# Patient Record
Sex: FEMALE | Race: WHITE | NOT HISPANIC OR LATINO | Employment: UNEMPLOYED
[De-identification: names, ages, dates, MRNs, and addresses within clinical notes are randomized per-mention and may not be internally consistent; named-entity substitution may affect disease eponyms.]

---

## 2018-01-01 ENCOUNTER — SURGERY (OUTPATIENT)
Age: 58
End: 2018-01-01

## 2018-01-01 ENCOUNTER — HOSPITAL ENCOUNTER (OUTPATIENT)
Dept: RADIOLOGY | Facility: HOSPITAL | Age: 58
Discharge: HOME | DRG: 405 | End: 2018-05-01
Attending: INTERNAL MEDICINE
Payer: MEDICARE

## 2018-01-01 ENCOUNTER — OFFICE VISIT (OUTPATIENT)
Dept: HEPATOLOGY | Facility: CLINIC | Age: 58
End: 2018-01-01
Attending: INTERNAL MEDICINE
Payer: MEDICARE

## 2018-01-01 ENCOUNTER — LAB VISIT (OUTPATIENT)
Dept: LAB | Facility: OTHER | Age: 58
End: 2018-01-01
Attending: INTERNAL MEDICINE
Payer: MEDICARE

## 2018-01-01 ENCOUNTER — TELEPHONE (OUTPATIENT)
Dept: HEMATOLOGY/ONCOLOGY | Facility: CLINIC | Age: 58
End: 2018-01-01

## 2018-01-01 ENCOUNTER — TELEPHONE (OUTPATIENT)
Dept: INTERVENTIONAL RADIOLOGY/VASCULAR | Facility: CLINIC | Age: 58
End: 2018-01-01

## 2018-01-01 ENCOUNTER — INITIAL CONSULT (OUTPATIENT)
Dept: HEMATOLOGY/ONCOLOGY | Facility: CLINIC | Age: 58
End: 2018-01-01
Payer: MEDICARE

## 2018-01-01 ENCOUNTER — CONFERENCE (OUTPATIENT)
Dept: TRANSPLANT | Facility: CLINIC | Age: 58
End: 2018-01-01

## 2018-01-01 ENCOUNTER — TELEPHONE (OUTPATIENT)
Dept: HEPATOLOGY | Facility: CLINIC | Age: 58
End: 2018-01-01

## 2018-01-01 ENCOUNTER — HOSPITAL ENCOUNTER (OUTPATIENT)
Dept: RADIOLOGY | Facility: HOSPITAL | Age: 58
Discharge: HOME | End: 2018-05-15
Attending: INTERNAL MEDICINE

## 2018-01-01 ENCOUNTER — TELEPHONE (OUTPATIENT)
Dept: TRANSPLANT | Facility: CLINIC | Age: 58
End: 2018-01-01

## 2018-01-01 ENCOUNTER — OFFICE VISIT (OUTPATIENT)
Dept: HEPATOLOGY | Facility: CLINIC | Age: 58
DRG: 405 | End: 2018-01-01
Payer: MEDICARE

## 2018-01-01 ENCOUNTER — PATIENT MESSAGE (OUTPATIENT)
Dept: HEPATOLOGY | Facility: CLINIC | Age: 58
End: 2018-01-01

## 2018-01-01 ENCOUNTER — HOSPITAL ENCOUNTER (INPATIENT)
Facility: HOSPITAL | Age: 58
LOS: 3 days | Discharge: HOME | DRG: 186 | End: 2018-05-25
Attending: EMERGENCY MEDICINE | Admitting: EMERGENCY MEDICINE
Payer: MEDICARE

## 2018-01-01 ENCOUNTER — ANESTHESIA EVENT (OUTPATIENT)
Dept: ENDOSCOPY | Facility: HOSPITAL | Age: 58
DRG: 405 | End: 2018-01-01
Payer: MEDICARE

## 2018-01-01 ENCOUNTER — ANESTHESIA (OUTPATIENT)
Dept: ENDOSCOPY | Facility: HOSPITAL | Age: 58
DRG: 405 | End: 2018-01-01
Payer: MEDICARE

## 2018-01-01 ENCOUNTER — PATIENT OUTREACH (OUTPATIENT)
Dept: ADMINISTRATIVE | Facility: CLINIC | Age: 58
End: 2018-01-01

## 2018-01-01 ENCOUNTER — HOSPITAL ENCOUNTER (INPATIENT)
Facility: HOSPITAL | Age: 58
LOS: 3 days | Discharge: HOME | DRG: 919 | End: 2018-07-30
Attending: EMERGENCY MEDICINE | Admitting: EMERGENCY MEDICINE
Payer: MEDICARE

## 2018-01-01 ENCOUNTER — OFFICE VISIT (OUTPATIENT)
Dept: HEPATOLOGY | Facility: CLINIC | Age: 58
End: 2018-01-01
Payer: MEDICARE

## 2018-01-01 ENCOUNTER — HOSPITAL ENCOUNTER (INPATIENT)
Facility: HOSPITAL | Age: 58
LOS: 13 days | Discharge: HOME | DRG: 405 | End: 2018-05-17
Attending: INTERNAL MEDICINE | Admitting: INTERNAL MEDICINE
Payer: MEDICARE

## 2018-01-01 ENCOUNTER — HOSPITAL ENCOUNTER (OUTPATIENT)
Facility: HOSPITAL | Age: 58
Discharge: HOME | End: 2018-08-13
Attending: RADIOLOGY | Admitting: RADIOLOGY
Payer: MEDICARE

## 2018-01-01 VITALS
WEIGHT: 155 LBS | BODY MASS INDEX: 26.4 KG/M2 | OXYGEN SATURATION: 98 % | HEART RATE: 76 BPM | HEIGHT: 63 IN | HEART RATE: 76 BPM | SYSTOLIC BLOOD PRESSURE: 107 MMHG | WEIGHT: 149 LBS | DIASTOLIC BLOOD PRESSURE: 53 MMHG | DIASTOLIC BLOOD PRESSURE: 60 MMHG | BODY MASS INDEX: 27.46 KG/M2 | TEMPERATURE: 98 F | RESPIRATION RATE: 18 BRPM | OXYGEN SATURATION: 97 % | HEIGHT: 63 IN | SYSTOLIC BLOOD PRESSURE: 104 MMHG | RESPIRATION RATE: 17 BRPM | TEMPERATURE: 98 F

## 2018-01-01 VITALS
WEIGHT: 166.69 LBS | SYSTOLIC BLOOD PRESSURE: 95 MMHG | TEMPERATURE: 98 F | HEIGHT: 63 IN | DIASTOLIC BLOOD PRESSURE: 50 MMHG | OXYGEN SATURATION: 96 % | HEART RATE: 80 BPM | BODY MASS INDEX: 29.54 KG/M2 | RESPIRATION RATE: 18 BRPM

## 2018-01-01 VITALS
DIASTOLIC BLOOD PRESSURE: 59 MMHG | DIASTOLIC BLOOD PRESSURE: 48 MMHG | TEMPERATURE: 98 F | BODY MASS INDEX: 26.31 KG/M2 | BODY MASS INDEX: 28.9 KG/M2 | SYSTOLIC BLOOD PRESSURE: 88 MMHG | OXYGEN SATURATION: 98 % | HEIGHT: 63 IN | WEIGHT: 148.5 LBS | DIASTOLIC BLOOD PRESSURE: 53 MMHG | RESPIRATION RATE: 19 BRPM | RESPIRATION RATE: 17 BRPM | SYSTOLIC BLOOD PRESSURE: 119 MMHG | HEART RATE: 104 BPM | RESPIRATION RATE: 16 BRPM | HEIGHT: 63 IN | TEMPERATURE: 98 F | OXYGEN SATURATION: 96 % | HEART RATE: 75 BPM | WEIGHT: 158.94 LBS | WEIGHT: 163.13 LBS | BODY MASS INDEX: 28.16 KG/M2 | OXYGEN SATURATION: 97 % | TEMPERATURE: 98 F | SYSTOLIC BLOOD PRESSURE: 87 MMHG | HEIGHT: 63 IN | HEART RATE: 78 BPM

## 2018-01-01 VITALS
DIASTOLIC BLOOD PRESSURE: 52 MMHG | OXYGEN SATURATION: 100 % | HEIGHT: 63 IN | BODY MASS INDEX: 27.46 KG/M2 | RESPIRATION RATE: 20 BRPM | WEIGHT: 155 LBS | HEART RATE: 67 BPM | TEMPERATURE: 98 F | SYSTOLIC BLOOD PRESSURE: 102 MMHG

## 2018-01-01 VITALS
BODY MASS INDEX: 26.17 KG/M2 | OXYGEN SATURATION: 100 % | RESPIRATION RATE: 18 BRPM | DIASTOLIC BLOOD PRESSURE: 61 MMHG | WEIGHT: 147.69 LBS | SYSTOLIC BLOOD PRESSURE: 128 MMHG | TEMPERATURE: 98 F | HEIGHT: 63 IN | HEART RATE: 93 BPM

## 2018-01-01 DIAGNOSIS — B18.2 CHRONIC HEPATITIS C WITHOUT HEPATIC COMA: ICD-10-CM

## 2018-01-01 DIAGNOSIS — K74.69 DECOMPENSATED HCV CIRRHOSIS: Primary | ICD-10-CM

## 2018-01-01 DIAGNOSIS — D61.818 PANCYTOPENIA: ICD-10-CM

## 2018-01-01 DIAGNOSIS — F32.A DEPRESSION, UNSPECIFIED DEPRESSION TYPE: ICD-10-CM

## 2018-01-01 DIAGNOSIS — D69.6 THROMBOCYTOPENIA: ICD-10-CM

## 2018-01-01 DIAGNOSIS — Z95.828 S/P TIPS (TRANSJUGULAR INTRAHEPATIC PORTOSYSTEMIC SHUNT): ICD-10-CM

## 2018-01-01 DIAGNOSIS — I81 PORTAL VEIN THROMBOSIS: Primary | ICD-10-CM

## 2018-01-01 DIAGNOSIS — B19.20 DECOMPENSATED HCV CIRRHOSIS: Primary | ICD-10-CM

## 2018-01-01 DIAGNOSIS — K73.2 CHRONIC ACTIVE HEPATITIS, NOT ELSEWHERE CLASSIFIED: ICD-10-CM

## 2018-01-01 DIAGNOSIS — B19.20 DECOMPENSATED HCV CIRRHOSIS: Chronic | ICD-10-CM

## 2018-01-01 DIAGNOSIS — J90 RECURRENT RIGHT PLEURAL EFFUSION: ICD-10-CM

## 2018-01-01 DIAGNOSIS — B19.20 DECOMPENSATED HCV CIRRHOSIS: ICD-10-CM

## 2018-01-01 DIAGNOSIS — K74.60 CIRRHOSIS: Primary | ICD-10-CM

## 2018-01-01 DIAGNOSIS — J94.8 HYDROTHORAX: Primary | ICD-10-CM

## 2018-01-01 DIAGNOSIS — J90 PLEURAL EFFUSION: ICD-10-CM

## 2018-01-01 DIAGNOSIS — J94.8 HYDROTHORAX: ICD-10-CM

## 2018-01-01 DIAGNOSIS — K74.69 DECOMPENSATED HCV CIRRHOSIS: Chronic | ICD-10-CM

## 2018-01-01 DIAGNOSIS — I81 PORTAL VEIN THROMBOSIS: ICD-10-CM

## 2018-01-01 DIAGNOSIS — D72.819 LEUKOPENIA, UNSPECIFIED TYPE: ICD-10-CM

## 2018-01-01 DIAGNOSIS — K74.69 DECOMPENSATED HCV CIRRHOSIS: ICD-10-CM

## 2018-01-01 DIAGNOSIS — B18.2 CHRONIC HEPATITIS C WITHOUT HEPATIC COMA: Primary | ICD-10-CM

## 2018-01-01 DIAGNOSIS — K74.69 OTHER CIRRHOSIS OF LIVER: ICD-10-CM

## 2018-01-01 DIAGNOSIS — K74.60 CIRRHOSIS: ICD-10-CM

## 2018-01-01 DIAGNOSIS — K74.69 OTHER CIRRHOSIS OF LIVER: Primary | ICD-10-CM

## 2018-01-01 DIAGNOSIS — I36.9 NONRHEUMATIC TRICUSPID VALVE DISORDER: ICD-10-CM

## 2018-01-01 DIAGNOSIS — R06.02 SOB (SHORTNESS OF BREATH): ICD-10-CM

## 2018-01-01 DIAGNOSIS — J90 RECURRENT RIGHT PLEURAL EFFUSION: Primary | ICD-10-CM

## 2018-01-01 DIAGNOSIS — N18.30 CKD STAGE 3 DUE TO TYPE 2 DIABETES MELLITUS: ICD-10-CM

## 2018-01-01 DIAGNOSIS — R06.02 SHORTNESS OF BREATH: ICD-10-CM

## 2018-01-01 DIAGNOSIS — I10 ESSENTIAL HYPERTENSION: ICD-10-CM

## 2018-01-01 DIAGNOSIS — R18.8 OTHER ASCITES: Primary | ICD-10-CM

## 2018-01-01 DIAGNOSIS — F41.9 ANXIETY: ICD-10-CM

## 2018-01-01 DIAGNOSIS — G93.40 ENCEPHALOPATHY: Primary | ICD-10-CM

## 2018-01-01 DIAGNOSIS — E88.09 HYPOALBUMINEMIA: ICD-10-CM

## 2018-01-01 DIAGNOSIS — E11.22 CKD STAGE 3 DUE TO TYPE 2 DIABETES MELLITUS: ICD-10-CM

## 2018-01-01 DIAGNOSIS — K74.60 HEPATIC CIRRHOSIS, UNSPECIFIED HEPATIC CIRRHOSIS TYPE, UNSPECIFIED WHETHER ASCITES PRESENT: Primary | ICD-10-CM

## 2018-01-01 LAB
ABO + RH BLD: NORMAL
ACID FAST MOD KINY STN SPEC: NORMAL
ALBUMIN FLD-MCNC: 0.4 G/DL
ALBUMIN SERPL BCP-MCNC: 1.8 G/DL
ALBUMIN SERPL BCP-MCNC: 1.9 G/DL
ALBUMIN SERPL BCP-MCNC: 2 G/DL
ALBUMIN SERPL BCP-MCNC: 2.1 G/DL
ALBUMIN SERPL BCP-MCNC: 2.1 G/DL
ALBUMIN SERPL BCP-MCNC: 2.2 G/DL
ALBUMIN SERPL BCP-MCNC: 2.3 G/DL
ALBUMIN SERPL BCP-MCNC: 2.3 G/DL
ALBUMIN SERPL BCP-MCNC: 2.4 G/DL
ALBUMIN SERPL BCP-MCNC: 2.6 G/DL
ALBUMIN SERPL BCP-MCNC: 2.6 G/DL
ALBUMIN SERPL BCP-MCNC: 3 G/DL
ALBUMIN SERPL BCP-MCNC: 3 G/DL
ALBUMIN SERPL BCP-MCNC: 3.1 G/DL
ALBUMIN SERPL BCP-MCNC: 3.2 G/DL
ALBUMIN SERPL BCP-MCNC: 3.3 G/DL
ALBUMIN SERPL BCP-MCNC: 3.4 G/DL
ALBUMIN SERPL BCP-MCNC: 3.4 G/DL
ALBUMIN SERPL BCP-MCNC: 3.5 G/DL
ALBUMIN SERPL BCP-MCNC: 3.7 G/DL
ALP SERPL-CCNC: 38 U/L
ALP SERPL-CCNC: 38 U/L
ALP SERPL-CCNC: 42 U/L
ALP SERPL-CCNC: 43 U/L
ALP SERPL-CCNC: 43 U/L
ALP SERPL-CCNC: 47 U/L
ALP SERPL-CCNC: 50 U/L
ALP SERPL-CCNC: 53 U/L
ALP SERPL-CCNC: 53 U/L
ALP SERPL-CCNC: 54 U/L
ALP SERPL-CCNC: 55 U/L
ALP SERPL-CCNC: 56 U/L
ALP SERPL-CCNC: 56 U/L
ALP SERPL-CCNC: 57 U/L
ALP SERPL-CCNC: 58 U/L
ALP SERPL-CCNC: 58 U/L
ALP SERPL-CCNC: 61 U/L
ALP SERPL-CCNC: 61 U/L
ALP SERPL-CCNC: 62 U/L
ALP SERPL-CCNC: 63 U/L
ALP SERPL-CCNC: 65 U/L
ALP SERPL-CCNC: 71 U/L
ALP SERPL-CCNC: 72 U/L
ALT SERPL W/O P-5'-P-CCNC: 10 U/L
ALT SERPL W/O P-5'-P-CCNC: 11 U/L
ALT SERPL W/O P-5'-P-CCNC: 12 U/L
ALT SERPL W/O P-5'-P-CCNC: 13 U/L
ALT SERPL W/O P-5'-P-CCNC: 16 U/L
ALT SERPL W/O P-5'-P-CCNC: 18 U/L
ALT SERPL W/O P-5'-P-CCNC: 20 U/L
ALT SERPL W/O P-5'-P-CCNC: 25 U/L
ALT SERPL W/O P-5'-P-CCNC: 26 U/L
ALT SERPL W/O P-5'-P-CCNC: 31 U/L
ALT SERPL W/O P-5'-P-CCNC: 37 U/L
ALT SERPL W/O P-5'-P-CCNC: 37 U/L
ALT SERPL W/O P-5'-P-CCNC: 44 U/L
ALT SERPL W/O P-5'-P-CCNC: 60 U/L
ALT SERPL W/O P-5'-P-CCNC: 82 U/L
ALT SERPL W/O P-5'-P-CCNC: 9 U/L
AMMONIA PLAS-SCNC: 37 UMOL/L
AMMONIA PLAS-SCNC: 58 UMOL/L
AMYLASE, BODY FLUID: 13 U/L
ANION GAP SERPL CALC-SCNC: 10 MMOL/L
ANION GAP SERPL CALC-SCNC: 3 MMOL/L
ANION GAP SERPL CALC-SCNC: 4 MMOL/L
ANION GAP SERPL CALC-SCNC: 4 MMOL/L
ANION GAP SERPL CALC-SCNC: 5 MMOL/L
ANION GAP SERPL CALC-SCNC: 5 MMOL/L
ANION GAP SERPL CALC-SCNC: 6 MMOL/L
ANION GAP SERPL CALC-SCNC: 7 MMOL/L
ANION GAP SERPL CALC-SCNC: 7 MMOL/L
ANION GAP SERPL CALC-SCNC: 8 MMOL/L
ANION GAP SERPL CALC-SCNC: 9 MMOL/L
ANISOCYTOSIS BLD QL SMEAR: SLIGHT
APPEARANCE FLD: NORMAL
APTT BLDCRRT: 26.5 SEC
AST SERPL-CCNC: 12 U/L
AST SERPL-CCNC: 13 U/L
AST SERPL-CCNC: 14 U/L
AST SERPL-CCNC: 141 U/L
AST SERPL-CCNC: 16 U/L
AST SERPL-CCNC: 18 U/L
AST SERPL-CCNC: 20 U/L
AST SERPL-CCNC: 20 U/L
AST SERPL-CCNC: 21 U/L
AST SERPL-CCNC: 21 U/L
AST SERPL-CCNC: 24 U/L
AST SERPL-CCNC: 25 U/L
AST SERPL-CCNC: 25 U/L
AST SERPL-CCNC: 30 U/L
AST SERPL-CCNC: 31 U/L
AST SERPL-CCNC: 35 U/L
AST SERPL-CCNC: 50 U/L
AST SERPL-CCNC: 51 U/L
AST SERPL-CCNC: 87 U/L
BACTERIA #/AREA URNS AUTO: ABNORMAL /HPF
BACTERIA BLD CULT: NORMAL
BACTERIA BLD CULT: NORMAL
BACTERIA FLD AEROBE CULT: NO GROWTH
BACTERIA SPEC ANAEROBE CULT: NORMAL
BACTERIA UR CULT: NO GROWTH
BASOPHILS # BLD AUTO: 0 K/UL
BASOPHILS # BLD AUTO: 0.01 K/UL
BASOPHILS # BLD AUTO: ABNORMAL K/UL
BASOPHILS NFR BLD: 0 %
BASOPHILS NFR BLD: 0.3 %
BASOPHILS NFR BLD: 0.4 %
BASOPHILS NFR BLD: 0.5 %
BILIRUB SERPL-MCNC: 0.3 MG/DL
BILIRUB SERPL-MCNC: 0.3 MG/DL
BILIRUB SERPL-MCNC: 0.4 MG/DL
BILIRUB SERPL-MCNC: 0.4 MG/DL
BILIRUB SERPL-MCNC: 0.6 MG/DL
BILIRUB SERPL-MCNC: 0.7 MG/DL
BILIRUB SERPL-MCNC: 0.7 MG/DL
BILIRUB SERPL-MCNC: 0.8 MG/DL
BILIRUB SERPL-MCNC: 0.9 MG/DL
BILIRUB SERPL-MCNC: 1 MG/DL
BILIRUB SERPL-MCNC: 1.2 MG/DL
BILIRUB SERPL-MCNC: 1.3 MG/DL
BILIRUB SERPL-MCNC: 1.5 MG/DL
BILIRUB SERPL-MCNC: 1.6 MG/DL
BILIRUB SERPL-MCNC: 1.7 MG/DL
BILIRUB SERPL-MCNC: 1.7 MG/DL
BILIRUB SERPL-MCNC: 1.9 MG/DL
BILIRUB UR QL STRIP: NEGATIVE
BLD GP AB SCN CELLS X3 SERPL QL: NORMAL
BLD PROD TYP BPU: NORMAL
BLOOD UNIT EXPIRATION DATE: NORMAL
BLOOD UNIT TYPE CODE: 6200
BLOOD UNIT TYPE: NORMAL
BNP SERPL-MCNC: 178 PG/ML
BNP SERPL-MCNC: 52 PG/ML
BODY FLD TYPE: NORMAL
BODY FLUID SOURCE AMYLASE: NORMAL
BODY FLUID SOURCE, LDH: NORMAL
BUN SERPL-MCNC: 10 MG/DL
BUN SERPL-MCNC: 11 MG/DL
BUN SERPL-MCNC: 12 MG/DL
BUN SERPL-MCNC: 14 MG/DL
BUN SERPL-MCNC: 15 MG/DL
BUN SERPL-MCNC: 16 MG/DL
BUN SERPL-MCNC: 17 MG/DL
BUN SERPL-MCNC: 20 MG/DL
BUN SERPL-MCNC: 22 MG/DL
BUN SERPL-MCNC: 22 MG/DL
BUN SERPL-MCNC: 24 MG/DL
BUN SERPL-MCNC: 26 MG/DL
BUN SERPL-MCNC: 26 MG/DL
BUN SERPL-MCNC: 27 MG/DL
BUN SERPL-MCNC: 27 MG/DL
CALCIUM SERPL-MCNC: 8 MG/DL
CALCIUM SERPL-MCNC: 8 MG/DL
CALCIUM SERPL-MCNC: 8.1 MG/DL
CALCIUM SERPL-MCNC: 8.3 MG/DL
CALCIUM SERPL-MCNC: 8.3 MG/DL
CALCIUM SERPL-MCNC: 8.4 MG/DL
CALCIUM SERPL-MCNC: 8.4 MG/DL
CALCIUM SERPL-MCNC: 8.6 MG/DL
CALCIUM SERPL-MCNC: 8.6 MG/DL
CALCIUM SERPL-MCNC: 8.8 MG/DL
CALCIUM SERPL-MCNC: 8.9 MG/DL
CALCIUM SERPL-MCNC: 8.9 MG/DL
CALCIUM SERPL-MCNC: 9 MG/DL
CALCIUM SERPL-MCNC: 9.1 MG/DL
CALCIUM SERPL-MCNC: 9.2 MG/DL
CALCIUM SERPL-MCNC: 9.3 MG/DL
CALCIUM SERPL-MCNC: 9.4 MG/DL
CALCIUM SERPL-MCNC: 9.4 MG/DL
CALCIUM SERPL-MCNC: 9.5 MG/DL
CALCIUM SERPL-MCNC: 9.5 MG/DL
CHLORIDE SERPL-SCNC: 100 MMOL/L
CHLORIDE SERPL-SCNC: 100 MMOL/L
CHLORIDE SERPL-SCNC: 101 MMOL/L
CHLORIDE SERPL-SCNC: 102 MMOL/L
CHLORIDE SERPL-SCNC: 102 MMOL/L
CHLORIDE SERPL-SCNC: 103 MMOL/L
CHLORIDE SERPL-SCNC: 104 MMOL/L
CHLORIDE SERPL-SCNC: 105 MMOL/L
CHLORIDE SERPL-SCNC: 106 MMOL/L
CHLORIDE SERPL-SCNC: 106 MMOL/L
CHLORIDE SERPL-SCNC: 107 MMOL/L
CHLORIDE SERPL-SCNC: 110 MMOL/L
CHLORIDE SERPL-SCNC: 96 MMOL/L
CHLORIDE SERPL-SCNC: 97 MMOL/L
CHLORIDE SERPL-SCNC: 98 MMOL/L
CHLORIDE SERPL-SCNC: 98 MMOL/L
CHLORIDE SERPL-SCNC: 99 MMOL/L
CHLORIDE SERPL-SCNC: 99 MMOL/L
CHOLEST FLD-MCNC: 9 MG/DL
CLARITY UR REFRACT.AUTO: CLEAR
CO2 SERPL-SCNC: 23 MMOL/L
CO2 SERPL-SCNC: 25 MMOL/L
CO2 SERPL-SCNC: 26 MMOL/L
CO2 SERPL-SCNC: 26 MMOL/L
CO2 SERPL-SCNC: 27 MMOL/L
CO2 SERPL-SCNC: 28 MMOL/L
CO2 SERPL-SCNC: 29 MMOL/L
CO2 SERPL-SCNC: 30 MMOL/L
CO2 SERPL-SCNC: 33 MMOL/L
CODING SYSTEM: NORMAL
COLOR FLD: NORMAL
COLOR UR AUTO: ABNORMAL
COLOR UR AUTO: YELLOW
COLOR UR AUTO: YELLOW
CREAT SERPL-MCNC: 0.8 MG/DL
CREAT SERPL-MCNC: 0.8 MG/DL
CREAT SERPL-MCNC: 0.9 MG/DL
CREAT SERPL-MCNC: 1 MG/DL
CREAT SERPL-MCNC: 1 MG/DL
CREAT SERPL-MCNC: 1.1 MG/DL
CREAT SERPL-MCNC: 1.2 MG/DL
CREAT SERPL-MCNC: 1.2 MG/DL
CREAT SERPL-MCNC: 1.3 MG/DL
CREAT SERPL-MCNC: 1.4 MG/DL
CREAT SERPL-MCNC: 1.4 MG/DL
CREAT SERPL-MCNC: 1.5 MG/DL
CREAT SERPL-MCNC: 1.7 MG/DL
CREAT UR-MCNC: 142 MG/DL
DACRYOCYTES BLD QL SMEAR: ABNORMAL
DIASTOLIC DYSFUNCTION: NO
DIFFERENTIAL METHOD: ABNORMAL
DISPENSE STATUS: NORMAL
EOSINOPHIL # BLD AUTO: 0 K/UL
EOSINOPHIL # BLD AUTO: 0.1 K/UL
EOSINOPHIL # BLD AUTO: ABNORMAL K/UL
EOSINOPHIL NFR BLD: 0 %
EOSINOPHIL NFR BLD: 0 %
EOSINOPHIL NFR BLD: 0.4 %
EOSINOPHIL NFR BLD: 0.9 %
EOSINOPHIL NFR BLD: 0.9 %
EOSINOPHIL NFR BLD: 1.2 %
EOSINOPHIL NFR BLD: 1.5 %
EOSINOPHIL NFR BLD: 2.2 %
EOSINOPHIL NFR BLD: 2.5 %
EOSINOPHIL NFR BLD: 2.6 %
EOSINOPHIL NFR BLD: 3 %
EOSINOPHIL NFR BLD: 3 %
EOSINOPHIL NFR BLD: 3.1 %
EOSINOPHIL NFR BLD: 3.8 %
EOSINOPHIL NFR BLD: 4.1 %
EOSINOPHIL NFR BLD: 4.2 %
EOSINOPHIL NFR BLD: 4.4 %
EOSINOPHIL NFR BLD: 4.5 %
EOSINOPHIL NFR BLD: 5 %
EOSINOPHIL NFR BLD: 5 %
EOSINOPHIL NFR BLD: 5.4 %
EOSINOPHIL NFR BLD: 6 %
ERYTHROCYTE [DISTWIDTH] IN BLOOD BY AUTOMATED COUNT: 15.1 %
ERYTHROCYTE [DISTWIDTH] IN BLOOD BY AUTOMATED COUNT: 15.2 %
ERYTHROCYTE [DISTWIDTH] IN BLOOD BY AUTOMATED COUNT: 15.5 %
ERYTHROCYTE [DISTWIDTH] IN BLOOD BY AUTOMATED COUNT: 15.5 %
ERYTHROCYTE [DISTWIDTH] IN BLOOD BY AUTOMATED COUNT: 15.6 %
ERYTHROCYTE [DISTWIDTH] IN BLOOD BY AUTOMATED COUNT: 15.7 %
ERYTHROCYTE [DISTWIDTH] IN BLOOD BY AUTOMATED COUNT: 15.8 %
ERYTHROCYTE [DISTWIDTH] IN BLOOD BY AUTOMATED COUNT: 15.8 %
ERYTHROCYTE [DISTWIDTH] IN BLOOD BY AUTOMATED COUNT: 15.9 %
ERYTHROCYTE [DISTWIDTH] IN BLOOD BY AUTOMATED COUNT: 15.9 %
ERYTHROCYTE [DISTWIDTH] IN BLOOD BY AUTOMATED COUNT: 16 %
ERYTHROCYTE [DISTWIDTH] IN BLOOD BY AUTOMATED COUNT: 16 %
ERYTHROCYTE [DISTWIDTH] IN BLOOD BY AUTOMATED COUNT: 16.1 %
ERYTHROCYTE [DISTWIDTH] IN BLOOD BY AUTOMATED COUNT: 16.2 %
ERYTHROCYTE [DISTWIDTH] IN BLOOD BY AUTOMATED COUNT: 16.3 %
ERYTHROCYTE [DISTWIDTH] IN BLOOD BY AUTOMATED COUNT: 16.3 %
ERYTHROCYTE [DISTWIDTH] IN BLOOD BY AUTOMATED COUNT: 16.4 %
ERYTHROCYTE [DISTWIDTH] IN BLOOD BY AUTOMATED COUNT: 16.5 %
ERYTHROCYTE [DISTWIDTH] IN BLOOD BY AUTOMATED COUNT: 17.2 %
ERYTHROCYTE [DISTWIDTH] IN BLOOD BY AUTOMATED COUNT: 18.1 %
ERYTHROCYTE [DISTWIDTH] IN BLOOD BY AUTOMATED COUNT: 18.2 %
ERYTHROCYTE [DISTWIDTH] IN BLOOD BY AUTOMATED COUNT: 18.2 %
ERYTHROCYTE [DISTWIDTH] IN BLOOD BY AUTOMATED COUNT: 18.7 %
EST. GFR  (AFRICAN AMERICAN): 38 ML/MIN/1.73 M^2
EST. GFR  (AFRICAN AMERICAN): 44.3 ML/MIN/1.73 M^2
EST. GFR  (AFRICAN AMERICAN): 48 ML/MIN/1.73 M^2
EST. GFR  (AFRICAN AMERICAN): 48.1 ML/MIN/1.73 M^2
EST. GFR  (AFRICAN AMERICAN): 52.6 ML/MIN/1.73 M^2
EST. GFR  (AFRICAN AMERICAN): 58 ML/MIN/1.73 M^2
EST. GFR  (AFRICAN AMERICAN): 58 ML/MIN/1.73 M^2
EST. GFR  (AFRICAN AMERICAN): >60 ML/MIN/1.73 M^2
EST. GFR  (NON AFRICAN AMERICAN): 33 ML/MIN/1.73 M^2
EST. GFR  (NON AFRICAN AMERICAN): 38.4 ML/MIN/1.73 M^2
EST. GFR  (NON AFRICAN AMERICAN): 41.7 ML/MIN/1.73 M^2
EST. GFR  (NON AFRICAN AMERICAN): 42 ML/MIN/1.73 M^2
EST. GFR  (NON AFRICAN AMERICAN): 45.6 ML/MIN/1.73 M^2
EST. GFR  (NON AFRICAN AMERICAN): 50.3 ML/MIN/1.73 M^2
EST. GFR  (NON AFRICAN AMERICAN): 50.3 ML/MIN/1.73 M^2
EST. GFR  (NON AFRICAN AMERICAN): 55.9 ML/MIN/1.73 M^2
EST. GFR  (NON AFRICAN AMERICAN): >60 ML/MIN/1.73 M^2
ESTIMATED AVG GLUCOSE: 117 MG/DL
ESTIMATED AVG GLUCOSE: 134 MG/DL
ESTIMATED PA SYSTOLIC PRESSURE: 22.36
FACT X PPP CHRO-ACNC: 0.24 IU/ML
FACT X PPP CHRO-ACNC: 0.42 IU/ML
FACT X PPP CHRO-ACNC: 0.5 IU/ML
FACT X PPP CHRO-ACNC: <0.1 IU/ML
FIBRINOGEN PPP-MCNC: 225 MG/DL
FOLATE SERPL-MCNC: 3.6 NG/ML
FUNGUS SPEC CULT: NORMAL
GIANT PLATELETS BLD QL SMEAR: PRESENT
GLUCOSE FLD-MCNC: 194 MG/DL
GLUCOSE SERPL-MCNC: 126 MG/DL
GLUCOSE SERPL-MCNC: 129 MG/DL
GLUCOSE SERPL-MCNC: 136 MG/DL
GLUCOSE SERPL-MCNC: 139 MG/DL
GLUCOSE SERPL-MCNC: 144 MG/DL
GLUCOSE SERPL-MCNC: 153 MG/DL
GLUCOSE SERPL-MCNC: 169 MG/DL
GLUCOSE SERPL-MCNC: 188 MG/DL
GLUCOSE SERPL-MCNC: 193 MG/DL
GLUCOSE SERPL-MCNC: 197 MG/DL
GLUCOSE SERPL-MCNC: 200 MG/DL
GLUCOSE SERPL-MCNC: 202 MG/DL
GLUCOSE SERPL-MCNC: 203 MG/DL
GLUCOSE SERPL-MCNC: 205 MG/DL
GLUCOSE SERPL-MCNC: 206 MG/DL
GLUCOSE SERPL-MCNC: 206 MG/DL
GLUCOSE SERPL-MCNC: 215 MG/DL
GLUCOSE SERPL-MCNC: 219 MG/DL
GLUCOSE SERPL-MCNC: 237 MG/DL
GLUCOSE SERPL-MCNC: 243 MG/DL
GLUCOSE SERPL-MCNC: 270 MG/DL
GLUCOSE SERPL-MCNC: 280 MG/DL
GLUCOSE SERPL-MCNC: 345 MG/DL
GLUCOSE SERPL-MCNC: 98 MG/DL
GLUCOSE UR QL STRIP: ABNORMAL
GLUCOSE UR QL STRIP: NEGATIVE
GLUCOSE UR QL STRIP: NEGATIVE
GRAM STN SPEC: NORMAL
GRAM STN SPEC: NORMAL
HAPTOGLOB SERPL-MCNC: 46 MG/DL
HBA1C MFR BLD HPLC: 5.7 %
HBA1C MFR BLD HPLC: 6.3 %
HCT VFR BLD AUTO: 21.4 %
HCT VFR BLD AUTO: 22.4 %
HCT VFR BLD AUTO: 22.8 %
HCT VFR BLD AUTO: 22.9 %
HCT VFR BLD AUTO: 23.8 %
HCT VFR BLD AUTO: 24 %
HCT VFR BLD AUTO: 24 %
HCT VFR BLD AUTO: 24.1 %
HCT VFR BLD AUTO: 24.6 %
HCT VFR BLD AUTO: 24.7 %
HCT VFR BLD AUTO: 24.7 %
HCT VFR BLD AUTO: 24.8 %
HCT VFR BLD AUTO: 25 %
HCT VFR BLD AUTO: 25.1 %
HCT VFR BLD AUTO: 25.2 %
HCT VFR BLD AUTO: 25.3 %
HCT VFR BLD AUTO: 25.5 %
HCT VFR BLD AUTO: 25.8 %
HCT VFR BLD AUTO: 27.1 %
HCT VFR BLD AUTO: 27.2 %
HCT VFR BLD AUTO: 27.6 %
HCT VFR BLD AUTO: 29 %
HCT VFR BLD AUTO: 29.5 %
HCT VFR BLD AUTO: 30.4 %
HCT VFR BLD AUTO: 30.9 %
HGB BLD-MCNC: 6.5 G/DL
HGB BLD-MCNC: 6.9 G/DL
HGB BLD-MCNC: 6.9 G/DL
HGB BLD-MCNC: 7 G/DL
HGB BLD-MCNC: 7.3 G/DL
HGB BLD-MCNC: 7.3 G/DL
HGB BLD-MCNC: 7.4 G/DL
HGB BLD-MCNC: 7.5 G/DL
HGB BLD-MCNC: 7.5 G/DL
HGB BLD-MCNC: 7.6 G/DL
HGB BLD-MCNC: 7.7 G/DL
HGB BLD-MCNC: 7.8 G/DL
HGB BLD-MCNC: 7.9 G/DL
HGB BLD-MCNC: 8 G/DL
HGB BLD-MCNC: 8.1 G/DL
HGB BLD-MCNC: 8.4 G/DL
HGB BLD-MCNC: 8.5 G/DL
HGB BLD-MCNC: 8.6 G/DL
HGB BLD-MCNC: 8.8 G/DL
HGB BLD-MCNC: 9.2 G/DL
HGB BLD-MCNC: 9.2 G/DL
HGB BLD-MCNC: 9.7 G/DL
HGB UR QL STRIP: NEGATIVE
HYALINE CASTS UR QL AUTO: 4 /LPF
HYPOCHROMIA BLD QL SMEAR: ABNORMAL
IMM GRANULOCYTES # BLD AUTO: 0 K/UL
IMM GRANULOCYTES # BLD AUTO: 0.01 K/UL
IMM GRANULOCYTES # BLD AUTO: 0.02 K/UL
IMM GRANULOCYTES # BLD AUTO: 0.03 K/UL
IMM GRANULOCYTES # BLD AUTO: 0.05 K/UL
IMM GRANULOCYTES # BLD AUTO: 0.06 K/UL
IMM GRANULOCYTES # BLD AUTO: ABNORMAL K/UL
IMM GRANULOCYTES NFR BLD AUTO: 0 %
IMM GRANULOCYTES NFR BLD AUTO: 0.4 %
IMM GRANULOCYTES NFR BLD AUTO: 0.5 %
IMM GRANULOCYTES NFR BLD AUTO: 0.6 %
IMM GRANULOCYTES NFR BLD AUTO: 0.7 %
IMM GRANULOCYTES NFR BLD AUTO: 0.7 %
IMM GRANULOCYTES NFR BLD AUTO: 0.8 %
IMM GRANULOCYTES NFR BLD AUTO: 0.8 %
IMM GRANULOCYTES NFR BLD AUTO: 0.9 %
IMM GRANULOCYTES NFR BLD AUTO: 1.1 %
IMM GRANULOCYTES NFR BLD AUTO: 1.1 %
IMM GRANULOCYTES NFR BLD AUTO: 1.3 %
IMM GRANULOCYTES NFR BLD AUTO: 1.3 %
IMM GRANULOCYTES NFR BLD AUTO: ABNORMAL %
INR PPP: 1.1
INR PPP: 1.2
INR PPP: 1.3
INR PPP: 1.4
INR PPP: 1.5
INR PPP: 1.6
KETONES UR QL STRIP: NEGATIVE
KOH PREP SPEC: NORMAL
LACTATE SERPL-SCNC: 1.2 MMOL/L
LACTATE SERPL-SCNC: 1.5 MMOL/L
LDH FLD L TO P-CCNC: 25 U/L
LDH SERPL L TO P-CCNC: 148 U/L
LDH SERPL L TO P-CCNC: 179 U/L
LEUKOCYTE ESTERASE UR QL STRIP: NEGATIVE
LIPASE SERPL-CCNC: 16 U/L
LYMPHOCYTES # BLD AUTO: 0.1 K/UL
LYMPHOCYTES # BLD AUTO: 0.2 K/UL
LYMPHOCYTES # BLD AUTO: 0.3 K/UL
LYMPHOCYTES # BLD AUTO: 0.4 K/UL
LYMPHOCYTES # BLD AUTO: 0.4 K/UL
LYMPHOCYTES # BLD AUTO: ABNORMAL K/UL
LYMPHOCYTES NFR BLD: 10.9 %
LYMPHOCYTES NFR BLD: 11.1 %
LYMPHOCYTES NFR BLD: 12 %
LYMPHOCYTES NFR BLD: 12.3 %
LYMPHOCYTES NFR BLD: 12.9 %
LYMPHOCYTES NFR BLD: 14 %
LYMPHOCYTES NFR BLD: 14.1 %
LYMPHOCYTES NFR BLD: 14.4 %
LYMPHOCYTES NFR BLD: 15.6 %
LYMPHOCYTES NFR BLD: 15.9 %
LYMPHOCYTES NFR BLD: 17.1 %
LYMPHOCYTES NFR BLD: 17.4 %
LYMPHOCYTES NFR BLD: 2.5 %
LYMPHOCYTES NFR BLD: 23 %
LYMPHOCYTES NFR BLD: 4.8 %
LYMPHOCYTES NFR BLD: 5 %
LYMPHOCYTES NFR BLD: 5.1 %
LYMPHOCYTES NFR BLD: 5.9 %
LYMPHOCYTES NFR BLD: 6 %
LYMPHOCYTES NFR BLD: 7.1 %
LYMPHOCYTES NFR BLD: 7.4 %
LYMPHOCYTES NFR BLD: 9.7 %
LYMPHOCYTES NFR BLD: 9.8 %
LYMPHOCYTES NFR BLD: 9.8 %
LYMPHOCYTES NFR FLD MANUAL: 53 %
MAGNESIUM SERPL-MCNC: 1.3 MG/DL
MAGNESIUM SERPL-MCNC: 1.4 MG/DL
MAGNESIUM SERPL-MCNC: 1.5 MG/DL
MAGNESIUM SERPL-MCNC: 1.5 MG/DL
MAGNESIUM SERPL-MCNC: 1.6 MG/DL
MAGNESIUM SERPL-MCNC: 1.7 MG/DL
MAGNESIUM SERPL-MCNC: 1.7 MG/DL
MAGNESIUM SERPL-MCNC: 1.8 MG/DL
MAGNESIUM SERPL-MCNC: 2.1 MG/DL
MAGNESIUM SERPL-MCNC: 2.1 MG/DL
MAGNESIUM SERPL-MCNC: 2.3 MG/DL
MCH RBC QN AUTO: 24 PG
MCH RBC QN AUTO: 24.1 PG
MCH RBC QN AUTO: 24.4 PG
MCH RBC QN AUTO: 24.6 PG
MCH RBC QN AUTO: 24.6 PG
MCH RBC QN AUTO: 25 PG
MCH RBC QN AUTO: 25.1 PG
MCH RBC QN AUTO: 25.2 PG
MCH RBC QN AUTO: 25.6 PG
MCH RBC QN AUTO: 25.6 PG
MCH RBC QN AUTO: 25.7 PG
MCH RBC QN AUTO: 25.8 PG
MCH RBC QN AUTO: 25.9 PG
MCH RBC QN AUTO: 26 PG
MCH RBC QN AUTO: 26.1 PG
MCH RBC QN AUTO: 26.2 PG
MCH RBC QN AUTO: 26.3 PG
MCH RBC QN AUTO: 26.4 PG
MCH RBC QN AUTO: 26.6 PG
MCH RBC QN AUTO: 26.7 PG
MCHC RBC AUTO-ENTMCNC: 30 G/DL
MCHC RBC AUTO-ENTMCNC: 30.1 G/DL
MCHC RBC AUTO-ENTMCNC: 30.2 G/DL
MCHC RBC AUTO-ENTMCNC: 30.3 G/DL
MCHC RBC AUTO-ENTMCNC: 30.4 G/DL
MCHC RBC AUTO-ENTMCNC: 30.6 G/DL
MCHC RBC AUTO-ENTMCNC: 30.7 G/DL
MCHC RBC AUTO-ENTMCNC: 30.8 G/DL
MCHC RBC AUTO-ENTMCNC: 30.9 G/DL
MCHC RBC AUTO-ENTMCNC: 30.9 G/DL
MCHC RBC AUTO-ENTMCNC: 31 G/DL
MCHC RBC AUTO-ENTMCNC: 31.2 G/DL
MCHC RBC AUTO-ENTMCNC: 31.3 G/DL
MCHC RBC AUTO-ENTMCNC: 31.4 G/DL
MCHC RBC AUTO-ENTMCNC: 31.5 G/DL
MCHC RBC AUTO-ENTMCNC: 31.5 G/DL
MCHC RBC AUTO-ENTMCNC: 31.6 G/DL
MCHC RBC AUTO-ENTMCNC: 31.7 G/DL
MCHC RBC AUTO-ENTMCNC: 31.8 G/DL
MCV RBC AUTO: 79 FL
MCV RBC AUTO: 80 FL
MCV RBC AUTO: 81 FL
MCV RBC AUTO: 81 FL
MCV RBC AUTO: 82 FL
MCV RBC AUTO: 82 FL
MCV RBC AUTO: 83 FL
MCV RBC AUTO: 84 FL
MCV RBC AUTO: 85 FL
MCV RBC AUTO: 86 FL
MCV RBC AUTO: 87 FL
MCV RBC AUTO: 87 FL
MICROSCOPIC COMMENT: ABNORMAL
MONOCYTES # BLD AUTO: 0.1 K/UL
MONOCYTES # BLD AUTO: 0.2 K/UL
MONOCYTES # BLD AUTO: 0.3 K/UL
MONOCYTES # BLD AUTO: 0.4 K/UL
MONOCYTES # BLD AUTO: 0.4 K/UL
MONOCYTES # BLD AUTO: 0.5 K/UL
MONOCYTES # BLD AUTO: ABNORMAL K/UL
MONOCYTES NFR BLD: 10.6 %
MONOCYTES NFR BLD: 10.7 %
MONOCYTES NFR BLD: 11.1 %
MONOCYTES NFR BLD: 12.1 %
MONOCYTES NFR BLD: 12.2 %
MONOCYTES NFR BLD: 12.4 %
MONOCYTES NFR BLD: 12.9 %
MONOCYTES NFR BLD: 16.7 %
MONOCYTES NFR BLD: 19.7 %
MONOCYTES NFR BLD: 2 %
MONOCYTES NFR BLD: 20.9 %
MONOCYTES NFR BLD: 3 %
MONOCYTES NFR BLD: 4 %
MONOCYTES NFR BLD: 5 %
MONOCYTES NFR BLD: 6.4 %
MONOCYTES NFR BLD: 6.5 %
MONOCYTES NFR BLD: 7 %
MONOCYTES NFR BLD: 7.4 %
MONOCYTES NFR BLD: 7.5 %
MONOCYTES NFR BLD: 7.5 %
MONOCYTES NFR BLD: 8.2 %
MONOCYTES NFR BLD: 8.8 %
MONOCYTES NFR BLD: 9 %
MONOCYTES NFR BLD: 9.1 %
MONOCYTES NFR BLD: 9.4 %
MONOCYTES NFR BLD: 9.5 %
MONOS+MACROS NFR FLD MANUAL: 36 %
MYCOBACTERIUM SPEC QL CULT: NORMAL
NEUTROPHILS # BLD AUTO: 0.9 K/UL
NEUTROPHILS # BLD AUTO: 1 K/UL
NEUTROPHILS # BLD AUTO: 1 K/UL
NEUTROPHILS # BLD AUTO: 1.3 K/UL
NEUTROPHILS # BLD AUTO: 1.4 K/UL
NEUTROPHILS # BLD AUTO: 1.5 K/UL
NEUTROPHILS # BLD AUTO: 1.7 K/UL
NEUTROPHILS # BLD AUTO: 1.7 K/UL
NEUTROPHILS # BLD AUTO: 1.8 K/UL
NEUTROPHILS # BLD AUTO: 1.9 K/UL
NEUTROPHILS # BLD AUTO: 2 K/UL
NEUTROPHILS # BLD AUTO: 2.1 K/UL
NEUTROPHILS # BLD AUTO: 2.1 K/UL
NEUTROPHILS # BLD AUTO: 2.2 K/UL
NEUTROPHILS # BLD AUTO: 2.9 K/UL
NEUTROPHILS # BLD AUTO: 3.9 K/UL
NEUTROPHILS # BLD AUTO: 4.1 K/UL
NEUTROPHILS # BLD AUTO: 4.3 K/UL
NEUTROPHILS NFR BLD: 61.1 %
NEUTROPHILS NFR BLD: 65.1 %
NEUTROPHILS NFR BLD: 65.4 %
NEUTROPHILS NFR BLD: 67.2 %
NEUTROPHILS NFR BLD: 67.2 %
NEUTROPHILS NFR BLD: 67.7 %
NEUTROPHILS NFR BLD: 68 %
NEUTROPHILS NFR BLD: 69.6 %
NEUTROPHILS NFR BLD: 69.7 %
NEUTROPHILS NFR BLD: 70.4 %
NEUTROPHILS NFR BLD: 73.6 %
NEUTROPHILS NFR BLD: 74.4 %
NEUTROPHILS NFR BLD: 76.1 %
NEUTROPHILS NFR BLD: 77.3 %
NEUTROPHILS NFR BLD: 77.4 %
NEUTROPHILS NFR BLD: 77.8 %
NEUTROPHILS NFR BLD: 79.8 %
NEUTROPHILS NFR BLD: 80 %
NEUTROPHILS NFR BLD: 81 %
NEUTROPHILS NFR BLD: 85 %
NEUTROPHILS NFR BLD: 85.5 %
NEUTROPHILS NFR BLD: 85.6 %
NEUTROPHILS NFR BLD: 93 %
NEUTROPHILS NFR BLD: 93.4 %
NEUTROPHILS NFR FLD MANUAL: 11 %
NITRITE UR QL STRIP: NEGATIVE
NRBC BLD-RTO: 0 /100 WBC
NRBC BLD-RTO: 1 /100 WBC
NRBC BLD-RTO: 1 /100 WBC
OB PNL STL: NEGATIVE
OVALOCYTES BLD QL SMEAR: ABNORMAL
PH UR STRIP: 5 [PH] (ref 5–8)
PHOSPHATE SERPL-MCNC: 2.1 MG/DL
PHOSPHATE SERPL-MCNC: 2.5 MG/DL
PHOSPHATE SERPL-MCNC: 2.5 MG/DL
PHOSPHATE SERPL-MCNC: 2.9 MG/DL
PHOSPHATE SERPL-MCNC: 2.9 MG/DL
PHOSPHATE SERPL-MCNC: 3 MG/DL
PHOSPHATE SERPL-MCNC: 3.1 MG/DL
PHOSPHATE SERPL-MCNC: 3.2 MG/DL
PHOSPHATE SERPL-MCNC: 3.3 MG/DL
PHOSPHATE SERPL-MCNC: 3.4 MG/DL
PHOSPHATE SERPL-MCNC: 3.4 MG/DL
PHOSPHATE SERPL-MCNC: 3.5 MG/DL
PHOSPHATE SERPL-MCNC: 3.5 MG/DL
PHOSPHATE SERPL-MCNC: 3.8 MG/DL
PHOSPHATE SERPL-MCNC: 3.9 MG/DL
PHOSPHATE SERPL-MCNC: 4.3 MG/DL
PHOSPHATE SERPL-MCNC: 4.7 MG/DL
PLATELET # BLD AUTO: 41 K/UL
PLATELET # BLD AUTO: 42 K/UL
PLATELET # BLD AUTO: 43 K/UL
PLATELET # BLD AUTO: 44 K/UL
PLATELET # BLD AUTO: 45 K/UL
PLATELET # BLD AUTO: 46 K/UL
PLATELET # BLD AUTO: 47 K/UL
PLATELET # BLD AUTO: 49 K/UL
PLATELET # BLD AUTO: 50 K/UL
PLATELET # BLD AUTO: 50 K/UL
PLATELET # BLD AUTO: 51 K/UL
PLATELET # BLD AUTO: 53 K/UL
PLATELET # BLD AUTO: 53 K/UL
PLATELET # BLD AUTO: 54 K/UL
PLATELET # BLD AUTO: 55 K/UL
PLATELET # BLD AUTO: 57 K/UL
PLATELET # BLD AUTO: 60 K/UL
PLATELET # BLD AUTO: 63 K/UL
PLATELET # BLD AUTO: 67 K/UL
PLATELET BLD QL SMEAR: ABNORMAL
PMV BLD AUTO: 10.3 FL
PMV BLD AUTO: 10.5 FL
PMV BLD AUTO: 10.7 FL
PMV BLD AUTO: 10.7 FL
PMV BLD AUTO: 10.8 FL
PMV BLD AUTO: 10.9 FL
PMV BLD AUTO: 11 FL
PMV BLD AUTO: 11.2 FL
PMV BLD AUTO: 11.2 FL
PMV BLD AUTO: 11.3 FL
PMV BLD AUTO: 11.3 FL
PMV BLD AUTO: 11.6 FL
PMV BLD AUTO: 11.7 FL
PMV BLD AUTO: 11.8 FL
PMV BLD AUTO: 12.1 FL
PMV BLD AUTO: 12.1 FL
PMV BLD AUTO: 12.2 FL
PMV BLD AUTO: 12.3 FL
PMV BLD AUTO: 12.3 FL
PMV BLD AUTO: ABNORMAL FL
POCT GLUCOSE: 105 MG/DL (ref 70–110)
POCT GLUCOSE: 107 MG/DL (ref 70–110)
POCT GLUCOSE: 109 MG/DL (ref 70–110)
POCT GLUCOSE: 127 MG/DL (ref 70–110)
POCT GLUCOSE: 131 MG/DL (ref 70–110)
POCT GLUCOSE: 133 MG/DL (ref 70–110)
POCT GLUCOSE: 135 MG/DL (ref 70–110)
POCT GLUCOSE: 141 MG/DL (ref 70–110)
POCT GLUCOSE: 141 MG/DL (ref 70–110)
POCT GLUCOSE: 145 MG/DL (ref 70–110)
POCT GLUCOSE: 148 MG/DL (ref 70–110)
POCT GLUCOSE: 151 MG/DL (ref 70–110)
POCT GLUCOSE: 155 MG/DL (ref 70–110)
POCT GLUCOSE: 157 MG/DL (ref 70–110)
POCT GLUCOSE: 158 MG/DL (ref 70–110)
POCT GLUCOSE: 158 MG/DL (ref 70–110)
POCT GLUCOSE: 163 MG/DL (ref 70–110)
POCT GLUCOSE: 165 MG/DL (ref 70–110)
POCT GLUCOSE: 166 MG/DL (ref 70–110)
POCT GLUCOSE: 168 MG/DL (ref 70–110)
POCT GLUCOSE: 170 MG/DL (ref 70–110)
POCT GLUCOSE: 175 MG/DL (ref 70–110)
POCT GLUCOSE: 185 MG/DL (ref 70–110)
POCT GLUCOSE: 185 MG/DL (ref 70–110)
POCT GLUCOSE: 189 MG/DL (ref 70–110)
POCT GLUCOSE: 190 MG/DL (ref 70–110)
POCT GLUCOSE: 197 MG/DL (ref 70–110)
POCT GLUCOSE: 202 MG/DL (ref 70–110)
POCT GLUCOSE: 207 MG/DL (ref 70–110)
POCT GLUCOSE: 213 MG/DL (ref 70–110)
POCT GLUCOSE: 213 MG/DL (ref 70–110)
POCT GLUCOSE: 215 MG/DL (ref 70–110)
POCT GLUCOSE: 218 MG/DL (ref 70–110)
POCT GLUCOSE: 218 MG/DL (ref 70–110)
POCT GLUCOSE: 225 MG/DL (ref 70–110)
POCT GLUCOSE: 225 MG/DL (ref 70–110)
POCT GLUCOSE: 226 MG/DL (ref 70–110)
POCT GLUCOSE: 226 MG/DL (ref 70–110)
POCT GLUCOSE: 232 MG/DL (ref 70–110)
POCT GLUCOSE: 232 MG/DL (ref 70–110)
POCT GLUCOSE: 235 MG/DL (ref 70–110)
POCT GLUCOSE: 243 MG/DL (ref 70–110)
POCT GLUCOSE: 349 MG/DL (ref 70–110)
POIKILOCYTOSIS BLD QL SMEAR: SLIGHT
POLYCHROMASIA BLD QL SMEAR: ABNORMAL
POTASSIUM SERPL-SCNC: 3.3 MMOL/L
POTASSIUM SERPL-SCNC: 3.4 MMOL/L
POTASSIUM SERPL-SCNC: 3.5 MMOL/L
POTASSIUM SERPL-SCNC: 3.5 MMOL/L
POTASSIUM SERPL-SCNC: 3.6 MMOL/L
POTASSIUM SERPL-SCNC: 3.7 MMOL/L
POTASSIUM SERPL-SCNC: 3.8 MMOL/L
POTASSIUM SERPL-SCNC: 3.8 MMOL/L
POTASSIUM SERPL-SCNC: 3.9 MMOL/L
POTASSIUM SERPL-SCNC: 4.2 MMOL/L
POTASSIUM SERPL-SCNC: 4.3 MMOL/L
POTASSIUM SERPL-SCNC: 4.7 MMOL/L
PREALB SERPL-MCNC: 11 MG/DL
PREALB SERPL-MCNC: 6 MG/DL
PROCALCITONIN SERPL IA-MCNC: 0.27 NG/ML
PROT FLD-MCNC: <1 G/DL
PROT SERPL-MCNC: 4.5 G/DL
PROT SERPL-MCNC: 4.5 G/DL
PROT SERPL-MCNC: 4.6 G/DL
PROT SERPL-MCNC: 4.6 G/DL
PROT SERPL-MCNC: 4.7 G/DL
PROT SERPL-MCNC: 4.9 G/DL
PROT SERPL-MCNC: 5 G/DL
PROT SERPL-MCNC: 5 G/DL
PROT SERPL-MCNC: 5.1 G/DL
PROT SERPL-MCNC: 5.1 G/DL
PROT SERPL-MCNC: 5.2 G/DL
PROT SERPL-MCNC: 5.3 G/DL
PROT SERPL-MCNC: 5.4 G/DL
PROT SERPL-MCNC: 5.5 G/DL
PROT SERPL-MCNC: 5.6 G/DL
PROT SERPL-MCNC: 5.6 G/DL
PROT SERPL-MCNC: 5.7 G/DL
PROT SERPL-MCNC: 5.8 G/DL
PROT SERPL-MCNC: 5.8 G/DL
PROT SERPL-MCNC: 5.9 G/DL
PROT SERPL-MCNC: 6.6 G/DL
PROT UR QL STRIP: NEGATIVE
PROTHROMBIN TIME: 11.2 SEC
PROTHROMBIN TIME: 11.4 SEC
PROTHROMBIN TIME: 11.5 SEC
PROTHROMBIN TIME: 11.6 SEC
PROTHROMBIN TIME: 11.6 SEC
PROTHROMBIN TIME: 11.8 SEC
PROTHROMBIN TIME: 12 SEC
PROTHROMBIN TIME: 12 SEC
PROTHROMBIN TIME: 12.1 SEC
PROTHROMBIN TIME: 12.3 SEC
PROTHROMBIN TIME: 12.4 SEC
PROTHROMBIN TIME: 12.7 SEC
PROTHROMBIN TIME: 13.3 SEC
PROTHROMBIN TIME: 13.3 SEC
PROTHROMBIN TIME: 13.4 SEC
PROTHROMBIN TIME: 13.4 SEC
PROTHROMBIN TIME: 14 SEC
PROTHROMBIN TIME: 14.1 SEC
PROTHROMBIN TIME: 14.2 SEC
PROTHROMBIN TIME: 14.6 SEC
PROTHROMBIN TIME: 15.7 SEC
RBC # BLD AUTO: 2.52 M/UL
RBC # BLD AUTO: 2.73 M/UL
RBC # BLD AUTO: 2.76 M/UL
RBC # BLD AUTO: 2.76 M/UL
RBC # BLD AUTO: 2.85 M/UL
RBC # BLD AUTO: 2.85 M/UL
RBC # BLD AUTO: 2.86 M/UL
RBC # BLD AUTO: 2.89 M/UL
RBC # BLD AUTO: 2.92 M/UL
RBC # BLD AUTO: 2.94 M/UL
RBC # BLD AUTO: 2.95 M/UL
RBC # BLD AUTO: 2.97 M/UL
RBC # BLD AUTO: 2.99 M/UL
RBC # BLD AUTO: 3 M/UL
RBC # BLD AUTO: 3.02 M/UL
RBC # BLD AUTO: 3.09 M/UL
RBC # BLD AUTO: 3.15 M/UL
RBC # BLD AUTO: 3.17 M/UL
RBC # BLD AUTO: 3.32 M/UL
RBC # BLD AUTO: 3.32 M/UL
RBC # BLD AUTO: 3.34 M/UL
RBC # BLD AUTO: 3.37 M/UL
RBC # BLD AUTO: 3.53 M/UL
RBC # BLD AUTO: 3.64 M/UL
RBC # BLD AUTO: 3.83 M/UL
RBC #/AREA URNS AUTO: 1 /HPF (ref 0–4)
RETIRED EF AND QEF - SEE NOTES: 60 (ref 55–65)
SODIUM SERPL-SCNC: 133 MMOL/L
SODIUM SERPL-SCNC: 134 MMOL/L
SODIUM SERPL-SCNC: 135 MMOL/L
SODIUM SERPL-SCNC: 136 MMOL/L
SODIUM SERPL-SCNC: 137 MMOL/L
SODIUM SERPL-SCNC: 138 MMOL/L
SODIUM SERPL-SCNC: 139 MMOL/L
SODIUM SERPL-SCNC: 140 MMOL/L
SODIUM SERPL-SCNC: 140 MMOL/L
SODIUM UR-SCNC: <20 MMOL/L
SP GR UR STRIP: 1.02 (ref 1–1.03)
SP GR UR STRIP: >=1.03 (ref 1–1.03)
SP GR UR STRIP: >=1.03 (ref 1–1.03)
SPECIMEN SOURCE: NORMAL
SQUAMOUS #/AREA URNS AUTO: 1 /HPF
TARGETS BLD QL SMEAR: ABNORMAL
TRANS ERYTHROCYTES VOL PATIENT: NORMAL ML
TRICUSPID VALVE REGURGITATION: NORMAL
TRIGL FLD-MCNC: 62 MG/DL
TROPONIN I SERPL DL<=0.01 NG/ML-MCNC: <0.006 NG/ML
TROPONIN I SERPL DL<=0.01 NG/ML-MCNC: <0.006 NG/ML
URN SPEC COLLECT METH UR: ABNORMAL
UROBILINOGEN UR STRIP-ACNC: ABNORMAL EU/DL
UROBILINOGEN UR STRIP-ACNC: NEGATIVE EU/DL
UROBILINOGEN UR STRIP-ACNC: NEGATIVE EU/DL
VIT B12 SERPL-MCNC: 247 PG/ML
WBC # BLD AUTO: 1.35 K/UL
WBC # BLD AUTO: 1.37 K/UL
WBC # BLD AUTO: 1.45 K/UL
WBC # BLD AUTO: 1.49 K/UL
WBC # BLD AUTO: 1.66 K/UL
WBC # BLD AUTO: 1.89 K/UL
WBC # BLD AUTO: 1.94 K/UL
WBC # BLD AUTO: 2.01 K/UL
WBC # BLD AUTO: 2.02 K/UL
WBC # BLD AUTO: 2.27 K/UL
WBC # BLD AUTO: 2.34 K/UL
WBC # BLD AUTO: 2.34 K/UL
WBC # BLD AUTO: 2.35 K/UL
WBC # BLD AUTO: 2.42 K/UL
WBC # BLD AUTO: 2.43 K/UL
WBC # BLD AUTO: 2.66 K/UL
WBC # BLD AUTO: 2.68 K/UL
WBC # BLD AUTO: 2.7 K/UL
WBC # BLD AUTO: 2.76 K/UL
WBC # BLD AUTO: 2.81 K/UL
WBC # BLD AUTO: 2.85 K/UL
WBC # BLD AUTO: 3.34 K/UL
WBC # BLD AUTO: 3.34 K/UL
WBC # BLD AUTO: 3.37 K/UL
WBC # BLD AUTO: 4.36 K/UL
WBC # BLD AUTO: 4.55 K/UL
WBC # BLD AUTO: 4.99 K/UL
WBC # FLD: 55 /CU MM
WBC #/AREA URNS AUTO: 2 /HPF (ref 0–5)
YEAST UR QL AUTO: ABNORMAL

## 2018-01-01 PROCEDURE — 85610 PROTHROMBIN TIME: CPT

## 2018-01-01 PROCEDURE — 83880 ASSAY OF NATRIURETIC PEPTIDE: CPT

## 2018-01-01 PROCEDURE — 63600175 PHARM REV CODE 636 W HCPCS: Performed by: HOSPITALIST

## 2018-01-01 PROCEDURE — 84100 ASSAY OF PHOSPHORUS: CPT

## 2018-01-01 PROCEDURE — 63600175 PHARM REV CODE 636 W HCPCS: Performed by: STUDENT IN AN ORGANIZED HEALTH CARE EDUCATION/TRAINING PROGRAM

## 2018-01-01 PROCEDURE — 25000003 PHARM REV CODE 250: Performed by: INTERNAL MEDICINE

## 2018-01-01 PROCEDURE — 25000003 PHARM REV CODE 250: Performed by: PHYSICIAN ASSISTANT

## 2018-01-01 PROCEDURE — 99223 1ST HOSP IP/OBS HIGH 75: CPT | Mod: ,,, | Performed by: HOSPITALIST

## 2018-01-01 PROCEDURE — 88312 SPECIAL STAINS GROUP 1: CPT | Performed by: PATHOLOGY

## 2018-01-01 PROCEDURE — 94761 N-INVAS EAR/PLS OXIMETRY MLT: CPT

## 2018-01-01 PROCEDURE — 86850 RBC ANTIBODY SCREEN: CPT

## 2018-01-01 PROCEDURE — 37000008 HC ANESTHESIA 1ST 15 MINUTES

## 2018-01-01 PROCEDURE — 83036 HEMOGLOBIN GLYCOSYLATED A1C: CPT

## 2018-01-01 PROCEDURE — 83605 ASSAY OF LACTIC ACID: CPT

## 2018-01-01 PROCEDURE — 80053 COMPREHEN METABOLIC PANEL: CPT

## 2018-01-01 PROCEDURE — 85027 COMPLETE CBC AUTOMATED: CPT

## 2018-01-01 PROCEDURE — P9047 ALBUMIN (HUMAN), 25%, 50ML: HCPCS | Mod: JG | Performed by: HOSPITALIST

## 2018-01-01 PROCEDURE — P9021 RED BLOOD CELLS UNIT: HCPCS

## 2018-01-01 PROCEDURE — 84478 ASSAY OF TRIGLYCERIDES: CPT

## 2018-01-01 PROCEDURE — 85025 COMPLETE CBC W/AUTO DIFF WBC: CPT

## 2018-01-01 PROCEDURE — 25000003 PHARM REV CODE 250: Performed by: STUDENT IN AN ORGANIZED HEALTH CARE EDUCATION/TRAINING PROGRAM

## 2018-01-01 PROCEDURE — 99215 OFFICE O/P EST HI 40 MIN: CPT | Mod: S$PBB,,, | Performed by: INTERNAL MEDICINE

## 2018-01-01 PROCEDURE — 71000039 HC RECOVERY, EACH ADD'L HOUR

## 2018-01-01 PROCEDURE — 84484 ASSAY OF TROPONIN QUANT: CPT

## 2018-01-01 PROCEDURE — 25000003 PHARM REV CODE 250: Performed by: HOSPITALIST

## 2018-01-01 PROCEDURE — 36415 COLL VENOUS BLD VENIPUNCTURE: CPT

## 2018-01-01 PROCEDURE — 99205 OFFICE O/P NEW HI 60 MIN: CPT | Mod: S$PBB,,, | Performed by: INTERNAL MEDICINE

## 2018-01-01 PROCEDURE — 85520 HEPARIN ASSAY: CPT

## 2018-01-01 PROCEDURE — 63600175 PHARM REV CODE 636 W HCPCS: Performed by: RADIOLOGY

## 2018-01-01 PROCEDURE — 25000003 PHARM REV CODE 250: Performed by: RADIOLOGY

## 2018-01-01 PROCEDURE — 20600001 HC STEP DOWN PRIVATE ROOM

## 2018-01-01 PROCEDURE — 63600175 PHARM REV CODE 636 W HCPCS: Performed by: NURSE ANESTHETIST, CERTIFIED REGISTERED

## 2018-01-01 PROCEDURE — 36430 TRANSFUSION BLD/BLD COMPNT: CPT

## 2018-01-01 PROCEDURE — 83735 ASSAY OF MAGNESIUM: CPT

## 2018-01-01 PROCEDURE — 63600175 PHARM REV CODE 636 W HCPCS: Performed by: INTERNAL MEDICINE

## 2018-01-01 PROCEDURE — 99232 SBSQ HOSP IP/OBS MODERATE 35: CPT | Mod: ,,, | Performed by: HOSPITALIST

## 2018-01-01 PROCEDURE — 0W993ZZ DRAINAGE OF RIGHT PLEURAL CAVITY, PERCUTANEOUS APPROACH: ICD-10-PCS | Performed by: INTERNAL MEDICINE

## 2018-01-01 PROCEDURE — 85730 THROMBOPLASTIN TIME PARTIAL: CPT

## 2018-01-01 PROCEDURE — 82140 ASSAY OF AMMONIA: CPT

## 2018-01-01 PROCEDURE — 85384 FIBRINOGEN ACTIVITY: CPT

## 2018-01-01 PROCEDURE — 97116 GAIT TRAINING THERAPY: CPT

## 2018-01-01 PROCEDURE — 25000003 PHARM REV CODE 250: Performed by: FAMILY MEDICINE

## 2018-01-01 PROCEDURE — 93306 TTE W/DOPPLER COMPLETE: CPT

## 2018-01-01 PROCEDURE — 87015 SPECIMEN INFECT AGNT CONCNTJ: CPT

## 2018-01-01 PROCEDURE — 99285 EMERGENCY DEPT VISIT HI MDM: CPT | Mod: 25

## 2018-01-01 PROCEDURE — 11000001 HC ACUTE MED/SURG PRIVATE ROOM

## 2018-01-01 PROCEDURE — 99232 SBSQ HOSP IP/OBS MODERATE 35: CPT | Mod: GC,,, | Performed by: INTERNAL MEDICINE

## 2018-01-01 PROCEDURE — G8980 MOBILITY D/C STATUS: HCPCS | Mod: CH

## 2018-01-01 PROCEDURE — 97161 PT EVAL LOW COMPLEX 20 MIN: CPT

## 2018-01-01 PROCEDURE — 63600175 PHARM REV CODE 636 W HCPCS: Performed by: FAMILY MEDICINE

## 2018-01-01 PROCEDURE — 86901 BLOOD TYPING SEROLOGIC RH(D): CPT

## 2018-01-01 PROCEDURE — D9220A PRA ANESTHESIA: Mod: ANES,,, | Performed by: ANESTHESIOLOGY

## 2018-01-01 PROCEDURE — 99222 1ST HOSP IP/OBS MODERATE 55: CPT | Mod: ,,, | Performed by: INTERNAL MEDICINE

## 2018-01-01 PROCEDURE — 37000009 HC ANESTHESIA EA ADD 15 MINS

## 2018-01-01 PROCEDURE — 99233 SBSQ HOSP IP/OBS HIGH 50: CPT | Mod: ,,, | Performed by: HOSPITALIST

## 2018-01-01 PROCEDURE — 85007 BL SMEAR W/DIFF WBC COUNT: CPT

## 2018-01-01 PROCEDURE — 82607 VITAMIN B-12: CPT

## 2018-01-01 PROCEDURE — 89051 BODY FLUID CELL COUNT: CPT

## 2018-01-01 PROCEDURE — 06JY3ZZ INSPECTION OF LOWER VEIN, PERCUTANEOUS APPROACH: ICD-10-PCS | Performed by: RADIOLOGY

## 2018-01-01 PROCEDURE — 97802 MEDICAL NUTRITION INDIV IN: CPT

## 2018-01-01 PROCEDURE — 84134 ASSAY OF PREALBUMIN: CPT

## 2018-01-01 PROCEDURE — 99215 OFFICE O/P EST HI 40 MIN: CPT | Mod: PBBFAC,25 | Performed by: INTERNAL MEDICINE

## 2018-01-01 PROCEDURE — 99233 SBSQ HOSP IP/OBS HIGH 50: CPT | Mod: GC,,, | Performed by: INTERNAL MEDICINE

## 2018-01-01 PROCEDURE — 87075 CULTR BACTERIA EXCEPT BLOOD: CPT

## 2018-01-01 PROCEDURE — 93306 TTE W/DOPPLER COMPLETE: CPT | Mod: 26,,, | Performed by: INTERNAL MEDICINE

## 2018-01-01 PROCEDURE — 84157 ASSAY OF PROTEIN OTHER: CPT

## 2018-01-01 PROCEDURE — 99999 PR PBB SHADOW E&M-EST. PATIENT-LVL IV: CPT | Mod: PBBFAC,,, | Performed by: INTERNAL MEDICINE

## 2018-01-01 PROCEDURE — 82962 GLUCOSE BLOOD TEST: CPT

## 2018-01-01 PROCEDURE — 87206 SMEAR FLUORESCENT/ACID STAI: CPT

## 2018-01-01 PROCEDURE — 84155 ASSAY OF PROTEIN SERUM: CPT

## 2018-01-01 PROCEDURE — 93010 ELECTROCARDIOGRAM REPORT: CPT | Mod: ,,, | Performed by: INTERNAL MEDICINE

## 2018-01-01 PROCEDURE — 25500020 PHARM REV CODE 255: Performed by: HOSPITALIST

## 2018-01-01 PROCEDURE — 0W993ZZ DRAINAGE OF RIGHT PLEURAL CAVITY, PERCUTANEOUS APPROACH: ICD-10-PCS | Performed by: RADIOLOGY

## 2018-01-01 PROCEDURE — 82150 ASSAY OF AMYLASE: CPT

## 2018-01-01 PROCEDURE — D9220A PRA ANESTHESIA: Mod: CRNA,,, | Performed by: NURSE ANESTHETIST, CERTIFIED REGISTERED

## 2018-01-01 PROCEDURE — 83010 ASSAY OF HAPTOGLOBIN QUANT: CPT

## 2018-01-01 PROCEDURE — 99223 1ST HOSP IP/OBS HIGH 75: CPT | Mod: AI,GC,, | Performed by: HOSPITALIST

## 2018-01-01 PROCEDURE — 80048 BASIC METABOLIC PNL TOTAL CA: CPT

## 2018-01-01 PROCEDURE — 25000003 PHARM REV CODE 250: Performed by: NURSE ANESTHETIST, CERTIFIED REGISTERED

## 2018-01-01 PROCEDURE — 99238 HOSP IP/OBS DSCHRG MGMT 30/<: CPT | Mod: ,,, | Performed by: HOSPITALIST

## 2018-01-01 PROCEDURE — 0W993ZZ DRAINAGE OF RIGHT PLEURAL CAVITY, PERCUTANEOUS APPROACH: ICD-10-PCS | Performed by: HOSPITALIST

## 2018-01-01 PROCEDURE — G8979 MOBILITY GOAL STATUS: HCPCS | Mod: CH

## 2018-01-01 PROCEDURE — 99223 1ST HOSP IP/OBS HIGH 75: CPT | Mod: GC,,, | Performed by: INTERNAL MEDICINE

## 2018-01-01 PROCEDURE — 99222 1ST HOSP IP/OBS MODERATE 55: CPT | Mod: GC,,, | Performed by: INTERNAL MEDICINE

## 2018-01-01 PROCEDURE — 71046 X-RAY EXAM CHEST 2 VIEWS: CPT | Mod: 26,,, | Performed by: RADIOLOGY

## 2018-01-01 PROCEDURE — 83615 LACTATE (LD) (LDH) ENZYME: CPT | Mod: 91

## 2018-01-01 PROCEDURE — 86920 COMPATIBILITY TEST SPIN: CPT

## 2018-01-01 PROCEDURE — 63600175 PHARM REV CODE 636 W HCPCS: Mod: JG | Performed by: HOSPITALIST

## 2018-01-01 PROCEDURE — 87205 SMEAR GRAM STAIN: CPT

## 2018-01-01 PROCEDURE — 82042 OTHER SOURCE ALBUMIN QUAN EA: CPT

## 2018-01-01 PROCEDURE — 87070 CULTURE OTHR SPECIMN AEROBIC: CPT

## 2018-01-01 PROCEDURE — 84145 PROCALCITONIN (PCT): CPT

## 2018-01-01 PROCEDURE — 88312 SPECIAL STAINS GROUP 1: CPT | Mod: 26,,, | Performed by: PATHOLOGY

## 2018-01-01 PROCEDURE — 83615 LACTATE (LD) (LDH) ENZYME: CPT

## 2018-01-01 PROCEDURE — 99284 EMERGENCY DEPT VISIT MOD MDM: CPT | Mod: 25

## 2018-01-01 PROCEDURE — 82746 ASSAY OF FOLIC ACID SERUM: CPT

## 2018-01-01 PROCEDURE — 82570 ASSAY OF URINE CREATININE: CPT

## 2018-01-01 PROCEDURE — 87086 URINE CULTURE/COLONY COUNT: CPT

## 2018-01-01 PROCEDURE — 87210 SMEAR WET MOUNT SALINE/INK: CPT

## 2018-01-01 PROCEDURE — 81001 URINALYSIS AUTO W/SCOPE: CPT

## 2018-01-01 PROCEDURE — 99285 EMERGENCY DEPT VISIT HI MDM: CPT | Mod: ,,, | Performed by: PHYSICIAN ASSISTANT

## 2018-01-01 PROCEDURE — 06183J4 BYPASS PORTAL VEIN TO HEPATIC VEIN WITH SYNTHETIC SUBSTITUTE, PERCUTANEOUS APPROACH: ICD-10-PCS | Performed by: RADIOLOGY

## 2018-01-01 PROCEDURE — 81003 URINALYSIS AUTO W/O SCOPE: CPT

## 2018-01-01 PROCEDURE — 99215 OFFICE O/P EST HI 40 MIN: CPT | Mod: S$GLB,,, | Performed by: INTERNAL MEDICINE

## 2018-01-01 PROCEDURE — 87040 BLOOD CULTURE FOR BACTERIA: CPT | Mod: 59

## 2018-01-01 PROCEDURE — S0030 INJECTION, METRONIDAZOLE: HCPCS | Performed by: RADIOLOGY

## 2018-01-01 PROCEDURE — 93005 ELECTROCARDIOGRAM TRACING: CPT

## 2018-01-01 PROCEDURE — 82272 OCCULT BLD FECES 1-3 TESTS: CPT

## 2018-01-01 PROCEDURE — 71000033 HC RECOVERY, INTIAL HOUR

## 2018-01-01 PROCEDURE — 25000242 PHARM REV CODE 250 ALT 637 W/ HCPCS: Performed by: INTERNAL MEDICINE

## 2018-01-01 PROCEDURE — 32555 ASPIRATE PLEURA W/ IMAGING: CPT

## 2018-01-01 PROCEDURE — 85025 COMPLETE CBC W/AUTO DIFF WBC: CPT | Mod: 91

## 2018-01-01 PROCEDURE — 99999 PR PBB SHADOW E&M-EST. PATIENT-LVL V: CPT | Mod: PBBFAC,,, | Performed by: INTERNAL MEDICINE

## 2018-01-01 PROCEDURE — 99214 OFFICE O/P EST MOD 30 MIN: CPT | Mod: PBBFAC | Performed by: INTERNAL MEDICINE

## 2018-01-01 PROCEDURE — 86850 RBC ANTIBODY SCREEN: CPT | Mod: 91

## 2018-01-01 PROCEDURE — G8978 MOBILITY CURRENT STATUS: HCPCS | Mod: CH

## 2018-01-01 PROCEDURE — 63600175 PHARM REV CODE 636 W HCPCS: Performed by: ANESTHESIOLOGY

## 2018-01-01 PROCEDURE — 87116 MYCOBACTERIA CULTURE: CPT

## 2018-01-01 PROCEDURE — 88112 CYTOPATH CELL ENHANCE TECH: CPT | Mod: 26,,, | Performed by: PATHOLOGY

## 2018-01-01 PROCEDURE — 27000221 HC OXYGEN, UP TO 24 HOURS

## 2018-01-01 PROCEDURE — 84300 ASSAY OF URINE SODIUM: CPT

## 2018-01-01 PROCEDURE — 71046 X-RAY EXAM CHEST 2 VIEWS: CPT | Mod: TC

## 2018-01-01 PROCEDURE — 99233 SBSQ HOSP IP/OBS HIGH 50: CPT | Mod: GC,,, | Performed by: HOSPITALIST

## 2018-01-01 PROCEDURE — 82945 GLUCOSE OTHER FLUID: CPT

## 2018-01-01 PROCEDURE — 99223 1ST HOSP IP/OBS HIGH 75: CPT | Mod: AI,,, | Performed by: HOSPITALIST

## 2018-01-01 PROCEDURE — 87102 FUNGUS ISOLATION CULTURE: CPT

## 2018-01-01 PROCEDURE — 83690 ASSAY OF LIPASE: CPT

## 2018-01-01 PROCEDURE — 84311 SPECTROPHOTOMETRY: CPT

## 2018-01-01 RX ORDER — POTASSIUM CHLORIDE 20 MEQ/1
40 TABLET, EXTENDED RELEASE ORAL ONCE
Status: COMPLETED | OUTPATIENT
Start: 2018-01-01 | End: 2018-01-01

## 2018-01-01 RX ORDER — SODIUM,POTASSIUM PHOSPHATES 280-250MG
1 POWDER IN PACKET (EA) ORAL
Status: DISCONTINUED | OUTPATIENT
Start: 2018-01-01 | End: 2018-01-01

## 2018-01-01 RX ORDER — ONDANSETRON 8 MG/1
8 TABLET, ORALLY DISINTEGRATING ORAL EVERY 8 HOURS PRN
Status: DISCONTINUED | OUTPATIENT
Start: 2018-01-01 | End: 2018-01-01 | Stop reason: HOSPADM

## 2018-01-01 RX ORDER — ALBUMIN HUMAN 250 G/1000ML
25 SOLUTION INTRAVENOUS 2 TIMES DAILY
Status: COMPLETED | OUTPATIENT
Start: 2018-01-01 | End: 2018-01-01

## 2018-01-01 RX ORDER — NEOSTIGMINE METHYLSULFATE 1 MG/ML
INJECTION, SOLUTION INTRAVENOUS
Status: DISCONTINUED | OUTPATIENT
Start: 2018-01-01 | End: 2018-01-01

## 2018-01-01 RX ORDER — GLUCAGON 1 MG
1 KIT INJECTION
Status: DISCONTINUED | OUTPATIENT
Start: 2018-01-01 | End: 2018-01-01 | Stop reason: HOSPADM

## 2018-01-01 RX ORDER — TRAZODONE HYDROCHLORIDE 50 MG/1
100 TABLET ORAL NIGHTLY
Status: DISCONTINUED | OUTPATIENT
Start: 2018-01-01 | End: 2018-01-01 | Stop reason: HOSPADM

## 2018-01-01 RX ORDER — MIDAZOLAM HYDROCHLORIDE 1 MG/ML
INJECTION, SOLUTION INTRAMUSCULAR; INTRAVENOUS
Status: DISCONTINUED | OUTPATIENT
Start: 2018-01-01 | End: 2018-01-01

## 2018-01-01 RX ORDER — CLONAZEPAM 0.5 MG/1
1 TABLET ORAL NIGHTLY
Status: DISCONTINUED | OUTPATIENT
Start: 2018-01-01 | End: 2018-01-01 | Stop reason: HOSPADM

## 2018-01-01 RX ORDER — IBUPROFEN 200 MG
16 TABLET ORAL
Status: DISCONTINUED | OUTPATIENT
Start: 2018-01-01 | End: 2018-01-01 | Stop reason: HOSPADM

## 2018-01-01 RX ORDER — HYDROXYZINE HYDROCHLORIDE 25 MG/1
25 TABLET, FILM COATED ORAL DAILY PRN
Status: DISCONTINUED | OUTPATIENT
Start: 2018-01-01 | End: 2018-01-01 | Stop reason: HOSPADM

## 2018-01-01 RX ORDER — GLYCOPYRROLATE 0.2 MG/ML
INJECTION INTRAMUSCULAR; INTRAVENOUS
Status: DISCONTINUED | OUTPATIENT
Start: 2018-01-01 | End: 2018-01-01

## 2018-01-01 RX ORDER — LIDOCAINE HCL/PF 100 MG/5ML
SYRINGE (ML) INTRAVENOUS
Status: DISCONTINUED | OUTPATIENT
Start: 2018-01-01 | End: 2018-01-01

## 2018-01-01 RX ORDER — INSULIN ASPART 100 [IU]/ML
0-5 INJECTION, SOLUTION INTRAVENOUS; SUBCUTANEOUS
Status: DISCONTINUED | OUTPATIENT
Start: 2018-01-01 | End: 2018-01-01 | Stop reason: HOSPADM

## 2018-01-01 RX ORDER — HYOSCYAMINE SULFATE 0.12 MG/1
TABLET SUBLINGUAL
Status: ON HOLD | COMMUNITY
Start: 2015-08-31 | End: 2019-01-01 | Stop reason: HOSPADM

## 2018-01-01 RX ORDER — FUROSEMIDE 40 MG/1
40 TABLET ORAL 2 TIMES DAILY
Status: DISCONTINUED | OUTPATIENT
Start: 2018-01-01 | End: 2018-01-01

## 2018-01-01 RX ORDER — HYDROCODONE BITARTRATE AND ACETAMINOPHEN 500; 5 MG/1; MG/1
TABLET ORAL
Status: DISCONTINUED | OUTPATIENT
Start: 2018-01-01 | End: 2018-01-01 | Stop reason: HOSPADM

## 2018-01-01 RX ORDER — SODIUM CHLORIDE 0.9 % (FLUSH) 0.9 %
5 SYRINGE (ML) INJECTION
Status: DISCONTINUED | OUTPATIENT
Start: 2018-01-01 | End: 2018-01-01

## 2018-01-01 RX ORDER — SODIUM CHLORIDE 0.9 % (FLUSH) 0.9 %
5 SYRINGE (ML) INJECTION
Status: DISCONTINUED | OUTPATIENT
Start: 2018-01-01 | End: 2018-01-01 | Stop reason: HOSPADM

## 2018-01-01 RX ORDER — SPIRONOLACTONE 100 MG/1
100 TABLET, FILM COATED ORAL DAILY
Qty: 30 TABLET | Refills: 11 | Status: ON HOLD | OUTPATIENT
Start: 2018-01-01 | End: 2018-01-01 | Stop reason: HOSPADM

## 2018-01-01 RX ORDER — ONDANSETRON 4 MG/1
8 TABLET, ORALLY DISINTEGRATING ORAL EVERY 8 HOURS PRN
Status: DISCONTINUED | OUTPATIENT
Start: 2018-01-01 | End: 2018-01-01 | Stop reason: HOSPADM

## 2018-01-01 RX ORDER — POTASSIUM CHLORIDE 20 MEQ/1
40 TABLET, EXTENDED RELEASE ORAL ONCE
Status: DISCONTINUED | OUTPATIENT
Start: 2018-01-01 | End: 2018-01-01

## 2018-01-01 RX ORDER — IBUPROFEN 200 MG
16 TABLET ORAL
Status: DISCONTINUED | OUTPATIENT
Start: 2018-01-01 | End: 2018-01-01

## 2018-01-01 RX ORDER — PROMETHAZINE HYDROCHLORIDE 25 MG/1
25 TABLET ORAL
COMMUNITY
End: 2018-01-01

## 2018-01-01 RX ORDER — FUROSEMIDE 40 MG/1
40 TABLET ORAL 2 TIMES DAILY
Status: ON HOLD | COMMUNITY
End: 2018-01-01 | Stop reason: HOSPADM

## 2018-01-01 RX ORDER — FENTANYL CITRATE 50 UG/ML
25 INJECTION, SOLUTION INTRAMUSCULAR; INTRAVENOUS EVERY 5 MIN PRN
Status: DISCONTINUED | OUTPATIENT
Start: 2018-01-01 | End: 2018-01-01 | Stop reason: HOSPADM

## 2018-01-01 RX ORDER — METOCLOPRAMIDE HYDROCHLORIDE 5 MG/ML
10 INJECTION INTRAMUSCULAR; INTRAVENOUS EVERY 10 MIN PRN
Status: DISCONTINUED | OUTPATIENT
Start: 2018-01-01 | End: 2018-01-01 | Stop reason: HOSPADM

## 2018-01-01 RX ORDER — SODIUM CHLORIDE 9 MG/ML
INJECTION, SOLUTION INTRAVENOUS CONTINUOUS PRN
Status: DISCONTINUED | OUTPATIENT
Start: 2018-01-01 | End: 2018-01-01

## 2018-01-01 RX ORDER — SPIRONOLACTONE 100 MG/1
200 TABLET, FILM COATED ORAL DAILY
Qty: 60 TABLET | Refills: 2 | Status: ON HOLD | OUTPATIENT
Start: 2018-01-01 | End: 2019-01-01 | Stop reason: HOSPADM

## 2018-01-01 RX ORDER — LANOLIN ALCOHOL/MO/W.PET/CERES
800 CREAM (GRAM) TOPICAL DAILY
Status: DISCONTINUED | OUTPATIENT
Start: 2018-01-01 | End: 2018-01-01 | Stop reason: HOSPADM

## 2018-01-01 RX ORDER — PROPOFOL 10 MG/ML
VIAL (ML) INTRAVENOUS
Status: DISCONTINUED | OUTPATIENT
Start: 2018-01-01 | End: 2018-01-01

## 2018-01-01 RX ORDER — FENTANYL CITRATE 50 UG/ML
INJECTION, SOLUTION INTRAMUSCULAR; INTRAVENOUS
Status: DISCONTINUED | OUTPATIENT
Start: 2018-01-01 | End: 2018-01-01

## 2018-01-01 RX ORDER — POTASSIUM CHLORIDE 20 MEQ/15ML
40 SOLUTION ORAL ONCE
Status: COMPLETED | OUTPATIENT
Start: 2018-01-01 | End: 2018-01-01

## 2018-01-01 RX ORDER — LORAZEPAM/0.9% SODIUM CHLORIDE 100MG/0.1L
2 PLASTIC BAG, INJECTION (ML) INTRAVENOUS ONCE
Status: COMPLETED | OUTPATIENT
Start: 2018-01-01 | End: 2018-01-01

## 2018-01-01 RX ORDER — PHENYLEPHRINE HYDROCHLORIDE 10 MG/ML
INJECTION INTRAVENOUS
Status: DISCONTINUED | OUTPATIENT
Start: 2018-01-01 | End: 2018-01-01

## 2018-01-01 RX ORDER — PROMETHAZINE HYDROCHLORIDE 25 MG/1
25 TABLET ORAL EVERY 6 HOURS PRN
Status: DISCONTINUED | OUTPATIENT
Start: 2018-01-01 | End: 2018-01-01 | Stop reason: HOSPADM

## 2018-01-01 RX ORDER — LISINOPRIL 5 MG/1
5 TABLET ORAL DAILY
COMMUNITY
End: 2018-01-01

## 2018-01-01 RX ORDER — FENTANYL CITRATE 50 UG/ML
50 INJECTION, SOLUTION INTRAMUSCULAR; INTRAVENOUS
Status: DISCONTINUED | OUTPATIENT
Start: 2018-01-01 | End: 2019-01-01

## 2018-01-01 RX ORDER — FUROSEMIDE 10 MG/ML
40 INJECTION INTRAMUSCULAR; INTRAVENOUS ONCE
Status: COMPLETED | OUTPATIENT
Start: 2018-01-01 | End: 2018-01-01

## 2018-01-01 RX ORDER — CLONAZEPAM 0.5 MG/1
1 TABLET ORAL 2 TIMES DAILY PRN
Status: DISCONTINUED | OUTPATIENT
Start: 2018-01-01 | End: 2018-01-01

## 2018-01-01 RX ORDER — MORPHINE SULFATE 2 MG/ML
2 INJECTION, SOLUTION INTRAMUSCULAR; INTRAVENOUS EVERY 5 MIN PRN
Status: DISCONTINUED | OUTPATIENT
Start: 2018-01-01 | End: 2018-01-01 | Stop reason: HOSPADM

## 2018-01-01 RX ORDER — HYDROXYZINE HYDROCHLORIDE 25 MG/1
25 TABLET, FILM COATED ORAL DAILY PRN
Qty: 30 TABLET | Refills: 1 | Status: SHIPPED | OUTPATIENT
Start: 2018-01-01 | End: 2018-01-01

## 2018-01-01 RX ORDER — CITALOPRAM 20 MG/1
TABLET, FILM COATED ORAL
COMMUNITY
Start: 2015-08-05 | End: 2018-01-01

## 2018-01-01 RX ORDER — SPIRONOLACTONE 25 MG/1
100 TABLET ORAL DAILY
Status: DISCONTINUED | OUTPATIENT
Start: 2018-01-01 | End: 2018-01-01

## 2018-01-01 RX ORDER — OXYCODONE HYDROCHLORIDE 5 MG/1
5 TABLET ORAL EVERY 6 HOURS PRN
Status: DISCONTINUED | OUTPATIENT
Start: 2018-01-01 | End: 2018-01-01 | Stop reason: HOSPADM

## 2018-01-01 RX ORDER — CIPROFLOXACIN 250 MG/1
500 TABLET, FILM COATED ORAL DAILY
Status: DISCONTINUED | OUTPATIENT
Start: 2018-01-01 | End: 2018-01-01 | Stop reason: HOSPADM

## 2018-01-01 RX ORDER — FUROSEMIDE 40 MG/1
40 TABLET ORAL DAILY
Status: DISCONTINUED | OUTPATIENT
Start: 2018-01-01 | End: 2018-01-01 | Stop reason: HOSPADM

## 2018-01-01 RX ORDER — ROCURONIUM BROMIDE 10 MG/ML
INJECTION, SOLUTION INTRAVENOUS
Status: DISCONTINUED | OUTPATIENT
Start: 2018-01-01 | End: 2018-01-01

## 2018-01-01 RX ORDER — HYDROMORPHONE HYDROCHLORIDE 1 MG/ML
0.2 INJECTION, SOLUTION INTRAMUSCULAR; INTRAVENOUS; SUBCUTANEOUS EVERY 5 MIN PRN
Status: DISCONTINUED | OUTPATIENT
Start: 2018-01-01 | End: 2018-01-01 | Stop reason: HOSPADM

## 2018-01-01 RX ORDER — CEFTRIAXONE 1 G/1
1 INJECTION, POWDER, FOR SOLUTION INTRAMUSCULAR; INTRAVENOUS ONCE
Status: COMPLETED | OUTPATIENT
Start: 2018-01-01 | End: 2018-01-01

## 2018-01-01 RX ORDER — HEPARIN SODIUM,PORCINE/D5W 25000/250
17 INTRAVENOUS SOLUTION INTRAVENOUS CONTINUOUS
Status: DISCONTINUED | OUTPATIENT
Start: 2018-01-01 | End: 2018-01-01

## 2018-01-01 RX ORDER — FUROSEMIDE 40 MG/1
80 TABLET ORAL 3 TIMES DAILY
Status: DISCONTINUED | OUTPATIENT
Start: 2018-01-01 | End: 2018-01-01 | Stop reason: HOSPADM

## 2018-01-01 RX ORDER — SPIRONOLACTONE 100 MG/1
100 TABLET, FILM COATED ORAL 2 TIMES DAILY
Status: DISCONTINUED | OUTPATIENT
Start: 2018-01-01 | End: 2018-01-01

## 2018-01-01 RX ORDER — CIPROFLOXACIN 500 MG/1
500 TABLET ORAL DAILY
Status: DISCONTINUED | OUTPATIENT
Start: 2018-01-01 | End: 2018-01-01 | Stop reason: HOSPADM

## 2018-01-01 RX ORDER — LACTULOSE 10 G/15ML
30 SOLUTION ORAL; RECTAL 3 TIMES DAILY
Status: ON HOLD
Start: 2018-01-01 | End: 2019-01-01 | Stop reason: HOSPADM

## 2018-01-01 RX ORDER — GLIMEPIRIDE 2 MG/1
2 TABLET ORAL
Status: ON HOLD | COMMUNITY
End: 2019-01-01 | Stop reason: HOSPADM

## 2018-01-01 RX ORDER — METOLAZONE 5 MG/1
5 TABLET ORAL DAILY
Status: ON HOLD | COMMUNITY
End: 2018-01-01 | Stop reason: HOSPADM

## 2018-01-01 RX ORDER — IBUPROFEN 200 MG
24 TABLET ORAL
Status: DISCONTINUED | OUTPATIENT
Start: 2018-01-01 | End: 2018-01-01 | Stop reason: HOSPADM

## 2018-01-01 RX ORDER — HYOSCYAMINE SULFATE 0.12 MG/1
0.12 TABLET SUBLINGUAL EVERY 4 HOURS PRN
Status: DISCONTINUED | OUTPATIENT
Start: 2018-01-01 | End: 2018-01-01 | Stop reason: HOSPADM

## 2018-01-01 RX ORDER — POTASSIUM CHLORIDE 750 MG/1
30 CAPSULE, EXTENDED RELEASE ORAL ONCE
Status: COMPLETED | OUTPATIENT
Start: 2018-01-01 | End: 2018-01-01

## 2018-01-01 RX ORDER — IBUPROFEN 200 MG
24 TABLET ORAL
Status: DISCONTINUED | OUTPATIENT
Start: 2018-01-01 | End: 2018-01-01

## 2018-01-01 RX ORDER — MIDAZOLAM HYDROCHLORIDE 1 MG/ML
INJECTION INTRAMUSCULAR; INTRAVENOUS CODE/TRAUMA/SEDATION MEDICATION
Status: COMPLETED | OUTPATIENT
Start: 2018-01-01 | End: 2018-01-01

## 2018-01-01 RX ORDER — SPIRONOLACTONE 100 MG/1
100 TABLET, FILM COATED ORAL ONCE
Status: COMPLETED | OUTPATIENT
Start: 2018-01-01 | End: 2018-01-01

## 2018-01-01 RX ORDER — CLONAZEPAM 1 MG/1
1 TABLET ORAL 2 TIMES DAILY PRN
Status: DISCONTINUED | OUTPATIENT
Start: 2018-01-01 | End: 2018-01-01 | Stop reason: HOSPADM

## 2018-01-01 RX ORDER — CIPROFLOXACIN 250 MG/1
500 TABLET, FILM COATED ORAL EVERY 24 HOURS
Status: DISCONTINUED | OUTPATIENT
Start: 2018-01-01 | End: 2018-01-01 | Stop reason: HOSPADM

## 2018-01-01 RX ORDER — FLUTICASONE PROPIONATE 50 MCG
2 SPRAY, SUSPENSION (ML) NASAL DAILY
Status: DISCONTINUED | OUTPATIENT
Start: 2018-01-01 | End: 2018-01-01 | Stop reason: HOSPADM

## 2018-01-01 RX ORDER — SODIUM CHLORIDE 0.9 % (FLUSH) 0.9 %
3 SYRINGE (ML) INJECTION
Status: DISCONTINUED | OUTPATIENT
Start: 2018-01-01 | End: 2018-01-01 | Stop reason: HOSPADM

## 2018-01-01 RX ORDER — LIDOCAINE HYDROCHLORIDE 10 MG/ML
10 INJECTION INFILTRATION; PERINEURAL ONCE
Status: DISCONTINUED | OUTPATIENT
Start: 2018-01-01 | End: 2018-01-01

## 2018-01-01 RX ORDER — CIPROFLOXACIN 500 MG/1
500 TABLET ORAL DAILY
Qty: 30 TABLET | Refills: 11 | Status: ON HOLD | OUTPATIENT
Start: 2018-01-01 | End: 2019-01-01 | Stop reason: HOSPADM

## 2018-01-01 RX ORDER — TRAMADOL HYDROCHLORIDE 50 MG/1
50 TABLET ORAL ONCE AS NEEDED
Status: COMPLETED | OUTPATIENT
Start: 2018-01-01 | End: 2018-01-01

## 2018-01-01 RX ORDER — ENOXAPARIN SODIUM 100 MG/ML
40 INJECTION SUBCUTANEOUS EVERY 24 HOURS
Status: DISCONTINUED | OUTPATIENT
Start: 2018-01-01 | End: 2018-01-01

## 2018-01-01 RX ORDER — MIDODRINE HYDROCHLORIDE 5 MG/1
15 TABLET ORAL 3 TIMES DAILY
Qty: 270 TABLET | Refills: 0 | Status: SHIPPED | OUTPATIENT
Start: 2018-01-01 | End: 2018-01-01

## 2018-01-01 RX ORDER — MIDODRINE HYDROCHLORIDE 5 MG/1
10 TABLET ORAL 3 TIMES DAILY
Status: DISCONTINUED | OUTPATIENT
Start: 2018-01-01 | End: 2018-01-01 | Stop reason: HOSPADM

## 2018-01-01 RX ORDER — SPIRONOLACTONE 25 MG/1
100 TABLET ORAL DAILY
Status: DISCONTINUED | OUTPATIENT
Start: 2018-01-01 | End: 2018-01-01 | Stop reason: HOSPADM

## 2018-01-01 RX ORDER — SODIUM CHLORIDE 9 MG/ML
INJECTION, SOLUTION INTRAVENOUS CONTINUOUS
Status: DISCONTINUED | OUTPATIENT
Start: 2018-01-01 | End: 2018-01-01

## 2018-01-01 RX ORDER — SODIUM,POTASSIUM PHOSPHATES 280-250MG
2 POWDER IN PACKET (EA) ORAL
Status: DISCONTINUED | OUTPATIENT
Start: 2018-01-01 | End: 2018-01-01 | Stop reason: HOSPADM

## 2018-01-01 RX ORDER — LACTULOSE 10 G/15ML
30 SOLUTION ORAL 2 TIMES DAILY
Status: DISCONTINUED | OUTPATIENT
Start: 2018-01-01 | End: 2018-01-01 | Stop reason: HOSPADM

## 2018-01-01 RX ORDER — MAGNESIUM SULFATE HEPTAHYDRATE 40 MG/ML
2 INJECTION, SOLUTION INTRAVENOUS ONCE
Status: DISCONTINUED | OUTPATIENT
Start: 2018-01-01 | End: 2018-01-01

## 2018-01-01 RX ORDER — SODIUM CHLORIDE 0.9 % (FLUSH) 0.9 %
3 SYRINGE (ML) INJECTION
Status: CANCELLED | OUTPATIENT
Start: 2018-01-01

## 2018-01-01 RX ORDER — MIDODRINE HYDROCHLORIDE 5 MG/1
5 TABLET ORAL ONCE
Status: COMPLETED | OUTPATIENT
Start: 2018-01-01 | End: 2018-01-01

## 2018-01-01 RX ORDER — MAGNESIUM SULFATE HEPTAHYDRATE 40 MG/ML
2 INJECTION, SOLUTION INTRAVENOUS ONCE
Status: COMPLETED | OUTPATIENT
Start: 2018-01-01 | End: 2018-01-01

## 2018-01-01 RX ORDER — METOLAZONE 5 MG/1
5 TABLET ORAL DAILY
Status: DISCONTINUED | OUTPATIENT
Start: 2018-01-01 | End: 2018-01-01

## 2018-01-01 RX ORDER — LIDOCAINE HYDROCHLORIDE 10 MG/ML
10 INJECTION INFILTRATION; PERINEURAL
Status: COMPLETED | OUTPATIENT
Start: 2018-01-01 | End: 2018-01-01

## 2018-01-01 RX ORDER — FUROSEMIDE 40 MG/1
40 TABLET ORAL DAILY
Qty: 30 TABLET | Refills: 11 | Status: ON HOLD | OUTPATIENT
Start: 2018-01-01 | End: 2018-01-01 | Stop reason: HOSPADM

## 2018-01-01 RX ORDER — LACTULOSE 10 G/15ML
30 SOLUTION ORAL; RECTAL 2 TIMES DAILY
Refills: 0 | Status: ON HOLD
Start: 2018-01-01 | End: 2018-01-01

## 2018-01-01 RX ORDER — SPIRONOLACTONE 100 MG/1
200 TABLET, FILM COATED ORAL DAILY
Status: DISCONTINUED | OUTPATIENT
Start: 2018-01-01 | End: 2018-01-01 | Stop reason: HOSPADM

## 2018-01-01 RX ORDER — SODIUM CHLORIDE 0.9 % (FLUSH) 0.9 %
3 SYRINGE (ML) INJECTION
Status: DISCONTINUED | OUTPATIENT
Start: 2018-01-01 | End: 2018-01-01 | Stop reason: SDUPTHER

## 2018-01-01 RX ORDER — LACTULOSE 10 G/15ML
30 SOLUTION ORAL 3 TIMES DAILY
Status: DISCONTINUED | OUTPATIENT
Start: 2018-01-01 | End: 2018-01-01 | Stop reason: HOSPADM

## 2018-01-01 RX ORDER — MIDODRINE HYDROCHLORIDE 10 MG/1
10 TABLET ORAL 3 TIMES DAILY
Qty: 90 TABLET | Refills: 5 | Status: ON HOLD | OUTPATIENT
Start: 2018-01-01 | End: 2019-01-01 | Stop reason: HOSPADM

## 2018-01-01 RX ORDER — GLUCAGON 1 MG
1 KIT INJECTION
Status: DISCONTINUED | OUTPATIENT
Start: 2018-01-01 | End: 2018-01-01

## 2018-01-01 RX ORDER — ALBUMIN HUMAN 250 G/1000ML
25 SOLUTION INTRAVENOUS ONCE
Status: COMPLETED | OUTPATIENT
Start: 2018-01-01 | End: 2018-01-01

## 2018-01-01 RX ORDER — IPRATROPIUM BROMIDE AND ALBUTEROL SULFATE 2.5; .5 MG/3ML; MG/3ML
3 SOLUTION RESPIRATORY (INHALATION) EVERY 4 HOURS PRN
Status: DISCONTINUED | OUTPATIENT
Start: 2018-01-01 | End: 2018-01-01 | Stop reason: HOSPADM

## 2018-01-01 RX ORDER — FENTANYL CITRATE 50 UG/ML
INJECTION, SOLUTION INTRAMUSCULAR; INTRAVENOUS CODE/TRAUMA/SEDATION MEDICATION
Status: COMPLETED | OUTPATIENT
Start: 2018-01-01 | End: 2018-01-01

## 2018-01-01 RX ORDER — TRAZODONE HYDROCHLORIDE 100 MG/1
100 TABLET ORAL NIGHTLY
Status: ON HOLD | COMMUNITY
End: 2019-01-01 | Stop reason: HOSPADM

## 2018-01-01 RX ORDER — ONDANSETRON 2 MG/ML
4 INJECTION INTRAMUSCULAR; INTRAVENOUS ONCE AS NEEDED
Status: DISCONTINUED | OUTPATIENT
Start: 2018-01-01 | End: 2018-01-01 | Stop reason: HOSPADM

## 2018-01-01 RX ORDER — SUCCINYLCHOLINE CHLORIDE 20 MG/ML
INJECTION INTRAMUSCULAR; INTRAVENOUS
Status: DISCONTINUED | OUTPATIENT
Start: 2018-01-01 | End: 2018-01-01

## 2018-01-01 RX ORDER — LACTULOSE 10 G/15ML
30 SOLUTION ORAL; RECTAL 2 TIMES DAILY
Status: ON HOLD | COMMUNITY
End: 2018-01-01

## 2018-01-01 RX ORDER — ACETAMINOPHEN 325 MG/1
650 TABLET ORAL EVERY 4 HOURS PRN
Status: DISCONTINUED | OUTPATIENT
Start: 2018-01-01 | End: 2018-01-01 | Stop reason: HOSPADM

## 2018-01-01 RX ORDER — MIDAZOLAM HYDROCHLORIDE 1 MG/ML
1 INJECTION INTRAMUSCULAR; INTRAVENOUS
Status: DISCONTINUED | OUTPATIENT
Start: 2018-01-01 | End: 2019-01-01

## 2018-01-01 RX ORDER — LIDOCAINE 50 MG/G
1 PATCH TOPICAL
Status: DISCONTINUED | OUTPATIENT
Start: 2018-01-01 | End: 2018-01-01 | Stop reason: HOSPADM

## 2018-01-01 RX ORDER — HYDROMORPHONE HYDROCHLORIDE 1 MG/ML
1 INJECTION, SOLUTION INTRAMUSCULAR; INTRAVENOUS; SUBCUTANEOUS ONCE
Status: COMPLETED | OUTPATIENT
Start: 2018-01-01 | End: 2018-01-01

## 2018-01-01 RX ORDER — SODIUM,POTASSIUM PHOSPHATES 280-250MG
2 POWDER IN PACKET (EA) ORAL ONCE
Status: COMPLETED | OUTPATIENT
Start: 2018-01-01 | End: 2018-01-01

## 2018-01-01 RX ORDER — CIPROFLOXACIN 2 MG/ML
400 INJECTION, SOLUTION INTRAVENOUS ONCE
Status: COMPLETED | OUTPATIENT
Start: 2018-01-01 | End: 2018-01-01

## 2018-01-01 RX ORDER — FUROSEMIDE 10 MG/ML
40 INJECTION INTRAMUSCULAR; INTRAVENOUS 2 TIMES DAILY
Status: DISCONTINUED | OUTPATIENT
Start: 2018-01-01 | End: 2018-01-01

## 2018-01-01 RX ORDER — MIDODRINE HYDROCHLORIDE 5 MG/1
10 TABLET ORAL 3 TIMES DAILY
Status: DISCONTINUED | OUTPATIENT
Start: 2018-01-01 | End: 2018-01-01

## 2018-01-01 RX ORDER — METRONIDAZOLE 500 MG/100ML
500 INJECTION, SOLUTION INTRAVENOUS
Status: DISCONTINUED | OUTPATIENT
Start: 2018-01-01 | End: 2018-01-01

## 2018-01-01 RX ORDER — ALBUMIN HUMAN 250 G/1000ML
25 SOLUTION INTRAVENOUS EVERY 6 HOURS
Status: COMPLETED | OUTPATIENT
Start: 2018-01-01 | End: 2018-01-01

## 2018-01-01 RX ORDER — FUROSEMIDE 10 MG/ML
40 INJECTION INTRAMUSCULAR; INTRAVENOUS 3 TIMES DAILY
Status: DISCONTINUED | OUTPATIENT
Start: 2018-01-01 | End: 2018-01-01

## 2018-01-01 RX ORDER — HEPARIN SODIUM 10000 [USP'U]/100ML
17 INJECTION, SOLUTION INTRAVENOUS CONTINUOUS
Status: DISCONTINUED | OUTPATIENT
Start: 2018-01-01 | End: 2018-01-01

## 2018-01-01 RX ORDER — DEXAMETHASONE SODIUM PHOSPHATE 4 MG/ML
INJECTION, SOLUTION INTRA-ARTICULAR; INTRALESIONAL; INTRAMUSCULAR; INTRAVENOUS; SOFT TISSUE
Status: DISCONTINUED | OUTPATIENT
Start: 2018-01-01 | End: 2018-01-01

## 2018-01-01 RX ORDER — SPIRONOLACTONE 100 MG/1
100 TABLET, FILM COATED ORAL 2 TIMES DAILY
Status: ON HOLD | COMMUNITY
End: 2018-01-01 | Stop reason: HOSPADM

## 2018-01-01 RX ORDER — FUROSEMIDE 40 MG/1
40 TABLET ORAL DAILY
Status: DISCONTINUED | OUTPATIENT
Start: 2018-01-01 | End: 2018-01-01

## 2018-01-01 RX ORDER — FUROSEMIDE 80 MG/1
80 TABLET ORAL 2 TIMES DAILY
Qty: 60 TABLET | Refills: 2 | Status: ON HOLD | OUTPATIENT
Start: 2018-01-01 | End: 2019-01-01 | Stop reason: HOSPADM

## 2018-01-01 RX ORDER — LIDOCAINE HYDROCHLORIDE 10 MG/ML
10 INJECTION, SOLUTION EPIDURAL; INFILTRATION; INTRACAUDAL; PERINEURAL ONCE
Status: COMPLETED | OUTPATIENT
Start: 2018-01-01 | End: 2018-01-01

## 2018-01-01 RX ORDER — FOLIC ACID 1 MG/1
1 TABLET ORAL DAILY
Status: DISCONTINUED | OUTPATIENT
Start: 2018-01-01 | End: 2018-01-01 | Stop reason: HOSPADM

## 2018-01-01 RX ORDER — CLONAZEPAM 1 MG/1
TABLET ORAL
Status: ON HOLD | COMMUNITY
Start: 2015-07-13 | End: 2019-01-01 | Stop reason: HOSPADM

## 2018-01-01 RX ORDER — FUROSEMIDE 10 MG/ML
20 INJECTION INTRAMUSCULAR; INTRAVENOUS ONCE
Status: COMPLETED | OUTPATIENT
Start: 2018-01-01 | End: 2018-01-01

## 2018-01-01 RX ORDER — SODIUM CHLORIDE 9 MG/ML
500 INJECTION, SOLUTION INTRAVENOUS ONCE
Status: COMPLETED | OUTPATIENT
Start: 2018-01-01 | End: 2018-01-01

## 2018-01-01 RX ORDER — MIDODRINE HYDROCHLORIDE 5 MG/1
15 TABLET ORAL 3 TIMES DAILY
Status: DISCONTINUED | OUTPATIENT
Start: 2018-01-01 | End: 2018-01-01 | Stop reason: HOSPADM

## 2018-01-01 RX ADMIN — FOLIC ACID 1 MG: 1 TABLET ORAL at 09:05

## 2018-01-01 RX ADMIN — OXYCODONE HYDROCHLORIDE 5 MG: 5 TABLET ORAL at 03:05

## 2018-01-01 RX ADMIN — ROCURONIUM BROMIDE 10 MG: 10 INJECTION, SOLUTION INTRAVENOUS at 03:05

## 2018-01-01 RX ADMIN — FUROSEMIDE 40 MG: 10 INJECTION, SOLUTION INTRAMUSCULAR; INTRAVENOUS at 06:05

## 2018-01-01 RX ADMIN — MIDODRINE HYDROCHLORIDE 5 MG: 5 TABLET ORAL at 09:05

## 2018-01-01 RX ADMIN — CEFTRIAXONE SODIUM 1 G: 1 INJECTION, POWDER, FOR SOLUTION INTRAMUSCULAR; INTRAVENOUS at 05:05

## 2018-01-01 RX ADMIN — FOLIC ACID 1 MG: 1 TABLET ORAL at 08:05

## 2018-01-01 RX ADMIN — CLONAZEPAM 1 MG: 0.5 TABLET ORAL at 09:05

## 2018-01-01 RX ADMIN — TRAZODONE HYDROCHLORIDE 100 MG: 50 TABLET ORAL at 09:05

## 2018-01-01 RX ADMIN — ENOXAPARIN SODIUM 40 MG: 100 INJECTION SUBCUTANEOUS at 07:05

## 2018-01-01 RX ADMIN — LACTULOSE 30 G: 20 SOLUTION ORAL at 09:05

## 2018-01-01 RX ADMIN — CIPROFLOXACIN 400 MG: 2 INJECTION, SOLUTION INTRAVENOUS at 01:05

## 2018-01-01 RX ADMIN — MIDODRINE HYDROCHLORIDE 15 MG: 5 TABLET ORAL at 10:05

## 2018-01-01 RX ADMIN — METRONIDAZOLE 500 MG: 500 INJECTION, SOLUTION INTRAVENOUS at 01:05

## 2018-01-01 RX ADMIN — OXYCODONE HYDROCHLORIDE 5 MG: 5 TABLET ORAL at 07:05

## 2018-01-01 RX ADMIN — MIDODRINE HYDROCHLORIDE 10 MG: 5 TABLET ORAL at 09:05

## 2018-01-01 RX ADMIN — OXYCODONE HYDROCHLORIDE 5 MG: 5 TABLET ORAL at 12:05

## 2018-01-01 RX ADMIN — POTASSIUM CHLORIDE 40 MEQ: 1500 TABLET, EXTENDED RELEASE ORAL at 10:05

## 2018-01-01 RX ADMIN — IOHEXOL 75 ML: 350 INJECTION, SOLUTION INTRAVENOUS at 11:05

## 2018-01-01 RX ADMIN — ONDANSETRON 8 MG: 8 TABLET, ORALLY DISINTEGRATING ORAL at 07:05

## 2018-01-01 RX ADMIN — SPIRONOLACTONE 100 MG: 100 TABLET, FILM COATED ORAL at 09:07

## 2018-01-01 RX ADMIN — POTASSIUM CHLORIDE 40 MEQ: 1500 TABLET, EXTENDED RELEASE ORAL at 02:05

## 2018-01-01 RX ADMIN — DEXAMETHASONE SODIUM PHOSPHATE 4 MG: 4 INJECTION, SOLUTION INTRAMUSCULAR; INTRAVENOUS at 02:05

## 2018-01-01 RX ADMIN — FENTANYL CITRATE 50 MCG: 50 INJECTION, SOLUTION INTRAMUSCULAR; INTRAVENOUS at 03:05

## 2018-01-01 RX ADMIN — TRAZODONE HYDROCHLORIDE 100 MG: 50 TABLET ORAL at 08:05

## 2018-01-01 RX ADMIN — LACTULOSE 30 G: 20 SOLUTION ORAL at 03:07

## 2018-01-01 RX ADMIN — INSULIN ASPART 2 UNITS: 100 INJECTION, SOLUTION INTRAVENOUS; SUBCUTANEOUS at 04:05

## 2018-01-01 RX ADMIN — ACETAMINOPHEN 650 MG: 325 TABLET ORAL at 07:05

## 2018-01-01 RX ADMIN — APIXABAN 5 MG: 5 TABLET, FILM COATED ORAL at 10:07

## 2018-01-01 RX ADMIN — FLUTICASONE PROPIONATE 100 MCG: 50 SPRAY, METERED NASAL at 09:05

## 2018-01-01 RX ADMIN — FUROSEMIDE 40 MG: 40 TABLET ORAL at 01:05

## 2018-01-01 RX ADMIN — METOLAZONE 5 MG: 5 TABLET ORAL at 09:05

## 2018-01-01 RX ADMIN — MIDODRINE HYDROCHLORIDE 15 MG: 5 TABLET ORAL at 06:05

## 2018-01-01 RX ADMIN — MIDAZOLAM HYDROCHLORIDE 1 MG: 1 INJECTION, SOLUTION INTRAMUSCULAR; INTRAVENOUS at 04:08

## 2018-01-01 RX ADMIN — MIDODRINE HYDROCHLORIDE 15 MG: 5 TABLET ORAL at 03:05

## 2018-01-01 RX ADMIN — HYDROXYZINE HYDROCHLORIDE 25 MG: 25 TABLET, FILM COATED ORAL at 07:05

## 2018-01-01 RX ADMIN — PHENYLEPHRINE HYDROCHLORIDE 100 MCG: 10 INJECTION INTRAVENOUS at 04:05

## 2018-01-01 RX ADMIN — INSULIN ASPART 2 UNITS: 100 INJECTION, SOLUTION INTRAVENOUS; SUBCUTANEOUS at 08:05

## 2018-01-01 RX ADMIN — CIPROFLOXACIN HYDROCHLORIDE 500 MG: 250 TABLET, FILM COATED ORAL at 08:05

## 2018-01-01 RX ADMIN — LACTULOSE 30 G: 20 SOLUTION ORAL at 08:07

## 2018-01-01 RX ADMIN — FUROSEMIDE 40 MG: 40 TABLET ORAL at 05:05

## 2018-01-01 RX ADMIN — INSULIN ASPART 1 UNITS: 100 INJECTION, SOLUTION INTRAVENOUS; SUBCUTANEOUS at 09:05

## 2018-01-01 RX ADMIN — MIDODRINE HYDROCHLORIDE 15 MG: 5 TABLET ORAL at 08:05

## 2018-01-01 RX ADMIN — LIDOCAINE HYDROCHLORIDE 80 MG: 20 INJECTION, SOLUTION INTRAVENOUS at 03:05

## 2018-01-01 RX ADMIN — CLONAZEPAM 1 MG: 0.5 TABLET ORAL at 08:05

## 2018-01-01 RX ADMIN — HYOSCYAMINE SULFATE 0.12 MG: 0.12 TABLET ORAL; SUBLINGUAL at 08:05

## 2018-01-01 RX ADMIN — MIDODRINE HYDROCHLORIDE 10 MG: 5 TABLET ORAL at 08:07

## 2018-01-01 RX ADMIN — OXYCODONE HYDROCHLORIDE 5 MG: 5 TABLET ORAL at 09:05

## 2018-01-01 RX ADMIN — FLUTICASONE PROPIONATE 100 MCG: 50 SPRAY, METERED NASAL at 08:05

## 2018-01-01 RX ADMIN — HYDROXYZINE HYDROCHLORIDE 25 MG: 25 TABLET, FILM COATED ORAL at 09:05

## 2018-01-01 RX ADMIN — APIXABAN 5 MG: 5 TABLET, FILM COATED ORAL at 08:07

## 2018-01-01 RX ADMIN — IOHEXOL 65 ML: 300 INJECTION, SOLUTION INTRAVENOUS at 06:05

## 2018-01-01 RX ADMIN — LACTULOSE 30 G: 20 SOLUTION ORAL at 10:07

## 2018-01-01 RX ADMIN — LIDOCAINE 1 PATCH: 50 PATCH TOPICAL at 04:05

## 2018-01-01 RX ADMIN — ROCURONIUM BROMIDE 30 MG: 10 INJECTION, SOLUTION INTRAVENOUS at 04:05

## 2018-01-01 RX ADMIN — MIDODRINE HYDROCHLORIDE 10 MG: 5 TABLET ORAL at 10:07

## 2018-01-01 RX ADMIN — ALBUMIN HUMAN 25 G: 0.25 SOLUTION INTRAVENOUS at 09:05

## 2018-01-01 RX ADMIN — CLONAZEPAM 1 MG: 1 TABLET ORAL at 08:07

## 2018-01-01 RX ADMIN — METOLAZONE 5 MG: 5 TABLET ORAL at 10:05

## 2018-01-01 RX ADMIN — FUROSEMIDE 40 MG: 10 INJECTION, SOLUTION INTRAMUSCULAR; INTRAVENOUS at 03:07

## 2018-01-01 RX ADMIN — CIPROFLOXACIN HYDROCHLORIDE 500 MG: 250 TABLET, FILM COATED ORAL at 09:05

## 2018-01-01 RX ADMIN — ROCURONIUM BROMIDE 5 MG: 10 INJECTION, SOLUTION INTRAVENOUS at 11:05

## 2018-01-01 RX ADMIN — SPIRONOLACTONE 200 MG: 100 TABLET, FILM COATED ORAL at 08:07

## 2018-01-01 RX ADMIN — POTASSIUM BICARBONATE 50 MEQ: 25 TABLET, EFFERVESCENT ORAL at 08:07

## 2018-01-01 RX ADMIN — TBO-FILGRASTIM 300 MCG: 300 INJECTION, SOLUTION SUBCUTANEOUS at 11:05

## 2018-01-01 RX ADMIN — SPIRONOLACTONE 100 MG: 100 TABLET, FILM COATED ORAL at 08:05

## 2018-01-01 RX ADMIN — OXYCODONE HYDROCHLORIDE 5 MG: 5 TABLET ORAL at 01:05

## 2018-01-01 RX ADMIN — MIDODRINE HYDROCHLORIDE 15 MG: 5 TABLET ORAL at 09:05

## 2018-01-01 RX ADMIN — APIXABAN 5 MG: 5 TABLET, FILM COATED ORAL at 09:07

## 2018-01-01 RX ADMIN — RIFAXIMIN 550 MG: 550 TABLET ORAL at 08:07

## 2018-01-01 RX ADMIN — SPIRONOLACTONE 100 MG: 100 TABLET, FILM COATED ORAL at 11:07

## 2018-01-01 RX ADMIN — MIDODRINE HYDROCHLORIDE 10 MG: 5 TABLET ORAL at 11:05

## 2018-01-01 RX ADMIN — FUROSEMIDE 40 MG: 40 TABLET ORAL at 08:05

## 2018-01-01 RX ADMIN — MAGNESIUM SULFATE IN WATER 2 G: 40 INJECTION, SOLUTION INTRAVENOUS at 09:05

## 2018-01-01 RX ADMIN — LIDOCAINE 1 PATCH: 50 PATCH TOPICAL at 06:05

## 2018-01-01 RX ADMIN — LACTULOSE 30 G: 20 SOLUTION ORAL at 09:07

## 2018-01-01 RX ADMIN — APIXABAN 5 MG: 5 TABLET, FILM COATED ORAL at 09:05

## 2018-01-01 RX ADMIN — HEPARIN SODIUM 17 UNITS/KG/HR: 10000 INJECTION, SOLUTION INTRAVENOUS at 11:05

## 2018-01-01 RX ADMIN — SPIRONOLACTONE 100 MG: 25 TABLET, FILM COATED ORAL at 12:05

## 2018-01-01 RX ADMIN — MAGNESIUM SULFATE IN WATER 2 G: 40 INJECTION, SOLUTION INTRAVENOUS at 10:07

## 2018-01-01 RX ADMIN — ALBUMIN HUMAN 25 G: 0.25 SOLUTION INTRAVENOUS at 10:05

## 2018-01-01 RX ADMIN — OXYCODONE HYDROCHLORIDE 5 MG: 5 TABLET ORAL at 04:05

## 2018-01-01 RX ADMIN — MIDODRINE HYDROCHLORIDE 10 MG: 5 TABLET ORAL at 03:07

## 2018-01-01 RX ADMIN — POTASSIUM & SODIUM PHOSPHATES POWDER PACK 280-160-250 MG 1 PACKET: 280-160-250 PACK at 05:07

## 2018-01-01 RX ADMIN — FUROSEMIDE 40 MG: 10 INJECTION, SOLUTION INTRAMUSCULAR; INTRAVENOUS at 08:07

## 2018-01-01 RX ADMIN — IOHEXOL 75 ML: 350 INJECTION, SOLUTION INTRAVENOUS at 02:05

## 2018-01-01 RX ADMIN — CIPROFLOXACIN HYDROCHLORIDE 500 MG: 500 TABLET, FILM COATED ORAL at 08:05

## 2018-01-01 RX ADMIN — MAGNESIUM SULFATE IN WATER 2 G: 40 INJECTION, SOLUTION INTRAVENOUS at 01:05

## 2018-01-01 RX ADMIN — INSULIN ASPART 2 UNITS: 100 INJECTION, SOLUTION INTRAVENOUS; SUBCUTANEOUS at 12:05

## 2018-01-01 RX ADMIN — SPIRONOLACTONE 100 MG: 100 TABLET, FILM COATED ORAL at 08:07

## 2018-01-01 RX ADMIN — SPIRONOLACTONE 100 MG: 100 TABLET, FILM COATED ORAL at 10:05

## 2018-01-01 RX ADMIN — IOHEXOL 130 ML: 300 INJECTION, SOLUTION INTRAVENOUS at 02:05

## 2018-01-01 RX ADMIN — SUCCINYLCHOLINE CHLORIDE 120 MG: 20 INJECTION, SOLUTION INTRAMUSCULAR; INTRAVENOUS at 11:05

## 2018-01-01 RX ADMIN — FUROSEMIDE 40 MG: 10 INJECTION, SOLUTION INTRAMUSCULAR; INTRAVENOUS at 10:07

## 2018-01-01 RX ADMIN — FUROSEMIDE 40 MG: 10 INJECTION, SOLUTION INTRAMUSCULAR; INTRAVENOUS at 09:05

## 2018-01-01 RX ADMIN — POTASSIUM & SODIUM PHOSPHATES POWDER PACK 280-160-250 MG 1 PACKET: 280-160-250 PACK at 08:07

## 2018-01-01 RX ADMIN — LACTULOSE 30 G: 20 SOLUTION ORAL at 08:05

## 2018-01-01 RX ADMIN — GLYCOPYRROLATE 0.4 MG: 0.2 INJECTION, SOLUTION INTRAMUSCULAR; INTRAVENOUS at 02:05

## 2018-01-01 RX ADMIN — PHENYLEPHRINE HYDROCHLORIDE 100 MCG: 10 INJECTION INTRAVENOUS at 11:05

## 2018-01-01 RX ADMIN — SODIUM CHLORIDE: 0.9 INJECTION, SOLUTION INTRAVENOUS at 03:05

## 2018-01-01 RX ADMIN — FLUTICASONE PROPIONATE 100 MCG: 50 SPRAY, METERED NASAL at 10:05

## 2018-01-01 RX ADMIN — TRAZODONE HYDROCHLORIDE 100 MG: 50 TABLET ORAL at 08:07

## 2018-01-01 RX ADMIN — FENTANYL CITRATE 50 MCG: 50 INJECTION, SOLUTION INTRAMUSCULAR; INTRAVENOUS at 04:08

## 2018-01-01 RX ADMIN — POTASSIUM & SODIUM PHOSPHATES POWDER PACK 280-160-250 MG 1 PACKET: 280-160-250 PACK at 01:07

## 2018-01-01 RX ADMIN — LIDOCAINE 1 PATCH: 50 PATCH TOPICAL at 07:05

## 2018-01-01 RX ADMIN — SODIUM CHLORIDE: 0.9 INJECTION, SOLUTION INTRAVENOUS at 12:05

## 2018-01-01 RX ADMIN — CIPROFLOXACIN HYDROCHLORIDE 500 MG: 250 TABLET, FILM COATED ORAL at 08:07

## 2018-01-01 RX ADMIN — INSULIN ASPART 1 UNITS: 100 INJECTION, SOLUTION INTRAVENOUS; SUBCUTANEOUS at 08:05

## 2018-01-01 RX ADMIN — SPIRONOLACTONE 100 MG: 100 TABLET, FILM COATED ORAL at 09:05

## 2018-01-01 RX ADMIN — INSULIN DETEMIR 5 UNITS: 100 INJECTION, SOLUTION SUBCUTANEOUS at 09:05

## 2018-01-01 RX ADMIN — CIPROFLOXACIN HYDROCHLORIDE 500 MG: 250 TABLET, FILM COATED ORAL at 10:07

## 2018-01-01 RX ADMIN — SPIRONOLACTONE 100 MG: 25 TABLET, FILM COATED ORAL at 08:05

## 2018-01-01 RX ADMIN — PHENYLEPHRINE HYDROCHLORIDE 100 MCG: 10 INJECTION INTRAVENOUS at 05:05

## 2018-01-01 RX ADMIN — ALBUMIN HUMAN 25 G: 0.25 SOLUTION INTRAVENOUS at 08:05

## 2018-01-01 RX ADMIN — FUROSEMIDE 40 MG: 40 TABLET ORAL at 09:05

## 2018-01-01 RX ADMIN — ONDANSETRON 8 MG: 8 TABLET, ORALLY DISINTEGRATING ORAL at 01:05

## 2018-01-01 RX ADMIN — ALBUMIN HUMAN 25 G: 0.25 SOLUTION INTRAVENOUS at 05:05

## 2018-01-01 RX ADMIN — ROCURONIUM BROMIDE 25 MG: 10 INJECTION, SOLUTION INTRAVENOUS at 12:05

## 2018-01-01 RX ADMIN — LACTULOSE 30 G: 20 SOLUTION ORAL at 01:05

## 2018-01-01 RX ADMIN — MIDODRINE HYDROCHLORIDE 10 MG: 5 TABLET ORAL at 08:05

## 2018-01-01 RX ADMIN — LACTULOSE 20 G: 20 SOLUTION ORAL at 08:05

## 2018-01-01 RX ADMIN — PROPOFOL 200 MG: 10 INJECTION, EMULSION INTRAVENOUS at 03:05

## 2018-01-01 RX ADMIN — POTASSIUM & SODIUM PHOSPHATES POWDER PACK 280-160-250 MG 1 PACKET: 280-160-250 PACK at 06:07

## 2018-01-01 RX ADMIN — LIDOCAINE HYDROCHLORIDE 50 MG: 20 INJECTION, SOLUTION INTRAVENOUS at 11:05

## 2018-01-01 RX ADMIN — CLONAZEPAM 1 MG: 1 TABLET ORAL at 09:07

## 2018-01-01 RX ADMIN — CIPROFLOXACIN HYDROCHLORIDE 500 MG: 250 TABLET, FILM COATED ORAL at 10:05

## 2018-01-01 RX ADMIN — PHENYLEPHRINE HYDROCHLORIDE 200 MCG: 10 INJECTION INTRAVENOUS at 04:05

## 2018-01-01 RX ADMIN — FUROSEMIDE 20 MG: 10 INJECTION, SOLUTION INTRAMUSCULAR; INTRAVENOUS at 01:05

## 2018-01-01 RX ADMIN — SODIUM CHLORIDE 500 ML: 0.9 INJECTION, SOLUTION INTRAVENOUS at 01:08

## 2018-01-01 RX ADMIN — RIFAXIMIN 550 MG: 550 TABLET ORAL at 09:07

## 2018-01-01 RX ADMIN — INSULIN ASPART 1 UNITS: 100 INJECTION, SOLUTION INTRAVENOUS; SUBCUTANEOUS at 08:07

## 2018-01-01 RX ADMIN — OXYCODONE HYDROCHLORIDE 5 MG: 5 TABLET ORAL at 06:05

## 2018-01-01 RX ADMIN — ALBUMIN HUMAN 25 G: 0.25 SOLUTION INTRAVENOUS at 11:05

## 2018-01-01 RX ADMIN — INSULIN DETEMIR 5 UNITS: 100 INJECTION, SOLUTION SUBCUTANEOUS at 08:05

## 2018-01-01 RX ADMIN — SPIRONOLACTONE 100 MG: 100 TABLET, FILM COATED ORAL at 10:07

## 2018-01-01 RX ADMIN — PHENYLEPHRINE HYDROCHLORIDE 200 MCG: 10 INJECTION INTRAVENOUS at 03:05

## 2018-01-01 RX ADMIN — OXYCODONE HYDROCHLORIDE 5 MG: 5 TABLET ORAL at 10:05

## 2018-01-01 RX ADMIN — OXYCODONE HYDROCHLORIDE 5 MG: 5 TABLET ORAL at 11:05

## 2018-01-01 RX ADMIN — POTASSIUM CHLORIDE 30 MEQ: 750 CAPSULE, EXTENDED RELEASE ORAL at 02:05

## 2018-01-01 RX ADMIN — APIXABAN 2.5 MG: 2.5 TABLET, FILM COATED ORAL at 08:05

## 2018-01-01 RX ADMIN — POTASSIUM & SODIUM PHOSPHATES POWDER PACK 280-160-250 MG 2 PACKET: 280-160-250 PACK at 04:05

## 2018-01-01 RX ADMIN — MIDODRINE HYDROCHLORIDE 10 MG: 5 TABLET ORAL at 07:07

## 2018-01-01 RX ADMIN — OXYCODONE HYDROCHLORIDE 5 MG: 5 TABLET ORAL at 05:05

## 2018-01-01 RX ADMIN — OXYCODONE HYDROCHLORIDE 5 MG: 5 TABLET ORAL at 08:05

## 2018-01-01 RX ADMIN — INSULIN ASPART 2 UNITS: 100 INJECTION, SOLUTION INTRAVENOUS; SUBCUTANEOUS at 05:05

## 2018-01-01 RX ADMIN — CLONAZEPAM 1 MG: 0.5 TABLET ORAL at 01:05

## 2018-01-01 RX ADMIN — INSULIN ASPART 2 UNITS: 100 INJECTION, SOLUTION INTRAVENOUS; SUBCUTANEOUS at 01:07

## 2018-01-01 RX ADMIN — LIDOCAINE HYDROCHLORIDE 10 ML: 10 INJECTION, SOLUTION INFILTRATION; PERINEURAL at 10:05

## 2018-01-01 RX ADMIN — MAGNESIUM OXIDE TAB 400 MG (241.3 MG ELEMENTAL MG) 800 MG: 400 (241.3 MG) TAB at 08:07

## 2018-01-01 RX ADMIN — SODIUM CHLORIDE: 9 INJECTION, SOLUTION INTRAVENOUS at 02:05

## 2018-01-01 RX ADMIN — PHENYLEPHRINE HYDROCHLORIDE 200 MCG: 10 INJECTION INTRAVENOUS at 05:05

## 2018-01-01 RX ADMIN — MIDODRINE HYDROCHLORIDE 10 MG: 5 TABLET ORAL at 02:05

## 2018-01-01 RX ADMIN — TRAMADOL HYDROCHLORIDE 50 MG: 50 TABLET, FILM COATED ORAL at 02:05

## 2018-01-01 RX ADMIN — INSULIN ASPART 2 UNITS: 100 INJECTION, SOLUTION INTRAVENOUS; SUBCUTANEOUS at 09:07

## 2018-01-01 RX ADMIN — FENTANYL CITRATE 25 MCG: 50 INJECTION INTRAMUSCULAR; INTRAVENOUS at 04:05

## 2018-01-01 RX ADMIN — ROCURONIUM BROMIDE 10 MG: 10 INJECTION, SOLUTION INTRAVENOUS at 05:05

## 2018-01-01 RX ADMIN — ALBUMIN HUMAN 25 G: 0.25 SOLUTION INTRAVENOUS at 12:05

## 2018-01-01 RX ADMIN — TRAZODONE HYDROCHLORIDE 100 MG: 50 TABLET ORAL at 09:07

## 2018-01-01 RX ADMIN — GLYCOPYRROLATE 0.2 MG: 0.2 INJECTION, SOLUTION INTRAMUSCULAR; INTRAVENOUS at 11:05

## 2018-01-01 RX ADMIN — CIPROFLOXACIN HYDROCHLORIDE 500 MG: 500 TABLET, FILM COATED ORAL at 10:05

## 2018-01-01 RX ADMIN — FUROSEMIDE 40 MG: 40 TABLET ORAL at 12:05

## 2018-01-01 RX ADMIN — CLONAZEPAM 1 MG: 1 TABLET ORAL at 03:05

## 2018-01-01 RX ADMIN — METOLAZONE 5 MG: 5 TABLET ORAL at 08:05

## 2018-01-01 RX ADMIN — CLONAZEPAM 1 MG: 0.5 TABLET ORAL at 10:05

## 2018-01-01 RX ADMIN — FUROSEMIDE 40 MG: 10 INJECTION, SOLUTION INTRAMUSCULAR; INTRAVENOUS at 05:07

## 2018-01-01 RX ADMIN — MIDODRINE HYDROCHLORIDE 10 MG: 5 TABLET ORAL at 09:07

## 2018-01-01 RX ADMIN — MIDAZOLAM HYDROCHLORIDE 2 MG: 1 INJECTION, SOLUTION INTRAMUSCULAR; INTRAVENOUS at 03:05

## 2018-01-01 RX ADMIN — CIPROFLOXACIN HYDROCHLORIDE 500 MG: 500 TABLET, FILM COATED ORAL at 09:05

## 2018-01-01 RX ADMIN — LIDOCAINE 1 PATCH: 50 PATCH TOPICAL at 03:05

## 2018-01-01 RX ADMIN — CIPROFLOXACIN HYDROCHLORIDE 500 MG: 250 TABLET, FILM COATED ORAL at 01:05

## 2018-01-01 RX ADMIN — PHENYLEPHRINE HYDROCHLORIDE 100 MCG: 10 INJECTION INTRAVENOUS at 01:05

## 2018-01-01 RX ADMIN — POTASSIUM & SODIUM PHOSPHATES POWDER PACK 280-160-250 MG 2 PACKET: 280-160-250 PACK at 09:05

## 2018-01-01 RX ADMIN — HYDROXYZINE HYDROCHLORIDE 25 MG: 25 TABLET, FILM COATED ORAL at 03:05

## 2018-01-01 RX ADMIN — FOLIC ACID 1 MG: 1 TABLET ORAL at 10:05

## 2018-01-01 RX ADMIN — FENTANYL CITRATE 100 MCG: 50 INJECTION, SOLUTION INTRAMUSCULAR; INTRAVENOUS at 11:05

## 2018-01-01 RX ADMIN — LIDOCAINE HYDROCHLORIDE 100 MG: 10 INJECTION, SOLUTION EPIDURAL; INFILTRATION; INTRACAUDAL; PERINEURAL at 05:05

## 2018-01-01 RX ADMIN — LIDOCAINE 1 PATCH: 50 PATCH TOPICAL at 02:05

## 2018-01-01 RX ADMIN — HEPARIN SODIUM 19 UNITS/KG/HR: 10000 INJECTION, SOLUTION INTRAVENOUS at 08:05

## 2018-01-01 RX ADMIN — POTASSIUM & SODIUM PHOSPHATES POWDER PACK 280-160-250 MG 2 PACKET: 280-160-250 PACK at 11:05

## 2018-01-01 RX ADMIN — POTASSIUM CHLORIDE 30 MEQ: 750 CAPSULE, EXTENDED RELEASE ORAL at 10:05

## 2018-01-01 RX ADMIN — CLONAZEPAM 1 MG: 1 TABLET ORAL at 10:07

## 2018-01-01 RX ADMIN — FENTANYL CITRATE 50 MCG: 50 INJECTION, SOLUTION INTRAMUSCULAR; INTRAVENOUS at 12:05

## 2018-01-01 RX ADMIN — PROPOFOL 130 MG: 10 INJECTION, EMULSION INTRAVENOUS at 11:05

## 2018-01-01 RX ADMIN — SODIUM CHLORIDE: 0.9 INJECTION, SOLUTION INTRAVENOUS at 11:05

## 2018-01-01 RX ADMIN — POTASSIUM & SODIUM PHOSPHATES POWDER PACK 280-160-250 MG 1 PACKET: 280-160-250 PACK at 11:07

## 2018-01-01 RX ADMIN — NEOSTIGMINE METHYLSULFATE 3 MG: 1 INJECTION INTRAVENOUS at 02:05

## 2018-01-01 RX ADMIN — MIDODRINE HYDROCHLORIDE 15 MG: 5 TABLET ORAL at 02:05

## 2018-01-01 RX ADMIN — MAGNESIUM SULFATE IN WATER 2 G: 40 INJECTION, SOLUTION INTRAVENOUS at 02:05

## 2018-01-01 RX ADMIN — HYDROMORPHONE HYDROCHLORIDE 1 MG: 1 INJECTION, SOLUTION INTRAMUSCULAR; INTRAVENOUS; SUBCUTANEOUS at 08:05

## 2018-01-01 RX ADMIN — POTASSIUM CHLORIDE 40 MEQ: 20 SOLUTION ORAL at 10:07

## 2018-01-01 RX ADMIN — APIXABAN 5 MG: 5 TABLET, FILM COATED ORAL at 10:05

## 2018-01-01 RX ADMIN — FUROSEMIDE 40 MG: 10 INJECTION, SOLUTION INTRAMUSCULAR; INTRAVENOUS at 09:07

## 2018-01-01 RX ADMIN — INSULIN ASPART 2 UNITS: 100 INJECTION, SOLUTION INTRAVENOUS; SUBCUTANEOUS at 01:05

## 2018-01-01 RX ADMIN — FUROSEMIDE 80 MG: 40 TABLET ORAL at 08:07

## 2018-04-30 NOTE — TELEPHONE ENCOUNTER
----- Message from Ghada Luna sent at 4/30/2018 10:51 AM CDT -----  Regarding: FW: Outside patient referral  Will give to Jacqueline  ----- Message -----  From: Asmita Reynaga  Sent: 4/27/2018  12:06 PM  To: [facility] Liver Referral Pool  Subject: Outside patient referral                         Good afternoon,    Patient is being referred with cirrhosis of liver. Referral and records have been scanned into media manager for review. Dr. Taj Palomares is requesting a call from a physician regarding this patient. He can be reached in the Endoscopy lab at [phone] or his cell at [phone].    Thank you,  Asmita

## 2018-04-30 NOTE — TELEPHONE ENCOUNTER
Spoke with Dr Taj Luna, he is requesting stat appt for this patient. Due to urgent request, I booked the first  Available with a hepatologist.  Pt called re need to be seen asap per Dr. Luna. She will call back once she verifies the appt with her brother.

## 2018-04-30 NOTE — TELEPHONE ENCOUNTER
----- Message from Ghada Luna sent at 4/30/2018 12:35 PM CDT -----  Contact: pt  Returned pt call to give her the location of the clinic  ----- Message -----  From: Porsha Haji  Sent: 4/30/2018  12:30 PM  To: [facility] Liver Referral Pool    Pt state that she will be here for 3:20 for appointment.    Best Contact: [phone]

## 2018-05-01 PROBLEM — I10 ESSENTIAL HYPERTENSION: Status: ACTIVE | Noted: 2018-01-01

## 2018-05-01 PROBLEM — E11.9 DIABETES: Status: ACTIVE | Noted: 2018-01-01

## 2018-05-01 PROBLEM — K74.69 OTHER CIRRHOSIS OF LIVER: Status: ACTIVE | Noted: 2018-01-01

## 2018-05-01 PROBLEM — B18.2 CHRONIC HEPATITIS C WITHOUT HEPATIC COMA: Status: ACTIVE | Noted: 2018-01-01

## 2018-05-01 PROBLEM — E88.09 HYPOALBUMINEMIA: Status: ACTIVE | Noted: 2018-01-01

## 2018-05-01 PROBLEM — D69.6 THROMBOCYTOPENIA: Status: ACTIVE | Noted: 2018-01-01

## 2018-05-01 PROBLEM — F32.A DEPRESSION: Status: ACTIVE | Noted: 2018-01-01

## 2018-05-01 PROBLEM — J94.8 HYDROTHORAX: Status: ACTIVE | Noted: 2018-01-01

## 2018-05-01 PROBLEM — F41.9 ANXIETY: Status: ACTIVE | Noted: 2018-01-01

## 2018-05-01 NOTE — PATIENT INSTRUCTIONS
1. Stop lisinopril  2. Ct scan of abdomen  3. US with doppler and paracentesis  4. Start cipro 500 mg daily for a hx of SBP  5. In anticipation of needing a TIPS procedure will do an echo  6. Blood tests today  7. cxr today  8. Atarax for itching as needed.  Return 4-6 weeks

## 2018-05-01 NOTE — PROGRESS NOTES
HEPATOLOGY CONSULTATION    Referring Physician: Taj Luna DO  Current Corresponding Physician: Taj Luna DO    Reason for Consultation: Consultation for evaluation of Cirrhosis    History of Present Illness: Michelle Pappas is a 58 y.o. femalewho presents for evaluation of decompensated HCV induced cirrhosis.    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    The paitent is on lactulose for some mild HE.    Harvoni x 3 months in 2015-cured    EGD- no varices by report 02/18  Recently a cholecystectomy was recommended but not performed due to the presence of cirrhosis      Chief Complaint   Patient presents with    Cirrhosis       Past Medical History:   Diagnosis Date    Anxiety 5/1/2018    Depression 5/1/2018    Essential hypertension 5/1/2018    Hepatic Hydrothorax 5/1/2018    Hypoalbuminemia 5/1/2018    Other cirrhosis of liver 5/1/2018    Thrombocytopenia 5/1/2018     Outpatient Encounter Prescriptions as of 5/1/2018   Medication Sig Dispense Refill    clonazePAM (KLONOPIN) 1 MG tablet AS needed      furosemide (LASIX) 40 MG tablet Take 40 mg by mouth 2 (two) times daily.      glimepiride (AMARYL) 2 MG tablet Take 2 mg by mouth as needed.      hyoscyamine (LEVSIN/SL) 0.125 mg Subl every 4 (four) hours as needed      lactulose (CHRONULAC) 10 gram/15 mL solution Take 30 g by mouth 2 (two) times daily.      lisinopril (PRINIVIL,ZESTRIL) 5 MG tablet Take 5 mg by mouth once daily.       metOLazone (ZAROXOLYN) 5 MG tablet Take 5 mg by mouth once daily.      promethazine (PHENERGAN) 25 MG tablet Take 25 mg by mouth as needed for Nausea.      spironolactone (ALDACTONE) 100 MG tablet Take 100 mg by mouth 2 (two) times daily.      traZODone (DESYREL) 100 MG tablet Take 100 mg by mouth  every evening.      [DISCONTINUED] citalopram (CELEXA) 20 MG tablet daily       No facility-administered encounter medications on file as of 5/1/2018.      Review of patient's allergies indicates:  No Known Allergies  No family history on file.    Social History     Social History    Marital status:      Spouse name: N/A    Number of children: N/A    Years of education: N/A     Occupational History    Not on file.     Social History Main Topics    Smoking status: Former Smoker     Types: Cigarettes     Quit date: 3/6/2018    Smokeless tobacco: Not on file    Alcohol use No    Drug use: Unknown    Sexual activity: Not on file     Other Topics Concern    Not on file     Social History Narrative    No narrative on file     Review of Systems   Constitutional: Negative.    HENT: Negative.    Eyes: Negative.    Respiratory: Negative.    Cardiovascular: Negative.    Gastrointestinal: Negative.    Genitourinary: Negative.    Musculoskeletal: Negative.    Skin: Negative.    Neurological: Negative.    Psychiatric/Behavioral: Negative.      Vitals:    05/01/18 1512   BP: (!) 87/48   Pulse: 104   Resp: 16   Temp: 98.3 °F (36.8 °C)       Physical Exam   Constitutional: She is oriented to person, place, and time. She appears well-nourished.   HENT:   Head: Normocephalic.   Eyes: Pupils are equal, round, and reactive to light. No scleral icterus.   Neck: Neck supple. No thyromegaly present.   Cardiovascular: Normal rate, regular rhythm and normal heart sounds.    Pulmonary/Chest: Effort normal and breath sounds normal. She has no wheezes.   Abdominal: Soft. She exhibits distension. She exhibits no mass. There is no tenderness.   Musculoskeletal: Normal range of motion.   Neurological: She is alert and oriented to person, place, and time.   Skin: Skin is warm and dry. No rash noted.   Psychiatric: She has a normal mood and affect.     MELD-Na score: 8 at 5/6/2018  4:56 AM  MELD score: 8 at 5/6/2018  4:56  AM  Calculated from:  Serum Creatinine: 1.1 mg/dL at 5/6/2018  4:56 AM  Serum Sodium: 139 mmol/L (Rounded to 137) at 5/6/2018  4:56 AM  Total Bilirubin: 1 mg/dL at 5/6/2018  4:56 AM  INR(ratio): 1.1 at 5/6/2018  4:56 AM  Age: 58 years      Assessment and Plan:    Michelle Pappas is a 58 y.o. female with decompensated Cirrhosis  I suspect her cirrhosis is from HCV. However with a more recent decompensation with fluid overload I think it important to rule out HCC as a precipitant. My current recommendations:  1. Ascites and hepatic hydrothorax: consider TIPS. Will need US with doppler, CT scan and 2 D echo in anticipation  2. Cirrhosis, meld 8: medically early for liver transplant  3. Hepatic hydrothorax: CXR today  4. Ascites: paracentesis; start cipro 500 mg daily given hx of SBP.  5. Pruritus: atarax prn  6. Lisinopril and spironolactone- pt at risk for hyperkalemia- recommend stopping.  7. HE, ongoing: recommend continuing HE meds    Return 4-6 weeks

## 2018-05-01 NOTE — LETTER
May 6, 2018      Taj Luna,   [address] [facility] [facility]  [place] [address]           [facility] - Hepatology  [address]  [place] [address]  Phone: [phone]  Fax: [phone]          Patient: Michelle Pappas   MR Number: [id number]   YOB: 1960   Date of Visit: 5/1/2018       Dear Dr. Taj Luna:    Thank you for referring Michelle Pappas to me for evaluation. Attached you will find relevant portions of my assessment and plan of care.    If you have questions, please do not hesitate to call me. I look forward to following Michelle Pappas along with you.    Sincerely,    Andreea Walton MD    Enclosure  CC:  No Recipients    If you would like to receive this communication electronically, please contact [email or web address] or [phone] to request more information on [employer] access.    For providers and/or their staff who would like to refer a patient to [facility], please contact us through our one-stop-shop provider referral line, [facility], at [phone].    If you feel you have received this communication in error or would no longer like to receive these types of communications, please e-mail [email or web address]

## 2018-05-04 PROBLEM — J90 PLEURAL EFFUSION: Status: ACTIVE | Noted: 2018-01-01

## 2018-05-05 PROBLEM — G47.00 INSOMNIA: Chronic | Status: ACTIVE | Noted: 2018-01-01

## 2018-05-05 NOTE — H&P
[facility] Hospital Medicine  History & Physical    Patient Name: Michelle Pappas  MRN: [id number]  Admission Date: 5/4/2018  Attending Physician: Prerna Verma MD   Primary Care Provider: Taj Luna DO    [facility] Medicine Team: [facility] 2 Hazel Neal MD     Patient information was obtained from patient and past medical records.     Subjective:     Principal Problem:Hydrothorax    Chief Complaint: No chief complaint on file.       HPI: Ms. Pappas is a 59 yo lady with HCV cirrhosis who was transferred from [place], [place] for recurrent hepatic hydrothorax and need for higher level of care.     She was recently admitted to [facility] on 4/26/18-4/29/18 due to recurrence of her right hydrothorax.  She underwent thoracentesis by IR while there, removing 1.5L total.  The fluid on this was transudative: GLUC 183, LDH 24, PROT <23, WBC 85 - L66, S22, RBC 4600.  The cultures were negative from the tap. She was discharged home only to return 5/4 with recurrent shortness of breath and re-accumulation of her right hydrothorax. She reports that she's had 4 thoracentesis in the past (2 in 2017 and 2 in 2018. The first two were done in succession and the second two were able to be spaced apart. She reports that she's had a few paracentesis before but does not regularly have them done. She adheres to a low sodium diet and takes all of her diuretics as prescribed. She does report that she's been taking them differently, however. She indicates that instead of taking her lasix twice per day, she's been taking them all at once. She does reports shortness of breath and a cough. She says that the cough is dry and has been present for the last year. She denies any fever, chills, CP, n/v, constipation, melena, hematochezia, abdominal pain or dysuria.     She was recently evaluated by Dr. Walton in Hepatology clinic on 5/1/18 for the first time.  She notes, 2002- liver biopsy- fatty liver and some scar.   - another biopsy- cirrhosis.  fluid overload started 10/15- required LVP ; complicated by SBP.  Since last year having hep hydrothorax that requires thoracentesis- 4 x since 2018.  Harvally x 3 months 2015-cured  EGD- no varices by report  & Cholecystectomy denied due to cirrhosis.       Past Medical History:   Diagnosis Date    Anxiety 2018    Depression 2018    Essential hypertension 2018    Hepatic Hydrothorax 2018    Hypoalbuminemia 2018    Other cirrhosis of liver 2018    Thrombocytopenia 2018       Past Surgical History:   Procedure Laterality Date    breast reduction       SECTION      HYSTERECTOMY         Review of patient's allergies indicates:  No Known Allergies    No current facility-administered medications on file prior to encounter.      Current Outpatient Prescriptions on File Prior to Encounter   Medication Sig    clonazePAM (KLONOPIN) 1 MG tablet AS needed    furosemide (LASIX) 40 MG tablet Take 40 mg by mouth 2 (two) times daily.    glimepiride (AMARYL) 2 MG tablet Take 2 mg by mouth as needed.    hydrOXYzine HCl (ATARAX) 25 MG tablet Take 1 tablet (25 mg total) by mouth daily as needed for Itching.    hyoscyamine (LEVSIN/SL) 0.125 mg Subl every 4 (four) hours as needed    lactulose (CHRONULAC) 10 gram/15 mL solution Take 30 g by mouth 2 (two) times daily.    promethazine (PHENERGAN) 25 MG tablet Take 25 mg by mouth as needed for Nausea.    spironolactone (ALDACTONE) 100 MG tablet Take 100 mg by mouth 2 (two) times daily.    traZODone (DESYREL) 100 MG tablet Take 100 mg by mouth every evening.    ciprofloxacin HCl (CIPRO) 500 MG tablet Take 1 tablet (500 mg total) by mouth once daily.    metOLazone (ZAROXOLYN) 5 MG tablet Take 5 mg by mouth once daily.     Family History     None        Social History Main Topics    Smoking status: Former Smoker     Types: Cigarettes     Quit date: 3/6/2018    Smokeless tobacco: Not on file     Alcohol use No    Drug use: Unknown    Sexual activity: Not on file     Review of Systems   Constitutional: Negative for chills and fever.   HENT: Positive for congestion and postnasal drip.    Eyes: Negative for visual disturbance.   Respiratory: Positive for cough.    Cardiovascular: Negative for chest pain.   Gastrointestinal: Negative for abdominal pain, blood in stool, constipation, diarrhea, nausea and vomiting.   Genitourinary: Negative for dysuria.   Musculoskeletal: Negative for back pain.   Skin: Positive for pallor. Negative for rash.   Neurological: Negative for headaches.   Psychiatric/Behavioral: Negative for confusion.     Objective:     Vital Signs (Most Recent):  Temp: 98.7 °F (37.1 °C) (05/04/18 2308)  Pulse: 93 (05/04/18 2308)  Resp: 20 (05/04/18 2308)  BP: 100/66 (05/04/18 2308)  SpO2: 97 % (05/04/18 2308) Vital Signs (24h Range):  Temp:  [98.7 °F (37.1 °C)] 98.7 °F (37.1 °C)  Pulse:  [93] 93  Resp:  [20] 20  SpO2:  [97 %] 97 %  BP: (100)/(66) 100/66     Weight: 76.1 kg (167 lb 12.3 oz)  Body mass index is 29.72 kg/m².    Physical Exam   Constitutional: She is oriented to person, place, and time. She appears well-developed and well-nourished.   HENT:   Head: Normocephalic and atraumatic.   Eyes: EOM are normal. Pupils are equal, round, and reactive to light. No scleral icterus.   Neck: Normal range of motion. Neck supple.   Cardiovascular: Normal rate and regular rhythm.    Pulmonary/Chest: Effort normal.   Decreased breath sound on R throughout   Abdominal: Soft. Bowel sounds are normal.   Distended and somewhat firm though non-tender. Small reducible hernia   Musculoskeletal: Normal range of motion. She exhibits no edema.   Neurological: She is alert and oriented to person, place, and time.   No asterixis   Skin: Skin is warm and dry.   Psychiatric: She has a normal mood and affect. Her behavior is normal.   Vitals reviewed.        CRANIAL NERVES     CN III, IV, VI   Pupils are equal,  round, and reactive to light.  Extraocular motions are normal.        Significant Labs: All pertinent labs within the past 24 hours have been reviewed.    Significant Imaging: I have reviewed all pertinent imaging results/findings within the past 24 hours.    Assessment/Plan:     * Hepatic Hydrothorax    - secondary to HCV cirrhosis  - likely needs large volume paracentesis as opposed to thoracentesis. If kidneys can tolerate- could likely benefit from increased diuretic regimen. Patient does have visualized fluid pocket on US but unable to do US on 10th floor overnight given new floor policy.   - pending CXR  - will placed consult for IR guided paracentesis  - will give extra dose of IV lasix  - encouraged patient to take home lasix twice per day as opposed to once as it's only a 6 hour medication  - possible TIPS?  - could consider pulmonary consult if hydrothorax does not improve with paracentesis           Insomnia    - continue trazodone          Diabetes    - pending hemoglobin A1c measurement  - previously controlled on amaryl   - diabetic diet with LDSSI           Other cirrhosis of liver    - likely 2/2 HCV s/p Harvoni treatment   - lasix 40 BID and aldactone 100 mg QD  - lactulose 30 mg BID   - INR daily for meld score calculation   - cipro 500 mg daily given history of SBP  - pending consult to hepatology- as per Dr. Walton's last note: CT abdomen/pelvis w&w/out contrast, 2D echo and US with doppler           Thrombocytopenia    - pending lab results here  - chronically low 2/2 to cirrhosis  - will continue to trend  - hold DVT ppx if plts<50          Hypoalbuminemia    - likely secondary to decreased synthetic function given liver cirrhosis             VTE Risk Mitigation         Ordered     enoxaparin injection 40 mg  Daily      05/04/18 2338     IP VTE LOW RISK PATIENT  Once      05/04/18 2338             Hazel Neal MD  Department of Hospital Medicine   [facility]

## 2018-05-05 NOTE — ASSESSMENT & PLAN NOTE
- secondary to HCV cirrhosis  - likely needs large volume paracentesis as opposed to thoracentesis. If kidneys can tolerate- could likely benefit from increased diuretic regimen. Patient does have visualized fluid pocket on US but unable to do US on 10th floor overnight given new floor policy.   - pending CXR  - will placed consult for IR guided paracentesis  - will give extra dose of IV lasix  - encouraged patient to take home lasix twice per day as opposed to once as it's only a 6 hour medication  - possible TIPS?  - could consider pulmonary consult if hydrothorax does not improve with paracentesis

## 2018-05-05 NOTE — PT/OT/SLP EVAL
"Physical Therapy Evaluation and Discharge Note    Patient Name:  Michelle Pappas   MRN:  [id number]    Recommendations:     Discharge Recommendations:  home   Discharge Equipment Recommendations: none   Barriers to discharge: None    Assessment:     Michelle Pappas is a 58 y.o. female admitted with a medical diagnosis of Hydrothorax.  Pt presents with HCV cirrhosis who was transferred from [place], [place] for recurrent hepatic hydrothorax and need for higher level of care.  She was recently admitted to [facility] on 4/26/18-4/29/18 due to recurrence of her right hydrothorax.  She underwent thoracentesis by IR while there, removing 1.5L total.  The fluid on this was transudative: GLUC 183, LDH 24, PROT <23, WBC 85 - L66, S22, RBC 4600.  The cultures were negative from the tap. She was discharged home only to return 5/4 with recurrent shortness of breath and re-accumulation of her right hydrothorax.  Pt is independent with all functional mobility assessed.  At this time, patient is functioning at their prior level of function and does not require further acute PT services.     Recent Surgery: * No surgery found *      Plan:     During this hospitalization, patient does not require further acute PT services.  Please re-consult if situation changes.     Plan of Care Reviewed with: patient    Subjective     Communicated with nursing prior to session.  Patient found in supine upon PT entry to room, agreeable to evaluation.      "My gallbladder, nobody will take it out."  "And it feels like I'm being shocked."    Chief Complaint:Chest and back pain  Patient comments/goals: To reduce continues fluid buildup  Pain/Comfort:  · Pain Rating 1: 7/10  · Location 1: chest  · Pain Addressed 1: Pre-medicate for activity, Distraction    Patients cultural, spiritual, Mormon conflicts given the current situation: no    Living Environment:  Pt lives alone, apt, [address] with B HRs.  Pt and brother drive.    Prior to admission, patients " level of function was independent with gait, ADLs and functional transfers.  Patient has the following equipment: glucometer.  DME owned (not currently used): rolling walker and single point cane.  Upon discharge, patient will have assistance from brother.    Objective:     Patient found with: peripheral IV, oxygen     General Precautions: Standard, fall, respiratory   Orthopedic Precautions:N/A   Braces: N/A     Exams:  · Cognitive Exam:  Patient is oriented to Person, Place, Time and Situation and follows 100% of multi step commands   · Fine Motor Coordination:    · -       Intact  Left hand thumb/finger opposition skills and Right hand thumb/finger opposition skills  · Gross Motor Coordination:  WFL  · Postural Exam:  Patient presented with the following abnormalities:    · -       No postural abnormalities identified  · Sensation:    · -       Intact  light/touch B LEs  · Skin Integrity/Edema:      · -       Skin integrity: Visible skin intact  · -       Edema: None noted B LEs  · RUE ROM: WFL  · RUE Strength: WFL  · LUE ROM: WFL  · LUE Strength: WFL  · RLE ROM: WFL  · RLE Strength: WFL  · LLE ROM: WFL  · LLE Strength: WFL    Functional Mobility:  · Bed Mobility:     · Scooting: independence  · Supine to Sit: independence  · Sit to Supine: independence  · Transfers:     · Sit to Stand:  independence with no AD  · Bed to Chair: independence with  no AD  using  Stand Pivot  · Gait: Pt amb approx 200', I without AD, 4L/min of supp O2 via NC, no FRIAS, no episodes of LOB  · Balance: I: dynamic standing balance without AD  · Stairs:  Pt amb up/down 20 steps, I, with B HRs    AM-PAC 6 CLICK MOBILITY  Total Score:24       Therapeutic Activities and Exercises:   Whiteboard updated    Patient left with bed in chair position with all lines intact and call button in reach.    GOALS:    Physical Therapy Goals     Not on file                History:     Past Medical History:   Diagnosis Date    Anxiety 5/1/2018    Depression  2018    Essential hypertension 2018    Hepatic Hydrothorax 2018    Hypoalbuminemia 2018    Other cirrhosis of liver 2018    Thrombocytopenia 2018       Past Surgical History:   Procedure Laterality Date    breast reduction       SECTION      HYSTERECTOMY         Clinical Decision Making:     History  Co-morbidities and personal factors that may impact the plan of care Examination  Body Structures and Functions, activity limitations and participation restrictions that may impact the plan of care Clinical Presentation   Decision Making/ Complexity Score   Co-morbidities:   [] Time since onset of injury / illness / exacerbation  [] Status of current condition  []Patient's cognitive status and safety concerns    [] Multiple Medical Problems (see med hx)  Personal Factors:   [] Patient's age  [] Prior Level of function   [] Patient's home situation (environment and family support)  [] Patient's level of motivation  [] Expected progression of patient      HISTORY:(criteria)    [] 55402 - no personal factors/history    [x] 18402 - has 1-2 personal factor/comorbidity     [] 67733 - has >3 personal factor/comorbidity     Body Regions:  [] Objective examination findings  [] Head     []  Neck  [] Trunk   [] Upper Extremity  [] Lower Extremity    Body Systems:  [] For communication ability, affect, cognition, language, and learning style: the assessment of the ability to make needs known, consciousness, orientation (person, place, and time), expected emotional /behavioral responses, and learning preferences (eg, learning barriers, education  needs)  [] For the neuromuscular system: a general assessment of gross coordinated movement (eg, balance, gait, locomotion, transfers, and transitions) and motor function  (motor control and motor learning)  [] For the musculoskeletal system: the assessment of gross symmetry, gross range of motion, gross strength, height, and weight  [] For the  integumentary system: the assessment of pliability(texture), presence of scar formation, skin color, and skin integrity  [] For cardiovascular/pulmonary system: the assessment of heart rate, respiratory rate, blood pressure, and edema     Activity limitations:    [] Patient's cognitive status and saf ety concerns          [] Status of current condition      [] Weight bearing restriction  [] Cardiopulmunary Restriction    Participation Restrictions:   [] Goals and goal agreement with the patient     [] Rehab potential (prognosis) and probable outcome      Examination of Body System: (criteria)    [x] 79103 - addressing 1-2 elements    [] 29564 - addressing a total of 3 or more elements     [] 77698 -  Addressing a total of 4 or more elements         Clinical Presentation: (criteria)  Stable - 58627     On examination of body system using standardized tests and measures patient presents with 1-2 elements from any of the following: body structures and functions, activity limitations, and/or participation restrictions.  Leading to a clinical presentation that is considered stable and/or uncomplicated                              Clinical Decision Making  (Eval Complexity):  Low- 54996     Time Tracking:     PT Received On: 05/05/18  PT Start Time: 1324     PT Stop Time: 1356  PT Total Time (min): 32 min     Billable Minutes: Evaluation 14 and Gait Training 18      Mary Jane Washington, PT  05/05/2018

## 2018-05-05 NOTE — ASSESSMENT & PLAN NOTE
- pending hemoglobin A1c measurement  - previously controlled on amaryl   - diabetic diet with LDSSI

## 2018-05-05 NOTE — ASSESSMENT & PLAN NOTE
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 Patient has had 4 thoracenteses in the last year, last one about 1 week ago with rapid re-accumulation of fluid. At that time, her pleural fluid labs showed transudative fluid. Will re-tap fluid today and send for chemistries, cell count, cytology, and micro. ECHO was completed today to evaluate for heart failure that may be contributing as well. Patient is on Lasix at home and is receiving 40 mg BID now. Continue aggressive diuresis.    Thoracentesis completed. Chemistries, cultures, cytology, and cell count ordered and sent.

## 2018-05-05 NOTE — HPI
Ms. Pappas is a 59 yo lady with HCV cirrhosis who was transferred from [place], [place] for recurrent hepatic hydrothorax and need for higher level of care.     She was recently admitted to [facility] on 4/26/18-4/29/18 due to recurrence of her right hydrothorax.  She underwent thoracentesis by IR while there, removing 1.5L total.  The fluid on this was transudative: GLUC 183, LDH 24, PROT <23, WBC 85 - L66, S22, RBC 4600.  The cultures were negative from the tap. She was discharged home only to return 5/4 with recurrent shortness of breath and re-accumulation of her right hydrothorax. She reports that she's had 4 thoracentesis in the past (2 in 2017 and 2 in 2018. The first two were done in succession and the second two were able to be spaced apart. She reports that she's had a few paracentesis before but does not regularly have them done. She adheres to a low sodium diet and takes all of her diuretics as prescribed. She does report that she's been taking them differently, however. She indicates that instead of taking her lasix twice per day, she's been taking them all at once. She does reports shortness of breath and a cough. She says that the cough is dry and has been present for the last year. She denies any fever, chills, CP, n/v, constipation, melena, hematochezia, abdominal pain or dysuria.     She was recently evaluated by Dr. Walton in Hepatology clinic on 5/1/18 for the first time.  She notes, 2002- liver biopsy- fatty liver and some scar.  2012- another biopsy- cirrhosis.  fluid overload started 10/15- required LVP ; complicated by SBP.  Since last year having hep hydrothorax that requires thoracentesis- 4 x since June 2018.  Anabella x 3 months 2015-cured  EGD- no varices by report 02/18 & Cholecystectomy denied due to cirrhosis.

## 2018-05-05 NOTE — NURSING
Patient arrived from outside hospital in stable condition via stretcher. Oriented to room and care plan. MD notified. Will continue to monitor.

## 2018-05-05 NOTE — ASSESSMENT & PLAN NOTE
- likely 2/2 HCV s/p Harvoni treatment   - lasix 40 BID and aldactone 100 mg QD  - lactulose 30 mg BID   - INR daily for meld score calculation   - cipro 500 mg daily given history of SBP  - pending consult to hepatology- as per Dr. Walton's last note: CT abdomen/pelvis w&w/out contrast, 2D echo and US with doppler

## 2018-05-05 NOTE — ASSESSMENT & PLAN NOTE
- pending lab results here  - chronically low 2/2 to cirrhosis  - will continue to trend  - hold DVT ppx if plts<50

## 2018-05-05 NOTE — PLAN OF CARE
Problem: Patient Care Overview  Goal: Plan of Care Review  Outcome: Ongoing (interventions implemented as appropriate)  Pt AAOX4. AVSS, bp running 90s/50s. No C/o pain during shift. Pt US of liver and CT of abd and pelvis. Thoracentesis performed. One unit of PRBCs transfused. Pt safety maintained. Hourly rounding performed.

## 2018-05-05 NOTE — SUBJECTIVE & OBJECTIVE
Past Medical History:   Diagnosis Date    Anxiety 2018    Depression 2018    Essential hypertension 2018    Hepatic Hydrothorax 2018    Hypoalbuminemia 2018    Other cirrhosis of liver 2018    Thrombocytopenia 2018       Past Surgical History:   Procedure Laterality Date    breast reduction       SECTION      HYSTERECTOMY         Review of patient's allergies indicates:  No Known Allergies    No current facility-administered medications on file prior to encounter.      Current Outpatient Prescriptions on File Prior to Encounter   Medication Sig    clonazePAM (KLONOPIN) 1 MG tablet AS needed    furosemide (LASIX) 40 MG tablet Take 40 mg by mouth 2 (two) times daily.    glimepiride (AMARYL) 2 MG tablet Take 2 mg by mouth as needed.    hydrOXYzine HCl (ATARAX) 25 MG tablet Take 1 tablet (25 mg total) by mouth daily as needed for Itching.    hyoscyamine (LEVSIN/SL) 0.125 mg Subl every 4 (four) hours as needed    lactulose (CHRONULAC) 10 gram/15 mL solution Take 30 g by mouth 2 (two) times daily.    promethazine (PHENERGAN) 25 MG tablet Take 25 mg by mouth as needed for Nausea.    spironolactone (ALDACTONE) 100 MG tablet Take 100 mg by mouth 2 (two) times daily.    traZODone (DESYREL) 100 MG tablet Take 100 mg by mouth every evening.    ciprofloxacin HCl (CIPRO) 500 MG tablet Take 1 tablet (500 mg total) by mouth once daily.    metOLazone (ZAROXOLYN) 5 MG tablet Take 5 mg by mouth once daily.     Family History     None        Social History Main Topics    Smoking status: Former Smoker     Types: Cigarettes     Quit date: 3/6/2018    Smokeless tobacco: Not on file    Alcohol use No    Drug use: Unknown    Sexual activity: Not on file     Review of Systems   Constitutional: Negative for chills and fever.   HENT: Positive for congestion and postnasal drip.    Eyes: Negative for visual disturbance.   Respiratory: Positive for cough.    Cardiovascular: Negative  for chest pain.   Gastrointestinal: Negative for abdominal pain, blood in stool, constipation, diarrhea, nausea and vomiting.   Genitourinary: Negative for dysuria.   Musculoskeletal: Negative for back pain.   Skin: Positive for pallor. Negative for rash.   Neurological: Negative for headaches.   Psychiatric/Behavioral: Negative for confusion.     Objective:     Vital Signs (Most Recent):  Temp: 98.7 °F (37.1 °C) (05/04/18 2308)  Pulse: 93 (05/04/18 2308)  Resp: 20 (05/04/18 2308)  BP: 100/66 (05/04/18 2308)  SpO2: 97 % (05/04/18 2308) Vital Signs (24h Range):  Temp:  [98.7 °F (37.1 °C)] 98.7 °F (37.1 °C)  Pulse:  [93] 93  Resp:  [20] 20  SpO2:  [97 %] 97 %  BP: (100)/(66) 100/66     Weight: 76.1 kg (167 lb 12.3 oz)  Body mass index is 29.72 kg/m².    Physical Exam   Constitutional: She is oriented to person, place, and time. She appears well-developed and well-nourished.   HENT:   Head: Normocephalic and atraumatic.   Eyes: EOM are normal. Pupils are equal, round, and reactive to light. No scleral icterus.   Neck: Normal range of motion. Neck supple.   Cardiovascular: Normal rate and regular rhythm.    Pulmonary/Chest: Effort normal.   Decreased breath sound on R throughout   Abdominal: Soft. Bowel sounds are normal.   Distended and somewhat firm though non-tender. Small reducible hernia   Musculoskeletal: Normal range of motion. She exhibits no edema.   Neurological: She is alert and oriented to person, place, and time.   No asterixis   Skin: Skin is warm and dry.   Psychiatric: She has a normal mood and affect. Her behavior is normal.   Vitals reviewed.        CRANIAL NERVES     CN III, IV, VI   Pupils are equal, round, and reactive to light.  Extraocular motions are normal.        Significant Labs: All pertinent labs within the past 24 hours have been reviewed.    Significant Imaging: I have reviewed all pertinent imaging results/findings within the past 24 hours.

## 2018-05-05 NOTE — HPI
Ms. Pappas is a 57 yo WF with a PMH of HTN, liver cirrhosis based on liver biopsy 2012, Hep C s/p Harvoni x3 months in 2015, hepatic hydrothorax requiring 4 thoracenteses in the last year, history of SBP. Pulmonology was consulted today for thoracentesis.    Patient was recently admitted to [facility] 4/26-4/29 for recurrence of R hydrothorax. She underwent thoracentesis by IR with removal of 1.5L. Fluid was found to be transudative (LDH 24, Protein <2.3) with (-) cultures and a body fluid WBC count of 85. She was discharged home, had recurrent SOB due to re-accumulation, and she was transferred to [facility] for higher level of care and hepatology.    Upon arrival, patient is maintaining her saturations on RA. She states that she is mildly short of breath, worse on exertion. She has mild ascites on exam. CXR shows complete white out of R lung. SBP coverage with Cipro was started. Patient has been started on Lactulose and Spironolactone. ECHO was performed. Patient states that she is compliant with medications. Denies fevers/chills, abdominal pain, N/V/D, chest pain.

## 2018-05-05 NOTE — PROCEDURES
"Michelle Pappas is a 58 y.o. female patient.    Temp: 98.2 °F (36.8 °C) (18)  Pulse: 91 (18)  Resp: 16 (18)  BP: (!) 97/54 (18)  SpO2: 95 % (18)  Weight: 76.1 kg (167 lb 12.3 oz) (18)  Height: 5' 3" (160 cm) (18)       Thoracentesis  Date/Time: 2018 6:21 PM  Location procedure was performed: [facility] MEDICAL SURGICAL UNIT  Performed by: LAKIA ZHU  Authorized by: ALKIA ZHU   Assisting provider: ALMA VIDAL  Pre-operative diagnosis: R pleural effusion  Post-operative diagnosis: R pleural effusion  Consent Done: Yes  Consent: Verbal consent obtained. Written consent obtained.  Risks and benefits: risks, benefits and alternatives were discussed  Consent given by: patient  Patient understanding: patient states understanding of the procedure being performed  Patient consent: the patient's understanding of the procedure matches consent given  Procedure consent: procedure consent matches procedure scheduled  Relevant documents: relevant documents present and verified  Test results: test results available and properly labeled  Site marked: the operative site was marked  Imaging studies: imaging studies available  Patient identity confirmed: , MRN, name and verbally with patient  Time out: Immediately prior to procedure a "time out" was called to verify the correct patient, procedure, equipment, support staff and site/side marked as required.  Procedure purpose: diagnostic and therapeutic  Indications: pleural effusion  Preparation: Patient was prepped and draped in the usual sterile fashion.  Local anesthesia used: yes  Anesthesia: local infiltration    Anesthesia:  Local anesthesia used: yes  Local Anesthetic: lidocaine 1% without epinephrine  Patient sedated: no  Description of findings: 1525 cc serous fluid   Preparation: skin prepped with ChloraPrep  Patient position: supported by bedside stand  Ultrasound guidance: " yes  Location: right posterior  Intercostal space: 6th  Needle size: 16  Catheter size: 14 gauge  Number of attempts: 1  Drainage characteristics: serous  Patient tolerance: Patient tolerated the procedure well with no immediate complications  Chest x-ray performed: yes  Technical procedures used: U/S used to elias site  Significant surgical tasks conducted by the assistant(s): -  Complications: No  Estimated blood loss (mL): 0  Specimens: Yes  Implants: No  Comments: Samples sent for cell count, chemistries, cytology, and cultures        Nora Dutton MD  [facility] Fellow, PGY 4  [facility]  Pager: [phone]

## 2018-05-05 NOTE — PLAN OF CARE
Problem: Patient Care Overview  Goal: Plan of Care Review  Outcome: Ongoing (interventions implemented as appropriate)  Patient alert and oriented. Admitted last night from OSH. Vital signs stable. NPO since midnight for ultrasound this morning. Patient denies any complaints at this time. Will continue to monitor.

## 2018-05-05 NOTE — CONSULTS
[facility]  Pulmonology  Consult Note    Patient Name: Michelle Pappas  MRN: [id number]  Admission Date: 2018  Hospital Length of Stay: 1 days  Code Status: Full Code  Attending Physician: Prerna Verma MD  Primary Care Provider: Taj Luna DO   Principal Problem: Hydrothorax    Inpatient consult to Pulmonology  Consult performed by: LAKIA ZHU  Consult ordered by: MARTHA CASTILLO AUGUST        Subjective:     HPI:  Ms. Pappas is a 57 yo WF with a PMH of HTN, liver cirrhosis based on liver biopsy , Hep C s/p Harvoni x3 months in , hepatic hydrothorax requiring 4 thoracenteses in the last year, history of SBP. Pulmonology was consulted today for thoracentesis.    Patient was recently admitted to [facility] - for recurrence of R hydrothorax. She underwent thoracentesis by IR with removal of 1.5L. Fluid was found to be transudative (LDH 24, Protein <2.3) with (-) cultures and a body fluid WBC count of 85. She was discharged home, had recurrent SOB due to re-accumulation, and she was transferred to [facility] for higher level of care and hepatology.    Upon arrival, patient is maintaining her saturations on RA. She states that she is mildly short of breath, worse on exertion. She has mild ascites on exam. CXR shows complete white out of R lung. SBP coverage with Cipro was started. Patient has been started on Lactulose and Spironolactone. ECHO was performed. Patient states that she is compliant with medications. Denies fevers/chills, abdominal pain, N/V/D, chest pain.    Past Medical History:   Diagnosis Date    Anxiety 2018    Depression 2018    Essential hypertension 2018    Hepatic Hydrothorax 2018    Hypoalbuminemia 2018    Other cirrhosis of liver 2018    Thrombocytopenia 2018     Past Surgical History:   Procedure Laterality Date    breast reduction       SECTION      HYSTERECTOMY       Review of patient's allergies  indicates:  No Known Allergies    Family History     None        Social History Main Topics    Smoking status: Former Smoker     Types: Cigarettes     Quit date: 3/6/2018    Smokeless tobacco: Not on file    Alcohol use No    Drug use: Unknown    Sexual activity: Not on file       Review of Systems   Constitutional: Negative for appetite change, chills, fever and unexpected weight change.   HENT: Negative for congestion, postnasal drip, rhinorrhea, sinus pain, sinus pressure and sore throat.    Eyes: Negative.    Respiratory: Positive for shortness of breath. Negative for cough, chest tightness and wheezing.    Cardiovascular: Negative for chest pain and leg swelling.   Gastrointestinal: Negative for abdominal pain, constipation, diarrhea, nausea and vomiting.   Genitourinary: Negative for difficulty urinating, dysuria and frequency.   Musculoskeletal: Negative for back pain and joint swelling.   Neurological: Negative for dizziness and headaches.     Objective:     Vital Signs (Most Recent):  Temp: 98.7 °F (37.1 °C) (05/05/18 1627)  Pulse: 91 (05/05/18 1627)  Resp: 16 (05/05/18 1627)  BP: (!) 103/56 (05/05/18 1627)  SpO2: 98 % (05/05/18 1627) Vital Signs (24h Range):  Temp:  [97.9 °F (36.6 °C)-98.8 °F (37.1 °C)] 98.7 °F (37.1 °C)  Pulse:  [88-97] 91  Resp:  [16-20] 16  SpO2:  [93 %-100 %] 98 %  BP: ()/(49-66) 103/56     Weight: 76.1 kg (167 lb 12.3 oz)  Body mass index is 29.72 kg/m².    No intake or output data in the 24 hours ending 05/05/18 1724    Physical Exam   Constitutional: She is oriented to person, place, and time. She appears well-developed and well-nourished. No distress.   Eyes: Conjunctivae are normal. Pupils are equal, round, and reactive to light. No scleral icterus.   Neck: Normal range of motion. Neck supple.   Cardiovascular: Normal rate and regular rhythm.    Pulmonary/Chest: Effort normal. She has no wheezes. She has no rales.   CTA L side, diminished breath sounds throughout R side    Abdominal: Soft. Bowel sounds are normal. She exhibits distension.   Musculoskeletal: Normal range of motion. She exhibits no edema.   Lymphadenopathy:     She has no cervical adenopathy.   Neurological: She is alert and oriented to person, place, and time.   Skin: Skin is warm and dry. She is not diaphoretic.   Psychiatric: She has a normal mood and affect. Her behavior is normal.   Nursing note and vitals reviewed.    Significant Labs:    CBC/Anemia Profile:    Recent Labs  Lab 05/04/18  2344 05/05/18  0458 05/05/18  1417   WBC 1.94* 1.49*  --    HGB 7.4* 6.9*  --    HCT 24.7* 22.8*  --    PLT 55* 50*  --    MCV 84 83  --    RDW 16.1* 15.9*  --    FOLATE  --   --  3.6*   [id number]  --   --  247     Chemistries:    Recent Labs  Lab 05/04/18  2344 05/05/18  0458    136   K 3.8 3.3*    102   CO2 30* 30*   BUN 17 17   CREATININE 1.1 1.1   CALCIUM 8.8 8.3*   ALBUMIN 2.2* 2.1*   PROT 5.1* 4.7*   BILITOT 0.3 0.3   ALKPHOS 61 53*   ALT 10 10   AST 13 12   MG 1.7 1.4*   PHOS 3.1 3.4     All pertinent labs within the past 24 hours have been reviewed.    Significant Imaging:   I have reviewed and interpreted all pertinent imaging results/findings within the past 24 hours.    Assessment/Plan:     * Hepatic Hydrothorax    This is a case of R-sided pleural effusion in the setting of liver cirrhosis likely representing hepatic hydrothorax. Patient has had 4 thoracenteses in the last year, last one about 1 week ago with rapid re-accumulation of fluid. At that time, her pleural fluid labs showed transudative fluid. Will re-tap fluid today and send for chemistries, cell count, cytology, and micro. ECHO was completed today to evaluate for heart failure that may be contributing as well. Patient is on Lasix at home and is receiving 40 mg BID now. Continue aggressive diuresis.    Thoracentesis completed. 1525 cc serous fluid removed. Chemistries, cultures, cytology, and cell count ordered and sent.          Thank you for  involving us in the care of this patient. We will sign off. Please call with any questions.    Nora Dutton MD  [facility] Fellow, PGY 4  [facility]  Pager: [phone]

## 2018-05-05 NOTE — SUBJECTIVE & OBJECTIVE
Past Medical History:   Diagnosis Date    Anxiety 2018    Depression 2018    Essential hypertension 2018    Hepatic Hydrothorax 2018    Hypoalbuminemia 2018    Other cirrhosis of liver 2018    Thrombocytopenia 2018     Past Surgical History:   Procedure Laterality Date    breast reduction       SECTION      HYSTERECTOMY       Review of patient's allergies indicates:  No Known Allergies    Family History     None        Social History Main Topics    Smoking status: Former Smoker     Types: Cigarettes     Quit date: 3/6/2018    Smokeless tobacco: Not on file    Alcohol use No    Drug use: Unknown    Sexual activity: Not on file       Review of Systems   Constitutional: Negative for appetite change, chills, fever and unexpected weight change.   HENT: Negative for congestion, postnasal drip, rhinorrhea, sinus pain, sinus pressure and sore throat.    Eyes: Negative.    Respiratory: Positive for shortness of breath. Negative for cough, chest tightness and wheezing.    Cardiovascular: Negative for chest pain and leg swelling.   Gastrointestinal: Negative for abdominal pain, constipation, diarrhea, nausea and vomiting.   Genitourinary: Negative for difficulty urinating, dysuria and frequency.   Musculoskeletal: Negative for back pain and joint swelling.   Neurological: Negative for dizziness and headaches.     Objective:     Vital Signs (Most Recent):  Temp: 98.7 °F (37.1 °C) (18 162)  Pulse: 91 (18 162)  Resp: 16 (18 162)  BP: (!) 103/56 (18)  SpO2: 98 % (18) Vital Signs (24h Range):  Temp:  [97.9 °F (36.6 °C)-98.8 °F (37.1 °C)] 98.7 °F (37.1 °C)  Pulse:  [88-97] 91  Resp:  [16-20] 16  SpO2:  [93 %-100 %] 98 %  BP: ()/(49-66) 103/56     Weight: 76.1 kg (167 lb 12.3 oz)  Body mass index is 29.72 kg/m².    No intake or output data in the 24 hours ending 18 1724    Physical Exam   Constitutional: She is oriented to  person, place, and time. She appears well-developed and well-nourished. No distress.   Eyes: Conjunctivae are normal. Pupils are equal, round, and reactive to light. No scleral icterus.   Neck: Normal range of motion. Neck supple.   Cardiovascular: Normal rate and regular rhythm.    Pulmonary/Chest: Effort normal. She has no wheezes. She has no rales.   CTA L side, diminished breath sounds throughout R side   Abdominal: Soft. Bowel sounds are normal. She exhibits distension.   Musculoskeletal: Normal range of motion. She exhibits no edema.   Lymphadenopathy:     She has no cervical adenopathy.   Neurological: She is alert and oriented to person, place, and time.   Skin: Skin is warm and dry. She is not diaphoretic.   Psychiatric: She has a normal mood and affect. Her behavior is normal.   Nursing note and vitals reviewed.    Significant Labs:    CBC/Anemia Profile:    Recent Labs  Lab 05/04/18  2344 05/05/18 0458 05/05/18  1417   WBC 1.94* 1.49*  --    HGB 7.4* 6.9*  --    HCT 24.7* 22.8*  --    PLT 55* 50*  --    MCV 84 83  --    RDW 16.1* 15.9*  --    FOLATE  --   --  3.6*   [id number]  --   --  247     Chemistries:    Recent Labs  Lab 05/04/18 2344 05/05/18  0458    136   K 3.8 3.3*    102   CO2 30* 30*   BUN 17 17   CREATININE 1.1 1.1   CALCIUM 8.8 8.3*   ALBUMIN 2.2* 2.1*   PROT 5.1* 4.7*   BILITOT 0.3 0.3   ALKPHOS 61 53*   ALT 10 10   AST 13 12   MG 1.7 1.4*   PHOS 3.1 3.4     All pertinent labs within the past 24 hours have been reviewed.    Significant Imaging:   I have reviewed and interpreted all pertinent imaging results/findings within the past 24 hours.

## 2018-05-06 PROBLEM — B19.20 DECOMPENSATED HCV CIRRHOSIS: Chronic | Status: ACTIVE | Noted: 2018-01-01

## 2018-05-06 PROBLEM — K74.69 DECOMPENSATED HCV CIRRHOSIS: Chronic | Status: ACTIVE | Noted: 2018-01-01

## 2018-05-06 NOTE — HPI
"This is a 59 y/o female with hx of HCV cirrhosis (complicated by prior SBP, ascites, hydrothorax) who was transferred to [facility] 5/4 for hepatology evaluation.  Pt admitted to OSH ([facility]) 4/26 - 4/29 for recurrent right hydrothorax. She underwent thoracentesis, removed 1.5L.   Fluid transudate: GLUC 183, LDH 24, PROT <23, WBC 85 - L66, S22, RBC 4600  She was discharged and then returned 5/4 with recurrent symptoms of SOB and reaccumulation of hydrothorax.   She was taking lasix 40mg BID and aldactone 100mg daily, although she was taking all her lasix at once.  She has required 4 thoracentesis on right in the past.    Today she feels better s/p thoracentesis last night. Still with abd distention.  She is on room air. She denies other complaints presently.    Prior records per Dr. Walton clinic note:  "2002- liver biopsy- fatty liver and some scar  2012- another biopsy- cirrhosis  fluid overload started 10/15- required LVP ; complicated by SBP    Since last year having hep hydrothorax that requires thoracentesis- 4 x since June 2018  Harvoni x 3 months 2015-cured  EGD- no varices by report 02/18 & Cholecystectomy denied due to cirrhosis"    "

## 2018-05-06 NOTE — SUBJECTIVE & OBJECTIVE
Review of Systems   Constitutional: Positive for fatigue. Negative for chills and fever.   HENT: Negative for mouth sores, nosebleeds and trouble swallowing.    Eyes: Negative for pain and redness.   Respiratory: Negative for cough and shortness of breath.    Cardiovascular: Negative for chest pain and palpitations.   Gastrointestinal: Positive for abdominal distention. Negative for abdominal pain and rectal pain.   Genitourinary: Negative for dysuria and hematuria.   Musculoskeletal: Negative for arthralgias and joint swelling.   Skin: Negative for color change and pallor.   Neurological: Negative for seizures and facial asymmetry.   Psychiatric/Behavioral: Negative for agitation and confusion.       Past Medical History:   Diagnosis Date    Anxiety 2018    Depression 2018    Essential hypertension 2018    Hepatic Hydrothorax 2018    Hypoalbuminemia 2018    Other cirrhosis of liver 2018    Thrombocytopenia 2018       Past Surgical History:   Procedure Laterality Date    breast reduction       SECTION      HYSTERECTOMY         Family history of liver disease: No    Review of patient's allergies indicates:  No Known Allergies    Social History Main Topics    Smoking status: Former Smoker     Types: Cigarettes     Quit date: 3/6/2018    Smokeless tobacco: Not on file    Alcohol use No    Drug use: Unknown    Sexual activity: Not on file       Prescriptions Prior to Admission   Medication Sig Dispense Refill Last Dose    clonazePAM (KLONOPIN) 1 MG tablet AS needed   5/3/2018 at Unknown time    furosemide (LASIX) 40 MG tablet Take 40 mg by mouth 2 (two) times daily.   5/3/2018 at Unknown time    glimepiride (AMARYL) 2 MG tablet Take 2 mg by mouth as needed.   Past Month at Unknown time    hydrOXYzine HCl (ATARAX) 25 MG tablet Take 1 tablet (25 mg total) by mouth daily as needed for Itching. 30 tablet 1     hyoscyamine (LEVSIN/SL) 0.125 mg Subl every 4 (four) hours  as needed   5/3/2018 at Unknown time    lactulose (CHRONULAC) 10 gram/15 mL solution Take 30 g by mouth 2 (two) times daily.   5/3/2018 at Unknown time    promethazine (PHENERGAN) 25 MG tablet Take 25 mg by mouth as needed for Nausea.   Past Week at Unknown time    spironolactone (ALDACTONE) 100 MG tablet Take 100 mg by mouth 2 (two) times daily.   5/3/2018 at Unknown time    traZODone (DESYREL) 100 MG tablet Take 100 mg by mouth every evening.   5/3/2018 at Unknown time    ciprofloxacin HCl (CIPRO) 500 MG tablet Take 1 tablet (500 mg total) by mouth once daily. 30 tablet 11     metOLazone (ZAROXOLYN) 5 MG tablet Take 5 mg by mouth once daily.   Unknown at Unknown time       Objective:     Vital Signs (Most Recent):  Temp: 98.4 °F (36.9 °C) (05/06/18 0749)  Pulse: 77 (05/06/18 0749)  Resp: 16 (05/06/18 0749)  BP: (!) 97/52 (05/06/18 0749)  SpO2: (!) 93 % (05/06/18 0749) Vital Signs (24h Range):  Temp:  [97.9 °F (36.6 °C)-99.5 °F (37.5 °C)] 98.4 °F (36.9 °C)  Pulse:  [75-97] 77  Resp:  [16-20] 16  SpO2:  [92 %-100 %] 93 %  BP: ()/(41-56) 97/52     Weight: 76.1 kg (167 lb 12.3 oz) (05/04/18 2308)  Body mass index is 29.72 kg/m².    Physical Exam   Constitutional: She is oriented to person, place, and time. No distress.   Thin appearing but nontoxic   HENT:   Head: Normocephalic and atraumatic.   Eyes: Conjunctivae are normal. No scleral icterus.   Neck: Neck supple.   Cardiovascular: Normal rate and regular rhythm.    Pulmonary/Chest: Effort normal. No stridor. No respiratory distress.   On room air   Abdominal: Soft. She exhibits distension. There is no tenderness. There is no rebound and no guarding.   Tympanic to percussion   Musculoskeletal: She exhibits no tenderness or deformity.   Neurological: She is alert and oriented to person, place, and time.   Skin: Skin is warm and dry. No rash noted.   Psychiatric: She has a normal mood and affect.   Vitals reviewed.      MELD-Na score: 8 at 5/6/2018  4:56  AM  MELD score: 8 at 5/6/2018  4:56 AM  Calculated from:  Serum Creatinine: 1.1 mg/dL at 5/6/2018  4:56 AM  Serum Sodium: 139 mmol/L (Rounded to 137) at 5/6/2018  4:56 AM  Total Bilirubin: 1 mg/dL at 5/6/2018  4:56 AM  INR(ratio): 1.1 at 5/6/2018  4:56 AM  Age: 58 years    Significant Labs:  CBC:   Recent Labs  Lab 05/06/18  0456   WBC 2.42*   RBC 3.32*   HGB 8.6*   HCT 27.1*   PLT 60*     CMP:   Recent Labs  Lab 05/06/18 0456   *   CALCIUM 8.3*   ALBUMIN 2.2*   PROT 5.1*      K 3.4*   CO2 29      BUN 16   CREATININE 1.1   ALKPHOS 58   ALT 11   AST 16   BILITOT 1.0     Coagulation:   Recent Labs  Lab 05/06/18 0456   INR 1.1       Significant Imaging:  Labs: Reviewed

## 2018-05-06 NOTE — CONSULTS
"[facility]  Hepatology  Consult Note    Patient Name: Michelle Pappas  MRN: [id number]  Admission Date: 5/4/2018  Hospital Length of Stay: 2 days  Attending Provider: Prerna Verma MD   Primary Care Physician: Taj Luna DO  Principal Problem:Hydrothorax    Inpatient consult to Hepatology  Consult performed by: YVAN DOMINGUEZ  Consult ordered by: KIRSTIN CHURCHILL        Subjective:     Transplant status: No    HPI:  This is a 59 y/o female with hx of HCV cirrhosis (complicated by prior SBP, ascites, hydrothorax) who was transferred to [facility] 5/4 for hepatology evaluation.  Pt admitted to OSH ([facility]) 4/26 - 4/29 for recurrent right hydrothorax. She underwent thoracentesis, removed 1.5L.   Fluid transudate: GLUC 183, LDH 24, PROT <23, WBC 85 - L66, S22, RBC 4600  She was discharged and then returned 5/4 with recurrent symptoms of SOB and reaccumulation of hydrothorax.   She was taking lasix 40mg BID and aldactone 100mg daily, although she was taking all her lasix at once.  She has required 4 thoracentesis on right in the past.    Today she feels better s/p thoracentesis last night. Still with abd distention.  She is on room air. She denies other complaints presently.    Prior records per Dr. Walton clinic note:  "2002- liver biopsy- fatty liver and some scar  2012- another biopsy- cirrhosis  fluid overload started 10/15- required LVP ; complicated by SBP    Since last year having hep hydrothorax that requires thoracentesis- 4 x since June 2018  Harvoni x 3 months 2015-cured  EGD- no varices by report 02/18 & Cholecystectomy denied due to cirrhosis"      Review of Systems   Constitutional: Positive for fatigue. Negative for chills and fever.   HENT: Negative for mouth sores, nosebleeds and trouble swallowing.    Eyes: Negative for pain and redness.   Respiratory: Negative for cough and shortness of breath.    Cardiovascular: Negative for chest pain and palpitations. "   Gastrointestinal: Positive for abdominal distention. Negative for abdominal pain and rectal pain.   Genitourinary: Negative for dysuria and hematuria.   Musculoskeletal: Negative for arthralgias and joint swelling.   Skin: Negative for color change and pallor.   Neurological: Negative for seizures and facial asymmetry.   Psychiatric/Behavioral: Negative for agitation and confusion.       Past Medical History:   Diagnosis Date    Anxiety 2018    Depression 2018    Essential hypertension 2018    Hepatic Hydrothorax 2018    Hypoalbuminemia 2018    Other cirrhosis of liver 2018    Thrombocytopenia 2018       Past Surgical History:   Procedure Laterality Date    breast reduction       SECTION      HYSTERECTOMY         Family history of liver disease: No    Review of patient's allergies indicates:  No Known Allergies    Social History Main Topics    Smoking status: Former Smoker     Types: Cigarettes     Quit date: 3/6/2018    Smokeless tobacco: Not on file    Alcohol use No    Drug use: Unknown    Sexual activity: Not on file       Prescriptions Prior to Admission   Medication Sig Dispense Refill Last Dose    clonazePAM (KLONOPIN) 1 MG tablet AS needed   5/3/2018 at Unknown time    furosemide (LASIX) 40 MG tablet Take 40 mg by mouth 2 (two) times daily.   5/3/2018 at Unknown time    glimepiride (AMARYL) 2 MG tablet Take 2 mg by mouth as needed.   Past Month at Unknown time    hydrOXYzine HCl (ATARAX) 25 MG tablet Take 1 tablet (25 mg total) by mouth daily as needed for Itching. 30 tablet 1     hyoscyamine (LEVSIN/SL) 0.125 mg Subl every 4 (four) hours as needed   5/3/2018 at Unknown time    lactulose (CHRONULAC) 10 gram/15 mL solution Take 30 g by mouth 2 (two) times daily.   5/3/2018 at Unknown time    promethazine (PHENERGAN) 25 MG tablet Take 25 mg by mouth as needed for Nausea.   Past Week at Unknown time    spironolactone (ALDACTONE) 100 MG tablet Take 100  mg by mouth 2 (two) times daily.   5/3/2018 at Unknown time    traZODone (DESYREL) 100 MG tablet Take 100 mg by mouth every evening.   5/3/2018 at Unknown time    ciprofloxacin HCl (CIPRO) 500 MG tablet Take 1 tablet (500 mg total) by mouth once daily. 30 tablet 11     metOLazone (ZAROXOLYN) 5 MG tablet Take 5 mg by mouth once daily.   Unknown at Unknown time       Objective:     Vital Signs (Most Recent):  Temp: 98.4 °F (36.9 °C) (05/06/18 0749)  Pulse: 77 (05/06/18 0749)  Resp: 16 (05/06/18 0749)  BP: (!) 97/52 (05/06/18 0749)  SpO2: (!) 93 % (05/06/18 0749) Vital Signs (24h Range):  Temp:  [97.9 °F (36.6 °C)-99.5 °F (37.5 °C)] 98.4 °F (36.9 °C)  Pulse:  [75-97] 77  Resp:  [16-20] 16  SpO2:  [92 %-100 %] 93 %  BP: ()/(41-56) 97/52     Weight: 76.1 kg (167 lb 12.3 oz) (05/04/18 2308)  Body mass index is 29.72 kg/m².    Physical Exam   Constitutional: She is oriented to person, place, and time. No distress.   Thin appearing but nontoxic   HENT:   Head: Normocephalic and atraumatic.   Eyes: Conjunctivae are normal. No scleral icterus.   Neck: Neck supple.   Cardiovascular: Normal rate and regular rhythm.    Pulmonary/Chest: Effort normal. No stridor. No respiratory distress.   On room air   Abdominal: Soft. She exhibits distension. There is no tenderness. There is no rebound and no guarding.   Tympanic to percussion   Musculoskeletal: She exhibits no tenderness or deformity.   Neurological: She is alert and oriented to person, place, and time.   Skin: Skin is warm and dry. No rash noted.   Psychiatric: She has a normal mood and affect.   Vitals reviewed.      MELD-Na score: 8 at 5/6/2018  4:56 AM  MELD score: 8 at 5/6/2018  4:56 AM  Calculated from:  Serum Creatinine: 1.1 mg/dL at 5/6/2018  4:56 AM  Serum Sodium: 139 mmol/L (Rounded to 137) at 5/6/2018  4:56 AM  Total Bilirubin: 1 mg/dL at 5/6/2018  4:56 AM  INR(ratio): 1.1 at 5/6/2018  4:56 AM  Age: 58 years    Significant Labs:  CBC:   Recent Labs  Lab  05/06/18  0456   WBC 2.42*   RBC 3.32*   HGB 8.6*   HCT 27.1*   PLT 60*     CMP:   Recent Labs  Lab 05/06/18  0456   *   CALCIUM 8.3*   ALBUMIN 2.2*   PROT 5.1*      K 3.4*   CO2 29      BUN 16   CREATININE 1.1   ALKPHOS 58   ALT 11   AST 16   BILITOT 1.0     Coagulation:   Recent Labs  Lab 05/06/18  0456   INR 1.1       Significant Imaging:  Labs: Reviewed    Assessment/Plan:     * Hepatic Hydrothorax    As above        Decompensated HCV cirrhosis    57 y/o female with hx of HCV cirrhosis (complicated by prior SBP, ascites, hydrothorax) who was transferred to [facility] 5/4 for hepatology evaluation.  She is now s/p thoracentesis this admission on 5/5.    Will rule out other etiologies of recurrent effusion, but suspect related to hepatic cirrhosis.  We will also workup for TIPS and potentially consider this admission.  SAAG 1.8 on pleural fluid c/w portal HTN     Recs:  Echo for pre-tips eval  CT chest , rule out pulmonary process as cause of effusion  Follow up pleural fluid studies  Continue Diuresis with IV lasix and aldactone  Strict I/ O ; daily wt    Pending Echo and CT chest will consider TIPS during this admission.          Chronic hepatitis C without hepatic coma    As above            Thank you for your consult. I will follow-up with patient. Please contact us if you have any additional questions.    Onesimo Juares MD  Hepatology  [facility]

## 2018-05-06 NOTE — PLAN OF CARE
Problem: Patient Care Overview  Goal: Plan of Care Review  Outcome: Ongoing (interventions implemented as appropriate)  Pt is AO x 4 , denies pain : Blood transfusion completed on this shift , no adverse reactions noted : Pt  Observed  Ambulating in room : continue with rounded abdomen : no acute distress noted ; will cont monitor.

## 2018-05-06 NOTE — ASSESSMENT & PLAN NOTE
- pending lab results here  - chronically low 2/2 to cirrhosis  - will continue to trend  - hold DVT ppx if plts<50 >>> pt refused to take her enoxaparin, stated that she will ambulate instead

## 2018-05-06 NOTE — ASSESSMENT & PLAN NOTE
- secondary to HCV cirrhosis  - likely needs large volume paracentesis as opposed to thoracentesis. If kidneys can tolerate- could likely benefit from increased diuretic regimen. Patient does have visualized fluid pocket on US but unable to do US on 10th floor overnight given new floor policy.   - IR guided paracentesis unable to drain as insufficient fluid noted.  - encouraged patient to take home lasix twice per day as opposed to once as it's only a 6 hour medication  - possible TIPS?  - S/P thora drained 1500 cc

## 2018-05-06 NOTE — SUBJECTIVE & OBJECTIVE
Interval History: NEON    Review of Systems   Constitutional: Negative for chills and fever.   HENT: Negative for congestion.    Eyes: Negative for visual disturbance.   Respiratory: Negative for cough and shortness of breath.    Cardiovascular: Negative for chest pain and palpitations.   Gastrointestinal: Negative for abdominal pain and blood in stool.   Genitourinary: Negative for dysuria and hematuria.   Neurological: Negative for light-headedness and headaches.   Psychiatric/Behavioral: Negative for agitation and behavioral problems.     Objective:     Vital Signs (Most Recent):  Temp: 98.4 °F (36.9 °C) (05/06/18 0749)  Pulse: 77 (05/06/18 0749)  Resp: 16 (05/06/18 0749)  BP: (!) 97/52 (05/06/18 0749)  SpO2: (!) 93 % (05/06/18 0749) Vital Signs (24h Range):  Temp:  [97.9 °F (36.6 °C)-99.5 °F (37.5 °C)] 98.4 °F (36.9 °C)  Pulse:  [75-97] 77  Resp:  [16-20] 16  SpO2:  [92 %-100 %] 93 %  BP: ()/(41-56) 97/52     Weight: 76.1 kg (167 lb 12.3 oz)  Body mass index is 29.72 kg/m².    Intake/Output Summary (Last 24 hours) at 05/06/18 0817  Last data filed at 05/06/18 0600   Gross per 24 hour   Intake          1308.33 ml   Output                3 ml   Net          1305.33 ml      Physical Exam   Constitutional: She is oriented to person, place, and time. She appears well-developed and well-nourished.   HENT:   Head: Normocephalic and atraumatic.   Eyes: Pupils are equal, round, and reactive to light.   Neck: Normal range of motion. Neck supple.   Pulmonary/Chest: Effort normal and breath sounds normal.   Abdominal: Soft. Bowel sounds are normal. She exhibits distension.   Musculoskeletal: She exhibits no edema.   Neurological: She is alert and oriented to person, place, and time.   Skin: Skin is warm and dry. Capillary refill takes less than 2 seconds.   Psychiatric: She has a normal mood and affect.       Significant Labs:   CBC:   Recent Labs  Lab 05/04/18  2344 05/05/18  0458 05/06/18  0456   WBC 1.94* 1.49*  2.42*   HGB 7.4* 6.9* 8.6*   HCT 24.7* 22.8* 27.1*   PLT 55* 50* 60*     CMP:   Recent Labs  Lab 05/04/18 2344 05/05/18 0458 05/05/18 1828 05/06/18 0456    136  --  139   K 3.8 3.3*  --  3.4*    102  --  104   CO2 30* 30*  --  29   * 203*  --  126*   BUN 17 17  --  16   CREATININE 1.1 1.1  --  1.1   CALCIUM 8.8 8.3*  --  8.3*   PROT 5.1* 4.7* 5.0* 5.1*   ALBUMIN 2.2* 2.1*  --  2.2*   BILITOT 0.3 0.3  --  1.0   ALKPHOS 61 53*  --  58   AST 13 12  --  16   ALT 10 10  --  11   ANIONGAP 4* 4*  --  6*   EGFRNONAA 55.9* 55.9*  --  55.9*     Lactic Acid: No results for input(s): LACTATE in the last 48 hours.  Magnesium:   Recent Labs  Lab 05/04/18 2344 05/05/18 0458 05/06/18 0456   MG 1.7 1.4* 1.6       Significant Imaging: I have reviewed and interpreted all pertinent imaging results/findings within the past 24 hours.

## 2018-05-06 NOTE — PROGRESS NOTES
[facility] Hospital Medicine  Progress Note    Patient Name: Michelle Pappas  MRN: [id number]  Patient Class: IP- Inpatient   Admission Date: 5/4/2018  Length of Stay: 2 days  Attending Physician: Prerna Verma MD  Primary Care Provider: Taj Luna DO    [facility] Medicine Team: [facility] HOSP MED 2 Jesús Bates MD    Subjective:     Principal Problem:Hydrothorax    HPI:  Ms. Pappas is a 59 yo lady with HCV cirrhosis who was transferred from [place], [place] for recurrent hepatic hydrothorax and need for higher level of care.     She was recently admitted to [facility] on 4/26/18-4/29/18 due to recurrence of her right hydrothorax.  She underwent thoracentesis by IR while there, removing 1.5L total.  The fluid on this was transudative: GLUC 183, LDH 24, PROT <23, WBC 85 - L66, S22, RBC 4600.  The cultures were negative from the tap. She was discharged home only to return 5/4 with recurrent shortness of breath and re-accumulation of her right hydrothorax. She reports that she's had 4 thoracentesis in the past (2 in 2017 and 2 in 2018. The first two were done in succession and the second two were able to be spaced apart. She reports that she's had a few paracentesis before but does not regularly have them done. She adheres to a low sodium diet and takes all of her diuretics as prescribed. She does report that she's been taking them differently, however. She indicates that instead of taking her lasix twice per day, she's been taking them all at once. She does reports shortness of breath and a cough. She says that the cough is dry and has been present for the last year. She denies any fever, chills, CP, n/v, constipation, melena, hematochezia, abdominal pain or dysuria.     She was recently evaluated by Dr. Walton in Hepatology clinic on 5/1/18 for the first time.  She notes, 2002- liver biopsy- fatty liver and some scar.  2012- another biopsy- cirrhosis.  fluid overload started 10/15- required  LVP ; complicated by SBP.  Since last year having hep hydrothorax that requires thoracentesis- 4 x since June 2018.  Anabella x 3 months 2015-cured  EGD- no varices by report 02/18 & Cholecystectomy denied due to cirrhosis.       Hospital Course:  05/05 pt was admitted to hospital medicine, stable on @ L, thoracentesis done by pulmonology drained, 1500 cc, pt symptoms imroved, hepatology consulted, CT abdomen and echo were done. IR para was attempted but not done due to insufficient fluid. Pt refused her enoxaparin because she was instructed not to take it as her Plt always low.  05/06  f/u on hepatology reccs, pt feel better after thora but CXR done post thoracentesis showed worsening of the effusion ?    Interval History: NEON    Review of Systems   Constitutional: Negative for chills and fever.   HENT: Negative for congestion.    Eyes: Negative for visual disturbance.   Respiratory: Negative for cough and shortness of breath.    Cardiovascular: Negative for chest pain and palpitations.   Gastrointestinal: Negative for abdominal pain and blood in stool.   Genitourinary: Negative for dysuria and hematuria.   Neurological: Negative for light-headedness and headaches.   Psychiatric/Behavioral: Negative for agitation and behavioral problems.     Objective:     Vital Signs (Most Recent):  Temp: 98.4 °F (36.9 °C) (05/06/18 0749)  Pulse: 77 (05/06/18 0749)  Resp: 16 (05/06/18 0749)  BP: (!) 97/52 (05/06/18 0749)  SpO2: (!) 93 % (05/06/18 0749) Vital Signs (24h Range):  Temp:  [97.9 °F (36.6 °C)-99.5 °F (37.5 °C)] 98.4 °F (36.9 °C)  Pulse:  [75-97] 77  Resp:  [16-20] 16  SpO2:  [92 %-100 %] 93 %  BP: ()/(41-56) 97/52     Weight: 76.1 kg (167 lb 12.3 oz)  Body mass index is 29.72 kg/m².    Intake/Output Summary (Last 24 hours) at 05/06/18 0817  Last data filed at 05/06/18 0600   Gross per 24 hour   Intake          1308.33 ml   Output                3 ml   Net          1305.33 ml      Physical Exam   Constitutional:  She is oriented to person, place, and time. She appears well-developed and well-nourished.   HENT:   Head: Normocephalic and atraumatic.   Eyes: Pupils are equal, round, and reactive to light.   Neck: Normal range of motion. Neck supple.   Pulmonary/Chest: Effort normal and breath sounds normal.   Abdominal: Soft. Bowel sounds are normal. She exhibits distension.   Musculoskeletal: She exhibits no edema.   Neurological: She is alert and oriented to person, place, and time.   Skin: Skin is warm and dry. Capillary refill takes less than 2 seconds.   Psychiatric: She has a normal mood and affect.       Significant Labs:   CBC:   Recent Labs  Lab 05/04/18 2344 05/05/18 0458 05/06/18 0456   WBC 1.94* 1.49* 2.42*   HGB 7.4* 6.9* 8.6*   HCT 24.7* 22.8* 27.1*   PLT 55* 50* 60*     CMP:   Recent Labs  Lab 05/04/18 2344 05/05/18 0458 05/05/18  1828 05/06/18 0456    136  --  139   K 3.8 3.3*  --  3.4*    102  --  104   CO2 30* 30*  --  29   * 203*  --  126*   BUN 17 17  --  16   CREATININE 1.1 1.1  --  1.1   CALCIUM 8.8 8.3*  --  8.3*   PROT 5.1* 4.7* 5.0* 5.1*   ALBUMIN 2.2* 2.1*  --  2.2*   BILITOT 0.3 0.3  --  1.0   ALKPHOS 61 53*  --  58   AST 13 12  --  16   ALT 10 10  --  11   ANIONGAP 4* 4*  --  6*   EGFRNONAA 55.9* 55.9*  --  55.9*     Lactic Acid: No results for input(s): LACTATE in the last 48 hours.  Magnesium:   Recent Labs  Lab 05/04/18 2344 05/05/18 0458 05/06/18 0456   MG 1.7 1.4* 1.6       Significant Imaging: I have reviewed and interpreted all pertinent imaging results/findings within the past 24 hours.    Assessment/Plan:      * Hepatic Hydrothorax    - secondary to HCV cirrhosis  - likely needs large volume paracentesis as opposed to thoracentesis. If kidneys can tolerate- could likely benefit from increased diuretic regimen. Patient does have visualized fluid pocket on US but unable to do US on 10th floor overnight given new floor policy.   - IR guided paracentesis unable to  drain as insufficient fluid noted.  - encouraged patient to take home lasix twice per day as opposed to once as it's only a 6 hour medication  - possible TIPS?  - S/P thora drained 1500 cc          Insomnia    - continue trazodone          Diabetes    - pending hemoglobin A1c measurement  - previously controlled on amaryl   - diabetic diet with LDSSI           Other cirrhosis of liver    - likely 2/2 HCV s/p Harvoni treatment   - lasix 40 BID and aldactone 100 mg QD  - lactulose 30 mg BID   - INR daily for meld score calculation   - cipro 500 mg daily given history of SBP   - CT abdomen/pelvis w&w/out contrast : consistent with cirrhosis and portal hypertension with a heterogeneous nodular liver, splenomegaly, portosystemic collaterals, and small volume ascites.  Large right pleural effusion with essentially complete atelectasis of the visualized portions of the right lung.Portal vein thrombosis which is partially calcified indicating that this is a chronic process.  Wall thickening of the duodenum likely relating to portal enteropathy.  Hepatic and renal cysts.Cystic splenic lesion which may represent an epidermoid cyst or possibly a splenic pseudocyst.  - 2D echo and US with doppler pending   - pending consult to hepatology- as per Dr. Walton's last note.          Thrombocytopenia    - pending lab results here  - chronically low 2/2 to cirrhosis  - will continue to trend  - hold DVT ppx if plts<50 >>> pt refused to take her enoxaparin, stated that she will ambulate instead           Hypoalbuminemia    - likely secondary to decreased synthetic function given liver cirrhosis             VTE Risk Mitigation         Ordered     IP VTE LOW RISK PATIENT  Once      05/04/18 4482              Jesús Bates MD  Department of Hospital Medicine   [facility]

## 2018-05-06 NOTE — ASSESSMENT & PLAN NOTE
- likely 2/2 HCV s/p Harvoni treatment   - lasix 40 BID and aldactone 100 mg QD  - lactulose 30 mg BID   - INR daily for meld score calculation   - cipro 500 mg daily given history of SBP   - CT abdomen/pelvis w&w/out contrast : consistent with cirrhosis and portal hypertension with a heterogeneous nodular liver, splenomegaly, portosystemic collaterals, and small volume ascites.  Large right pleural effusion with essentially complete atelectasis of the visualized portions of the right lung.Portal vein thrombosis which is partially calcified indicating that this is a chronic process.  Wall thickening of the duodenum likely relating to portal enteropathy.  Hepatic and renal cysts.Cystic splenic lesion which may represent an epidermoid cyst or possibly a splenic pseudocyst.  - 2D echo and US with doppler pending   - pending consult to hepatology- as per Dr. Walton's last note.

## 2018-05-06 NOTE — HOSPITAL COURSE
"05/05 pt was admitted to hospital medicine, stable on @ L, thoracentesis done by pulmonology drained, 1500 cc, pt symptoms imroved, hepatology consulted, CT abdomen and echo were done. IR para was attempted but not done due to insufficient fluid. Pt refused her enoxaparin because she was instructed not to take it as her Plt always low.  05/06  f/u on hepatology reccs, pt feel better after thora but CXR repeated showed improvement of effusion.  05/07 pt refused her enoxaparin, "she was instructed not to previously due to her plts", she is ambulating daily., CT chest did not show any lung pathology, possible TIPS procedure to be done during her admission, her cret trending up slowly switched back to PO lasix. Pleural fluid showed transudate.  "

## 2018-05-07 NOTE — PROGRESS NOTES
Notified Dr. Espinosa with IM2 pts BP 91/54 ok to giver IV laisx, metolazone and spironolactone. Will continue to monitor pt.

## 2018-05-07 NOTE — PLAN OF CARE
Problem: Patient Care Overview  Goal: Plan of Care Review  Outcome: Ongoing (interventions implemented as appropriate)  Pt denies pain : up ad prince: no acute distress : decrease coughing : will cont to monitor: no changes overnight.

## 2018-05-07 NOTE — H&P
Inpatient Radiology Pre-procedure Note    History of Present Illness:    Michelle Pappas is a 58 y.o. female who presents for right thoracentesis and paracentesis.    Admission H&P reviewed.    Past Medical History:   Diagnosis Date    Anxiety 2018    Depression 2018    Essential hypertension 2018    Hepatic Hydrothorax 2018    Hypoalbuminemia 2018    Other cirrhosis of liver 2018    Thrombocytopenia 2018     Past Surgical History:   Procedure Laterality Date    breast reduction       SECTION      HYSTERECTOMY         Review of Systems:   As documented in primary team H&P    Home Meds:   Prior to Admission medications    Medication Sig Start Date End Date Taking? Authorizing Provider   clonazePAM (KLONOPIN) 1 MG tablet AS needed 7/13/15  Yes Historical Provider, MD   furosemide (LASIX) 40 MG tablet Take 40 mg by mouth 2 (two) times daily.   Yes Historical Provider, MD   glimepiride (AMARYL) 2 MG tablet Take 2 mg by mouth as needed.   Yes Historical Provider, MD   hydrOXYzine HCl (ATARAX) 25 MG tablet Take 1 tablet (25 mg total) by mouth daily as needed for Itching. 18  Yes Andreea Walton MD   hyoscyamine (LEVSIN/SL) 0.125 mg Subl every 4 (four) hours as needed 8/31/15  Yes Historical Provider, MD   lactulose (CHRONULAC) 10 gram/15 mL solution Take 30 g by mouth 2 (two) times daily.   Yes Historical Provider, MD   promethazine (PHENERGAN) 25 MG tablet Take 25 mg by mouth as needed for Nausea.   Yes Historical Provider, MD   spironolactone (ALDACTONE) 100 MG tablet Take 100 mg by mouth 2 (two) times daily.   Yes Historical Provider, MD   traZODone (DESYREL) 100 MG tablet Take 100 mg by mouth every evening.   Yes Historical Provider, MD   ciprofloxacin HCl (CIPRO) 500 MG tablet Take 1 tablet (500 mg total) by mouth once daily. 18   Andreea Walton MD   metOLazone (ZAROXOLYN) 5 MG tablet Take 5 mg by mouth once daily.    Historical Provider, MD     Scheduled  Meds:    ciprofloxacin HCl  500 mg Oral Daily    fluticasone  2 spray Each Nare Daily    folic acid  1 mg Oral Daily    furosemide  40 mg Oral BID    lactulose  30 g Oral BID    lidocaine HCL 10 mg/ml (1%)  10 mL Other Once    metOLazone  5 mg Oral Daily    spironolactone  100 mg Oral BID    traZODone  100 mg Oral QHS     Continuous Infusions:      PRN Meds:sodium chloride, acetaminophen, albuterol-ipratropium 2.5mg-0.5mg/3mL, clonazePAM, dextrose 50%, dextrose 50%, glucagon (human recombinant), glucose, glucose, hydrOXYzine HCl, hyoscyamine, insulin aspart U-100, ondansetron, promethazine, sodium chloride 0.9%     Anticoagulants/Antiplatelets: no anticoagulation    Allergies: Review of patient's allergies indicates:    No Known Allergies    Sedation Hx: have not been any systemic reactions    Labs:    Recent Labs  Lab 05/07/18  0546   INR 1.1       Recent Labs  Lab 05/07/18  0546   WBC 2.01*   HGB 9.2*   HCT 29.5*   MCV 84   PLT 63*      Recent Labs  Lab 05/07/18  0546   *      K 3.3*      CO2 28   BUN 15   CREATININE 1.3   CALCIUM 8.1*   MG 1.6   ALT 12   AST 16   ALBUMIN 2.2*   BILITOT 0.4         Vitals:  Temp: 98.7 °F (37.1 °C) (05/07/18 1243)  Pulse: 97 (05/07/18 1600)  Resp: 18 (05/07/18 1600)  BP: (!) 93/51 (05/07/18 1600)  SpO2: 98 % (05/07/18 1600)     Physical Exam:    General: no acute distress  Mental Status: alert and oriented to person, place and time  HEENT: normocephalic, atraumatic  Chest: unlabored breathing  Heart: regular heart rate  Abdomen: distended  Extremity: moves all extremities    Plan: right thoracentesis and paracentesis.  Sedation Plan: local    Donell Kamara MD  PGY-5  Department of Radiology  [phone]

## 2018-05-07 NOTE — PROGRESS NOTES
"[facility]  Hepatology  Progress Note    Patient Name: Michelle Pappas  MRN: [id number]  Admission Date: 5/4/2018  Hospital Length of Stay: 3 days  Attending Provider: Prerna Verma MD   Primary Care Physician: Taj Luna DO  Principal Problem:Hydrothorax    Subjective:     Transplant status: n/a    HPI: This is a 59 y/o female with hx of HCV cirrhosis (complicated by prior SBP, ascites, hydrothorax) who was transferred [place] [facility] [place] for hepatology evaluation.  Pt admitted to OSH ([facility]) 4/26 - 4/29 for recurrent right hydrothorax. She underwent thoracentesis, removed 1.5L.   Fluid transudate: GLUC 183, LDH 24, PROT <23, WBC 85 - L66, S22, RBC 4600  She was discharged and then returned 5/4 with recurrent symptoms of SOB and reaccumulation of hydrothorax.   She was taking lasix 40mg BID and aldactone 100mg daily, although she was taking all her lasix at once.  She has required 4 thoracentesis on right in the past.    Today she feels better s/p thoracentesis last night. Still with abd distention.  She is on room air. She denies other complaints presently.    Prior records per Dr. Walton clinic note:  "2002- liver biopsy- fatty liver and some scar  2012- another biopsy- cirrhosis  fluid overload started 10/15- required LVP ; complicated by SBP    Since last year having hep hydrothorax that requires thoracentesis- 4 x since June 2018  Harvoni x 3 months 2015-cured  EGD- no varices by report 02/18 & Cholecystectomy denied due to cirrhosis"      Interval History: Ms. Pappas is feeling improved this AM. She denies dyspnea, cough, or chest pain. Denies increased abdominal distention, lower extremity swelling, nausea, vomiting or confusion. Reports adequate urine output and 17 bowel movements over the last 24 hrs. She also reports history of 1x episode of confusion in 2015.    Review of Systems   Constitutional: Negative for chills and fever.   HENT: Negative for congestion.    Eyes: Negative for " visual disturbance.   Respiratory: Negative for cough and shortness of breath.    Cardiovascular: Negative for chest pain and palpitations.   Gastrointestinal: Positive for abdominal distention. Negative for abdominal pain and blood in stool.   Genitourinary: Negative for dysuria and hematuria.   Neurological: Negative for light-headedness and headaches.   Psychiatric/Behavioral: Negative for agitation and behavioral problems.     Current Facility-Administered Medications   Medication    0.9%  NaCl infusion (for blood administration)    acetaminophen tablet 650 mg    albuterol-ipratropium 2.5mg-0.5mg/3mL nebulizer solution 3 mL    ciprofloxacin HCl tablet 500 mg    clonazePAM tablet 1 mg    dextrose 50% injection 12.5 g    dextrose 50% injection 25 g    fluticasone 50 mcg/actuation nasal spray 100 mcg    folic acid tablet 1 mg    furosemide injection 40 mg    glucagon (human recombinant) injection 1 mg    glucose chewable tablet 16 g    glucose chewable tablet 24 g    hydrOXYzine HCl tablet 25 mg    hyoscyamine SL tablet 0.125 mg    insulin aspart U-100 pen 0-5 Units    lactulose 20 gram/30 mL solution Soln 30 g    lidocaine HCL 10 mg/ml (1%) injection 10 mL    metOLazone tablet 5 mg    ondansetron disintegrating tablet 8 mg    potassium, sodium phosphates 280-160-250 mg packet 2 packet    promethazine tablet 25 mg    sodium chloride 0.9% flush 5 mL    spironolactone tablet 100 mg    trazodone split tablet 100 mg       Objective:     Vital Signs (Most Recent):  Temp: 98.4 °F (36.9 °C) (05/07/18 0801)  Pulse: 84 (05/07/18 0801)  Resp: 18 (05/07/18 0801)  BP: (!) 91/54 (05/07/18 0801)  SpO2: (!) 90 % (05/07/18 0801) Vital Signs (24h Range):  Temp:  [97.9 °F (36.6 °C)-98.4 °F (36.9 °C)] 98.4 °F (36.9 °C)  Pulse:  [75-88] 84  Resp:  [18-20] 18  SpO2:  [90 %-97 %] 90 %  BP: ()/(50-73) 91/54     Weight: 71.6 kg (157 lb 12.2 oz) (05/07/18 0600)  Body mass index is 27.95 kg/m².    Physical  "Exam   Constitutional: She is oriented to person, place, and time. She appears well-developed and well-nourished.   HENT:   Head: Normocephalic and atraumatic.   Eyes: Pupils are equal, round, and reactive to light.   Neck: Normal range of motion. Neck supple.   Pulmonary/Chest: Effort normal and breath sounds normal.   Diminished in the middle and lower lobes   Abdominal: Soft. Bowel sounds are normal. She exhibits distension. No tenderness.  Musculoskeletal: She exhibits no edema.   Neurological: She is alert and oriented to person, place, and time.   Skin: Skin is warm and dry. Capillary refill takes less than 2 seconds.   Psychiatric: She has a normal mood and affect.     MELD-Na score: 10 at 5/7/2018  5:46 AM  MELD score: 10 at 5/7/2018  5:46 AM  Calculated from:  Serum Creatinine: 1.3 mg/dL at 5/7/2018  5:46 AM  Serum Sodium: 137 mmol/L at 5/7/2018  5:46 AM  Total Bilirubin: 0.4 mg/dL (Rounded to 1) at 5/7/2018  5:46 AM  INR(ratio): 1.1 at 5/7/2018  5:46 AM  Age: 58 years    Significant Labs:  CBC:   Recent Labs  Lab 05/07/18  0546   WBC 2.01*   RBC 3.53*   HGB 9.2*   HCT 29.5*   PLT 63*     CMP:   Recent Labs  Lab 05/07/18  0546   *   CALCIUM 8.1*   ALBUMIN 2.2*   PROT 5.4*      K 3.3*   CO2 28      BUN 15   CREATININE 1.3   ALKPHOS 63   ALT 12   AST 16   BILITOT 0.4       Significant Imaging:    CXR 05/06  Decrease in right pleural effusion since May 5, 2018 at 6:52 p.m.    CT Chest 05/06  Redemonstration of a large right pleural effusion with leftward displacement of the cardiomediastinal silhouette.  There is associated compressive atelectasis.  The left lung is clear.    Incidental findings: Splenomegaly, nodular hepatic contour right renal cysts, gallstones, and mild atherosclerosis.    Assessment/Plan:     * Hepatic Hydrothorax    See "decompensated HCV cirrhosis."        Decompensated HCV cirrhosis    59 y/o female with hx of HCV cirrhosis (complicated by prior SBP, ascites, " hydrothorax, and hepatic encephalopathy) who was transferred to [facility] [place] 5[place]4 for hepatology evaluation.  She is now s/p thoracentesis this admission on 5/5.    -Pleural fluid studies consistent with hepatic hydrothorax (transudative, SAAG 1.8 c/w portal HTN.)  -Echo & CT chest negative for alternative etiologies of hydrothorax   -Echo with normal heart function (EF 60-65%), trivial tricuspid regurgitation, pulmonary HTN 22 mmHg  -MELD 10 today    Plan  -IR review tomorrow for TIPS to assist with recurring volume overload. May not qualify given chronic PVT (possibly amenable to ballooning during TIPS.) There is also report of 1x episode of confusion in 2015.  -Low MELD, but may consider review for transplant if she does not qualify for TIPS  -Would consider paracentesis if amenable. However, per radiology report, there is minimal ascites.    Recs:  Titrate lactulose for 3-4 BM/day  Continue Diuresis with IV lasix and aldactone  Strict I/ O; daily wt          Chronic hepatitis C without hepatic coma    As above            Thank you for your consult. I will follow-up with patient. Please contact us if you have any additional questions.    America Rico MD  Hepatology  [facility]

## 2018-05-07 NOTE — ASSESSMENT & PLAN NOTE
59 y/o female with hx of HCV cirrhosis (complicated by prior SBP, ascites, hydrothorax, and hepatic encephalopathy) who was transferred to [facility] 5/4 for hepatology evaluation.  She is now s/p thoracentesis this admission on 5/5.    Pleural fluid studies consistent with hepatic hydrothorax (transudative, SAAG 1.8 c/w portal HTN.)  Echo & CT chest negative for alternative etiologies of hydrothorax   Echo with normal heart function (EF 60-65%), trivial tricuspid regurgitation, pulmonary HTN 22 mmHg  IR review tomorrow for TIPS. May not qualify given history of 2x hepatic encephalopathy (once last year, once 02/2018) and chronic PVT (possibly amenable to ballooning during TIPS.)    Recs:  Titrate lactulose for 3-4 BM/day  Continue Diuresis with IV lasix and aldactone  Strict I/ O; daily wt

## 2018-05-07 NOTE — TELEPHONE ENCOUNTER
----- Message from Ankush Cabral RN sent at 5/7/2018  5:17 PM CDT -----      ----- Message -----  From: Andreea Walton MD  Sent: 5/7/2018   3:46 PM  To: [facility] Pre-Liver Transplant Clinical    The Labs are stable - please let patient know.

## 2018-05-07 NOTE — TELEPHONE ENCOUNTER
".  Patient: Michelle Pappas       MRN: [id number]      : 1960     Age: 58 y.o.  [address] [place] [id number]    Provider: Hepatologist Geoff Walton    Urgency of review: urgent    Patient Transplant Status: Other TIPS vs EVAL    Reason for presentation: Determine if IR intervention is possible    Clinical Summary: This is a 59 y/o female with hx of HCV cirrhosis (complicated by prior SBP, ascites, hydrothorax) who [place] [facility]  [place].  Pt admitted to OSH ([facility])  -  for recurrent right hydrothorax. She underwent thoracentesis, removed 1.5L.   Fluid transudate: GLUC 183, LDH 24, PROT <23, WBC 85 - L66, S22, RBC 4600  She was discharged and then returned  with recurrent symptoms of SOB and reaccumulation of hydrothorax.   She was taking lasix 40mg BID and aldactone 100mg daily, although she was taking all her lasix at once.  She has required 4 thoracentesis on right in the past.  Prior records per Dr. Walton clinic note:  "- liver biopsy- fatty liver and some scar  - another biopsy- cirrhosis  fluid overload started 10/15- required LVP ; complicated by SBP    Since last year having hep hydrothorax that requires thoracentesis- 4 x since 2018  Harvoni x 3 months 2015-cured    Imaging to be reviewed: Abdominal/Pelvis CT    HCC Treatment History: N/A    ABO: A POS    Platelets:   Lab Results   Component Value Date/Time    PLT 63 (L) 2018 05:46 AM     Creatinine:   Lab Results   Component Value Date/Time    CREATININE 1.3 2018 05:46 AM     Bilirubin:   Lab Results   Component Value Date/Time    BILITOT 0.4 2018 05:46 AM     AFP Last 3 each if available:   Lab Results   Component Value Date/Time    AFP 6.0 2018 05:03 PM       MELD: MELD-Na score: 10 at 2018  5:46 AM  MELD score: 10 at 2018  5:46 AM  Calculated from:  Serum Creatinine: 1.3 mg/dL at 2018  5:46 AM  Serum Sodium: 137 mmol/L at 2018  5:46 AM  Total " Bilirubin: 0.4 mg/dL (Rounded to 1) at 5/7/2018  5:46 AM  INR(ratio): 1.1 at 5/7/2018  5:46 AM  Age: 58 years    Plan:     Follow-up Provider:

## 2018-05-07 NOTE — TELEPHONE ENCOUNTER
".  Patient: Michelle Pappas       MRN: [id number]      : 1960     Age: 58 y.o.  [address] [place] [id number]    Provider: Hepatologist Geoff Walton    Urgency of review: urgent    Patient Transplant Status: Other TIPS vs EVAL    Reason for presentation: Determine if IR intervention is possible    Clinical Summary: This is a 57 y/o female with hx of HCV cirrhosis (complicated by prior SBP, ascites, hydrothorax) who was transferred to [facility]  for hepatology evaluation.  Pt admitted to OSH ([facility])  -  for recurrent right hydrothorax. She underwent thoracentesis, removed 1.5L.   Fluid transudate: GLUC 183, LDH 24, PROT <23, WBC 85 - L66, S22, RBC 4600  She was discharged and then returned  with recurrent symptoms of SOB and reaccumulation of hydrothorax.   She was taking lasix 40mg BID and aldactone 100mg daily, although she was taking all her lasix at once.  She has required 4 thoracentesis on right in the past.  Prior records per Dr. Walton clinic note:  "- liver biopsy- fatty liver and some scar  - another biopsy- cirrhosis  fluid overload started 10/15- required LVP ; complicated by SBP    Since last year having hep hydrothorax that requires thoracentesis- 4 x since 2018  Harvoni x 3 months 2015-cured    Imaging to be reviewed: Abdominal/Pelvis CT    HCC Treatment History: N/A    ABO: A POS    Platelets:   Lab Results   Component Value Date/Time    PLT 63 (L) 2018 05:46 AM     Creatinine:   Lab Results   Component Value Date/Time    CREATININE 1.3 2018 05:46 AM     Bilirubin:   Lab Results   Component Value Date/Time    BILITOT 0.4 2018 05:46 AM     AFP Last 3 each if available:   Lab Results   Component Value Date/Time    AFP 6.0 2018 05:03 PM       MELD: MELD-Na score: 10 at 2018  5:46 AM  MELD score: 10 at 2018  5:46 AM  Calculated from:  Serum Creatinine: 1.3 mg/dL at 2018  5:46 AM  Serum Sodium: 137 mmol/L at 2018  5:46 AM  Total " Bilirubin: 0.4 mg/dL (Rounded to 1) at 5/7/2018  5:46 AM  INR(ratio): 1.1 at 5/7/2018  5:46 AM  Age: 58 years    Plan: IR for TIPS insertion, re: hydrothorax.  May also improve PVT.    Follow-up Provider: Andreea Walton MD

## 2018-05-07 NOTE — PROGRESS NOTES
[facility] Hospital Medicine  Progress Note    Patient Name: Michelle Pappas  MRN: [id number]  Patient Class: IP- Inpatient   Admission Date: 5/4/2018  Length of Stay: 3 days  Attending Physician: Prerna Verma MD  Primary Care Provider: Taj Luna DO    [facility] Medicine Team: [facility] 2 Jesús Bates MD    Subjective:     Principal Problem:Hydrothorax    HPI:  Ms. Pappas is a 57 yo lady with HCV cirrhosis who was transferred from [place], [place] for recurrent hepatic hydrothorax and need for higher level of care.     She was recently admitted to [facility] on 4/26/18-4/29/18 due to recurrence of her right hydrothorax.  She underwent thoracentesis by IR while there, removing 1.5L total.  The fluid on this was transudative: GLUC 183, LDH 24, PROT <23, WBC 85 - L66, S22, RBC 4600.  The cultures were negative from the tap. She was discharged home only to return 5/4 with recurrent shortness of breath and re-accumulation of her right hydrothorax. She reports that she's had 4 thoracentesis in the past (2 in 2017 and 2 in 2018. The first two were done in succession and the second two were able to be spaced apart. She reports that she's had a few paracentesis before but does not regularly have them done. She adheres to a low sodium diet and takes all of her diuretics as prescribed. She does report that she's been taking them differently, however. She indicates that instead of taking her lasix twice per day, she's been taking them all at once. She does reports shortness of breath and a cough. She says that the cough is dry and has been present for the last year. She denies any fever, chills, CP, n/v, constipation, melena, hematochezia, abdominal pain or dysuria.     She was recently evaluated by Dr. Walton in Hepatology clinic on 5/1/18 for the first time.  She notes, 2002- liver biopsy- fatty liver and some scar.  2012- another biopsy- cirrhosis.  fluid overload started 10/15- required  "LVP ; complicated by SBP.  Since last year having hep hydrothorax that requires thoracentesis- 4 x since June 2018.  Anabella x 3 months 2015-cured  EGD- no varices by report 02/18 & Cholecystectomy denied due to cirrhosis.       Hospital Course:  05/05 pt was admitted to hospital medicine, stable on @ L, thoracentesis done by pulmonology drained, 1500 cc, pt symptoms imroved, hepatology consulted, CT abdomen and echo were done. IR para was attempted but not done due to insufficient fluid. Pt refused her enoxaparin because she was instructed not to take it as her Plt always low.  05/06  f/u on hepatology reccs, pt feel better after thora but CXR repeated showed improvement of effusion.  05/07 pt refused her enoxaparin, "she was instructed not to previously due to her plts", she is ambulating daily., CT chest did not show any lung pathology, possible TIPS procedure to be done during her admission, her cret trending up slowly switched back to PO lasix. Pleural fluid showed transudate.    Interval History: NEON    Review of Systems   Constitutional: Negative for chills and fever.   HENT: Negative for congestion.    Eyes: Negative for visual disturbance.   Respiratory: Negative for cough and shortness of breath.    Cardiovascular: Negative for chest pain and palpitations.   Gastrointestinal: Negative for abdominal pain and blood in stool.   Genitourinary: Negative for dysuria and hematuria.   Neurological: Negative for light-headedness and headaches.   Psychiatric/Behavioral: Negative for agitation and behavioral problems.     Objective:     Vital Signs (Most Recent):  Temp: 98.4 °F (36.9 °C) (05/07/18 0801)  Pulse: 84 (05/07/18 0801)  Resp: 18 (05/07/18 0801)  BP: (!) 91/54 (05/07/18 0801)  SpO2: (!) 90 % (05/07/18 0801) Vital Signs (24h Range):  Temp:  [97.9 °F (36.6 °C)-98.4 °F (36.9 °C)] 98.4 °F (36.9 °C)  Pulse:  [75-88] 84  Resp:  [18-20] 18  SpO2:  [90 %-97 %] 90 %  BP: ()/(50-73) 91/54     Weight: 71.6 kg " (157 lb 12.2 oz)  Body mass index is 27.95 kg/m².    Intake/Output Summary (Last 24 hours) at 05/07/18 1056  Last data filed at 05/07/18 0500   Gross per 24 hour   Intake              480 ml   Output                0 ml   Net              480 ml      Physical Exam   Constitutional: She is oriented to person, place, and time. She appears well-developed and well-nourished.   HENT:   Head: Normocephalic and atraumatic.   Eyes: Pupils are equal, round, and reactive to light.   Neck: Normal range of motion. Neck supple.   Pulmonary/Chest: Effort normal and breath sounds normal.   Diminished in the middle and lower lobes   Abdominal: Soft. Bowel sounds are normal. She exhibits distension.   Musculoskeletal: She exhibits no edema.   Neurological: She is alert and oriented to person, place, and time.   Skin: Skin is warm and dry. Capillary refill takes less than 2 seconds.   Psychiatric: She has a normal mood and affect.       Significant Labs:   CBC:     Recent Labs  Lab 05/06/18  0456 05/07/18  0546   WBC 2.42* 2.01*   HGB 8.6* 9.2*   HCT 27.1* 29.5*   PLT 60* 63*     CMP:     Recent Labs  Lab 05/05/18  1828 05/06/18  0456 05/07/18  0546   NA  --  139 137   K  --  3.4* 3.3*   CL  --  104 101   CO2  --  29 28   GLU  --  126* 202*   BUN  --  16 15   CREATININE  --  1.1 1.3   CALCIUM  --  8.3* 8.1*   PROT 5.0* 5.1* 5.4*   ALBUMIN  --  2.2* 2.2*   BILITOT  --  1.0 0.4   ALKPHOS  --  58 63   AST  --  16 16   ALT  --  11 12   ANIONGAP  --  6* 8   EGFRNONAA  --  55.9* 45.6*     Lactic Acid: No results for input(s): LACTATE in the last 48 hours.  Magnesium:     Recent Labs  Lab 05/06/18  0456 05/07/18  0546   MG 1.6 1.6       Significant Imaging: I have reviewed and interpreted all pertinent imaging results/findings within the past 24 hours.    Assessment/Plan:      * Hepatic Hydrothorax    - secondary to HCV cirrhosis  - likely needs large volume paracentesis as opposed to thoracentesis. If kidneys can tolerate- could likely  benefit from increased diuretic regimen. Patient does have visualized fluid pocket on US but unable to do US on 10th floor overnight given new floor policy.   - IR guided paracentesis unable to drain as insufficient fluid noted.  - encouraged patient to take home lasix twice per day as opposed to once as it's only a 6 hour medication  - S/P thora drained 1500 cc          Decompensated HCV cirrhosis              Insomnia    - continue trazodone          Diabetes    - pending hemoglobin A1c measurement  - previously controlled on amaryl   - diabetic diet with LDSSI           Other cirrhosis of liver    - likely 2/2 HCV s/p Harvoni treatment   - lasix 40 BID and aldactone 100 mg QD  - lactulose 30 mg BID   - INR daily for meld score calculation   - cipro 500 mg daily given history of SBP   - CT abdomen/pelvis w&w/out contrast : consistent with cirrhosis and portal hypertension with a heterogeneous nodular liver, splenomegaly, portosystemic collaterals, and small volume ascites.  Large right pleural effusion with essentially complete atelectasis of the visualized portions of the right lung.Portal vein thrombosis which is partially calcified indicating that this is a chronic process.Wall thickening of the duodenum likely relating to portal enteropathy.  Hepatic and renal cysts.Cystic splenic lesion which may represent an epidermoid cyst or possibly a splenic pseudocyst.  - 2D echo showed WNL EF60% no DD.  - CT- chest with no obvious lung pathology r/o other etiology to her hydrothorax.  - Hepatology considering TIPS procedure, will wait for their final reccs.              Thrombocytopenia    - pending lab results here  - chronically low 2/2 to cirrhosis  - will continue to trend  - hold DVT ppx if plts<50 >>> pt refused to take her enoxaparin, stated that she will ambulate instead           Hypoalbuminemia    - likely secondary to decreased synthetic function given liver cirrhosis             VTE Risk Mitigation          Ordered     IP VTE LOW RISK PATIENT  Once      05/04/18 2338              Jesús Bates MD  Department of Hospital Medicine   [facility]

## 2018-05-07 NOTE — SUBJECTIVE & OBJECTIVE
Interval History: Ms. Pappas is feeling improved this AM. She denies dyspnea, cough, or chest pain. Denies increased abdominal distention, lower extremity swelling, nausea, vomiting or confusion. Reports adequate urine output. She reports having 17 bowel movements over the last 24 hrs.     Current Facility-Administered Medications   Medication    0.9%  NaCl infusion (for blood administration)    acetaminophen tablet 650 mg    albuterol-ipratropium 2.5mg-0.5mg/3mL nebulizer solution 3 mL    ciprofloxacin HCl tablet 500 mg    clonazePAM tablet 1 mg    dextrose 50% injection 12.5 g    dextrose 50% injection 25 g    fluticasone 50 mcg/actuation nasal spray 100 mcg    folic acid tablet 1 mg    furosemide injection 40 mg    glucagon (human recombinant) injection 1 mg    glucose chewable tablet 16 g    glucose chewable tablet 24 g    hydrOXYzine HCl tablet 25 mg    hyoscyamine SL tablet 0.125 mg    insulin aspart U-100 pen 0-5 Units    lactulose 20 gram/30 mL solution Soln 30 g    lidocaine HCL 10 mg/ml (1%) injection 10 mL    metOLazone tablet 5 mg    ondansetron disintegrating tablet 8 mg    potassium, sodium phosphates 280-160-250 mg packet 2 packet    promethazine tablet 25 mg    sodium chloride 0.9% flush 5 mL    spironolactone tablet 100 mg    trazodone split tablet 100 mg       Objective:     Vital Signs (Most Recent):  Temp: 98.4 °F (36.9 °C) (05/07/18 0801)  Pulse: 84 (05/07/18 0801)  Resp: 18 (05/07/18 0801)  BP: (!) 91/54 (05/07/18 0801)  SpO2: (!) 90 % (05/07/18 0801) Vital Signs (24h Range):  Temp:  [97.9 °F (36.6 °C)-98.4 °F (36.9 °C)] 98.4 °F (36.9 °C)  Pulse:  [75-88] 84  Resp:  [18-20] 18  SpO2:  [90 %-97 %] 90 %  BP: ()/(50-73) 91/54     Weight: 71.6 kg (157 lb 12.2 oz) (05/07/18 0600)  Body mass index is 27.95 kg/m².    Physical Exam    MELD-Na score: 10 at 5/7/2018  5:46 AM  MELD score: 10 at 5/7/2018  5:46 AM  Calculated from:  Serum Creatinine: 1.3 mg/dL at 5/7/2018   5:46 AM  Serum Sodium: 137 mmol/L at 5/7/2018  5:46 AM  Total Bilirubin: 0.4 mg/dL (Rounded to 1) at 5/7/2018  5:46 AM  INR(ratio): 1.1 at 5/7/2018  5:46 AM  Age: 58 years    Significant Labs:  CBC:   Recent Labs  Lab 05/07/18  0546   WBC 2.01*   RBC 3.53*   HGB 9.2*   HCT 29.5*   PLT 63*     CMP:   Recent Labs  Lab 05/07/18  0546   *   CALCIUM 8.1*   ALBUMIN 2.2*   PROT 5.4*      K 3.3*   CO2 28      BUN 15   CREATININE 1.3   ALKPHOS 63   ALT 12   AST 16   BILITOT 0.4       Significant Imaging:    CXR 05/06  Decrease in right pleural effusion since May 5, 2018 at 6:52 p.m.    CT Chest 05/06  Redemonstration of a large right pleural effusion with leftward displacement of the cardiomediastinal silhouette.  There is associated compressive atelectasis.  The left lung is clear.    Incidental findings: Splenomegaly, nodular hepatic contour right renal cysts, gallstones, and mild atherosclerosis.

## 2018-05-07 NOTE — SUBJECTIVE & OBJECTIVE
Interval History: NEON    Review of Systems   Constitutional: Negative for chills and fever.   HENT: Negative for congestion.    Eyes: Negative for visual disturbance.   Respiratory: Negative for cough and shortness of breath.    Cardiovascular: Negative for chest pain and palpitations.   Gastrointestinal: Negative for abdominal pain and blood in stool.   Genitourinary: Negative for dysuria and hematuria.   Neurological: Negative for light-headedness and headaches.   Psychiatric/Behavioral: Negative for agitation and behavioral problems.     Objective:     Vital Signs (Most Recent):  Temp: 98.4 °F (36.9 °C) (05/07/18 0801)  Pulse: 84 (05/07/18 0801)  Resp: 18 (05/07/18 0801)  BP: (!) 91/54 (05/07/18 0801)  SpO2: (!) 90 % (05/07/18 0801) Vital Signs (24h Range):  Temp:  [97.9 °F (36.6 °C)-98.4 °F (36.9 °C)] 98.4 °F (36.9 °C)  Pulse:  [75-88] 84  Resp:  [18-20] 18  SpO2:  [90 %-97 %] 90 %  BP: ()/(50-73) 91/54     Weight: 71.6 kg (157 lb 12.2 oz)  Body mass index is 27.95 kg/m².    Intake/Output Summary (Last 24 hours) at 05/07/18 1056  Last data filed at 05/07/18 0500   Gross per 24 hour   Intake              480 ml   Output                0 ml   Net              480 ml      Physical Exam   Constitutional: She is oriented to person, place, and time. She appears well-developed and well-nourished.   HENT:   Head: Normocephalic and atraumatic.   Eyes: Pupils are equal, round, and reactive to light.   Neck: Normal range of motion. Neck supple.   Pulmonary/Chest: Effort normal and breath sounds normal.   Diminished in the middle and lower lobes   Abdominal: Soft. Bowel sounds are normal. She exhibits distension.   Musculoskeletal: She exhibits no edema.   Neurological: She is alert and oriented to person, place, and time.   Skin: Skin is warm and dry. Capillary refill takes less than 2 seconds.   Psychiatric: She has a normal mood and affect.       Significant Labs:   CBC:     Recent Labs  Lab 05/06/18  5046  05/07/18  0546   WBC 2.42* 2.01*   HGB 8.6* 9.2*   HCT 27.1* 29.5*   PLT 60* 63*     CMP:     Recent Labs  Lab 05/05/18  1828 05/06/18 0456 05/07/18  0546   NA  --  139 137   K  --  3.4* 3.3*   CL  --  104 101   CO2  --  29 28   GLU  --  126* 202*   BUN  --  16 15   CREATININE  --  1.1 1.3   CALCIUM  --  8.3* 8.1*   PROT 5.0* 5.1* 5.4*   ALBUMIN  --  2.2* 2.2*   BILITOT  --  1.0 0.4   ALKPHOS  --  58 63   AST  --  16 16   ALT  --  11 12   ANIONGAP  --  6* 8   EGFRNONAA  --  55.9* 45.6*     Lactic Acid: No results for input(s): LACTATE in the last 48 hours.  Magnesium:     Recent Labs  Lab 05/06/18 0456 05/07/18  0546   MG 1.6 1.6       Significant Imaging: I have reviewed and interpreted all pertinent imaging results/findings within the past 24 hours.

## 2018-05-07 NOTE — PLAN OF CARE
Payor: MEDICARE / Plan: MEDICARE PART A & B / Product Type: Government /      [facility] - [place], [place] - [address] AT [facility] [address]  [address]  [place] [address]  Phone: [phone] Fax: [phone]      Extended Emergency Contact Information  Primary Emergency Contact: Be Irby   [place]  Mobile Phone: [phone]  Relation: Brother        05/07/18 1320   Discharge Assessment   Assessment Type Discharge Planning Assessment   Confirmed/corrected address and phone number on facesheet? Yes   Assessment information obtained from? Patient   Expected Length of Stay (days) 3   Prior to hospitilization cognitive status: Alert/Oriented   Prior to hospitalization functional status: Independent   Current cognitive status: Alert/Oriented   Current Functional Status: Needs Assistance   Lives With spouse   Able to Return to Prior Arrangements yes   Is patient able to care for self after discharge? Yes   Patient's perception of discharge disposition acute care hospital   Readmission Within The Last 30 Days no previous admission in last 30 days   Patient currently being followed by outpatient case management? No   Patient currently receives any other outside agency services? No   Equipment Currently Used at Home none   Do you have any problems affording any of your prescribed medications? No   Is the patient taking medications as prescribed? yes   Does the patient have transportation home? Yes   Transportation Available family or friend will provide   Discharge Plan A Home Health;Home   Discharge Plan B Home;Home with family   Patient/Family In Agreement With Plan yes

## 2018-05-07 NOTE — PROCEDURES
Radiology Post-Procedure Note    Pre Op Diagnosis: right pleural effusion and ascites  Post Op Diagnosis: Same    Procedure: right thoracentesis    Procedure performed by: Dr. Nikos Salgado and Dr. Donell Kamara    Written Informed Consent Obtained: Yes  Specimen Removed: YES pleural fluid  Estimated Blood Loss: Minimal    Findings:     Right thoracentesis performed with ultrasound guidance. Paracentesis was not performed due to insufficient fluid. Please see separate imaging dictation for further details.    Patient tolerated procedure well.    Donell Kamara MD  PGY-5  Department of Radiology  [phone]

## 2018-05-07 NOTE — ASSESSMENT & PLAN NOTE
- likely 2/2 HCV s/p Harvoni treatment   - lasix 40 BID and aldactone 100 mg QD  - lactulose 30 mg BID   - INR daily for meld score calculation   - cipro 500 mg daily given history of SBP   - CT abdomen/pelvis w&w/out contrast : consistent with cirrhosis and portal hypertension with a heterogeneous nodular liver, splenomegaly, portosystemic collaterals, and small volume ascites.  Large right pleural effusion with essentially complete atelectasis of the visualized portions of the right lung.Portal vein thrombosis which is partially calcified indicating that this is a chronic process.Wall thickening of the duodenum likely relating to portal enteropathy.  Hepatic and renal cysts.Cystic splenic lesion which may represent an epidermoid cyst or possibly a splenic pseudocyst.  - 2D echo showed WNL EF60% no DD.  - CT- chest with no obvious lung pathology r/o other etiology to her hydrothorax.  - Hepatology considering TIPS procedure, will wait for their final reccs.

## 2018-05-07 NOTE — ASSESSMENT & PLAN NOTE
- secondary to HCV cirrhosis  - likely needs large volume paracentesis as opposed to thoracentesis. If kidneys can tolerate- could likely benefit from increased diuretic regimen. Patient does have visualized fluid pocket on US but unable to do US on 10th floor overnight given new floor policy.   - IR guided paracentesis unable to drain as insufficient fluid noted.  - encouraged patient to take home lasix twice per day as opposed to once as it's only a 6 hour medication  - S/P thora drained 1500 cc

## 2018-05-07 NOTE — ASSESSMENT & PLAN NOTE
57 y/o female with hx of HCV cirrhosis (complicated by prior SBP, ascites, hydrothorax, and hepatic encephalopathy) who was transferred to [facility] [place] 5/4 for hepatology evaluation.    -She is now s/p thoracentesis this admission on 5/5.   -Pleural fluid studies consistent with hepatic hydrothorax (transudative, SAAG 1.8 c/w portal HTN.)  -Echo & CT chest negative for alternative etiologies of hydrothorax   -Echo with normal heart function (EF 60-65%), trivial tricuspid regurgitation, pulmonary HTN 22 mmHg  -MELD 10 today  -Not enough ascitic fluid for paracentesis.    Plan/Recs  -TIPS tomorrow-NPO and hold anticoagulation at midnight.  -Low MELD, no need for transplant eval at this time.  -Titrate lactulose for 3-4 BM/day  -Continue Diuresis with IV lasix and aldactone  -Strict I/ O; daily wt

## 2018-05-07 NOTE — PT/OT/SLP PROGRESS
Occupational Therapy      Patient Name:  Michelle Pappas   MRN:  [id number]    OT orders D/Cd at pt's request as she states that she walks in the hallway throughout the day and does not want/need OT at this time.    CARON Méndez  5/7/2018

## 2018-05-08 PROBLEM — N18.30 CKD STAGE 3 DUE TO TYPE 2 DIABETES MELLITUS: Status: ACTIVE | Noted: 2018-01-01

## 2018-05-08 PROBLEM — D61.818 PANCYTOPENIA: Status: ACTIVE | Noted: 2018-01-01

## 2018-05-08 PROBLEM — E11.22 CKD STAGE 3 DUE TO TYPE 2 DIABETES MELLITUS: Status: ACTIVE | Noted: 2018-01-01

## 2018-05-08 PROBLEM — I95.0 IDIOPATHIC HYPOTENSION: Status: ACTIVE | Noted: 2018-01-01

## 2018-05-08 PROBLEM — E87.6 HYPOKALEMIA: Status: ACTIVE | Noted: 2018-01-01

## 2018-05-08 NOTE — PROGRESS NOTES
"[facility]  Hepatology  Progress Note    Patient Name: Michelle Pappas  MRN: [id number]  Admission Date: 5/4/2018  Hospital Length of Stay: 4 days  Attending Provider: Phill Thurston MD   Primary Care Physician: Taj Luna DO  Principal Problem:Hydrothorax    Subjective:     Transplant status: No    HPI: This is a 59 y/o female with hx of HCV cirrhosis (complicated by prior SBP, ascites, hydrothorax) who [place] [facility] [place].  Pt admitted to OSH ([facility]) 4/26 - 4/29 for recurrent right hydrothorax. She underwent thoracentesis, removed 1.5L.   Fluid transudate: GLUC 183, LDH 24, PROT <23, WBC 85 - L66, S22, RBC 4600  She was discharged and then returned 5/4 with recurrent symptoms of SOB and reaccumulation of hydrothorax.   She was taking lasix 40mg BID and aldactone 100mg daily, although she was taking all her lasix at once.  She has required 4 thoracentesis on right in the past.    Today she feels better s/p thoracentesis last night. Still with abd distention.  She is on room air. She denies other complaints presently.    Prior records per Dr. Walton clinic note:  "2002- liver biopsy- fatty liver and some scar  2012- another biopsy- cirrhosis  fluid overload started 10/15- required LVP ; complicated by SBP    Since last year having hep hydrothorax that requires thoracentesis- 4 x since June 2018  Harvoni x 3 months 2015-cured  EGD- no varices by report 02/18 & Cholecystectomy denied due to cirrhosis"      Interval History: No events overnight. Denies increased abdominal distention, lower extremity swelling, nausea, vomiting or confusion. Reports adequate urine output.     Current Facility-Administered Medications   Medication    0.9%  NaCl infusion (for blood administration)    acetaminophen tablet 650 mg    albuterol-ipratropium 2.5mg-0.5mg/3mL nebulizer solution 3 mL    ciprofloxacin HCl tablet 500 mg    clonazePAM tablet 1 mg    dextrose 50% " injection 12.5 g    dextrose 50% injection 25 g    fluticasone 50 mcg/actuation nasal spray 100 mcg    folic acid tablet 1 mg    furosemide tablet 40 mg    glucagon (human recombinant) injection 1 mg    glucose chewable tablet 16 g    glucose chewable tablet 24 g    hydrOXYzine HCl tablet 25 mg    hyoscyamine SL tablet 0.125 mg    insulin aspart U-100 pen 0-5 Units    lactulose 20 gram/30 mL solution Soln 30 g    metOLazone tablet 5 mg    ondansetron disintegrating tablet 8 mg    promethazine tablet 25 mg    sodium chloride 0.9% flush 5 mL    spironolactone tablet 100 mg    trazodone split tablet 100 mg       Objective:     Vital Signs (Most Recent):  Temp: 97.9 °F (36.6 °C) (05/08/18 1137)  Pulse: 88 (05/08/18 1137)  Resp: 20 (05/08/18 1137)  BP: (!) 95/54 (05/08/18 1137)  SpO2: 96 % (05/08/18 1137) Vital Signs (24h Range):  Temp:  [97.9 °F (36.6 °C)-98.7 °F (37.1 °C)] 97.9 °F (36.6 °C)  Pulse:  [79-97] 88  Resp:  [12-20] 20  SpO2:  [91 %-98 %] 96 %  BP: ()/(46-56) 95/54     Weight: 72.2 kg (159 lb 2.8 oz) (05/08/18 0421)  Body mass index is 28.2 kg/m².    Physical Exam   Constitutional: She is oriented to person, place, and time. She appears well-developed and well-nourished.   HENT:   Head: Normocephalic and atraumatic.   Eyes: Pupils are equal, round, and reactive to light. No scleral icterus.   Neck: No JVD present.   Cardiovascular: Normal rate, regular rhythm and normal heart sounds.    No murmur heard.  Pulmonary/Chest: Effort normal.   Decreased breath sounds middle & lower lobes.   Abdominal: Soft. Bowel sounds are normal. She exhibits distension. There is no tenderness. There is no guarding.   Musculoskeletal: She exhibits no edema or tenderness.   Neurological: She is alert and oriented to person, place, and time.   Skin: Skin is warm and dry.   Psychiatric: She has a normal mood and affect. Her behavior is normal.       MELD-Na score: 9 at 5/8/2018  4:31 AM  MELD score: 9 at  "5/8/2018  4:31 AM  Calculated from:  Serum Creatinine: 1.2 mg/dL at 5/8/2018  4:31 AM  Serum Sodium: 134 mmol/L at 5/8/2018  4:31 AM  Total Bilirubin: 0.4 mg/dL (Rounded to 1) at 5/8/2018  4:31 AM  INR(ratio): 1.1 at 5/8/2018  4:31 AM  Age: 58 years    Significant Labs:  CBC:     Recent Labs  Lab 05/08/18  0431   WBC 2.76*   RBC 3.15*   HGB 8.1*   HCT 25.5*   PLT 50*     CMP:     Recent Labs  Lab 05/08/18  0431   *   CALCIUM 8.1*   ALBUMIN 2.1*   PROT 5.0*   *   K 3.7   CO2 27   CL 99   BUN 14   CREATININE 1.2   ALKPHOS 56   ALT 9*   AST 16   BILITOT 0.4       Significant Imaging:    CXR 05/06  Decrease in right pleural effusion since May 5, 2018 at 6:52 p.m.    CT Chest 05/06  Redemonstration of a large right pleural effusion with leftward displacement of the cardiomediastinal silhouette.  There is associated compressive atelectasis.  The left lung is clear.    Incidental findings: Splenomegaly, nodular hepatic contour right renal cysts, gallstones, and mild atherosclerosis.    Assessment/Plan:     * Hepatic Hydrothorax    See  "decompensated HCV cirrhosis."        Decompensated HCV cirrhosis    57 y/o female with hx of HCV cirrhosis (complicated by prior SBP, ascites, hydrothorax, and hepatic encephalopathy) who was transferred to [facility] [place] 5[place] for hepatology evaluation.    -She is now s/p thoracentesis this admission on 5/5.   -Pleural fluid studies consistent with hepatic hydrothorax (transudative, SAAG 1.8 c/w portal HTN.)  -Echo & CT chest negative for alternative etiologies of hydrothorax   -Echo with normal heart function (EF 60-65%), trivial tricuspid regurgitation, pulmonary HTN 22 mmHg  -MELD 10 today  -Not enough ascitic fluid for paracentesis.    Plan/Recs  -TIPS tomorrow-NPO and hold anticoagulation at midnight.  -Low MELD, no need for transplant eval at this time.  -Titrate lactulose for 3-4 BM/day  -Continue Diuresis with IV lasix and aldactone  -Strict I/ O; daily wt          Chronic " "hepatitis C without hepatic coma    See "decompensated HCV cirrhosis."            Thank you for your consult. I will follow-up with patient. Please contact us if you have any additional questions.    America Rico MD  Hepatology  [facility]  "

## 2018-05-08 NOTE — NURSING
Patient stable and in bed, she denies pain but requested a clonazapam this morning. Patient educated in using the hat for strict I/o and that an occult blood is needed. The patient states she has made multiple samples and reported this to the previous nurses.

## 2018-05-08 NOTE — ANESTHESIA PREPROCEDURE EVALUATION
05/08/2018  [facility]  Anesthesia Pre-Operative Evaluation         Patient Name: Michelle Pappas  YOB: 1960  MRN: [id number]    SUBJECTIVE:     Pre-operative evaluation for Procedure(s) (LRB):  TIPS (N/A)     05/08/2018    Michelle Pappas is a 58 y.o. female w/ a significant PMHx of HTN, DM, Hx pf HCV cirrhosis complicated by prior SBP, ascites, and hydrothorax who [facility]. Pt is s/p thoracentesis on 05/07/18.      Patient now presents for the above procedure(s).      LDA: None documented.       Prev airway: None documented.    Drips: None documented.      Patient Active Problem List   Diagnosis    Hypoalbuminemia    Thrombocytopenia    Other cirrhosis of liver    Hepatic Hydrothorax    Anxiety    Depression    Essential hypertension    Diabetes    Chronic hepatitis C without hepatic coma    Pleural effusion    Insomnia    Decompensated HCV cirrhosis       Review of patient's allergies indicates:  No Known Allergies    Current Inpatient Medications:   ciprofloxacin HCl  500 mg Oral Daily    fluticasone  2 spray Each Nare Daily    folic acid  1 mg Oral Daily    furosemide  40 mg Oral BID    lactulose  30 g Oral BID    metOLazone  5 mg Oral Daily    spironolactone  100 mg Oral BID    traZODone  100 mg Oral QHS       No current facility-administered medications on file prior to encounter.      Current Outpatient Prescriptions on File Prior to Encounter   Medication Sig Dispense Refill    clonazePAM (KLONOPIN) 1 MG tablet AS needed      furosemide (LASIX) 40 MG tablet Take 40 mg by mouth 2 (two) times daily.      glimepiride (AMARYL) 2 MG tablet Take 2 mg by mouth as needed.      hydrOXYzine HCl (ATARAX) 25 MG tablet Take 1 tablet (25 mg total) by mouth daily as needed for Itching. 30 tablet 1    hyoscyamine  (LEVSIN/SL) 0.125 mg Subl every 4 (four) hours as needed      lactulose (CHRONULAC) 10 gram/15 mL solution Take 30 g by mouth 2 (two) times daily.      promethazine (PHENERGAN) 25 MG tablet Take 25 mg by mouth as needed for Nausea.      spironolactone (ALDACTONE) 100 MG tablet Take 100 mg by mouth 2 (two) times daily.      traZODone (DESYREL) 100 MG tablet Take 100 mg by mouth every evening.      ciprofloxacin HCl (CIPRO) 500 MG tablet Take 1 tablet (500 mg total) by mouth once daily. 30 tablet 11    metOLazone (ZAROXOLYN) 5 MG tablet Take 5 mg by mouth once daily.         Past Surgical History:   Procedure Laterality Date    breast reduction       SECTION      HYSTERECTOMY         Social History     Social History    Marital status:      Spouse name: N/A    Number of children: N/A    Years of education: N/A     Occupational History    Not on file.     Social History Main Topics    Smoking status: Former Smoker     Types: Cigarettes     Quit date: 3/6/2018    Smokeless tobacco: Not on file    Alcohol use No    Drug use: Unknown    Sexual activity: Not on file     Other Topics Concern    Not on file     Social History Narrative    No narrative on file       OBJECTIVE:     Vital Signs Range (Last 24H):  Temp:  [36.7 °C (98.1 °F)-37.1 °C (98.7 °F)]   Pulse:  [79-97]   Resp:  [12-20]   BP: ()/(46-56)   SpO2:  [91 %-98 %]       CBC:   Recent Labs      18   0546  18   0431   WBC  2.01*  2.76*   RBC  3.53*  3.15*   HGB  9.2*  8.1*   HCT  29.5*  25.5*   PLT  63*  50*   MCV  84  81*   MCH  26.1*  25.7*   MCHC  31.2*  31.8*       CMP:   Recent Labs      18   0546  18   0431   NA  137  134*   K  3.3*  3.7   CL  101  99   CO2  28  27   BUN  15  14   CREATININE  1.3  1.2   GLU  202*  205*   MG  1.6  1.7   PHOS  2.5*  3.1   CALCIUM  8.1*  8.1*   ALBUMIN  2.2*  2.1*   PROT  5.4*  5.0*   ALKPHOS  63  56   ALT  12  9*   AST  16  16   BILITOT  0.4  0.4        INR:  Recent Labs      05/06/18   0456  05/07/18   0546  05/08/18   0431   INR  1.1  1.1  1.1       Diagnostic Studies:     CXR 05/06/18:  Right thoracentesis has been performed, with interval decrease in the amount of fluid on the right.  Interstitial edema remains.  Patchy parenchymal attenuation projects over the right lower lung zone, suggesting compressive atelectasis and/or consolidation.  No new large focal consolidation.  No pneumothorax.  No acute change in the cardiac status.      EKG: No recent studies available.    2D ECHO:  Results for orders placed or performed during the hospital encounter of 05/04/18   2D echo with color flow doppler   Result Value Ref Range    EF 60 55 - 65    Diastolic Dysfunction No     Est. PA Systolic Pressure 22.36     Tricuspid Valve Regurgitation MILD          ASSESSMENT/PLAN:         Anesthesia Evaluation    I have reviewed the Patient Summary Reports.        Review of Systems  Anesthesia Hx:  History of prior surgery of interest to airway management or planning: Denies Family Hx of Anesthesia complications.   Denies Personal Hx of Anesthesia complications.   Hematology/Oncology:  Hematology Normal   Oncology Normal     EENT/Dental:EENT/Dental Normal   Cardiovascular:   Hypertension    Pulmonary:  Pulmonary Normal    Renal/:  Renal/ Normal     Hepatic/GI:   Liver Disease, Hepatitis, C    Musculoskeletal:  Musculoskeletal Normal    Neurological:  Neurology Normal    Endocrine:   Diabetes    Dermatological:  Skin Normal    Psych:   Psychiatric History          Physical Exam  General:  Well nourished    Airway/Jaw/Neck:  Airway Findings: Mouth Opening: Normal Tongue: Normal  General Airway Assessment: Adult  Mallampati: II  TM Distance: Normal, at least 6 cm  Jaw/Neck Findings:  Neck ROM: Normal ROM  Neck Findings: Normal    Eyes/Ears/Nose:  EYES/EARS/NOSE FINDINGS: Normal   Dental:  Dental Findings: Periodontal disease, Mild   Chest/Lungs:  Chest/Lungs Clear     Heart/Vascular:  Heart Findings: Normal Heart murmur: negative       Mental Status:  Mental Status Findings: Normal        Anesthesia Plan  Type of Anesthesia, risks & benefits discussed:  Anesthesia Type:  general  Patient's Preference:   Intra-op Monitoring Plan: standard ASA monitors  Intra-op Monitoring Plan Comments:   Post Op Pain Control Plan: multimodal analgesia, IV/PO Opioids PRN and per primary service following discharge from PACU  Post Op Pain Control Plan Comments:   Induction:   IV  Beta Blocker:  Patient is not currently on a Beta-Blocker (No further documentation required).       Informed Consent: Patient understands risks and agrees with Anesthesia plan.  Questions answered. Anesthesia consent signed with patient.  ASA Score: 4     Day of Surgery Review of History & Physical:    H&P update referred to the provider.         Ready For Surgery From Anesthesia Perspective.

## 2018-05-08 NOTE — PLAN OF CARE
Problem: Patient Care Overview  Goal: Plan of Care Review  Outcome: Ongoing (interventions implemented as appropriate)  No acute changes overnight. Still awaiting stool sample. Pt denies pain. BP stable. Call light in reach. WCTM.

## 2018-05-08 NOTE — H&P
Inpatient Radiology Pre-procedure Note    History of Present Illness:  Michelle Pappas is a 58 y.o. female who presents for TIPS placement in the setting of hepatic hydrothorax now with inability to receive routine thoracentesis at her home medical center. ESLD from HCV. Patient with chronic PVT.     Patient reports only a single episode of confusion and in . Cardiac echo essentially unremarkable.    Admission H&P reviewed.  Past Medical History:   Diagnosis Date    Anxiety 2018    Depression 2018    Essential hypertension 2018    Hepatic Hydrothorax 2018    Hypoalbuminemia 2018    Other cirrhosis of liver 2018    Thrombocytopenia 2018     Past Surgical History:   Procedure Laterality Date    breast reduction       SECTION      HYSTERECTOMY         Review of Systems:   As documented in primary team H&P    Home Meds:   Prior to Admission medications    Medication Sig Start Date End Date Taking? Authorizing Provider   clonazePAM (KLONOPIN) 1 MG tablet AS needed 7/13/15  Yes Historical Provider, MD   furosemide (LASIX) 40 MG tablet Take 40 mg by mouth 2 (two) times daily.   Yes Historical Provider, MD   glimepiride (AMARYL) 2 MG tablet Take 2 mg by mouth as needed.   Yes Historical Provider, MD   hydrOXYzine HCl (ATARAX) 25 MG tablet Take 1 tablet (25 mg total) by mouth daily as needed for Itching. 18  Yes Andreea Walton MD   hyoscyamine (LEVSIN/SL) 0.125 mg Subl every 4 (four) hours as needed 8/31/15  Yes Historical Provider, MD   lactulose (CHRONULAC) 10 gram/15 mL solution Take 30 g by mouth 2 (two) times daily.   Yes Historical Provider, MD   promethazine (PHENERGAN) 25 MG tablet Take 25 mg by mouth as needed for Nausea.   Yes Historical Provider, MD   spironolactone (ALDACTONE) 100 MG tablet Take 100 mg by mouth 2 (two) times daily.   Yes Historical Provider, MD   traZODone (DESYREL) 100 MG tablet Take 100 mg by mouth every evening.   Yes Historical  Provider, MD   ciprofloxacin HCl (CIPRO) 500 MG tablet Take 1 tablet (500 mg total) by mouth once daily. 5/1/18   Andreea Walton MD   metOLazone (ZAROXOLYN) 5 MG tablet Take 5 mg by mouth once daily.    Historical Provider, MD     Scheduled Meds:    ciprofloxacin HCl  500 mg Oral Daily    fluticasone  2 spray Each Nare Daily    folic acid  1 mg Oral Daily    furosemide  40 mg Oral BID    lactulose  30 g Oral BID    metOLazone  5 mg Oral Daily    spironolactone  100 mg Oral BID    traZODone  100 mg Oral QHS     Continuous Infusions:   PRN Meds:sodium chloride, acetaminophen, albuterol-ipratropium 2.5mg-0.5mg/3mL, clonazePAM, dextrose 50%, dextrose 50%, glucagon (human recombinant), glucose, glucose, hydrOXYzine HCl, hyoscyamine, insulin aspart U-100, ondansetron, promethazine, sodium chloride 0.9%  Anticoagulants/Antiplatelets: no anticoagulation    Allergies: Review of patient's allergies indicates:  No Known Allergies  Sedation Hx: have not been any systemic reactions    Labs:    Recent Labs  Lab 05/08/18  0431   INR 1.1       Recent Labs  Lab 05/08/18 0431   WBC 2.76*   HGB 8.1*   HCT 25.5*   MCV 81*   PLT 50*      Recent Labs  Lab 05/08/18 0431   *   *   K 3.7   CL 99   CO2 27   BUN 14   CREATININE 1.2   CALCIUM 8.1*   MG 1.7   ALT 9*   AST 16   ALBUMIN 2.1*   BILITOT 0.4         Vitals:  Temp: 98.5 °F (36.9 °C) (05/08/18 1546)  Pulse: 89 (05/08/18 1546)  Resp: 18 (05/08/18 1546)  BP: (!) 99/58 (05/08/18 1546)  SpO2: 96 % (05/08/18 1546)     Physical Exam:  ASA: 3  Mallampati: per anesthesia    General: no acute distress  Mental Status: alert and oriented to person, place and time  HEENT: normocephalic, atraumatic  Chest: mildly labored breathing  Abdomen: distended  Extremity: moves all extremities    Plan: TIPS shunt placement. Risks of cardiac decompensation, bleeding and hepatic encephalopathy specifically reviewed. Discussed with patient that TIPS placement may not fully resolve  hepatic hydrothorax.  Sedation Plan: per anesthesia.    Taj Calix MD  Radiology

## 2018-05-08 NOTE — SUBJECTIVE & OBJECTIVE
Interval History: No events overnight. Denies increased abdominal distention, lower extremity swelling, nausea, vomiting or confusion. Reports adequate urine output.     Current Facility-Administered Medications   Medication    0.9%  NaCl infusion (for blood administration)    acetaminophen tablet 650 mg    albuterol-ipratropium 2.5mg-0.5mg/3mL nebulizer solution 3 mL    ciprofloxacin HCl tablet 500 mg    clonazePAM tablet 1 mg    dextrose 50% injection 12.5 g    dextrose 50% injection 25 g    fluticasone 50 mcg/actuation nasal spray 100 mcg    folic acid tablet 1 mg    furosemide tablet 40 mg    glucagon (human recombinant) injection 1 mg    glucose chewable tablet 16 g    glucose chewable tablet 24 g    hydrOXYzine HCl tablet 25 mg    hyoscyamine SL tablet 0.125 mg    insulin aspart U-100 pen 0-5 Units    lactulose 20 gram/30 mL solution Soln 30 g    metOLazone tablet 5 mg    ondansetron disintegrating tablet 8 mg    promethazine tablet 25 mg    sodium chloride 0.9% flush 5 mL    spironolactone tablet 100 mg    trazodone split tablet 100 mg       Objective:     Vital Signs (Most Recent):  Temp: 97.9 °F (36.6 °C) (05/08/18 1137)  Pulse: 88 (05/08/18 1137)  Resp: 20 (05/08/18 1137)  BP: (!) 95/54 (05/08/18 1137)  SpO2: 96 % (05/08/18 1137) Vital Signs (24h Range):  Temp:  [97.9 °F (36.6 °C)-98.7 °F (37.1 °C)] 97.9 °F (36.6 °C)  Pulse:  [79-97] 88  Resp:  [12-20] 20  SpO2:  [91 %-98 %] 96 %  BP: ()/(46-56) 95/54     Weight: 72.2 kg (159 lb 2.8 oz) (05/08/18 0421)  Body mass index is 28.2 kg/m².    Physical Exam   Constitutional: She is oriented to person, place, and time. She appears well-developed and well-nourished.   HENT:   Head: Normocephalic and atraumatic.   Eyes: Pupils are equal, round, and reactive to light. No scleral icterus.   Neck: No JVD present.   Cardiovascular: Normal rate, regular rhythm and normal heart sounds.    No murmur heard.  Pulmonary/Chest: Effort normal.    Decreased breath sounds middle & lower lobes.   Abdominal: Soft. Bowel sounds are normal. She exhibits distension. There is no tenderness. There is no guarding.   Musculoskeletal: She exhibits no edema or tenderness.   Neurological: She is alert and oriented to person, place, and time.   Skin: Skin is warm and dry.   Psychiatric: She has a normal mood and affect. Her behavior is normal.       MELD-Na score: 9 at 5/8/2018  4:31 AM  MELD score: 9 at 5/8/2018  4:31 AM  Calculated from:  Serum Creatinine: 1.2 mg/dL at 5/8/2018  4:31 AM  Serum Sodium: 134 mmol/L at 5/8/2018  4:31 AM  Total Bilirubin: 0.4 mg/dL (Rounded to 1) at 5/8/2018  4:31 AM  INR(ratio): 1.1 at 5/8/2018  4:31 AM  Age: 58 years    Significant Labs:  CBC:     Recent Labs  Lab 05/08/18  0431   WBC 2.76*   RBC 3.15*   HGB 8.1*   HCT 25.5*   PLT 50*     CMP:     Recent Labs  Lab 05/08/18  0431   *   CALCIUM 8.1*   ALBUMIN 2.1*   PROT 5.0*   *   K 3.7   CO2 27   CL 99   BUN 14   CREATININE 1.2   ALKPHOS 56   ALT 9*   AST 16   BILITOT 0.4       Significant Imaging:    CXR 05/06  Decrease in right pleural effusion since May 5, 2018 at 6:52 p.m.    CT Chest 05/06  Redemonstration of a large right pleural effusion with leftward displacement of the cardiomediastinal silhouette.  There is associated compressive atelectasis.  The left lung is clear.    Incidental findings: Splenomegaly, nodular hepatic contour right renal cysts, gallstones, and mild atherosclerosis.

## 2018-05-09 NOTE — PLAN OF CARE
Problem: Patient Care Overview  Goal: Plan of Care Review  Outcome: Ongoing (interventions implemented as appropriate)  VSS. No complaints of pain.  A/Ox4.  Atarax given for itching.  No acute events over night.  NPO since midnight for TIPS procedure in AM.  Safety maintained.  All questions answered. Patient updated on plan of care.  Will continue to monitor.

## 2018-05-09 NOTE — PROGRESS NOTES
Pt arrived to IR room 188 for TIPS placement, no acute distress noted. Orders, consents and labs reviewed on chart. Anesthesia at bedside.

## 2018-05-09 NOTE — PROCEDURES
Radiology Post-Procedure Note    Pre Op Diagnosis: Cirrhosis, ascites and portal vein thrombus.  Post Op Diagnosis: Same    Procedure: Attempted TIPS    Procedure performed by: Dr. Nikos Salgado and Dr. Donell Kamara    Written Informed Consent Obtained: Yes  Specimen Removed: NO  Estimated Blood Loss: 200 mL    Findings:     Unable to place the TIPS at this time secondary to difficulty passing a needle from the hepatic vein to the portal vein. Fluoroscopic images demonstrate worsening of portal vein thrombus. Repeat attempt will be made in the near future utilizing a different technique. This plan of care was communicated with Dr. Thurston, the patient and her family.    Patient tolerated procedure well.    Donell Kamara MD  PGY-5  Department of Radiology  [phone]

## 2018-05-09 NOTE — PHARMACY MED REC
"Admission Medication Reconciliation - Pharmacy Consult Note    The home medication history was taken by Avril Conley Pharmacy Tech. Based on information gathered and subsequent review by the clinical pharmacist, the items below may need attention.    You may go to "Admission" then "Reconcile Home Medications" tabs to review and/or act upon these items.    No issues noted with the medication reconciliation.    Please address this information as you see fit.  Feel free to contact us if you have any questions or require assistance.    Dasha ZarateD  PGY-1 Pharmacy Resident  [id number]          .    .          "

## 2018-05-09 NOTE — TRANSFER OF CARE
"Anesthesia Transfer of Care Note    Patient: Michelle Pappas    Procedure(s) Performed: Procedure(s) (LRB):  TIPS (N/A)    Patient location: PACU    Anesthesia Type: general    Transport from OR: Transported from OR on 6-10 L/min O2 by face mask with adequate spontaneous ventilation    Post pain: adequate analgesia    Post assessment: no apparent anesthetic complications and tolerated procedure well    Post vital signs: stable    Level of consciousness: awake, alert and lethargic    Nausea/Vomiting: no nausea/vomiting    Complications: none    Transfer of care protocol was followed      Last vitals:   Visit Vitals  /85 (BP Location: Left arm, Patient Position: Lying)   Pulse 85   Temp 36.3 °C (97.3 °F) (Temporal)   Resp 18   Ht 5' 3" (1.6 m)   Wt 68.9 kg (152 lb)   SpO2 99%   Breastfeeding? No   BMI 26.93 kg/m²     "

## 2018-05-09 NOTE — PROGRESS NOTES
Progress Note   Hospital Medicine         Patient Name: Michelle Pappas  MRN:  [id number]  [facility] Medicine Team: [facility] MED L Phill Thurston MD  Date of Admission:  5/4/2018     Length of Stay:  LOS: 4 days   Expected Discharge Date: 5/10/2018  Principal Problem:  Hydrothorax       Subjective:     Interval History/Overnight Events:  Patient is actually doing well today; on RA; planning for TIPS tomorrow; NPO after midnight; had thoracentesis yesterday with 500 cc removed and looks transudate     Review of Systems   Constitutional: Negative for chills, fatigue, fever.   HENT: Negative for sore throat, trouble swallowing.    Eyes: Negative for photophobia, visual disturbance.   Respiratory: Negative for cough, shortness of breath.    Cardiovascular: Negative for chest pain, palpitations, leg swelling.   Gastrointestinal: Negative for abdominal pain, constipation, diarrhea, nausea, vomiting.   Endocrine: Negative for cold intolerance, heat intolerance.   Genitourinary: Negative for dysuria, frequency.   Musculoskeletal: Negative for arthralgias, myalgias.   Skin: Negative for rash, wound, erythema   Neurological: Negative for dizziness, syncope, weakness, light-headedness.   Psychiatric/Behavioral: Negative for confusion, hallucinations, anxiety  All other systems reviewed and are negative.    Objective:     Temp:  [97.9 °F (36.6 °C)-98.5 °F (36.9 °C)]   Pulse:  [80-89]   Resp:  [12-20]   BP: (89-99)/(46-58)   SpO2:  [91 %-96 %]       Physical Exam:  Constitutional: Appears well-developed and well-nourished.   Head: Normocephalic and atraumatic.   Mouth/Throat: Oropharynx is clear and moist.   Eyes: EOM are normal. Pupils are equal, round, and reactive to light. No scleral icterus.   Neck: Normal range of motion. Neck supple.   Cardiovascular: Normal rate and regular rhythm.  No murmur heard.  Pulmonary/Chest: Effort normal and breath sounds normal. No respiratory distress. No wheezes, rales, or rhonchi  Abdominal:  Soft. Bowel sounds are normal.  No distension or tenderness  Musculoskeletal: Normal range of motion. No edema.   Neurological: Alert and oriented to person, place, and time.   Skin: Skin is warm and dry.   Psychiatric: Normal mood and affect. Behavior is normal.       Recent Labs  Lab 05/04/18  2344 05/05/18  0458 05/06/18  0456 05/07/18  0546 05/08/18  0431   WBC 1.94* 1.49* 2.42* 2.01* 2.76*   HGB 7.4* 6.9* 8.6* 9.2* 8.1*   HCT 24.7* 22.8* 27.1* 29.5* 25.5*   PLT 55* 50* 60* 63* 50*       Recent Labs  Lab 05/06/18  0456 05/07/18  0546 05/08/18  0431    137 134*   K 3.4* 3.3* 3.7    101 99   CO2 29 28 27   BUN 16 15 14   CREATININE 1.1 1.3 1.2   * 202* 205*   CALCIUM 8.3* 8.1* 8.1*   MG 1.6 1.6 1.7   PHOS 2.9 2.5* 3.1       Recent Labs  Lab 05/06/18  0456 05/07/18  0546 05/08/18  0431   ALKPHOS 58 63 56   ALT 11 12 9*   AST 16 16 16   ALBUMIN 2.2* 2.2* 2.1*   PROT 5.1* 5.4* 5.0*   BILITOT 1.0 0.4 0.4   INR 1.1 1.1 1.1       Recent Labs  Lab 05/04/18  2352 05/05/18  0757   POCTGLUCOSE 185* 155*        ciprofloxacin HCl  500 mg Oral Daily    fluticasone  2 spray Each Nare Daily    folic acid  1 mg Oral Daily    furosemide  40 mg Oral BID    lactulose  30 g Oral BID    midodrine  10 mg Oral TID    spironolactone  100 mg Oral BID    traZODone  100 mg Oral QHS       Assessment and Plan     Ms. Michelle Pappas is a 58 y.o. female who presented to [facility] on 5/4/2018 with     Hospital Course:    Ms. Michelle Pappas was admitted to Hospital Medicine for management of     Active Hospital Problems    Diagnosis  POA    *Hepatic Hydrothorax [J94.8]  Yes    Idiopathic hypotension [I95.0]  Yes    Pancytopenia [D61.818]  Yes    CKD stage 3 due to type 2 diabetes mellitus [E11.22, N18.3]  Yes    Hypokalemia [E87.6]  No    Decompensated HCV cirrhosis [B19.20, K74.69]  Yes     Chronic    Insomnia [G47.00]  Yes     Chronic    Hypoalbuminemia [E88.09]  Yes    Thrombocytopenia [D69.6]  Yes     Other cirrhosis of liver [K74.69]  Yes    Diabetes [E11.9]  Yes    Chronic hepatitis C without hepatic coma [B18.2]  Yes      Resolved Hospital Problems    Diagnosis Date Resolved POA   No resolved problems to display.     # Hepatic hydrothorax  # Ascites   - s/p thoracentesis yesterday with 500 cc removed  - planning for IR TIPS procedure in the AM; NPO after midnight      # Decompensated Hep C Cirrhosis with ascites   MELD-Na score: 9 at 5/8/2018  4:31 AM  MELD score: 9 at 5/8/2018  4:31 AM  Calculated from:  Serum Creatinine: 1.2 mg/dL at 5/8/2018  4:31 AM  Serum Sodium: 134 mmol/L at 5/8/2018  4:31 AM  Total Bilirubin: 0.4 mg/dL (Rounded to 1) at 5/8/2018  4:31 AM  INR(ratio): 1.1 at 5/8/2018  4:31 AM  Age: 58 years  - s/p HARVONI treatment   - hepatology consult  - holding off on an inpatient liver tx evaluation with low meld and planning for TIPS to bridge patient  - cont diuretics     # Hypotension  - starting patient on midodrine 10 mg PO TID today    # Pancytopenia  - monitor for now    # DM2 with nephropathy   # CKD stage 3  - SSI  - Hba1c of 6.3    # Hypoalbuminemia  - PAB in AM    # Hypokalemia  -replace    Diet:  Low sodium  GI PPx:    DVT PPx:    Goals of Care:      High Risk Conditions:  Respiratory failure    Disposition:  Possibly Thursday, TIPS tomorrow     Phill Thurston MD  Medical Director [facility] Medicine  Spectra:  [phone]  Pager: [phone]

## 2018-05-10 PROBLEM — N17.9 AKI (ACUTE KIDNEY INJURY): Status: ACTIVE | Noted: 2018-01-01

## 2018-05-10 NOTE — PROGRESS NOTES
Progress Note   Hospital Medicine         Patient Name: Michelle Pappas  MRN:  [id number]  [facility] Medicine Team: [facility] [place] [facility] MED L Phill Thurston MD  Date of Admission:  5/4/2018     Length of Stay:  LOS: 5 days   Expected Discharge Date: 5/10/2018  Principal Problem:  Hydrothorax       Subjective:     Interval History/Overnight Events:  Patient went down for a TIPS today, however it was unsuccessful; IR planning to do it again on Monday as an outpatient; will continue diuretics and likely d/c home tomorrow     Review of Systems   Constitutional: Negative for chills, fatigue, fever.   HENT: Negative for sore throat, trouble swallowing.    Eyes: Negative for photophobia, visual disturbance.   Respiratory: Negative for cough, shortness of breath.    Cardiovascular: Negative for chest pain, palpitations, leg swelling.   Gastrointestinal: Negative for abdominal pain, constipation, diarrhea, nausea, vomiting.   Endocrine: Negative for cold intolerance, heat intolerance.   Genitourinary: Negative for dysuria, frequency.   Musculoskeletal: Negative for arthralgias, myalgias.   Skin: Negative for rash, wound, erythema   Neurological: Negative for dizziness, syncope, weakness, light-headedness.   Psychiatric/Behavioral: Negative for confusion, hallucinations, anxiety  All other systems reviewed and are negative.    Objective:     Temp:  [9.3 °F (-12.6 °C)-98.5 °F (36.9 °C)]   Pulse:  []   Resp:  [14-20]   BP: ()/(46-85)   SpO2:  [91 %-99 %]       Physical Exam:  Constitutional: Appears well-developed and well-nourished.   Head: Normocephalic and atraumatic.   Mouth/Throat: Oropharynx is clear and moist.   Eyes: EOM are normal. Pupils are equal, round, and reactive to light. No scleral icterus.   Neck: Normal range of motion. Neck supple.   Cardiovascular: Normal rate and regular rhythm.  No murmur heard.  Pulmonary/Chest: Effort normal and breath sounds normal. No respiratory distress. No wheezes, rales, or  rhonchi  Abdominal: Soft. Bowel sounds are normal.  No distension or tenderness  Musculoskeletal: Normal range of motion. No edema.   Neurological: Alert and oriented to person, place, and time.   Skin: Skin is warm and dry.   Psychiatric: Normal mood and affect. Behavior is normal.       Recent Labs  Lab 05/04/18  2344 05/05/18  0458 05/06/18  0456 05/07/18  0546 05/08/18  0431 05/09/18  0538   WBC 1.94* 1.49* 2.42* 2.01* 2.76* 2.27*   HGB 7.4* 6.9* 8.6* 9.2* 8.1* 8.4*   HCT 24.7* 22.8* 27.1* 29.5* 25.5* 27.2*   PLT 55* 50* 60* 63* 50* 67*       Recent Labs  Lab 05/07/18  0546 05/08/18  0431 05/09/18  0538    134* 134*   K 3.3* 3.7 3.5    99 98   CO2 28 27 28   BUN 15 14 17   CREATININE 1.3 1.2 1.2   * 205* 219*   CALCIUM 8.1* 8.1* 8.6*   MG 1.6 1.7 1.4*   PHOS 2.5* 3.1 3.1       Recent Labs  Lab 05/07/18  0546 05/08/18  0431 05/09/18  0538   ALKPHOS 63 56 56   ALT 12 9* 10   AST 16 16 14   ALBUMIN 2.2* 2.1* 2.3*   PROT 5.4* 5.0* 5.3*   BILITOT 0.4 0.4 0.6   INR 1.1 1.1 1.1       Recent Labs  Lab 05/04/18  2352 05/05/18  0757 05/09/18  1415 05/09/18  1909   POCTGLUCOSE 185* 155* 168* 141*        ciprofloxacin HCl  500 mg Oral Daily    clonazePAM  1 mg Oral QHS    fluticasone  2 spray Each Nare Daily    folic acid  1 mg Oral Daily    furosemide  40 mg Oral BID    lactulose  30 g Oral BID    magnesium sulfate IVPB  2 g Intravenous Once    midodrine  10 mg Oral TID    spironolactone  100 mg Oral BID    traZODone  100 mg Oral QHS       Assessment and Plan     Ms. Michelle Pappas is a 58 y.o. female who presented to [facility] on 5/4/2018 with     Hospital Course:    Ms. Michelle Pappas was admitted to Hospital Medicine for management of     Active Hospital Problems    Diagnosis  POA    *Hepatic Hydrothorax [J94.8]  Yes    Idiopathic hypotension [I95.0]  Yes    Pancytopenia [D61.818]  Yes    CKD stage 3 due to type 2 diabetes mellitus [E11.22, N18.3]  Yes    Hypokalemia [E87.6]  No     Decompensated HCV cirrhosis [B19.20, K74.69]  Yes     Chronic    Insomnia [G47.00]  Yes     Chronic    Pleural effusion [J90]  Yes    Hypoalbuminemia [E88.09]  Yes    Thrombocytopenia [D69.6]  Yes    Other cirrhosis of liver [K74.69]  Yes    Diabetes [E11.9]  Yes    Chronic hepatitis C without hepatic coma [B18.2]  Yes      Resolved Hospital Problems    Diagnosis Date Resolved POA   No resolved problems to display.     # Hepatic hydrothorax  # Ascites   - s/p thoracentesis 5/7 with 500 cc removed  - failed TIPS today, planning on outpatient TIPS on Monday;       # Decompensated Hep C Cirrhosis with ascites   MELD-Na score: 9 at 5/9/2018  5:38 AM  MELD score: 9 at 5/9/2018  5:38 AM  Calculated from:  Serum Creatinine: 1.2 mg/dL at 5/9/2018  5:38 AM  Serum Sodium: 134 mmol/L at 5/9/2018  5:38 AM  Total Bilirubin: 0.6 mg/dL (Rounded to 1) at 5/9/2018  5:38 AM  INR(ratio): 1.1 at 5/9/2018  5:38 AM  Age: 58 years  - s/p HARVONI treatment   - hepatology consult  - holding off on an inpatient liver tx evaluation with low meld and planning for TIPS to bridge patient  - cont diuretics     # Hypotension  - starting patient on midodrine 10 mg PO TID     # Pancytopenia  - monitor for now    # DM2 with nephropathy   # CKD stage 3  - SSI  - Hba1c of 6.3    # Hypoalbuminemia  - PAB in AM    # Hypokalemia  -replace    Diet:  Low sodium  GI PPx:    DVT PPx:    Goals of Care:      High Risk Conditions:  Respiratory failure    Disposition:  Tomorrow with follow up on Monday for outpatient TIPS procedure     Phill Thurston MD  Medical Director [facility] Medicine  Spectra:  [phone]  Pager: [phone]

## 2018-05-10 NOTE — SUBJECTIVE & OBJECTIVE
Interval History: Attempted TIPS yesterday unsuccessful due to PVT. Plan to try again Wednesday of next week. Ms. Pappas woke up with significant abdominal d& neck discomfort this morning. Continues to breath well, now on room air.    Current Facility-Administered Medications   Medication    0.9%  NaCl infusion (for blood administration)    0.9%  NaCl infusion    acetaminophen tablet 650 mg    albumin human 25% bottle 25 g    albuterol-ipratropium 2.5mg-0.5mg/3mL nebulizer solution 3 mL    ciprofloxacin HCl tablet 500 mg    clonazePAM tablet 1 mg    dextrose 50% injection 12.5 g    dextrose 50% injection 25 g    fluticasone 50 mcg/actuation nasal spray 100 mcg    folic acid tablet 1 mg    glucagon (human recombinant) injection 1 mg    glucose chewable tablet 16 g    glucose chewable tablet 24 g    hydrOXYzine HCl tablet 25 mg    hyoscyamine SL tablet 0.125 mg    insulin aspart U-100 pen 0-5 Units    lactulose 20 gram/30 mL solution Soln 30 g    lidocaine 5 % patch 1 patch    magnesium sulfate 2g in water 50mL IVPB (premix)    midodrine tablet 10 mg    ondansetron disintegrating tablet 8 mg    oxyCODONE immediate release tablet 5 mg    promethazine tablet 25 mg    sodium chloride 0.9% flush 5 mL    trazodone split tablet 100 mg       Objective:     Vital Signs (Most Recent):  Temp: 98.9 °F (37.2 °C) (05/10/18 1146)  Pulse: 70 (05/10/18 1146)  Resp: 17 (05/10/18 1146)  BP: (!) 92/51 (05/10/18 1146)  SpO2: (!) 92 % (05/10/18 1146) Vital Signs (24h Range):  Temp:  [9.3 °F (-12.6 °C)-98.9 °F (37.2 °C)] 98.9 °F (37.2 °C)  Pulse:  [67-88] 70  Resp:  [16-20] 17  SpO2:  [91 %-100 %] 92 %  BP: ()/(46-85) 92/51     Weight: 71.5 kg (157 lb 10.1 oz) (05/10/18 0423)  Body mass index is 27.92 kg/m².    Physical Exam   Constitutional: She is oriented to person, place, and time. She appears well-developed and well-nourished.   HENT:   Head: Normocephalic and atraumatic.   Eyes: Pupils are equal,  round, and reactive to light. No scleral icterus.   Neck: No JVD present.   Bandage overlying right neck.   Cardiovascular: Normal rate, regular rhythm and normal heart sounds.    No murmur heard.  Pulmonary/Chest: Effort normal.   Decreased breath sounds middle & lower lobes.   Abdominal: Soft. Bowel sounds are normal. She exhibits distension. There is tenderness. There is no guarding.   No hematoma noted.   Musculoskeletal: She exhibits no edema or tenderness.   Neurological: She is alert and oriented to person, place, and time.   Skin: Skin is warm and dry.   Psychiatric: She has a normal mood and affect. Her behavior is normal.       MELD-Na score: 16 at 5/10/2018  4:24 AM  MELD score: 12 at 5/10/2018  4:24 AM  Calculated from:  Serum Creatinine: 1.5 mg/dL at 5/10/2018  4:24 AM  Serum Sodium: 133 mmol/L at 5/10/2018  4:24 AM  Total Bilirubin: 0.6 mg/dL (Rounded to 1) at 5/10/2018  4:24 AM  INR(ratio): 1.2 at 5/10/2018  4:23 AM  Age: 58 years    Significant Labs:  CBC:     Recent Labs  Lab 05/10/18  0423   WBC 4.99   RBC 3.32*   HGB 8.5*   HCT 27.6*   PLT 53*     CMP:     Recent Labs  Lab 05/10/18  0424   *   CALCIUM 8.6*   ALBUMIN 2.3*   PROT 5.3*   *   K 3.7   CO2 28   CL 97   BUN 22*   CREATININE 1.5*   ALKPHOS 55   ALT 37   AST 51*   BILITOT 0.6       Significant Imaging:    CXR 05/06  Decrease in right pleural effusion since May 5, 2018 at 6:52 p.m.    CT Chest 05/06  Redemonstration of a large right pleural effusion with leftward displacement of the cardiomediastinal silhouette.  There is associated compressive atelectasis.  The left lung is clear.    Incidental findings: Splenomegaly, nodular hepatic contour right renal cysts, gallstones, and mild atherosclerosis.

## 2018-05-10 NOTE — ANESTHESIA POSTPROCEDURE EVALUATION
"Anesthesia Post Evaluation    Patient: Michelle Pappas    Procedure(s) Performed: Procedure(s) (LRB):  TIPS (N/A)    Final Anesthesia Type: general  Patient location during evaluation: PACU  Patient participation: Yes- Able to Participate  Level of consciousness: awake and alert and oriented  Post-procedure vital signs: reviewed and stable  Pain management: adequate  Airway patency: patent  PONV status at discharge: No PONV  Anesthetic complications: no      Cardiovascular status: hemodynamically stable  Respiratory status: unassisted, spontaneous ventilation and room air  Hydration status: euvolemic  Follow-up not needed.        Visit Vitals  BP (!) 97/52   Pulse 83   Temp 36.4 °C (97.6 °F)   Resp 18   Ht 5' 3" (1.6 m)   Wt 68.9 kg (152 lb)   SpO2 (!) 93%   Breastfeeding? No   BMI 26.93 kg/m²       Pain/Rajendra Score: Pain Assessment Performed: Yes (5/9/2018  7:45 PM)  Presence of Pain: denies (5/9/2018  7:45 PM)  Rajendra Score: 10 (5/9/2018  7:45 PM)      "

## 2018-05-10 NOTE — NURSING TRANSFER
Nursing Transfer Note      5/9/2018     Transfer to 1042    Transfer via stretcher    Transfer with     Transported by PCT    Medicines sent: N/A    Chart send with patient: Yes    Notified: Patient aware & in contact w/ family    Patient reassessed at: 5/9/18 @ 2015    Upon arrival to floor: patient oriented to room, call bell in reach and bed in lowest position

## 2018-05-10 NOTE — PROGRESS NOTES
Pt. Asking for pain medication. BP check before pain medicine administration was 88/46. Pt. Asymptomatic. Notified Dr. Thurston. New orders for Albumin and to hold narcotic pain medication. Ok to give tylenol. Will carry out orders and continue to monitor.     Update: 1820: Albumin completed per orders. BP 89/51. Patient stated she's content with waiting for pain medication until next dose of Midodrine is due. Dr. Thurston notified. [facility].

## 2018-05-10 NOTE — PLAN OF CARE
Problem: Patient Care Overview  Goal: Plan of Care Review  Outcome: Ongoing (interventions implemented as appropriate)  VSS. Patient complained of pain after TIPS procedure.  One time dose of dilaudid with oxy helped for about four hours.  Call placed to MD to see if oxy could be more frequent (possibly q4 from q6).  Orders of lidocaine patch and one time dose of tramadol given and carried out.  Patient complained of pain at 0410 but next dose of oxy wasn't due until 0445.  RN went to room at 0500 and patient was sleeping.  Right lateral thorax dressing intact with dried drainage.  Right neck dressing CDI.  A/Ox4.  No acute events over night. Safety maintained.  All questions answered. Patient updated on plan of care.  Will continue to monitor.

## 2018-05-10 NOTE — PROGRESS NOTES
"[facility]  Hepatology  Progress Note    Patient Name: Michelle Pappas  MRN: [id number]  Admission Date: 5/4/2018  Hospital Length of Stay: 6 days  Attending Provider: Phill Thurston MD   Primary Care Physician: Taj Luna DO  Principal Problem:Hydrothorax    Subjective:     Transplant status: No    HPI: This is a 59 y/o female with hx of HCV cirrhosis (complicated by prior SBP, ascites, hydrothorax) who was transferred to [facility] [place] 5/4 for hepatology evaluation.  Pt admitted to OSH ([facility]) 4/26 - 4/29 for recurrent right hydrothorax. She underwent thoracentesis, removed 1.5L.   Fluid transudate: GLUC 183, LDH 24, PROT <23, WBC 85 - L66, S22, RBC 4600  She was discharged and then returned 5/4 with recurrent symptoms of SOB and reaccumulation of hydrothorax.   She was taking lasix 40mg BID and aldactone 100mg daily, although she was taking all her lasix at once.  She has required 4 thoracentesis on right in the past.    Today she feels better s/p thoracentesis last night. Still with abd distention.  She is on room air. She denies other complaints presently.    Prior records per Dr. Walton clinic note:  "2002- liver biopsy- fatty liver and some scar  2012- another biopsy- cirrhosis  fluid overload started 10/15- required LVP ; complicated by SBP    Since last year having hep hydrothorax that requires thoracentesis- 4 x since June 2018  Harvoni x 3 months 2015-cured  EGD- no varices by report 02/18 & Cholecystectomy denied due to cirrhosis"      Interval History: Attempted TIPS yesterday unsuccessful due to PVT. Plan to try again Wednesday of next week. Ms. Pappas woke up with significant abdominal d& neck discomfort this morning. Continues to breath well, now on room air.    Current Facility-Administered Medications   Medication    0.9%  NaCl infusion (for blood administration)    0.9%  NaCl infusion    acetaminophen tablet 650 mg    albumin human 25% bottle 25 g    albuterol-ipratropium " 2.5mg-0.5mg/3mL nebulizer solution 3 mL    ciprofloxacin HCl tablet 500 mg    clonazePAM tablet 1 mg    dextrose 50% injection 12.5 g    dextrose 50% injection 25 g    fluticasone 50 mcg/actuation nasal spray 100 mcg    folic acid tablet 1 mg    glucagon (human recombinant) injection 1 mg    glucose chewable tablet 16 g    glucose chewable tablet 24 g    hydrOXYzine HCl tablet 25 mg    hyoscyamine SL tablet 0.125 mg    insulin aspart U-100 pen 0-5 Units    lactulose 20 gram/30 mL solution Soln 30 g    lidocaine 5 % patch 1 patch    magnesium sulfate 2g in water 50mL IVPB (premix)    midodrine tablet 10 mg    ondansetron disintegrating tablet 8 mg    oxyCODONE immediate release tablet 5 mg    promethazine tablet 25 mg    sodium chloride 0.9% flush 5 mL    trazodone split tablet 100 mg       Objective:     Vital Signs (Most Recent):  Temp: 98.9 °F (37.2 °C) (05/10/18 1146)  Pulse: 70 (05/10/18 1146)  Resp: 17 (05/10/18 1146)  BP: (!) 92/51 (05/10/18 1146)  SpO2: (!) 92 % (05/10/18 1146) Vital Signs (24h Range):  Temp:  [9.3 °F (-12.6 °C)-98.9 °F (37.2 °C)] 98.9 °F (37.2 °C)  Pulse:  [67-88] 70  Resp:  [16-20] 17  SpO2:  [91 %-100 %] 92 %  BP: ()/(46-85) 92/51     Weight: 71.5 kg (157 lb 10.1 oz) (05/10/18 0423)  Body mass index is 27.92 kg/m².    Physical Exam   Constitutional: She is oriented to person, place, and time. She appears well-developed and well-nourished.   HENT:   Head: Normocephalic and atraumatic.   Eyes: Pupils are equal, round, and reactive to light. No scleral icterus.   Neck: No JVD present.   Bandage overlying right neck.   Cardiovascular: Normal rate, regular rhythm and normal heart sounds.    No murmur heard.  Pulmonary/Chest: Effort normal.   Decreased breath sounds middle & lower lobes.   Abdominal: Soft. Bowel sounds are normal. She exhibits distension. There is tenderness. There is no guarding.   No hematoma noted.   Musculoskeletal: She exhibits no edema or  "tenderness.   Neurological: She is alert and oriented to person, place, and time.   Skin: Skin is warm and dry.   Psychiatric: She has a normal mood and affect. Her behavior is normal.       MELD-Na score: 16 at 5/10/2018  4:24 AM  MELD score: 12 at 5/10/2018  4:24 AM  Calculated from:  Serum Creatinine: 1.5 mg/dL at 5/10/2018  4:24 AM  Serum Sodium: 133 mmol/L at 5/10/2018  4:24 AM  Total Bilirubin: 0.6 mg/dL (Rounded to 1) at 5/10/2018  4:24 AM  INR(ratio): 1.2 at 5/10/2018  4:23 AM  Age: 58 years    Significant Labs:  CBC:     Recent Labs  Lab 05/10/18  0423   WBC 4.99   RBC 3.32*   HGB 8.5*   HCT 27.6*   PLT 53*     CMP:     Recent Labs  Lab 05/10/18  0424   *   CALCIUM 8.6*   ALBUMIN 2.3*   PROT 5.3*   *   K 3.7   CO2 28   CL 97   BUN 22*   CREATININE 1.5*   ALKPHOS 55   ALT 37   AST 51*   BILITOT 0.6       Significant Imaging:    CXR 05/06  Decrease in right pleural effusion since May 5, 2018 at 6:52 p.m.    CT Chest 05/06  Redemonstration of a large right pleural effusion with leftward displacement of the cardiomediastinal silhouette.  There is associated compressive atelectasis.  The left lung is clear.    Incidental findings: Splenomegaly, nodular hepatic contour right renal cysts, gallstones, and mild atherosclerosis.    Assessment/Plan:     * Hepatic Hydrothorax    See  "decompensated HCV cirrhosis."        Decompensated HCV cirrhosis    57 y/o female with hx of HCV cirrhosis (complicated by prior SBP, ascites, hydrothorax, and hepatic encephalopathy) who [place] [facility] [place] evaluation.    -She is now s/p thoracentesis this admission on 5/5.   -Pleural fluid studies consistent with hepatic hydrothorax (transudative, SAAG 1.8 c/w portal HTN.)  -Echo & CT chest negative for alternative etiologies of hydrothorax   -Echo with normal heart function (EF 60-65%), trivial tricuspid regurgitation, pulmonary HTN 22 mmHg  -MELD 10 today  -Not enough ascitic fluid for " "paracentesis.  -Plan for TIPS for volume overload. Unsuccessful 05/10, will reattempt next week.    Plan/Recs  -TIPS tomorrow-NPO and hold anticoagulation at midnight.  -Low MELD, no need for transplant eval at this time.  -Titrate lactulose for 3-4 BM/day  -Cr rising. Hold diuresis with IV lasix and aldactone. Con't to monitor renal function.  -Strict I/ O; daily wt  -obtaining CT abdomen to assess for procedural complication in the setting of increased abdominal pain today. Overall, Hb counts stable.        Chronic hepatitis C without hepatic coma    See "decompensated HCV cirrhosis."            Thank you for your consult. I will follow-up with patient. Please contact us if you have any additional questions.    America Rico MD  Hepatology  [facility]  "

## 2018-05-10 NOTE — PLAN OF CARE
Problem: Patient Care Overview  Goal: Plan of Care Review  Outcome: Ongoing (interventions implemented as appropriate)  POC reviewed with patient. VSS. Tolerating diet with fluid restrictions. Accuchecks completed and covered per orders. Ambulating to the bathroom independently. Patient remained free from falls and trauma. Call light in reach. WCTM.

## 2018-05-10 NOTE — ASSESSMENT & PLAN NOTE
59 y/o female with hx of HCV cirrhosis (complicated by prior SBP, ascites, hydrothorax, and hepatic encephalopathy) who [place] [facility] [place].    -She is now s/p thoracentesis this admission on 5/5.   -Pleural fluid studies consistent with hepatic hydrothorax (transudative, SAAG 1.8 c/w portal HTN.)  -Echo & CT chest negative for alternative etiologies of hydrothorax   -Echo with normal heart function (EF 60-65%), trivial tricuspid regurgitation, pulmonary HTN 22 mmHg  -MELD 10 today  -Not enough ascitic fluid for paracentesis.  -Plan for TIPS for volume overload. Unsuccessful 05/10, will reattempt next week.    Plan/Recs  -TIPS tomorrow-NPO and hold anticoagulation at midnight.  -Low MELD, no need for transplant eval at this time.  -Titrate lactulose for 3-4 BM/day  -Cr rising. Hold diuresis with IV lasix and aldactone. Con't to monitor renal function.  -Strict I/ O; daily wt  -obtaining CT abdomen to assess for procedural complication in the setting of increased abdominal pain today. Overall, Hb counts stable.

## 2018-05-10 NOTE — NURSING TRANSFER
Nursing Transfer Note      5/9/2018     Transfer To: [id number]    Transfer via stretcher    Transfer with     Transported by PCT    Medicines sent: n/a    Chart send with patient: Yes    Notified: patient    Patient reassessed at: 05/9/18     Upon arrival to floor:

## 2018-05-11 NOTE — PLAN OF CARE
05/11/18 1443   Discharge Reassessment   Assessment Type Discharge Planning Reassessment   Do you have any problems affording any of your prescribed medications? TBD   Discharge Plan A Home with family;Home Health   Discharge Plan B Home with family   Can the patient answer the patient profile reliably? Yes, cognitively intact   How does the patient rate their overall health at the present time? Fair   Describe the patient's ability to walk at the present time. Minor restrictions or changes   How often would a person be available to care for the patient? Whenever needed   Number of comorbid conditions (as recorded on the chart) Three   During the past month, has the patient often been bothered by feeling down, depressed or hopeless? No   During the past month, has the patient often been bothered by little interest or pleasure in doing things? No

## 2018-05-11 NOTE — ASSESSMENT & PLAN NOTE
59 y/o female with hx of HCV cirrhosis (complicated by prior SBP, ascites, hydrothorax, and hepatic encephalopathy) who was transferred to [facility] [place] 5/4 for hepatology evaluation.    -She is now s/p thoracentesis this admission on 5/5.   -Pleural fluid studies consistent with hepatic hydrothorax (transudative, SAAG 1.8 c/w portal HTN.)  -Echo & CT chest negative for alternative etiologies of hydrothorax   -Echo with normal heart function (EF 60-65%), trivial tricuspid regurgitation, pulmonary HTN 22 mmHg  -MELD 17 today  -Not enough ascitic fluid for paracentesis.  -Plan for TIPS for volume overload. Unsuccessful 05/10, will reattempt next week. Right upper quadrant pain following procedure; CT abd negative for acute post-procedural complication although Hb drop today 8.5-->7.0.    MELD-Na score: 17 at 5/11/2018  3:29 AM  MELD score: 14 at 5/11/2018  3:29 AM  Calculated from:  Serum Creatinine: 1.7 mg/dL at 5/11/2018  3:29 AM  Serum Sodium: 134 mmol/L at 5/11/2018  3:29 AM  Total Bilirubin: 0.6 mg/dL (Rounded to 1) at 5/11/2018  3:29 AM  INR(ratio): 1.2 at 5/11/2018  3:29 AM  Age: 58 years    Plan/Recs  -Reattempt TIPS on Monday 05/14-NPO and hold anticoagulation at midnight.  -Low MELD, no need for transplant eval at this time.  -Titrate lactulose for 3-4 BM/day  -Cr rising. Holding diuresis (lasix & aldactone) since 05/10. Con't to hold and give albumin. Con't to monitor renal function.  -Strict I/ O; daily wt  -giving 1u pRBC with Hb 7.0.

## 2018-05-11 NOTE — PROGRESS NOTES
Progress Note   Hospital Medicine         Patient Name: Michelle Pappas  MRN:  [id number]  [facility] Medicine Team: [facility] MED L Jack Cuevas MD  Date of Admission:  5/4/2018     Length of Stay:  LOS: 7 days   Expected Discharge Date: 5/14/2018  Principal Problem:  Hydrothorax       Subjective:     Interval History/Overnight Events:  Patient was complaining of RUQ pain today after procedure yesterday. Creatinine slightly elevated today compared to yesterday. Will receive one unit of PRBC today as well as IV Albumin.     Review of Systems   Constitutional: Negative for chills, fatigue, fever.   HENT: Negative for sore throat, trouble swallowing.    Eyes: Negative for photophobia, visual disturbance.   Respiratory: Negative for cough, shortness of breath.    Cardiovascular: Negative for chest pain, palpitations, leg swelling.   Gastrointestinal: Negative for abdominal pain, constipation, diarrhea, nausea, vomiting.   Endocrine: Negative for cold intolerance, heat intolerance.   Genitourinary: Negative for dysuria, frequency.   Musculoskeletal: Negative for arthralgias, myalgias.   Skin: Negative for rash, wound, erythema   Neurological: Negative for dizziness, syncope, weakness, light-headedness.   Psychiatric/Behavioral: Negative for confusion, hallucinations, anxiety  All other systems reviewed and are negative.    Objective:     Temp:  [97.4 °F (36.3 °C)-99 °F (37.2 °C)]   Pulse:  [69-78]   Resp:  [14-20]   BP: ()/(45-53)   SpO2:  [94 %-98 %]       Physical Exam:  Constitutional: Appears well-developed and well-nourished.   Head: Normocephalic and atraumatic.   Mouth/Throat: Oropharynx is clear and moist.   Eyes: EOM are normal. Pupils are equal, round, and reactive to light. No scleral icterus.   Neck: Normal range of motion. Neck supple.   Cardiovascular: Normal rate and regular rhythm.  No murmur heard.  Pulmonary/Chest: Effort normal and breath sounds normal. No respiratory distress. No wheezes,  rales, or rhonchi  Abdominal: Soft. Bowel sounds are normal.  No distension or tenderness  Musculoskeletal: Normal range of motion. No edema.   Neurological: Alert and oriented to person, place, and time.   Skin: Skin is warm and dry.   Psychiatric: Normal mood and affect. Behavior is normal.       Recent Labs  Lab 05/06/18  0456 05/07/18  0546 05/08/18  0431 05/09/18  0538 05/10/18  0423 05/11/18  0329   WBC 2.42* 2.01* 2.76* 2.27* 4.99 2.02*   HGB 8.6* 9.2* 8.1* 8.4* 8.5* 7.0*   HCT 27.1* 29.5* 25.5* 27.2* 27.6* 22.4*   PLT 60* 63* 50* 67* 53* 51*       Recent Labs  Lab 05/09/18  0538 05/10/18  0424 05/11/18  0329   * 133* 134*   K 3.5 3.7 3.9   CL 98 97 96   CO2 28 28 30*   BUN 17 22* 26*   CREATININE 1.2 1.5* 1.7*   * 270* 197*   CALCIUM 8.6* 8.6* 9.0   MG 1.4* 1.8 2.1   PHOS 3.1 4.7* 3.9       Recent Labs  Lab 05/09/18  0538 05/10/18  0423 05/10/18  0424 05/11/18  0329   ALKPHOS 56  --  55 43*   ALT 10  --  37 31   AST 14  --  51* 35   ALBUMIN 2.3*  --  2.3* 3.3*   PROT 5.3*  --  5.3* 5.7*   BILITOT 0.6  --  0.6 0.6   INR 1.1 1.2  --  1.2       Recent Labs  Lab 05/10/18  0753 05/10/18  1217 05/10/18  1714 05/10/18  2130 05/11/18  0806 05/11/18  1126   POCTGLUCOSE 213* 175* 133* 218* 202* 157*        albumin human 25%  25 g Intravenous BID    ciprofloxacin HCl  500 mg Oral Daily    clonazePAM  1 mg Oral QHS    fluticasone  2 spray Each Nare Daily    folic acid  1 mg Oral Daily    insulin detemir U-100  5 Units Subcutaneous QHS    lactulose  30 g Oral BID    lidocaine  1 patch Transdermal Q24H    midodrine  15 mg Oral TID    traZODone  100 mg Oral QHS       Assessment and Plan     Ms. Michelle Pappas is a 58 y.o. female who presented to [facility] on 5/4/2018 with     Hospital Course:    Ms. Michelle Pappas was admitted to Hospital Medicine for management of     Active Hospital Problems    Diagnosis  POA    *Hepatic Hydrothorax [J94.8]  Yes    ARIANNE (acute kidney injury) [N17.9]  No     Idiopathic hypotension [I95.0]  Yes    Pancytopenia [D61.818]  Yes    CKD stage 3 due to type 2 diabetes mellitus [E11.22, N18.3]  Yes    Hypokalemia [E87.6]  No    Decompensated HCV cirrhosis [B19.20, K74.69]  Yes     Chronic    Insomnia [G47.00]  Yes     Chronic    Pleural effusion [J90]  Yes    Hypoalbuminemia [E88.09]  Yes    Thrombocytopenia [D69.6]  Yes    Other cirrhosis of liver [K74.69]  Yes    Diabetes [E11.9]  Yes    Chronic hepatitis C without hepatic coma [B18.2]  Yes      Resolved Hospital Problems    Diagnosis Date Resolved POA   No resolved problems to display.     # Hepatic hydrothorax  # Ascites   - s/p thoracentesis 5/7 with 500 cc removed  - failed TIPS, planning on outpatient TIPS on Monday    # Decompensated Hep C Cirrhosis with ascites   MELD-Na score: 17 at 5/11/2018  3:29 AM  MELD score: 14 at 5/11/2018  3:29 AM  Calculated from:  Serum Creatinine: 1.7 mg/dL at 5/11/2018  3:29 AM  Serum Sodium: 134 mmol/L at 5/11/2018  3:29 AM  Total Bilirubin: 0.6 mg/dL (Rounded to 1) at 5/11/2018  3:29 AM  INR(ratio): 1.2 at 5/11/2018  3:29 AM  Age: 58 years  - s/p HARVONI treatment   - hepatology consult  - holding off on an inpatient liver tx evaluation with low meld and planning for TIPS to bridge patient  - holding diuretics due to ARIANNE     # ARIANNE  - stopping lasix and aldactone and giving albumin  - IV albumin  - 1 unit of PRBCs    # Hypotension  - increase midodrine 15 mg PO TID; check procal, LA and blood cultures today; unable to do IR paracentesis due to no pocket of fluid    # Pancytopenia  - monitor for now    # DM2 with nephropathy   # CKD stage 3  - SSI  - Hba1c of 6.3    # Hypoalbuminemia  - PAB in AM    # Hypokalemia  -replace    Diet:  Low sodium  GI PPx:    DVT PPx:    Goals of Care:      High Risk Conditions:  Respiratory failure    Disposition:  Tomorrow with follow up on Monday for outpatient TIPS procedure     Phill Thurston MD  Medical Director [facility]  Medicine  Spectra:  [phone]  Pager: [phone]

## 2018-05-11 NOTE — ANESTHESIA PREPROCEDURE EVALUATION
05/11/2018  [facility]  Anesthesia Pre-Operative Evaluation         Patient Name: Michelle Pappas  YOB: 1960  MRN: [id number]    SUBJECTIVE:     Pre-operative evaluation for Procedure(s) (LRB):  TIPS (N/A)     05/11/2018    Michelle Pappas is a 58 y.o. female w/ a significant PMHx of HTN, DM, Hx pf HCV cirrhosis complicated by prior SBP, ascites, and hydrothorax who was transferred to [facility] [place]. Found to be in decompensated HCV cirrhosis. Pt is s/p thoracentesis on 05/07/18. TIPS was attempted on 5/9/18 however it was unsuccessful due to PVT. Patient now presents for the above procedure(s).      LDA: None documented.       Prev airway: None documented.    Drips: None documented.      Patient Active Problem List   Diagnosis    Hypoalbuminemia    Thrombocytopenia    Other cirrhosis of liver    Hepatic Hydrothorax    Anxiety    Depression    Essential hypertension    Diabetes    Chronic hepatitis C without hepatic coma    Pleural effusion    Insomnia    Decompensated HCV cirrhosis    Idiopathic hypotension    Pancytopenia    CKD stage 3 due to type 2 diabetes mellitus    Hypokalemia    ARIANNE (acute kidney injury)       Review of patient's allergies indicates:  No Known Allergies    Current Inpatient Medications:      Current Facility-Administered Medications on File Prior to Visit   Medication Dose Route Frequency Provider Last Rate Last Dose    0.9%  NaCl infusion (for blood administration)   Intravenous Q24H PRN Jesús Bates MD        acetaminophen tablet 650 mg  650 mg Oral Q4H PRN Hazel Neal MD        albumin human 25% bottle 25 g  25 g Intravenous BID Phill Thurston MD   25 g at 05/10/18 2116    albuterol-ipratropium 2.5mg-0.5mg/3mL nebulizer solution 3 mL  3 mL Nebulization Q4H PRN Hazel Neal MD         ciprofloxacin HCl tablet 500 mg  500 mg Oral Daily Hazel Neal MD   500 mg at 05/10/18 0853    clonazePAM tablet 1 mg  1 mg Oral QHS Phill Thurston MD   1 mg at 05/10/18 2117    dextrose 50% injection 12.5 g  12.5 g Intravenous PRN Hazel Neal MD        dextrose 50% injection 25 g  25 g Intravenous PRN Hazel Neal MD        fluticasone 50 mcg/actuation nasal spray 100 mcg  2 spray Each Nare Daily Hazel Neal MD   100 mcg at 05/10/18 0852    folic acid tablet 1 mg  1 mg Oral Daily Hind Arias Bates MD   1 mg at 05/10/18 0853    glucagon (human recombinant) injection 1 mg  1 mg Intramuscular PRN Hazel Neal MD        glucose chewable tablet 16 g  16 g Oral PRN Hazel Neal MD        glucose chewable tablet 24 g  24 g Oral PRN Hazel Neal MD        hydrOXYzine HCl tablet 25 mg  25 mg Oral Daily PRN Hazel Neal MD   25 mg at 05/09/18 0359    hyoscyamine SL tablet 0.125 mg  0.125 mg Oral Q4H PRN Hazel Neal MD        insulin aspart U-100 pen 0-5 Units  0-5 Units Subcutaneous QID (AC + HS) PRN Hazel Neal MD   1 Units at 05/10/18 2131    insulin detemir U-100 pen 5 Units  5 Units Subcutaneous QHS Phill Thurston MD   5 Units at 05/10/18 2129    lactulose 20 gram/30 mL solution Soln 30 g  30 g Oral BID Hazel Neal MD   30 g at 05/10/18 2117    lidocaine 5 % patch 1 patch  1 patch Transdermal Q24H Demetrio Worley PA-C   1 patch at 05/11/18 0441    magnesium sulfate 2g in water 50mL IVPB (premix)  2 g Intravenous Once Phill Thurston MD        midodrine tablet 15 mg  15 mg Oral TID Phill Thurston MD        omnipaque 350 iohexol 75 mL  75 mL Intravenous ONCE PRN Phill Thurston MD        ondansetron disintegrating tablet 8 mg  8 mg Oral Q8H PRN Hazel Neal MD        oxyCODONE immediate release tablet 5 mg  5 mg Oral Q6H PRN Phill Thurston MD   5 mg at 05/10/18 2023    promethazine tablet 25 mg  25 mg Oral Q6H PRN Hazel Neal MD        sodium chloride 0.9% flush 5  mL  5 mL Intravenous PRN Hazel Neal MD        trazodone split tablet 100 mg  100 mg Oral QHS Hazel Neal MD   100 mg at 05/10/18 2118     Current Outpatient Prescriptions on File Prior to Visit   Medication Sig Dispense Refill    ciprofloxacin HCl (CIPRO) 500 MG tablet Take 1 tablet (500 mg total) by mouth once daily. 30 tablet 11    clonazePAM (KLONOPIN) 1 MG tablet Take 1 tablet by mouth twice daily as needed for anxiety      furosemide (LASIX) 40 MG tablet Take 40 mg by mouth 2 (two) times daily.      glimepiride (AMARYL) 2 MG tablet Take 2 mg by mouth as needed.      hydrOXYzine HCl (ATARAX) 25 MG tablet Take 1 tablet (25 mg total) by mouth daily as needed for Itching. 30 tablet 1    hyoscyamine (LEVSIN/SL) 0.125 mg Subl Take 1 tablet by mouth up to four times daily      lactulose (CHRONULAC) 10 gram/15 mL solution Take 30 g by mouth 2 (two) times daily.      metOLazone (ZAROXOLYN) 5 MG tablet Take 5 mg by mouth once daily.      promethazine (PHENERGAN) 25 MG tablet Take 25 mg by mouth as needed for Nausea.      spironolactone (ALDACTONE) 100 MG tablet Take 100 mg by mouth 2 (two) times daily.      traZODone (DESYREL) 100 MG tablet Take 100 mg by mouth every evening.         Past Surgical History:   Procedure Laterality Date    breast reduction       SECTION      HYSTERECTOMY         Social History     Social History    Marital status:      Spouse name: N/A    Number of children: N/A    Years of education: N/A     Occupational History    Not on file.     Social History Main Topics    Smoking status: Former Smoker     Types: Cigarettes     Quit date: 3/6/2018    Smokeless tobacco: Not on file    Alcohol use No    Drug use: Unknown    Sexual activity: Not on file     Other Topics Concern    Not on file     Social History Narrative    No narrative on file       OBJECTIVE:     Vital Signs Range (Last 24H):  Temp:  [36.4 °C (97.5 °F)-37.2 °C (99 °F)]   Pulse:  [69-75]    Resp:  [14-20]   BP: ()/(45-55)   SpO2:  [92 %-100 %]       CBC:   Recent Labs      05/10/18   0423  05/11/18   0329   WBC  4.99  2.02*   RBC  3.32*  2.73*   HGB  8.5*  7.0*   HCT  27.6*  22.4*   PLT  53*  51*   MCV  83  82   MCH  25.6*  25.6*   MCHC  30.8*  31.3*       CMP:   Recent Labs      05/10/18   0424  05/11/18   0329   NA  133*  134*   K  3.7  3.9   CL  97  96   CO2  28  30*   BUN  22*  26*   CREATININE  1.5*  1.7*   GLU  270*  197*   MG  1.8  2.1   PHOS  4.7*  3.9   CALCIUM  8.6*  9.0   ALBUMIN  2.3*  3.3*   PROT  5.3*  5.7*   ALKPHOS  55  43*   ALT  37  31   AST  51*  35   BILITOT  0.6  0.6       INR:  Recent Labs      05/09/18   0538  05/10/18   0423  05/11/18   0329   INR  1.1  1.2  1.2       Diagnostic Studies:     5/10/18    Stable nonocclusive thrombus in the main portal vein, and intrahepatic high attenuation in the gallbladder fossa likely representing contrast from recent IR procedure.    Interval improvement of right pleural effusion, now moderate.    Interval improvement of ascites.    Sequela of portal hypertension such as splenomegaly and enlarged venous collateral vessels.    Duodenal wall thickening possibly representative of portal enteropathy.    Cholelithiasis and biliary sludge without evidence of acute cholecystitis.    Additional findings as above.      EKG: No recent studies available.    2D ECHO:  Results for orders placed or performed during the hospital encounter of 05/04/18   2D echo with color flow doppler   Result Value Ref Range    EF 60 55 - 65    Diastolic Dysfunction No     Est. PA Systolic Pressure 22.36     Tricuspid Valve Regurgitation MILD          ASSESSMENT/PLAN:         Anesthesia Evaluation    I have reviewed the Patient Summary Reports.        Review of Systems  Anesthesia Hx:  History of prior surgery of interest to airway management or planning: Denies Family Hx of Anesthesia complications.   Denies Personal Hx of Anesthesia complications.    Hematology/Oncology:  Hematology Normal   Oncology Normal     EENT/Dental:EENT/Dental Normal   Cardiovascular:   Hypertension    Pulmonary:  Pulmonary Normal    Renal/:   Chronic Renal Disease    Hepatic/GI:   Liver Disease, Hepatitis, C    Musculoskeletal:  Musculoskeletal Normal    Neurological:  Neurology Normal    Endocrine:   Diabetes    Dermatological:  Skin Normal    Psych:   Psychiatric History          Physical Exam  General:  Well nourished    Airway/Jaw/Neck:  Airway Findings: Mouth Opening: Normal Tongue: Normal  General Airway Assessment: Adult  Mallampati: II  TM Distance: Normal, at least 6 cm  Jaw/Neck Findings:  Neck ROM: Normal ROM  Neck Findings: Normal    Eyes/Ears/Nose:  EYES/EARS/NOSE FINDINGS: Normal   Dental:  Dental Findings: Periodontal disease, Mild   Chest/Lungs:  Chest/Lungs Clear    Heart/Vascular:  Heart Findings: Normal Heart murmur: negative       Mental Status:  Mental Status Findings: Normal        Anesthesia Plan  Type of Anesthesia, risks & benefits discussed:  Anesthesia Type:  general  Patient's Preference:   Intra-op Monitoring Plan: standard ASA monitors  Intra-op Monitoring Plan Comments:   Post Op Pain Control Plan: multimodal analgesia, IV/PO Opioids PRN and per primary service following discharge from PACU  Post Op Pain Control Plan Comments:   Induction:   IV  Beta Blocker:  Patient is not currently on a Beta-Blocker (No further documentation required).       Informed Consent: Patient understands risks and agrees with Anesthesia plan.  Questions answered. Anesthesia consent signed with patient.  ASA Score: 3     Day of Surgery Review of History & Physical:    H&P update referred to the provider.     Anesthesia Plan Notes: Prior  intubation without difficulty        Ready For Surgery From Anesthesia Perspective.

## 2018-05-11 NOTE — PROGRESS NOTES
"[facility]  Hepatology  Progress Note    Patient Name: Michelle Pappas  MRN: [id number]  Admission Date: 5/4/2018  Hospital Length of Stay: 7 days  Attending Provider: Jack Cuevas MD   Primary Care Physician: Taj Luna DO  Principal Problem:Hydrothorax    Subjective:     Transplant status: No    HPI: This is a 57 y/o female with hx of HCV cirrhosis (complicated by prior SBP, ascites, hydrothorax) who was transferred to [facility] [place] [facility] 5/4 for hepatology evaluation.  Pt admitted to OSH ([facility]) 4/26 - 4/29 for recurrent right hydrothorax. She underwent thoracentesis, removed 1.5L.   Fluid transudate: GLUC 183, LDH 24, PROT <23, WBC 85 - L66, S22, RBC 4600  She was discharged and then returned 5/4 with recurrent symptoms of SOB and reaccumulation of hydrothorax.   She was taking lasix 40mg BID and aldactone 100mg daily, although she was taking all her lasix at once.  She has required 4 thoracentesis on right in the past.    Today she feels better s/p thoracentesis last night. Still with abd distention.  She is on room air. She denies other complaints presently.    Prior records per Dr. Walton clinic note:  "2002- liver biopsy- fatty liver and some scar  2012- another biopsy- cirrhosis  fluid overload started 10/15- required LVP ; complicated by SBP    Since last year having hep hydrothorax that requires thoracentesis- 4 x since June 2018  Harvoni x 3 months 2015-cured  EGD- no varices by report 02/18 & Cholecystectomy denied due to cirrhosis"      Interval History: Continues to have right upper extremity discomfort following unsuccessful TIPs. Breathing well on room air.    Current Facility-Administered Medications   Medication    0.9%  NaCl infusion (for blood administration)    acetaminophen tablet 650 mg    albumin human 25% bottle 25 g    albuterol-ipratropium 2.5mg-0.5mg/3mL nebulizer solution 3 mL    ciprofloxacin HCl tablet 500 mg    clonazePAM tablet 1 mg    dextrose 50% injection " 12.5 g    dextrose 50% injection 25 g    fluticasone 50 mcg/actuation nasal spray 100 mcg    folic acid tablet 1 mg    glucagon (human recombinant) injection 1 mg    glucose chewable tablet 16 g    glucose chewable tablet 24 g    hydrOXYzine HCl tablet 25 mg    hyoscyamine SL tablet 0.125 mg    insulin aspart U-100 pen 0-5 Units    insulin detemir U-100 pen 5 Units    lactulose 20 gram/30 mL solution Soln 30 g    lidocaine 5 % patch 1 patch    magnesium sulfate 2g in water 50mL IVPB (premix)    midodrine tablet 15 mg    omnipaque 350 iohexol 75 mL    ondansetron disintegrating tablet 8 mg    oxyCODONE immediate release tablet 5 mg    promethazine tablet 25 mg    sodium chloride 0.9% flush 5 mL    trazodone split tablet 100 mg       Objective:     Vital Signs (Most Recent):  Temp: 98.8 °F (37.1 °C) (05/11/18 1126)  Pulse: 69 (05/11/18 1126)  Resp: 18 (05/11/18 1126)  BP: (!) 97/53 (05/11/18 1126)  SpO2: 96 % (05/11/18 1126) Vital Signs (24h Range):  Temp:  [97.8 °F (36.6 °C)-99 °F (37.2 °C)] 98.8 °F (37.1 °C)  Pulse:  [69-78] 69  Resp:  [14-20] 18  SpO2:  [94 %-100 %] 96 %  BP: ()/(45-54) 97/53     Weight: 69.8 kg (153 lb 14.1 oz) (05/11/18 0519)  Body mass index is 27.26 kg/m².    Physical Exam   Constitutional: She is oriented to person, place, and time. She appears well-developed and well-nourished.   HENT:   Head: Normocephalic and atraumatic.   Eyes: Pupils are equal, round, and reactive to light. No scleral icterus.   Neck: No JVD present.   Bandage overlying right neck.   Cardiovascular: Normal rate, regular rhythm and normal heart sounds.    No murmur heard.  Pulmonary/Chest: Effort normal.   Decreased breath sounds middle & lower lobes.   Abdominal: Soft. Bowel sounds are normal. She exhibits distension. There is tenderness. There is no guarding.   No hematoma noted.   Musculoskeletal: She exhibits no edema or tenderness.   Neurological: She is alert and oriented to person, place,  "and time.   Skin: Skin is warm and dry.   Psychiatric: She has a normal mood and affect. Her behavior is normal.       MELD-Na score: 17 at 5/11/2018  3:29 AM  MELD score: 14 at 5/11/2018  3:29 AM  Calculated from:  Serum Creatinine: 1.7 mg/dL at 5/11/2018  3:29 AM  Serum Sodium: 134 mmol/L at 5/11/2018  3:29 AM  Total Bilirubin: 0.6 mg/dL (Rounded to 1) at 5/11/2018  3:29 AM  INR(ratio): 1.2 at 5/11/2018  3:29 AM  Age: 58 years    Significant Labs:  CBC:     Recent Labs  Lab 05/11/18 0329   WBC 2.02*   RBC 2.73*   HGB 7.0*   HCT 22.4*   PLT 51*     CMP:     Recent Labs  Lab 05/11/18 0329   *   CALCIUM 9.0   ALBUMIN 3.3*   PROT 5.7*   *   K 3.9   CO2 30*   CL 96   BUN 26*   CREATININE 1.7*   ALKPHOS 43*   ALT 31   AST 35   BILITOT 0.6       Significant Imaging:    CT abdomen with contrast 05/10  Stable nonocclusive thrombus in the main portal vein, and intrahepatic high attenuation in the gallbladder fossa likely representing contrast from recent IR procedure.    Interval improvement of right pleural effusion, now moderate.    Interval improvement of ascites.    Sequela of portal hypertension such as splenomegaly and enlarged venous collateral vessels.    Duodenal wall thickening possibly representative of portal enteropathy.    Cholelithiasis and biliary sludge without evidence of acute cholecystitis.    CXR 05/06  Decrease in right pleural effusion since May 5, 2018 at 6:52 p.m.    CT Chest 05/06  Redemonstration of a large right pleural effusion with leftward displacement of the cardiomediastinal silhouette.  There is associated compressive atelectasis.  The left lung is clear.    Incidental findings: Splenomegaly, nodular hepatic contour right renal cysts, gallstones, and mild atherosclerosis.    Assessment/Plan:     * Hepatic Hydrothorax    See  "decompensated HCV cirrhosis."        Decompensated HCV cirrhosis    57 y/o female with hx of HCV cirrhosis (complicated by prior SBP, ascites, " "hydrothorax, and hepatic encephalopathy) who was transferred to [facility] [place] 5/4 for hepatology evaluation.    -She is now s/p thoracentesis this admission on 5/5.   -Pleural fluid studies consistent with hepatic hydrothorax (transudative, SAAG 1.8 c/w portal HTN.)  -Echo & CT chest negative for alternative etiologies of hydrothorax   -Echo with normal heart function (EF 60-65%), trivial tricuspid regurgitation, pulmonary HTN 22 mmHg  -MELD 17 today  -Not enough ascitic fluid for paracentesis.  -Plan for TIPS for volume overload. Unsuccessful 05/10, will reattempt next week. Right upper quadrant pain following procedure; CT abd negative for acute post-procedural complication although Hb drop today 8.5-->7.0.    MELD-Na score: 17 at 5/11/2018  3:29 AM  MELD score: 14 at 5/11/2018  3:29 AM  Calculated from:  Serum Creatinine: 1.7 mg/dL at 5/11/2018  3:29 AM  Serum Sodium: 134 mmol/L at 5/11/2018  3:29 AM  Total Bilirubin: 0.6 mg/dL (Rounded to 1) at 5/11/2018  3:29 AM  INR(ratio): 1.2 at 5/11/2018  3:29 AM  Age: 58 years    Plan/Recs  -Reattempt TIPS on Monday 05/14-NPO and hold anticoagulation at midnight.  -Low MELD, no need for transplant eval at this time.  -Titrate lactulose for 3-4 BM/day  -Cr rising. Holding diuresis (lasix & aldactone) since 05/10. Con't to hold and give albumin. Con't to monitor renal function.  -Strict I/ O; daily wt  -giving 1u pRBC with Hb 7.0.        Chronic hepatitis C without hepatic coma    See "decompensated HCV cirrhosis."            Thank you for your consult. I will follow-up with patient. Please contact us if you have any additional questions.    America Rico MD  Hepatology  [facility]  "

## 2018-05-11 NOTE — PLAN OF CARE
Problem: Patient Care Overview  Goal: Plan of Care Review  Outcome: Ongoing (interventions implemented as appropriate)  One unit PRBC's transfused this shift. No reaction suspected. Pt. Sitting up in bed, eating dinner. PRN oxy given with relief reported. NADN, VSS. Safety maintained. Call light within reach. WCTM.

## 2018-05-11 NOTE — SUBJECTIVE & OBJECTIVE
Interval History: Continues to have right upper extremity discomfort following unsuccessful TIPs. Breathing well on room air.    Current Facility-Administered Medications   Medication    0.9%  NaCl infusion (for blood administration)    acetaminophen tablet 650 mg    albumin human 25% bottle 25 g    albuterol-ipratropium 2.5mg-0.5mg/3mL nebulizer solution 3 mL    ciprofloxacin HCl tablet 500 mg    clonazePAM tablet 1 mg    dextrose 50% injection 12.5 g    dextrose 50% injection 25 g    fluticasone 50 mcg/actuation nasal spray 100 mcg    folic acid tablet 1 mg    glucagon (human recombinant) injection 1 mg    glucose chewable tablet 16 g    glucose chewable tablet 24 g    hydrOXYzine HCl tablet 25 mg    hyoscyamine SL tablet 0.125 mg    insulin aspart U-100 pen 0-5 Units    insulin detemir U-100 pen 5 Units    lactulose 20 gram/30 mL solution Soln 30 g    lidocaine 5 % patch 1 patch    magnesium sulfate 2g in water 50mL IVPB (premix)    midodrine tablet 15 mg    omnipaque 350 iohexol 75 mL    ondansetron disintegrating tablet 8 mg    oxyCODONE immediate release tablet 5 mg    promethazine tablet 25 mg    sodium chloride 0.9% flush 5 mL    trazodone split tablet 100 mg       Objective:     Vital Signs (Most Recent):  Temp: 98.8 °F (37.1 °C) (05/11/18 1126)  Pulse: 69 (05/11/18 1126)  Resp: 18 (05/11/18 1126)  BP: (!) 97/53 (05/11/18 1126)  SpO2: 96 % (05/11/18 1126) Vital Signs (24h Range):  Temp:  [97.8 °F (36.6 °C)-99 °F (37.2 °C)] 98.8 °F (37.1 °C)  Pulse:  [69-78] 69  Resp:  [14-20] 18  SpO2:  [94 %-100 %] 96 %  BP: ()/(45-54) 97/53     Weight: 69.8 kg (153 lb 14.1 oz) (05/11/18 0519)  Body mass index is 27.26 kg/m².    Physical Exam   Constitutional: She is oriented to person, place, and time. She appears well-developed and well-nourished.   HENT:   Head: Normocephalic and atraumatic.   Eyes: Pupils are equal, round, and reactive to light. No scleral icterus.   Neck: No JVD  present.   Bandage overlying right neck.   Cardiovascular: Normal rate, regular rhythm and normal heart sounds.    No murmur heard.  Pulmonary/Chest: Effort normal.   Decreased breath sounds middle & lower lobes.   Abdominal: Soft. Bowel sounds are normal. She exhibits distension. There is tenderness. There is no guarding.   No hematoma noted.   Musculoskeletal: She exhibits no edema or tenderness.   Neurological: She is alert and oriented to person, place, and time.   Skin: Skin is warm and dry.   Psychiatric: She has a normal mood and affect. Her behavior is normal.       MELD-Na score: 17 at 5/11/2018  3:29 AM  MELD score: 14 at 5/11/2018  3:29 AM  Calculated from:  Serum Creatinine: 1.7 mg/dL at 5/11/2018  3:29 AM  Serum Sodium: 134 mmol/L at 5/11/2018  3:29 AM  Total Bilirubin: 0.6 mg/dL (Rounded to 1) at 5/11/2018  3:29 AM  INR(ratio): 1.2 at 5/11/2018  3:29 AM  Age: 58 years    Significant Labs:  CBC:     Recent Labs  Lab 05/11/18  0329   WBC 2.02*   RBC 2.73*   HGB 7.0*   HCT 22.4*   PLT 51*     CMP:     Recent Labs  Lab 05/11/18 0329   *   CALCIUM 9.0   ALBUMIN 3.3*   PROT 5.7*   *   K 3.9   CO2 30*   CL 96   BUN 26*   CREATININE 1.7*   ALKPHOS 43*   ALT 31   AST 35   BILITOT 0.6       Significant Imaging:    CT abdomen with contrast 05/10  Stable nonocclusive thrombus in the main portal vein, and intrahepatic high attenuation in the gallbladder fossa likely representing contrast from recent IR procedure.    Interval improvement of right pleural effusion, now moderate.    Interval improvement of ascites.    Sequela of portal hypertension such as splenomegaly and enlarged venous collateral vessels.    Duodenal wall thickening possibly representative of portal enteropathy.    Cholelithiasis and biliary sludge without evidence of acute cholecystitis.    CXR 05/06  Decrease in right pleural effusion since May 5, 2018 at 6:52 p.m.    CT Chest 05/06  Redemonstration of a large right pleural effusion  with leftward displacement of the cardiomediastinal silhouette.  There is associated compressive atelectasis.  The left lung is clear.    Incidental findings: Splenomegaly, nodular hepatic contour right renal cysts, gallstones, and mild atherosclerosis.

## 2018-05-11 NOTE — PLAN OF CARE
Problem: Patient Care Overview  Goal: Plan of Care Review  Outcome: Ongoing (interventions implemented as appropriate)  Patient AAO x4, calm and cooperative. Pt Hypotensive at the start of shift. Additional dose of midodrine administered per MD orders for BP. Acucheck at bedtime,. Levemir and Aspart administered. PRN pain meds administered for pain management.  No fall or injuries noted during shift. Pt stable will continue to monitor.

## 2018-05-12 NOTE — ASSESSMENT & PLAN NOTE
57 y/o female with hx of HCV cirrhosis (complicated by prior SBP, ascites, hydrothorax, and hepatic encephalopathy) who [place] [facility]ology evaluation.    -She is now s/p thoracentesis this admission on 5/5.   -Pleural fluid studies consistent with hepatic hydrothorax (transudative, SAAG 1.8 c/w portal HTN.)  -Echo & CT chest negative for alternative etiologies of hydrothorax   -Echo with normal heart function (EF 60-65%), trivial tricuspid regurgitation, pulmonary HTN 22 mmHg  -Not enough ascitic fluid for paracentesis.  -Plan for TIPS for volume overload. Unsuccessful 05/10, will reattempt next week. Right upper quadrant pain following procedure; CT abd negative for acute post-procedural complication      Plan/Recs  -Reattempt TIPS on Monday 05/14-NPO and hold anticoagulation at midnight.  - IV albumin  -Titrate lactulose for 3-4 BM/day  -Strict I/ O; daily wt

## 2018-05-12 NOTE — PROGRESS NOTES
"[facility]  Hepatology  Progress Note    Patient Name: Michelle Pappas  MRN: [id number]  Admission Date: 5/4/2018  Hospital Length of Stay: 8 days  Attending Provider: Jack Cuevas MD   Primary Care Physician: Taj Luna DO  Principal Problem:Hydrothorax    Subjective:     Transplant status:  no    HPI: This is a 57 y/o female with hx of HCV cirrhosis (complicated by prior SBP, ascites, hydrothorax) who [facility] for hepatology evaluation.  Pt admitted to [facility] 4/26 - 4/29 for recurrent right hydrothorax. She underwent thoracentesis, removed 1.5L.   Fluid transudate: GLUC 183, LDH 24, PROT <23, WBC 85 - L66, S22, RBC 4600  She was discharged and then returned 5/4 with recurrent symptoms of SOB and reaccumulation of hydrothorax.   She was taking lasix 40mg BID and aldactone 100mg daily, although she was taking all her lasix at once.  She has required 4 thoracentesis on right in the past.    Today she feels better s/p thoracentesis last night. Still with abd distention.  She is on room air. She denies other complaints presently.    Prior records per Dr. Walton clinic note:  "2002- liver biopsy- fatty liver and some scar  2012- another biopsy- cirrhosis  fluid overload started 10/15- required LVP ; complicated by SBP    Since last year having hep hydrothorax that requires thoracentesis- 4 x since June 2018  Harvoni x 3 months 2015-cured  EGD- no varices by report 02/18 & Cholecystectomy denied due to cirrhosis"      Interval History:     Still having right sided abd pain, somewhat better but pain medications wear off quickly    Current Facility-Administered Medications   Medication    0.9%  NaCl infusion (for blood administration)    acetaminophen tablet 650 mg    albuterol-ipratropium 2.5mg-0.5mg/3mL nebulizer solution 3 mL    ciprofloxacin HCl tablet 500 mg    clonazePAM tablet 1 mg    dextrose 50% injection 12.5 g    dextrose 50% injection 25 g    " fluticasone 50 mcg/actuation nasal spray 100 mcg    folic acid tablet 1 mg    glucagon (human recombinant) injection 1 mg    glucose chewable tablet 16 g    glucose chewable tablet 24 g    hydrOXYzine HCl tablet 25 mg    hyoscyamine SL tablet 0.125 mg    insulin aspart U-100 pen 0-5 Units    insulin detemir U-100 pen 5 Units    lactulose 20 gram/30 mL solution Soln 30 g    lidocaine 5 % patch 1 patch    midodrine tablet 15 mg    omnipaque 350 iohexol 75 mL    ondansetron disintegrating tablet 8 mg    oxyCODONE immediate release tablet 5 mg    promethazine tablet 25 mg    sodium chloride 0.9% flush 5 mL    trazodone split tablet 100 mg       Objective:     Vital Signs (Most Recent):  Temp: 97.6 °F (36.4 °C) (05/12/18 0919)  Pulse: 63 (05/12/18 0919)  Resp: 18 (05/12/18 0424)  BP: (!) 94/52 (05/12/18 0919)  SpO2: (!) 94 % (05/12/18 0919) Vital Signs (24h Range):  Temp:  [97 °F (36.1 °C)-98.8 °F (37.1 °C)] 97.6 °F (36.4 °C)  Pulse:  [63-78] 63  Resp:  [16-18] 18  SpO2:  [94 %-98 %] 94 %  BP: ()/(49-56) 94/52     Weight: 70.8 kg (156 lb 3.1 oz) (05/12/18 0524)  Body mass index is 27.67 kg/m².    Physical Exam   Constitutional: She is oriented to person, place, and time. She appears well-developed and well-nourished.   HENT:   Head: Normocephalic and atraumatic.   Eyes: Pupils are equal, round, and reactive to light. No scleral icterus.   Neck:   Bandage overlying right neck.   Cardiovascular: Normal rate.    No murmur heard.  Pulmonary/Chest: Effort normal.   Abdominal: Soft. Bowel sounds are normal. She exhibits distension. There is tenderness. There is no guarding.   No hematoma   Musculoskeletal: She exhibits no edema or tenderness.   Neurological: She is alert and oriented to person, place, and time.   Skin: Skin is warm and dry.   Psychiatric: She has a normal mood and affect. Her behavior is normal.   Vitals reviewed.      MELD-Na score: 13 at 5/12/2018  7:15 AM  MELD score: 12 at 5/12/2018   7:15 AM  Calculated from:  Serum Creatinine: 1.5 mg/dL at 5/12/2018  7:15 AM  Serum Sodium: 136 mmol/L at 5/12/2018  7:15 AM  Total Bilirubin: 1 mg/dL at 5/12/2018  7:15 AM  INR(ratio): 1.2 at 5/12/2018  7:15 AM  Age: 58 years    Significant Labs:  CBC:   Recent Labs  Lab 05/12/18 0715   WBC 1.45*   RBC 2.97*   HGB 7.7*   HCT 25.0*   PLT 46*     BMP:   Recent Labs  Lab 05/12/18  0715   *      K 3.4*   CL 98   CO2 33*   BUN 27*   CREATININE 1.5*   CALCIUM 9.0     CMP:   Recent Labs  Lab 05/12/18 0715   *   CALCIUM 9.0   ALBUMIN 3.7   PROT 5.9*      K 3.4*   CO2 33*   CL 98   BUN 27*   CREATININE 1.5*   ALKPHOS 38*   ALT 26   AST 30   BILITOT 1.0     Coagulation:   Recent Labs  Lab 05/12/18 0715   INR 1.2       Significant Imaging:  Labs: Reviewed    Assessment/Plan:     Decompensated HCV cirrhosis    57 y/o female with hx of HCV cirrhosis (complicated by prior SBP, ascites, hydrothorax, and hepatic encephalopathy) who was transferred to [facility] for hepatology evaluation.    -She is now s/p thoracentesis this admission on 5/5.   -Pleural fluid studies consistent with hepatic hydrothorax (transudative, SAAG 1.8 c/w portal HTN.)  -Echo & CT chest negative for alternative etiologies of hydrothorax   -Echo with normal heart function (EF 60-65%), trivial tricuspid regurgitation, pulmonary HTN 22 mmHg  -Not enough ascitic fluid for paracentesis.  -Plan for TIPS for volume overload. Unsuccessful 05/10, will reattempt next week. Right upper quadrant pain following procedure; CT abd negative for acute post-procedural complication      Plan/Recs  -Reattempt TIPS on Monday 05/14-NPO and hold anticoagulation at midnight.  - IV albumin  -Titrate lactulose for 3-4 BM/day  -Strict I/ O; daily wt              Thank you for your consult. I will follow-up with patient. Please contact us if you have any additional questions.    Onesimo Juares MD  Hepatology  [facility]

## 2018-05-12 NOTE — SUBJECTIVE & OBJECTIVE
Interval History:     Still having right sided abd pain, somewhat better but pain medications wear off quickly    Current Facility-Administered Medications   Medication    0.9%  NaCl infusion (for blood administration)    acetaminophen tablet 650 mg    albuterol-ipratropium 2.5mg-0.5mg/3mL nebulizer solution 3 mL    ciprofloxacin HCl tablet 500 mg    clonazePAM tablet 1 mg    dextrose 50% injection 12.5 g    dextrose 50% injection 25 g    fluticasone 50 mcg/actuation nasal spray 100 mcg    folic acid tablet 1 mg    glucagon (human recombinant) injection 1 mg    glucose chewable tablet 16 g    glucose chewable tablet 24 g    hydrOXYzine HCl tablet 25 mg    hyoscyamine SL tablet 0.125 mg    insulin aspart U-100 pen 0-5 Units    insulin detemir U-100 pen 5 Units    lactulose 20 gram/30 mL solution Soln 30 g    lidocaine 5 % patch 1 patch    midodrine tablet 15 mg    omnipaque 350 iohexol 75 mL    ondansetron disintegrating tablet 8 mg    oxyCODONE immediate release tablet 5 mg    promethazine tablet 25 mg    sodium chloride 0.9% flush 5 mL    trazodone split tablet 100 mg       Objective:     Vital Signs (Most Recent):  Temp: 97.6 °F (36.4 °C) (05/12/18 0919)  Pulse: 63 (05/12/18 0919)  Resp: 18 (05/12/18 0424)  BP: (!) 94/52 (05/12/18 0919)  SpO2: (!) 94 % (05/12/18 0919) Vital Signs (24h Range):  Temp:  [97 °F (36.1 °C)-98.8 °F (37.1 °C)] 97.6 °F (36.4 °C)  Pulse:  [63-78] 63  Resp:  [16-18] 18  SpO2:  [94 %-98 %] 94 %  BP: ()/(49-56) 94/52     Weight: 70.8 kg (156 lb 3.1 oz) (05/12/18 0524)  Body mass index is 27.67 kg/m².    Physical Exam   Constitutional: She is oriented to person, place, and time. She appears well-developed and well-nourished.   HENT:   Head: Normocephalic and atraumatic.   Eyes: Pupils are equal, round, and reactive to light. No scleral icterus.   Neck:   Bandage overlying right neck.   Cardiovascular: Normal rate.    No murmur heard.  Pulmonary/Chest: Effort  normal.   Abdominal: Soft. Bowel sounds are normal. She exhibits distension. There is tenderness. There is no guarding.   No hematoma   Musculoskeletal: She exhibits no edema or tenderness.   Neurological: She is alert and oriented to person, place, and time.   Skin: Skin is warm and dry.   Psychiatric: She has a normal mood and affect. Her behavior is normal.   Vitals reviewed.      MELD-Na score: 13 at 5/12/2018  7:15 AM  MELD score: 12 at 5/12/2018  7:15 AM  Calculated from:  Serum Creatinine: 1.5 mg/dL at 5/12/2018  7:15 AM  Serum Sodium: 136 mmol/L at 5/12/2018  7:15 AM  Total Bilirubin: 1 mg/dL at 5/12/2018  7:15 AM  INR(ratio): 1.2 at 5/12/2018  7:15 AM  Age: 58 years    Significant Labs:  CBC:   Recent Labs  Lab 05/12/18 0715   WBC 1.45*   RBC 2.97*   HGB 7.7*   HCT 25.0*   PLT 46*     BMP:   Recent Labs  Lab 05/12/18 0715   *      K 3.4*   CL 98   CO2 33*   BUN 27*   CREATININE 1.5*   CALCIUM 9.0     CMP:   Recent Labs  Lab 05/12/18  0715   *   CALCIUM 9.0   ALBUMIN 3.7   PROT 5.9*      K 3.4*   CO2 33*   CL 98   BUN 27*   CREATININE 1.5*   ALKPHOS 38*   ALT 26   AST 30   BILITOT 1.0     Coagulation:   Recent Labs  Lab 05/12/18 0715   INR 1.2       Significant Imaging:  Labs: Reviewed

## 2018-05-12 NOTE — PLAN OF CARE
Problem: Patient Care Overview  Goal: Plan of Care Review  Outcome: Ongoing (interventions implemented as appropriate)  Pt resting comfortably. No acute events overnight AAO x4. No fall and injuries overnight. BG at bedtime, Levermir administered as ordered by MD. Prn pain meds administered for pain management. Pt stable will continue to monitor.

## 2018-05-12 NOTE — PROGRESS NOTES
Progress Note   Hospital Medicine         Patient Name: Michelle Pappas  MRN:  [id number]  [facility] [facility] AMELIA Cuevas MD  Date of Admission:  5/4/2018     Length of Stay:  LOS: 8 days   Expected Discharge Date: 5/14/2018  Principal Problem:  Hydrothorax       Subjective:     Interval History/Overnight Events:  Patient had no acute events overnight. Creatinine slightly better today, will re attempt IR TIPS on Monday.     Review of Systems   Constitutional: Negative for chills, fatigue, fever.   HENT: Negative for sore throat, trouble swallowing.    Eyes: Negative for photophobia, visual disturbance.   Respiratory: Negative for cough, shortness of breath.    Cardiovascular: Negative for chest pain, palpitations, leg swelling.   Gastrointestinal: Negative for abdominal pain, constipation, diarrhea, nausea, vomiting.   Endocrine: Negative for cold intolerance, heat intolerance.   Genitourinary: Negative for dysuria, frequency.   Musculoskeletal: Negative for arthralgias, myalgias.   Skin: Negative for rash, wound, erythema   Neurological: Negative for dizziness, syncope, weakness, light-headedness.   Psychiatric/Behavioral: Negative for confusion, hallucinations, anxiety  All other systems reviewed and are negative.    Objective:     Temp:  [97 °F (36.1 °C)-98.1 °F (36.7 °C)]   Pulse:  [63-78]   Resp:  [16-18]   BP: ()/(49-56)   SpO2:  [94 %-98 %]       Physical Exam:  Constitutional: Appears well-developed and well-nourished.   Head: Normocephalic and atraumatic.   Mouth/Throat: Oropharynx is clear and moist.   Eyes: EOM are normal. Pupils are equal, round, and reactive to light. No scleral icterus.   Neck: Normal range of motion. Neck supple.   Cardiovascular: Normal rate and regular rhythm.  No murmur heard.  Pulmonary/Chest: Effort normal and breath sounds normal. No respiratory distress. No wheezes, rales, or rhonchi  Abdominal: Soft. Bowel sounds are normal.  No distension or  tenderness  Musculoskeletal: Normal range of motion. No edema.   Neurological: Alert and oriented to person, place, and time.   Skin: Skin is warm and dry.   Psychiatric: Normal mood and affect. Behavior is normal.       Recent Labs  Lab 05/07/18  0546 05/08/18  0431 05/09/18  0538 05/10/18  0423 05/11/18 0329 05/12/18  0715   WBC 2.01* 2.76* 2.27* 4.99 2.02* 1.45*   HGB 9.2* 8.1* 8.4* 8.5* 7.0* 7.7*   HCT 29.5* 25.5* 27.2* 27.6* 22.4* 25.0*   PLT 63* 50* 67* 53* 51* 46*       Recent Labs  Lab 05/10/18  0424 05/11/18 0329 05/12/18  0715   * 134* 136   K 3.7 3.9 3.4*   CL 97 96 98   CO2 28 30* 33*   BUN 22* 26* 27*   CREATININE 1.5* 1.7* 1.5*   * 197* 206*   CALCIUM 8.6* 9.0 9.0   MG 1.8 2.1 2.3   PHOS 4.7* 3.9 2.9       Recent Labs  Lab 05/10/18  0423 05/10/18  0424 05/11/18 0329 05/12/18  0715   ALKPHOS  --  55 43* 38*   ALT  --  37 31 26   AST  --  51* 35 30   ALBUMIN  --  2.3* 3.3* 3.7   PROT  --  5.3* 5.7* 5.9*   BILITOT  --  0.6 0.6 1.0   INR 1.2  --  1.2 1.2       Recent Labs  Lab 05/10/18  2130 05/11/18  0806 05/11/18  1126 05/11/18  1710 05/11/18  2137 05/12/18  0932   POCTGLUCOSE 218* 202* 157* 232* 166* 190*        ciprofloxacin HCl  500 mg Oral Daily    clonazePAM  1 mg Oral QHS    fluticasone  2 spray Each Nare Daily    folic acid  1 mg Oral Daily    insulin detemir U-100  5 Units Subcutaneous QHS    lactulose  30 g Oral BID    lidocaine  1 patch Transdermal Q24H    midodrine  15 mg Oral TID    traZODone  100 mg Oral QHS       Assessment and Plan     Ms. Michelle Pappas is a 58 y.o. female who presented to [facility] on 5/4/2018 with     Hospital Course:    Ms. Michelle Pappas was admitted to Hospital Medicine for management of     Active Hospital Problems    Diagnosis  POA    *Hepatic Hydrothorax [J94.8]  Yes    ARIANNE (acute kidney injury) [N17.9]  No    Idiopathic hypotension [I95.0]  Yes    Pancytopenia [D61.818]  Yes    CKD stage 3 due to type 2 diabetes mellitus [E11.22,  N18.3]  Yes    Hypokalemia [E87.6]  No    Decompensated HCV cirrhosis [B19.20, K74.69]  Yes     Chronic    Insomnia [G47.00]  Yes     Chronic    Pleural effusion [J90]  Yes    Hypoalbuminemia [E88.09]  Yes    Thrombocytopenia [D69.6]  Yes    Other cirrhosis of liver [K74.69]  Yes    Diabetes [E11.9]  Yes    Chronic hepatitis C without hepatic coma [B18.2]  Yes      Resolved Hospital Problems    Diagnosis Date Resolved POA   No resolved problems to display.     # Hepatic hydrothorax  # Ascites   - s/p thoracentesis 5/7 with 500 cc removed  - failed TIPS, planning on inpatient TIPS on Monday 05/14/2018    # Decompensated Hep C Cirrhosis with ascites   MELD-Na score: 13 at 5/12/2018  7:15 AM  MELD score: 12 at 5/12/2018  7:15 AM  Calculated from:  Serum Creatinine: 1.5 mg/dL at 5/12/2018  7:15 AM  Serum Sodium: 136 mmol/L at 5/12/2018  7:15 AM  Total Bilirubin: 1 mg/dL at 5/12/2018  7:15 AM  INR(ratio): 1.2 at 5/12/2018  7:15 AM  Age: 58 years  - s/p HARVONI treatment   - hepatology consult  - holding off on an inpatient liver tx evaluation with low meld and planning for TIPS to bridge patient  - holding diuretics due to ARIANNE     # ARIANNE  - stopping lasix and aldactone and giving albumin  - IV albumin   - Creatinine trend: 1.2-->1.5-->1.7-->1.5  - 1 unit of PRBCs on 05/11/2018    # Hypotension  - increase midodrine 15 mg PO TID;   - blood cultures NGTD   unable to do IR paracentesis due to no pocket of fluid    # Pancytopenia  - monitor for now    # DM2 with nephropathy   # CKD stage 3  - SSI  - Hba1c of 6.3    # Hypoalbuminemia  - PAB in AM    # Hypokalemia  -replace    Diet:  Low sodium  GI PPx:    DVT PPx:    Goals of Care:      High Risk Conditions:  Respiratory failure    Disposition:  Tomorrow with follow up on Monday for outpatient TIPS procedure     Phill Thurston MD  Medical Director [facility] Medicine  Spectra:  [phone]  Pager: [phone]

## 2018-05-13 NOTE — PROGRESS NOTES
Progress Note   Hospital Medicine         Patient Name: Michelle Pappas  MRN:  [id number]  [facility] Medicine Team: [facility] L Jack Cuevas MD  Date of Admission:  5/4/2018     Length of Stay:  LOS: 9 days   Expected Discharge Date: 5/14/2018  Principal Problem:  Hydrothorax       Subjective:     Interval History/Overnight Events:  Patient had no acute events overnight. Neuopogen ordered x1, TIPS tomorrow.     Review of Systems   Constitutional: Negative for chills, fatigue, fever.   HENT: Negative for sore throat, trouble swallowing.    Eyes: Negative for photophobia, visual disturbance.   Respiratory: Negative for cough, shortness of breath.    Cardiovascular: Negative for chest pain, palpitations, leg swelling.   Gastrointestinal: Negative for abdominal pain, constipation, diarrhea, nausea, vomiting.   Endocrine: Negative for cold intolerance, heat intolerance.   Genitourinary: Negative for dysuria, frequency.   Musculoskeletal: Negative for arthralgias, myalgias.   Skin: Negative for rash, wound, erythema   Neurological: Negative for dizziness, syncope, weakness, light-headedness.   Psychiatric/Behavioral: Negative for confusion, hallucinations, anxiety  All other systems reviewed and are negative.    Objective:     Temp:  [97.9 °F (36.6 °C)-98.6 °F (37 °C)]   Pulse:  [64-73]   Resp:  [16-18]   BP: ()/(54-62)   SpO2:  [95 %-98 %]       Physical Exam:  Constitutional: Appears well-developed and well-nourished.   Head: Normocephalic and atraumatic.   Mouth/Throat: Oropharynx is clear and moist.   Eyes: EOM are normal. Pupils are equal, round, and reactive to light. No scleral icterus.   Neck: Normal range of motion. Neck supple.   Cardiovascular: Normal rate and regular rhythm.  No murmur heard.  Pulmonary/Chest: Effort normal and breath sounds normal. No respiratory distress. No wheezes, rales, or rhonchi  Abdominal: Soft. Bowel sounds are normal.  No distension or tenderness  Musculoskeletal: Normal  range of motion. No edema.   Neurological: Alert and oriented to person, place, and time.   Skin: Skin is warm and dry.   Psychiatric: Normal mood and affect. Behavior is normal.       Recent Labs  Lab 05/08/18  0431 05/09/18  0538 05/10/18  0423 05/11/18  0329 05/12/18  0715 05/13/18  0548   WBC 2.76* 2.27* 4.99 2.02* 1.45* 1.35*   HGB 8.1* 8.4* 8.5* 7.0* 7.7* 7.6*   HCT 25.5* 27.2* 27.6* 22.4* 25.0* 24.1*   PLT 50* 67* 53* 51* 46* 53*       Recent Labs  Lab 05/11/18  0329 05/12/18  0715 05/13/18  0548   * 136 139   K 3.9 3.4* 3.4*   CL 96 98 101   CO2 30* 33* 28   BUN 26* 27* 22*   CREATININE 1.7* 1.5* 1.3   * 206* 139*   CALCIUM 9.0 9.0 9.0   MG 2.1 2.3 2.1   PHOS 3.9 2.9 3.4       Recent Labs  Lab 05/11/18  0329 05/12/18  0715 05/13/18  0548   ALKPHOS 43* 38* 38*   ALT 31 26 20   AST 35 30 24   ALBUMIN 3.3* 3.7 3.4*   PROT 5.7* 5.9* 5.6*   BILITOT 0.6 1.0 0.7   INR 1.2 1.2 1.2       Recent Labs  Lab 05/11/18  1710 05/11/18  2137 05/12/18  0932 05/12/18  2224 05/13/18  0824 05/13/18  1158   POCTGLUCOSE 232* 166* 190* 165* 151* 185*        ciprofloxacin HCl  500 mg Oral Daily    clonazePAM  1 mg Oral QHS    fluticasone  2 spray Each Nare Daily    folic acid  1 mg Oral Daily    insulin detemir U-100  5 Units Subcutaneous QHS    lactulose  30 g Oral BID    lidocaine  1 patch Transdermal Q24H    midodrine  15 mg Oral TID    potassium chloride SA  40 mEq Oral Once    traZODone  100 mg Oral QHS       Assessment and Plan     Ms. Michelle Pappas is a 58 y.o. female who presented to [facility] on 5/4/2018 with     Hospital Course:    Ms. Michelle Pappas was admitted to Hospital Medicine for management of     Active Hospital Problems    Diagnosis  POA    *Hepatic Hydrothorax [J94.8]  Yes    ARIANNE (acute kidney injury) [N17.9]  No    Idiopathic hypotension [I95.0]  Yes    Pancytopenia [D61.818]  Yes    CKD stage 3 due to type 2 diabetes mellitus [E11.22, N18.3]  Yes    Hypokalemia [E87.6]  No     Decompensated HCV cirrhosis [B19.20, K74.69]  Yes     Chronic    Insomnia [G47.00]  Yes     Chronic    Pleural effusion [J90]  Yes    Hypoalbuminemia [E88.09]  Yes    Thrombocytopenia [D69.6]  Yes    Other cirrhosis of liver [K74.69]  Yes    Diabetes [E11.9]  Yes    Chronic hepatitis C without hepatic coma [B18.2]  Yes      Resolved Hospital Problems    Diagnosis Date Resolved POA   No resolved problems to display.     # Hepatic hydrothorax  # Ascites   - s/p thoracentesis 5/7 with 500 cc removed  - failed TIPS, planning on inpatient TIPS on Monday 05/14/2018    # Decompensated Hep C Cirrhosis with ascites   MELD-Na score: 11 at 5/13/2018  5:48 AM  MELD score: 11 at 5/13/2018  5:48 AM  Calculated from:  Serum Creatinine: 1.3 mg/dL at 5/13/2018  5:48 AM  Serum Sodium: 139 mmol/L (Rounded to 137) at 5/13/2018  5:48 AM  Total Bilirubin: 0.7 mg/dL (Rounded to 1) at 5/13/2018  5:48 AM  INR(ratio): 1.2 at 5/13/2018  5:48 AM  Age: 58 years  - s/p HARVONI treatment   - hepatology consult  - holding off on an inpatient liver tx evaluation with low meld and planning for TIPS to bridge patient  - holding diuretics due to ARIANNE     # ARIANNE  - stopping lasix and aldactone and giving albumin  - IV albumin   - Creatinine trend: 1.2-->1.5-->1.7-->1.5  - 1 unit of PRBCs on 05/11/2018    # Hypotension  - increase midodrine 15 mg PO TID;   - blood cultures NGTD   unable to do IR paracentesis due to no pocket of fluid    # Pancytopenia  - one dose of Neupogen given on 05/13/18    # DM2 with nephropathy   # CKD stage 3  - SSI  - Hba1c of 6.3    # Hypoalbuminemia  - PAB in AM    # Hypokalemia  -replace    Diet:  Low sodium  GI PPx:    DVT PPx:    Goals of Care:      High Risk Conditions:  Respiratory failure    Disposition:  Tomorrow with follow up on Monday for outpatient TIPS procedure     Phill Thurston MD  Medical Director [facility] Medicine  Spectra:  [phone]  Pager: [phone]

## 2018-05-13 NOTE — ASSESSMENT & PLAN NOTE
57 y/o female with hx of HCV cirrhosis (complicated by prior SBP, ascites, hydrothorax, and hepatic encephalopathy) who was [place] [facility] [place] hepatology evaluation.    -She is now s/p thoracentesis this admission on 5/5.   -Pleural fluid studies consistent with hepatic hydrothorax (transudative, SAAG 1.8 c/w portal HTN.)  -Echo & CT chest negative for alternative etiologies of hydrothorax   -Echo with normal heart function (EF 60-65%), trivial tricuspid regurgitation, pulmonary HTN 22 mmHg  -Not enough ascitic fluid for paracentesis.  -Plan for TIPS for volume overload. Unsuccessful 05/10, will reattempt next week. Right upper quadrant pain following procedure; CT abd negative for acute post-procedural complication      Plan/Recs  -Reattempt TIPS on Monday 05/14-NPO and hold anticoagulation at midnight.  - given leukopenia, give 1 dose neupogen today  -Titrate lactulose for 3-4 BM/day  -Strict I/ O; daily wt

## 2018-05-13 NOTE — NURSING
Paged Med L related to WBC results 1.35.Awaiting return call at 0851 received return call from Dr. Noguera no new orders given.

## 2018-05-13 NOTE — PLAN OF CARE
Problem: Patient Care Overview  Goal: Plan of Care Review  Outcome: Ongoing (interventions implemented as appropriate)  Patient AAO x4, calm and cooperative. No acute events overnight. Atarax administered for itching. TIPS scheduled for Monday.Pt stable will continue to monitor.

## 2018-05-13 NOTE — PLAN OF CARE
Problem: Patient Care Overview  Goal: Plan of Care Review  Outcome: Ongoing (interventions implemented as appropriate)   05/13/18 1726   Coping/Psychosocial   Plan Of Care Reviewed With patient       Problem: Diabetes, Type 2 (Adult)  Goal: Signs and Symptoms of Listed Potential Problems Will be Absent, Minimized or Managed (Diabetes, Type 2)  Signs and symptoms of listed potential problems will be absent, minimized or managed by discharge/transition of care (reference Diabetes, Type 2 (Adult) CPG).   Outcome: Ongoing (interventions implemented as appropriate)   05/13/18 1726   Diabetes, Type 2   Problems Assessed (Type 2 Diabetes) all   Problems Present (Type 2 Diabetes) none       Problem: Fall Risk (Adult)  Goal: Absence of Falls  Patient will demonstrate the desired outcomes by discharge/transition of care.   Outcome: Ongoing (interventions implemented as appropriate)   05/13/18 1726   Fall Risk (Adult)   Absence of Falls making progress toward outcome

## 2018-05-13 NOTE — PROGRESS NOTES
"[facility]  Hepatology  Progress Note    Patient Name: Michelle Pappas  MRN: [id number]  Admission Date: 5/4/2018  Hospital Length of Stay: 9 days  Attending Provider: Jack Cuevas MD   Primary Care Physician: Taj Luna DO  Principal Problem:Hydrothorax    Subjective:     Transplant status: No    HPI: This is a 59 y/o female with hx of HCV cirrhosis (complicated by prior SBP, ascites, hydrothorax) who [place] [facility] [place].  Pt admitted to OSH ([facility]) 4/26 - 4/29 for recurrent right hydrothorax. She underwent thoracentesis, removed 1.5L.   Fluid transudate: GLUC 183, LDH 24, PROT <23, WBC 85 - L66, S22, RBC 4600  She was discharged and then returned 5/4 with recurrent symptoms of SOB and reaccumulation of hydrothorax.   She was taking lasix 40mg BID and aldactone 100mg daily, although she was taking all her lasix at once.  She has required 4 thoracentesis on right in the past.    Today she feels better s/p thoracentesis last night. Still with abd distention.  She is on room air. She denies other complaints presently.    Prior records per Dr. Walton clinic note:  "2002- liver biopsy- fatty liver and some scar  2012- another biopsy- cirrhosis  fluid overload started 10/15- required LVP ; complicated by SBP    Since last year having hep hydrothorax that requires thoracentesis- 4 x since June 2018  Harvoni x 3 months 2015-cured  EGD- no varices by report 02/18 & Cholecystectomy denied due to cirrhosis"      Interval History:     Still with some right sided pain but resolving.       Current Facility-Administered Medications   Medication    0.9%  NaCl infusion (for blood administration)    acetaminophen tablet 650 mg    albuterol-ipratropium 2.5mg-0.5mg/3mL nebulizer solution 3 mL    ciprofloxacin HCl tablet 500 mg    clonazePAM tablet 1 mg    dextrose 50% injection 12.5 g    dextrose 50% injection 25 g    fluticasone 50 mcg/actuation nasal spray 100 " mcg    folic acid tablet 1 mg    glucagon (human recombinant) injection 1 mg    glucose chewable tablet 16 g    glucose chewable tablet 24 g    hydrOXYzine HCl tablet 25 mg    hyoscyamine SL tablet 0.125 mg    insulin aspart U-100 pen 0-5 Units    insulin detemir U-100 pen 5 Units    lactulose 20 gram/30 mL solution Soln 30 g    lidocaine 5 % patch 1 patch    midodrine tablet 15 mg    omnipaque 350 iohexol 75 mL    ondansetron disintegrating tablet 8 mg    oxyCODONE immediate release tablet 5 mg    promethazine tablet 25 mg    sodium chloride 0.9% flush 5 mL    trazodone split tablet 100 mg       Objective:     Vital Signs (Most Recent):  Temp: 98.3 °F (36.8 °C) (05/13/18 0828)  Pulse: 66 (05/13/18 0828)  Resp: 16 (05/13/18 0828)  BP: 99/61 (05/13/18 0828)  SpO2: 98 % (05/13/18 0828) Vital Signs (24h Range):  Temp:  [97.9 °F (36.6 °C)-98.6 °F (37 °C)] 98.3 °F (36.8 °C)  Pulse:  [65-73] 66  Resp:  [16-18] 16  SpO2:  [95 %-98 %] 98 %  BP: ()/(56-62) 99/61     Weight: 70.9 kg (156 lb 4.9 oz) (05/13/18 0600)  Body mass index is 27.69 kg/m².    Physical Exam   Constitutional: She is oriented to person, place, and time. She appears well-developed and well-nourished.   HENT:   Head: Normocephalic and atraumatic.   Eyes: Pupils are equal, round, and reactive to light. No scleral icterus.   Cardiovascular: Normal rate.    No murmur heard.  Pulmonary/Chest: Effort normal.   Abdominal: Soft. Bowel sounds are normal. She exhibits distension. There is tenderness. There is no guarding.   Musculoskeletal: She exhibits no edema or tenderness.   Neurological: She is alert and oriented to person, place, and time.   Skin: Skin is warm and dry.   Psychiatric: She has a normal mood and affect. Her behavior is normal.   Vitals reviewed.      MELD-Na score: 11 at 5/13/2018  5:48 AM  MELD score: 11 at 5/13/2018  5:48 AM  Calculated from:  Serum Creatinine: 1.3 mg/dL at 5/13/2018  5:48 AM  Serum Sodium: 139 mmol/L  (Rounded to 137) at 5/13/2018  5:48 AM  Total Bilirubin: 0.7 mg/dL (Rounded to 1) at 5/13/2018  5:48 AM  INR(ratio): 1.2 at 5/13/2018  5:48 AM  Age: 58 years    Significant Labs:  CBC:   Recent Labs  Lab 05/13/18  0548   WBC 1.35*   RBC 2.89*   HGB 7.6*   HCT 24.1*   PLT 53*     BMP:   Recent Labs  Lab 05/13/18  0548   *      K 3.4*      CO2 28   BUN 22*   CREATININE 1.3   CALCIUM 9.0     CMP:   Recent Labs  Lab 05/13/18  0548   *   CALCIUM 9.0   ALBUMIN 3.4*   PROT 5.6*      K 3.4*   CO2 28      BUN 22*   CREATININE 1.3   ALKPHOS 38*   ALT 20   AST 24   BILITOT 0.7     Coagulation:   Recent Labs  Lab 05/13/18  0548   INR 1.2       Significant Imaging:  Labs: Reviewed    Assessment/Plan:     Decompensated HCV cirrhosis    59 y/o female with hx of HCV cirrhosis (complicated by prior SBP, ascites, hydrothorax, and hepatic encephalopathy) who was transferred to [facility] 5/4 for hepatology evaluation.    -She is now s/p thoracentesis this admission on 5/5.   -Pleural fluid studies consistent with hepatic hydrothorax (transudative, SAAG 1.8 c/w portal HTN.)  -Echo & CT chest negative for alternative etiologies of hydrothorax   -Echo with normal heart function (EF 60-65%), trivial tricuspid regurgitation, pulmonary HTN 22 mmHg  -Not enough ascitic fluid for paracentesis.  -Plan for TIPS for volume overload. Unsuccessful 05/10, will reattempt next week. Right upper quadrant pain following procedure; CT abd negative for acute post-procedural complication      Plan/Recs  -Reattempt TIPS on Monday 05/14-NPO and hold anticoagulation at midnight.  - given leukopenia, give 1 dose neupogen today  -Titrate lactulose for 3-4 BM/day  -Strict I/ O; daily wt              Thank you for your consult. I will follow-up with patient. Please contact us if you have any additional questions.    Onesimo Juares MD  Hepatology  [facility]

## 2018-05-13 NOTE — SUBJECTIVE & OBJECTIVE
Interval History:     Still with some right sided pain but resolving.       Current Facility-Administered Medications   Medication    0.9%  NaCl infusion (for blood administration)    acetaminophen tablet 650 mg    albuterol-ipratropium 2.5mg-0.5mg/3mL nebulizer solution 3 mL    ciprofloxacin HCl tablet 500 mg    clonazePAM tablet 1 mg    dextrose 50% injection 12.5 g    dextrose 50% injection 25 g    fluticasone 50 mcg/actuation nasal spray 100 mcg    folic acid tablet 1 mg    glucagon (human recombinant) injection 1 mg    glucose chewable tablet 16 g    glucose chewable tablet 24 g    hydrOXYzine HCl tablet 25 mg    hyoscyamine SL tablet 0.125 mg    insulin aspart U-100 pen 0-5 Units    insulin detemir U-100 pen 5 Units    lactulose 20 gram/30 mL solution Soln 30 g    lidocaine 5 % patch 1 patch    midodrine tablet 15 mg    omnipaque 350 iohexol 75 mL    ondansetron disintegrating tablet 8 mg    oxyCODONE immediate release tablet 5 mg    promethazine tablet 25 mg    sodium chloride 0.9% flush 5 mL    trazodone split tablet 100 mg       Objective:     Vital Signs (Most Recent):  Temp: 98.3 °F (36.8 °C) (05/13/18 0828)  Pulse: 66 (05/13/18 0828)  Resp: 16 (05/13/18 0828)  BP: 99/61 (05/13/18 0828)  SpO2: 98 % (05/13/18 0828) Vital Signs (24h Range):  Temp:  [97.9 °F (36.6 °C)-98.6 °F (37 °C)] 98.3 °F (36.8 °C)  Pulse:  [65-73] 66  Resp:  [16-18] 16  SpO2:  [95 %-98 %] 98 %  BP: ()/(56-62) 99/61     Weight: 70.9 kg (156 lb 4.9 oz) (05/13/18 0600)  Body mass index is 27.69 kg/m².    Physical Exam   Constitutional: She is oriented to person, place, and time. She appears well-developed and well-nourished.   HENT:   Head: Normocephalic and atraumatic.   Eyes: Pupils are equal, round, and reactive to light. No scleral icterus.   Cardiovascular: Normal rate.    No murmur heard.  Pulmonary/Chest: Effort normal.   Abdominal: Soft. Bowel sounds are normal. She exhibits distension. There is  tenderness. There is no guarding.   Musculoskeletal: She exhibits no edema or tenderness.   Neurological: She is alert and oriented to person, place, and time.   Skin: Skin is warm and dry.   Psychiatric: She has a normal mood and affect. Her behavior is normal.   Vitals reviewed.      MELD-Na score: 11 at 5/13/2018  5:48 AM  MELD score: 11 at 5/13/2018  5:48 AM  Calculated from:  Serum Creatinine: 1.3 mg/dL at 5/13/2018  5:48 AM  Serum Sodium: 139 mmol/L (Rounded to 137) at 5/13/2018  5:48 AM  Total Bilirubin: 0.7 mg/dL (Rounded to 1) at 5/13/2018  5:48 AM  INR(ratio): 1.2 at 5/13/2018  5:48 AM  Age: 58 years    Significant Labs:  CBC:   Recent Labs  Lab 05/13/18  0548   WBC 1.35*   RBC 2.89*   HGB 7.6*   HCT 24.1*   PLT 53*     BMP:   Recent Labs  Lab 05/13/18  0548   *      K 3.4*      CO2 28   BUN 22*   CREATININE 1.3   CALCIUM 9.0     CMP:   Recent Labs  Lab 05/13/18  0548   *   CALCIUM 9.0   ALBUMIN 3.4*   PROT 5.6*      K 3.4*   CO2 28      BUN 22*   CREATININE 1.3   ALKPHOS 38*   ALT 20   AST 24   BILITOT 0.7     Coagulation:   Recent Labs  Lab 05/13/18  0548   INR 1.2       Significant Imaging:  Labs: Reviewed

## 2018-05-14 NOTE — PROCEDURES
Radiology Post-Procedure Note    Pre Op Diagnosis: hepatic hydrothorax with hemodynamically significant PVT.    Post Op Diagnosis:     Procedure: Transjugular intrahepatic portosystemic shunt (TIPS)    Performed by: Damian    Written Informed Consent Obtained: Yes    Specimen Removed: NO    Estimated Blood Loss: Minimal    Findings: Under general anesthesia, via right internal jugular approach using ultrasound and fluoroscopic surveillance, a transhepatic portosystemic shunt was created via a middle hepatic and right portal vein.  Postprocedural angiography indicates hepato-pedal flow in the right portal vein. Portal vein thrombolysis was performed in the main portal vein.    Pre gradient 15  Post gradient 8     The catheter was removed and hemostasis achieved without hematoma.     There were no complications.  Please see Imaging report for further details.     Tomorrow begin therapeutic anticoagulation with heparin and transition to Lovenox or coumadin.    Taj Calix MD  Radiology

## 2018-05-14 NOTE — TRANSFER OF CARE
"Anesthesia Transfer of Care Note    Patient: Michelle Pappas    Procedure(s) Performed: Procedure(s) (LRB):  TIPS (N/A)    Patient location: PACU    Anesthesia Type: general    Transport from OR: Transported from OR on 6-10 L/min O2 by face mask with adequate spontaneous ventilation    Post pain: adequate analgesia    Post assessment: no apparent anesthetic complications and tolerated procedure well    Post vital signs: stable    Level of consciousness: awake, alert and oriented    Nausea/Vomiting: no nausea/vomiting    Complications: none    Transfer of care protocol was followed      Last vitals:   Visit Vitals  BP (!) 103/56 (BP Location: Right arm, Patient Position: Sitting)   Pulse 80   Temp 36.9 °C (98.4 °F) (Oral)   Resp 15   Ht 5' 3" (1.6 m)   Wt 70 kg (154 lb 5.2 oz)   SpO2 100%   Breastfeeding? No   BMI 27.34 kg/m²     "

## 2018-05-14 NOTE — H&P
Inpatient Radiology Pre-procedure Note    History of Present Illness:  Michelle Pappas is a 58 y.o. female  who presents for TIPS placement reattempted in the setting of hepatic hydrothorax now with inability to receive routine thoracentesis at her home medical center. ESLD from HCV. Patient with chronic PVT. No evidence of progression of PVT on recent CT scan from 5/10. Patient with significant pain after TIPS attempt on , but no significant complication from CT scan.     Patient reports only a single episode of confusion and in . Cardiac echo essentially unremarkable.    Admission H&P reviewed.  Past Medical History:   Diagnosis Date    Anxiety 2018    Depression 2018    Essential hypertension 2018    Hepatic Hydrothorax 2018    Hypoalbuminemia 2018    Other cirrhosis of liver 2018    Thrombocytopenia 2018     Past Surgical History:   Procedure Laterality Date    breast reduction       SECTION      HYSTERECTOMY         Review of Systems:   As documented in primary team H&P    Home Meds:   Prior to Admission medications    Medication Sig Start Date End Date Taking? Authorizing Provider   clonazePAM (KLONOPIN) 1 MG tablet Take 1 tablet by mouth twice daily as needed for anxiety 7/13/15  Yes Historical Provider, MD   furosemide (LASIX) 40 MG tablet Take 40 mg by mouth 2 (two) times daily.   Yes Historical Provider, MD   glimepiride (AMARYL) 2 MG tablet Take 2 mg by mouth as needed.   Yes Historical Provider, MD   hyoscyamine (LEVSIN/SL) 0.125 mg Subl Take 1 tablet by mouth up to four times daily 8/31/15  Yes Historical Provider, MD   lactulose (CHRONULAC) 10 gram/15 mL solution Take 30 g by mouth 2 (two) times daily.   Yes Historical Provider, MD   metOLazone (ZAROXOLYN) 5 MG tablet Take 5 mg by mouth once daily.   Yes Historical Provider, MD   promethazine (PHENERGAN) 25 MG tablet Take 25 mg by mouth as needed for Nausea.   Yes Historical Provider, MD    spironolactone (ALDACTONE) 100 MG tablet Take 100 mg by mouth 2 (two) times daily.   Yes Historical Provider, MD   traZODone (DESYREL) 100 MG tablet Take 100 mg by mouth every evening.   Yes Historical Provider, MD   ciprofloxacin HCl (CIPRO) 500 MG tablet Take 1 tablet (500 mg total) by mouth once daily. 5/1/18   Andreea Walton MD   hydrOXYzine HCl (ATARAX) 25 MG tablet Take 1 tablet (25 mg total) by mouth daily as needed for Itching. 5/1/18   Andreea Walton MD     Scheduled Meds:    ciprofloxacin HCl  500 mg Oral Daily    clonazePAM  1 mg Oral QHS    fluticasone  2 spray Each Nare Daily    folic acid  1 mg Oral Daily    insulin detemir U-100  5 Units Subcutaneous QHS    lactulose  30 g Oral BID    lidocaine  1 patch Transdermal Q24H    midodrine  15 mg Oral TID    traZODone  100 mg Oral QHS     Continuous Infusions:   PRN Meds:sodium chloride, acetaminophen, albuterol-ipratropium 2.5mg-0.5mg/3mL, dextrose 50%, dextrose 50%, glucagon (human recombinant), glucose, glucose, hydrOXYzine HCl, hyoscyamine, insulin aspart U-100, omnipaque 350 iohexol, ondansetron, oxyCODONE, promethazine, sodium chloride 0.9%  Anticoagulants/Antiplatelets: no anticoagulation    Allergies: Review of patient's allergies indicates:  No Known Allergies  Sedation Hx: have not been any systemic reactions    Labs:    Recent Labs  Lab 05/14/18  0344   INR 1.2       Recent Labs  Lab 05/14/18  0345   WBC 4.55   HGB 7.5*   HCT 23.8*   MCV 84   PLT 44*      Recent Labs  Lab 05/14/18  0344   *      K 3.4*      CO2 30*   BUN 17   CREATININE 1.3   CALCIUM 9.4   MG 1.8   ALT 18   AST 18   ALBUMIN 3.1*   BILITOT 0.9         Vitals:  Temp: 98.4 °F (36.9 °C) (05/14/18 0734)  Pulse: 72 (05/14/18 0734)  Resp: 16 (05/14/18 0734)  BP: (!) 88/52 (05/14/18 0734)  SpO2: 95 % (05/14/18 0734)     Physical Exam:  ASA: 3  Mallampati: per anesthesia    General: no acute distress  Mental Status: alert and oriented to person, place  and time  HEENT: normocephalic, atraumatic  Chest: unlabored breathing  Heart: regular heart rate  Abdomen: nondistended  Extremity: moves all extremities    Plan: TIPS placement  Sedation Plan: per anesthesia    Taj Calix MD  Radiology

## 2018-05-14 NOTE — PROGRESS NOTES
Transjugular liver pressures:    Right atrium= 9  Main Portal Pre: 24    Immediate Post TIPS: 22  Immediate Post TIPS right atrium:14    Post Portal (2): 21  Post TIPS Right atrium: 13

## 2018-05-14 NOTE — PLAN OF CARE
Problem: Patient Care Overview  Goal: Plan of Care Review  Outcome: Ongoing (interventions implemented as appropriate)  Patient free from falls overnight and continues to ambulate independently in the room. Patient has been NPO since midnight for scheduled TIPS procedure. Patient c/o pain and PRN analgesic administered w/minimal relief. Patient reports overall loss of appetite and minimal oral intake.

## 2018-05-14 NOTE — PROGRESS NOTES
TIPS procedure complete. Procedure site (right neck) dressing CDI.   Awaiting PACU bed. Pt remains in the care of anesthesia team.

## 2018-05-14 NOTE — NURSING TRANSFER
Nursing Transfer Note      5/14/2018     Transfer To: 1042A    Transfer via stretcher    Transfer with 2L per NC    Transported by PCT    Medicines sent: none    Chart send with patient: Yes    Notified: brother

## 2018-05-14 NOTE — ANESTHESIA POSTPROCEDURE EVALUATION
"Anesthesia Post Evaluation    Patient: Michelle Pappas    Procedure(s) Performed: Procedure(s) (LRB):  TIPS (N/A)    Final Anesthesia Type: general  Patient location during evaluation: PACU  Patient participation: Yes- Able to Participate  Level of consciousness: awake and alert  Post-procedure vital signs: reviewed and stable  Pain management: adequate  Airway patency: patent  PONV status at discharge: No PONV  Anesthetic complications: no      Cardiovascular status: blood pressure returned to baseline  Respiratory status: unassisted  Hydration status: euvolemic  Follow-up not needed.        Visit Vitals  BP (!) 99/57 (BP Location: Left arm, Patient Position: Lying)   Pulse 78   Temp 36.6 °C (97.9 °F) (Oral)   Resp 16   Ht 5' 3" (1.6 m)   Wt 70 kg (154 lb 5.2 oz)   SpO2 97%   Breastfeeding? No   BMI 27.34 kg/m²       Pain/Rajendra Score: Pain Assessment Performed: Yes (5/14/2018  3:02 PM)  Presence of Pain: denies (5/14/2018  3:02 PM)  Pain Rating Prior to Med Admin: 7 (5/14/2018  4:10 PM)  Pain Rating Post Med Admin: 3 (5/14/2018  2:38 AM)  Rajendra Score: 9 (5/14/2018  3:52 PM)      "

## 2018-05-14 NOTE — PROGRESS NOTES
Progress Note   Hospital Medicine         Patient Name: Michelle Pappas  MRN:  [id number]  [facility]: [facility] AMELIA Cuevas MD  Date of Admission:  5/4/2018     Length of Stay:  LOS: 10 days   Expected Discharge Date: 5/15/2018  Principal Problem:  Hydrothorax       Subjective:     Interval History/Overnight Events:  Patient had no acute events overnight. Neuopogen ordered yesterday with good results, TIPS today.     Review of Systems   Constitutional: Negative for chills, fatigue, fever.   HENT: Negative for sore throat, trouble swallowing.    Eyes: Negative for photophobia, visual disturbance.   Respiratory: Negative for cough, shortness of breath.    Cardiovascular: Negative for chest pain, palpitations, leg swelling.   Gastrointestinal: Negative for abdominal pain, constipation, diarrhea, nausea, vomiting.   Endocrine: Negative for cold intolerance, heat intolerance.   Genitourinary: Negative for dysuria, frequency.   Musculoskeletal: Negative for arthralgias, myalgias.   Skin: Negative for rash, wound, erythema   Neurological: Negative for dizziness, syncope, weakness, light-headedness.   Psychiatric/Behavioral: Negative for confusion, hallucinations, anxiety  All other systems reviewed and are negative.    Objective:     Temp:  [98 °F (36.7 °C)-98.8 °F (37.1 °C)]   Pulse:  [65-72]   Resp:  [16-20]   BP: ()/(52-57)   SpO2:  [93 %-97 %]       Physical Exam:  Constitutional: Appears well-developed and well-nourished.   Head: Normocephalic and atraumatic.   Mouth/Throat: Oropharynx is clear and moist.   Eyes: EOM are normal. Pupils are equal, round, and reactive to light. No scleral icterus.   Neck: Normal range of motion. Neck supple.   Cardiovascular: Normal rate and regular rhythm.  No murmur heard.  Pulmonary/Chest: Effort normal and breath sounds normal. No respiratory distress. No wheezes, rales, or rhonchi  Abdominal: Soft. Bowel sounds are normal.  No distension or  tenderness  Musculoskeletal: Normal range of motion. No edema.   Neurological: Alert and oriented to person, place, and time.   Skin: Skin is warm and dry.   Psychiatric: Normal mood and affect. Behavior is normal.       Recent Labs  Lab 05/09/18  0538 05/10/18  0423 05/11/18  0329 05/12/18  0715 05/13/18  0548 05/14/18  0345   WBC 2.27* 4.99 2.02* 1.45* 1.35* 4.55   HGB 8.4* 8.5* 7.0* 7.7* 7.6* 7.5*   HCT 27.2* 27.6* 22.4* 25.0* 24.1* 23.8*   PLT 67* 53* 51* 46* 53* 44*       Recent Labs  Lab 05/12/18  0715 05/13/18  0548 05/14/18  0344    139 140   K 3.4* 3.4* 3.4*   CL 98 101 102   CO2 33* 28 30*   BUN 27* 22* 17   CREATININE 1.5* 1.3 1.3   * 139* 153*   CALCIUM 9.0 9.0 9.4   MG 2.3 2.1 1.8   PHOS 2.9 3.4 3.1       Recent Labs  Lab 05/12/18  0715 05/13/18  0548 05/14/18  0344   ALKPHOS 38* 38* 42*   ALT 26 20 18   AST 30 24 18   ALBUMIN 3.7 3.4* 3.1*   PROT 5.9* 5.6* 5.3*   BILITOT 1.0 0.7 0.9   INR 1.2 1.2 1.2       Recent Labs  Lab 05/12/18  0932 05/12/18  2224 05/13/18  0824 05/13/18  1158 05/13/18  1653 05/14/18  0736   POCTGLUCOSE 190* 165* 151* 185* 135* 141*        ciprofloxacin HCl  500 mg Oral Daily    clonazePAM  1 mg Oral QHS    fluticasone  2 spray Each Nare Daily    folic acid  1 mg Oral Daily    insulin detemir U-100  5 Units Subcutaneous QHS    lactulose  30 g Oral BID    lidocaine  1 patch Transdermal Q24H    midodrine  15 mg Oral TID    traZODone  100 mg Oral QHS       Assessment and Plan     Ms. Michelle Pappas is a 58 y.o. female who presented to [facility] on 5/4/2018 with     Hospital Course:    Ms. Michelle Pappas was admitted to Hospital Medicine for management of     Active Hospital Problems    Diagnosis  POA    *Hepatic Hydrothorax [J94.8]  Yes    ARIANNE (acute kidney injury) [N17.9]  No    Idiopathic hypotension [I95.0]  Yes    Pancytopenia [D61.818]  Yes    CKD stage 3 due to type 2 diabetes mellitus [E11.22, N18.3]  Yes    Hypokalemia [E87.6]  No     Decompensated HCV cirrhosis [B19.20, K74.69]  Yes     Chronic    Insomnia [G47.00]  Yes     Chronic    Pleural effusion [J90]  Yes    Hypoalbuminemia [E88.09]  Yes    Thrombocytopenia [D69.6]  Yes    Other cirrhosis of liver [K74.69]  Yes    Diabetes [E11.9]  Yes    Chronic hepatitis C without hepatic coma [B18.2]  Yes      Resolved Hospital Problems    Diagnosis Date Resolved POA   No resolved problems to display.     # Hepatic hydrothorax  # Ascites   - s/p thoracentesis 5/7 with 500 cc removed  - failed TIPS, planning on inpatient TIPS on Monday 05/14/2018    # Decompensated Hep C Cirrhosis with ascites   MELD-Na score: 11 at 5/14/2018  3:44 AM  MELD score: 11 at 5/14/2018  3:44 AM  Calculated from:  Serum Creatinine: 1.3 mg/dL at 5/14/2018  3:44 AM  Serum Sodium: 140 mmol/L (Rounded to 137) at 5/14/2018  3:44 AM  Total Bilirubin: 0.9 mg/dL (Rounded to 1) at 5/14/2018  3:44 AM  INR(ratio): 1.2 at 5/14/2018  3:44 AM  Age: 58 years  - s/p HARVONI treatment   - hepatology consult  - holding off on an inpatient liver tx evaluation with low meld and planning for TIPS to bridge patient  - holding diuretics due to ARIANNE     # ARIANNE  - stopping lasix and aldactone and giving albumin  - IV albumin   - Creatinine trend: 1.2-->1.5-->1.7-->1.5-->1.3  - 1 unit of PRBCs on 05/11/2018    # Hypotension  - increase midodrine 15 mg PO TID;   - blood cultures NGTD   unable to do IR paracentesis due to no pocket of fluid    # Pancytopenia  - one dose of Neupogen given on 05/13/18, resolution after dose    # DM2 with nephropathy   # CKD stage 3  - SSI  - Hba1c of 6.3    # Hypoalbuminemia  - PAB in AM    # Hypokalemia  -replace    Diet:  Low sodium  GI PPx:    DVT PPx:    Goals of Care:      High Risk Conditions:  Respiratory failure    Disposition:  Tomorrow with follow up on Monday for outpatient TIPS procedure     Jack Prater MD  Staff Hospitalist  Department of Hospital Medicine  [facility]    [phone]

## 2018-05-14 NOTE — PROGRESS NOTES
"  [facility]  Adult Nutrition  Consult Note    SUMMARY     Recommendations    1. When medically feasible, resume 2 gram sodium diet (fluid restriction per MD). Add Boost Glucose Control ONS to aid in caloric intake.   2. RD to monitor & follow-up.    Goals: PO intake >50%  Nutrition Goal Status: new  Communication of RD Recs: reviewed with RN    Reason for Assessment    Reason for Assessment: length of stay  Diagnosis: other (see comments) (Hepatic hydrothorax)  Relevant Medical History: No sign. PMH  Interdisciplinary Rounds: did not attend    General Information Comments: Pt currently NPO for TIPS procedure. Prior to NPO status, pt w/ poor appetite.  Nutrition Discharge Planning: Adequate PO intake    Nutrition/Diet History    Patient Reported Diet/Restrictions/Preferences: general  Do you have any cultural, spiritual, Orthodoxy conflicts, given your current situation?: none  Factors Affecting Nutritional Intake: NPO, decreased appetite    Anthropometrics    Temp: 98.4 °F (36.9 °C)  Height Method: Stated  Height: 5' 3" (160 cm)  Height (inches): 63 in  Weight Method: Standard Scale  Weight: 70 kg (154 lb 5.2 oz)  Weight (lb): 154.32 lb  Ideal Body Weight (IBW), Female: 115 lb  % Ideal Body Weight, Female (lb): 134.19 lb  BMI (Calculated): 27.4  BMI Grade: 25 - 29.9 - overweight    Lab/Procedures/Meds    Pertinent Labs Reviewed: reviewed  Pertinent Labs Comments: GFR 45.6, Gluc 153, A1C 6.3  Pertinent Medications Reviewed: reviewed  Pertinent Medications Comments: Lactulose    Physical Findings/Assessment    Overall Physical Appearance: edematous, nourished  Oral/Mouth Cavity: WDL  Skin: intact    Estimated/Assessed Needs    Weight Used For Calorie Calculations: 70 kg (154 lb 5.2 oz)     Energy Calorie Requirements (kcal): 1568 kcal/d  Energy Need Method: [place]-St Jeor (1.25 PAL)     Protein Requirements: 70 g/d (1 g/kg)  Weight Used For Protein Calculations: 70 kg (154 lb 5.2 oz)     Fluid " Need Method: other (see comments) (Per MD or 1 mL/kcal)     Nutrition Prescription Ordered    Current Diet Order: NPO  Nutrition Order Comments: Was on 2 gram Na, 1000 mL FR    Nutrition Risk    Level of Risk/Frequency of Follow-up: moderate     Assessment and Plan    Nutrition Problem  Inadequate energy intake    Related to (etiology):   Decreased appetite    Signs and Symptoms (as evidenced by):   Poor PO intake    Nutrition Diagnosis Status:   New    Monitor and Evaluation    Food and Nutrient Intake: energy intake, food and beverage intake  Food and Nutrient Adminstration: diet order  Physical Activity and Function: nutrition-related ADLs and IADLs  Anthropometric Measurements: weight, weight change  Biochemical Data, Medical Tests and Procedures: lipid profile, inflammatory profile, glucose/endocrine profile, gastrointestinal profile, electrolyte and renal panel  Nutrition-Focused Physical Findings: overall appearance     Nutrition Follow-Up    RD Follow-up?: Yes

## 2018-05-15 NOTE — PROGRESS NOTES
"[facility]  Hepatology  Progress Note    Patient Name: Michelle Pappas  MRN: [id number]  Admission Date: 5/4/2018  Hospital Length of Stay: 11 days  Attending Provider: Jack Cuevas MD   Primary Care Physician: Taj Luna DO  Principal Problem:Hydrothorax    Subjective:     Transplant status: No    HPI: This is a 57 y/o female with hx of HCV cirrhosis (complicated by prior SBP, ascites, hydrothorax) who was transferred [place] [facility] [place] for hepatology evaluation.  Pt admitted to OSH (R Med) 4/26 - 4/29 for recurrent right hydrothorax. She underwent thoracentesis, removed 1.5L.   Fluid transudate: GLUC 183, LDH 24, PROT <23, WBC 85 - L66, S22, RBC 4600  She was discharged and then returned 5/4 with recurrent symptoms of SOB and reaccumulation of hydrothorax.   She was taking lasix 40mg BID and aldactone 100mg daily, although she was taking all her lasix at once.  She has required 4 thoracentesis on right in the past.    Today she feels better s/p thoracentesis last night. Still with abd distention.  She is on room air. She denies other complaints presently.    Prior records per Dr. Walton clinic note:  "2002- liver biopsy- fatty liver and some scar  2012- another biopsy- cirrhosis  fluid overload started 10/15- required LVP ; complicated by SBP    Since last year having hep hydrothorax that requires thoracentesis- 4 x since June 2018  Harvoni x 3 months 2015-cured  EGD- no varices by report 02/18 & Cholecystectomy denied due to cirrhosis"      Interval History:     No events overnight. Continues to have right sided abdominal pain, overall improving. Breathing comfortably on room air, denies chest pain.      Current Facility-Administered Medications   Medication    0.9%  NaCl infusion (for blood administration)    acetaminophen tablet 650 mg    albuterol-ipratropium 2.5mg-0.5mg/3mL nebulizer solution 3 mL    ciprofloxacin HCl tablet 500 mg    clonazePAM tablet 1 mg    dextrose 50% " injection 12.5 g    dextrose 50% injection 25 g    fluticasone 50 mcg/actuation nasal spray 100 mcg    folic acid tablet 1 mg    glucagon (human recombinant) injection 1 mg    glucose chewable tablet 16 g    glucose chewable tablet 24 g    heparin 25,000 units in dextrose 5% 250 mL (100 units/mL) bolus from bag; ADDITIONAL PRN BOLUS    heparin 25,000 units in dextrose 5% 250 mL (100 units/mL) bolus from bag; ADDITIONAL PRN BOLUS    heparin 25,000 units in dextrose 5% 250 mL (100 units/mL) infusion    hydrOXYzine HCl tablet 25 mg    hyoscyamine SL tablet 0.125 mg    insulin aspart U-100 pen 0-5 Units    insulin detemir U-100 pen 5 Units    lactulose 20 gram/30 mL solution Soln 30 g    lidocaine 5 % patch 1 patch    midodrine tablet 15 mg    omnipaque 350 iohexol 75 mL    ondansetron disintegrating tablet 8 mg    oxyCODONE immediate release tablet 5 mg    promethazine tablet 25 mg    sodium chloride 0.9% flush 5 mL    trazodone split tablet 100 mg       Objective:     Vital Signs (Most Recent):  Temp: 98 °F (36.7 °C) (05/15/18 1141)  Pulse: 66 (05/15/18 1141)  Resp: 18 (05/15/18 1141)  BP: (!) 102/54 (05/15/18 1141)  SpO2: 96 % (05/15/18 1141) Vital Signs (24h Range):  Temp:  [97.3 °F (36.3 °C)-98.7 °F (37.1 °C)] 98 °F (36.7 °C)  Pulse:  [66-80] 66  Resp:  [15-20] 18  SpO2:  [92 %-100 %] 96 %  BP: ()/(51-65) 102/54     Weight: 72.9 kg (160 lb 11.5 oz) (05/15/18 0600)  Body mass index is 28.47 kg/m².    Physical Exam   Constitutional: She is oriented to person, place, and time. She appears well-developed and well-nourished.   HENT:   Head: Normocephalic and atraumatic.   Eyes: Pupils are equal, round, and reactive to light. No scleral icterus.   Cardiovascular: Normal rate.    No murmur heard.  Pulmonary/Chest: Effort normal.   Abdominal: Soft. Bowel sounds are normal. She exhibits distension. There is tenderness. There is no guarding.   Musculoskeletal: She exhibits no edema or tenderness.    Neurological: She is alert and oriented to person, place, and time.   Skin: Skin is warm and dry.   Psychiatric: She has a normal mood and affect. Her behavior is normal.   Vitals reviewed.      MELD-Na score: 12 at 5/15/2018  4:40 AM  MELD score: 12 at 5/15/2018  4:40 AM  Calculated from:  Serum Creatinine: 1.3 mg/dL at 5/15/2018  4:40 AM  Serum Sodium: 138 mmol/L (Rounded to 137) at 5/15/2018  4:40 AM  Total Bilirubin: 0.7 mg/dL (Rounded to 1) at 5/15/2018  4:40 AM  INR(ratio): 1.3 at 5/15/2018  4:40 AM  Age: 58 years    Significant Labs:  CBC:     Recent Labs  Lab 05/15/18  0440   WBC 4.36   RBC 2.92*   HGB 7.6*   HCT 24.7*   PLT 46*     BMP:     Recent Labs  Lab 05/15/18  0440   *      K 4.2      CO2 25   BUN 20   CREATININE 1.3   CALCIUM 9.2     CMP:     Recent Labs  Lab 05/15/18  0440   *   CALCIUM 9.2   ALBUMIN 3.2*   PROT 5.6*      K 4.2   CO2 25      BUN 20   CREATININE 1.3   ALKPHOS 47*   ALT 20   AST 24   BILITOT 0.7     Coagulation:     Recent Labs  Lab 05/15/18  0440   INR 1.3*       Significant Imaging:  Labs: Reviewed    Assessment/Plan:     Decompensated HCV cirrhosis    57 y/o female with hx of HCV cirrhosis (complicated by prior SBP, ascites, hydrothorax, and hepatic encephalopathy) who was transferred to [facility] 5/4 for hepatology evaluation. Plan for TIPS to assist with volume control.    -She is now s/p thoracentesis this admission on 5/5.   -Pleural fluid studies consistent with hepatic hydrothorax (transudative, SAAG 1.8 c/w portal HTN.)  -Echo & CT chest negative for alternative etiologies of hydrothorax   -Echo with normal heart function (EF 60-65%), trivial tricuspid regurgitation, pulmonary HTN 22 mmHg  -Not enough ascitic fluid for paracentesis.  -Right upper quadrant pain following procedure; CT abd negative for acute post-procedural complication, H/H stable.  - s/p 1 dose neupogen for leukopenia  - failed attempt at TIPS 05/10 due to PVT. Successful  "second attempt 05/15    Plan/Recs  -Titrate lactulose for 3-4 BM/day  -Strict I/ O; daily wt  - Per radiology recommendations, will anticoagulate to dissolve remainder of PVT. Started on hepartin gtt 05/15, monitor counts and plan to discharge on apixiban pending stable clinical condition.        Chronic hepatitis C without hepatic coma    See "decompensated HCV cirrhosis."            Thank you for your consult. I will follow-up with patient. Please contact us if you have any additional questions.    America Rico MD  Hepatology  [facility]  "

## 2018-05-15 NOTE — SUBJECTIVE & OBJECTIVE
Interval History:     No events overnight. Continues to have right sided abdominal pain, overall improving. Breathing comfortably on room air, denies chest pain.      Current Facility-Administered Medications   Medication    0.9%  NaCl infusion (for blood administration)    acetaminophen tablet 650 mg    albuterol-ipratropium 2.5mg-0.5mg/3mL nebulizer solution 3 mL    ciprofloxacin HCl tablet 500 mg    clonazePAM tablet 1 mg    dextrose 50% injection 12.5 g    dextrose 50% injection 25 g    fluticasone 50 mcg/actuation nasal spray 100 mcg    folic acid tablet 1 mg    glucagon (human recombinant) injection 1 mg    glucose chewable tablet 16 g    glucose chewable tablet 24 g    heparin 25,000 units in dextrose 5% 250 mL (100 units/mL) bolus from bag; ADDITIONAL PRN BOLUS    heparin 25,000 units in dextrose 5% 250 mL (100 units/mL) bolus from bag; ADDITIONAL PRN BOLUS    heparin 25,000 units in dextrose 5% 250 mL (100 units/mL) infusion    hydrOXYzine HCl tablet 25 mg    hyoscyamine SL tablet 0.125 mg    insulin aspart U-100 pen 0-5 Units    insulin detemir U-100 pen 5 Units    lactulose 20 gram/30 mL solution Soln 30 g    lidocaine 5 % patch 1 patch    midodrine tablet 15 mg    omnipaque 350 iohexol 75 mL    ondansetron disintegrating tablet 8 mg    oxyCODONE immediate release tablet 5 mg    promethazine tablet 25 mg    sodium chloride 0.9% flush 5 mL    trazodone split tablet 100 mg       Objective:     Vital Signs (Most Recent):  Temp: 98 °F (36.7 °C) (05/15/18 1141)  Pulse: 66 (05/15/18 1141)  Resp: 18 (05/15/18 1141)  BP: (!) 102/54 (05/15/18 1141)  SpO2: 96 % (05/15/18 1141) Vital Signs (24h Range):  Temp:  [97.3 °F (36.3 °C)-98.7 °F (37.1 °C)] 98 °F (36.7 °C)  Pulse:  [66-80] 66  Resp:  [15-20] 18  SpO2:  [92 %-100 %] 96 %  BP: ()/(51-65) 102/54     Weight: 72.9 kg (160 lb 11.5 oz) (05/15/18 0600)  Body mass index is 28.47 kg/m².    Physical Exam   Constitutional: She is  oriented to person, place, and time. She appears well-developed and well-nourished.   HENT:   Head: Normocephalic and atraumatic.   Eyes: Pupils are equal, round, and reactive to light. No scleral icterus.   Cardiovascular: Normal rate.    No murmur heard.  Pulmonary/Chest: Effort normal.   Abdominal: Soft. Bowel sounds are normal. She exhibits distension. There is tenderness. There is no guarding.   Musculoskeletal: She exhibits no edema or tenderness.   Neurological: She is alert and oriented to person, place, and time.   Skin: Skin is warm and dry.   Psychiatric: She has a normal mood and affect. Her behavior is normal.   Vitals reviewed.      MELD-Na score: 12 at 5/15/2018  4:40 AM  MELD score: 12 at 5/15/2018  4:40 AM  Calculated from:  Serum Creatinine: 1.3 mg/dL at 5/15/2018  4:40 AM  Serum Sodium: 138 mmol/L (Rounded to 137) at 5/15/2018  4:40 AM  Total Bilirubin: 0.7 mg/dL (Rounded to 1) at 5/15/2018  4:40 AM  INR(ratio): 1.3 at 5/15/2018  4:40 AM  Age: 58 years    Significant Labs:  CBC:     Recent Labs  Lab 05/15/18  0440   WBC 4.36   RBC 2.92*   HGB 7.6*   HCT 24.7*   PLT 46*     BMP:     Recent Labs  Lab 05/15/18  0440   *      K 4.2      CO2 25   BUN 20   CREATININE 1.3   CALCIUM 9.2     CMP:     Recent Labs  Lab 05/15/18  0440   *   CALCIUM 9.2   ALBUMIN 3.2*   PROT 5.6*      K 4.2   CO2 25      BUN 20   CREATININE 1.3   ALKPHOS 47*   ALT 20   AST 24   BILITOT 0.7     Coagulation:     Recent Labs  Lab 05/15/18  0440   INR 1.3*       Significant Imaging:  Labs: Reviewed

## 2018-05-15 NOTE — PLAN OF CARE
05/15/18 1307   Discharge Reassessment   Assessment Type Discharge Planning Reassessment   Provided patient/caregiver education on the expected discharge date and the discharge plan Yes   Do you have any problems affording any of your prescribed medications? TBD   Discharge Plan A Home with family   Discharge Plan B Home with family;Home Health   Can the patient answer the patient profile reliably? Yes, cognitively intact   How does the patient rate their overall health at the present time? Fair   Describe the patient's ability to walk at the present time. Minor restrictions or changes   How often would a person be available to care for the patient? Whenever needed   Number of comorbid conditions (as recorded on the chart) Three   During the past month, has the patient often been bothered by feeling down, depressed or hopeless? No   During the past month, has the patient often been bothered by little interest or pleasure in doing things? No

## 2018-05-15 NOTE — PROGRESS NOTES
Patient seen at bedside one day s/p TIPS for PVT and hepatic hydrothorax. Patient tolerated procedure well and reports already feeling somewhat better though it was explained that it is early post procedure and treatment effect for hepatic hydrothorax often takes 4-6 weeks post TIPS.     Patient to be seen by IR in next 7-14 days for follow up venogram. If medically ok for anticoagulation - recommend anticoagulation in the interim.     Please feel free to call IR with any further questions regarding the management of this patient.     .I, Nikos Salgado M.D. personally reviewed and agree with the above dictated note.

## 2018-05-15 NOTE — PLAN OF CARE
Problem: Patient Care Overview  Goal: Plan of Care Review  Outcome: Ongoing (interventions implemented as appropriate)  Pt restless ; blood glucose monitoring, 218 ; Diabetic teaching ongoing : Pt drinks sodas: abdomen . Round & distended: c/o slight sob  Applied Supplement oxygen @2 liters for comfort ; Sats 95%: up ad prince : c/o pain to rt side : post op TIPS procedure : will cont to monitor.; VSS.

## 2018-05-15 NOTE — ASSESSMENT & PLAN NOTE
57 y/o female with hx of HCV cirrhosis (complicated by prior SBP, ascites, hydrothorax, and hepatic encephalopathy) who was transferred to [facility] 5/4 for hepatology evaluation. Plan for TIPS to assist with volume control.    -She is now s/p thoracentesis this admission on 5/5.   -Pleural fluid studies consistent with hepatic hydrothorax (transudative, SAAG 1.8 c/w portal HTN.)  -Echo & CT chest negative for alternative etiologies of hydrothorax   -Echo with normal heart function (EF 60-65%), trivial tricuspid regurgitation, pulmonary HTN 22 mmHg  -Not enough ascitic fluid for paracentesis.  -Right upper quadrant pain following procedure; CT abd negative for acute post-procedural complication, H/H stable.  - s/p 1 dose neupogen for leukopenia  - failed attempt at TIPS 05/10 due to PVT. Successful second attempt 05/15    Plan/Recs  -Titrate lactulose for 3-4 BM/day  -Strict I/ O; daily wt  - Per radiology recommendations, will anticoagulate to dissolve remainder of PVT. Started on hepartin gtt 05/15, monitor counts and plan to discharge on apixiban pending stable clinical condition.

## 2018-05-15 NOTE — PROGRESS NOTES
Progress Note   Hospital Medicine         Patient Name: Michelle Pappas  MRN:  [id number]  [facility] Medicine Team: [facility]OSP MED AMELIA Cuevas MD  Date of Admission:  5/4/2018     Length of Stay:  LOS: 11 days   Expected Discharge Date: 5/16/2018  Principal Problem:  Hydrothorax       Subjective:     Interval History/Overnight Events:  Patient had no acute events overnight.   TIPS performed yesterday, started on heparin gtt for right portal vein thrombosis. Might transition tomorrow to a NOAC.     Review of Systems   Constitutional: Negative for chills, fatigue, fever.   HENT: Negative for sore throat, trouble swallowing.    Eyes: Negative for photophobia, visual disturbance.   Respiratory: Negative for cough, shortness of breath.    Cardiovascular: Negative for chest pain, palpitations, leg swelling.   Gastrointestinal: Negative for abdominal pain, constipation, diarrhea, nausea, vomiting.   Endocrine: Negative for cold intolerance, heat intolerance.   Genitourinary: Negative for dysuria, frequency.   Musculoskeletal: Negative for arthralgias, myalgias.   Skin: Negative for rash, wound, erythema   Neurological: Negative for dizziness, syncope, weakness, light-headedness.   Psychiatric/Behavioral: Negative for confusion, hallucinations, anxiety  All other systems reviewed and are negative.    Objective:     Temp:  [97.3 °F (36.3 °C)-98.7 °F (37.1 °C)]   Pulse:  [66-80]   Resp:  [15-20]   BP: ()/(51-65)   SpO2:  [92 %-100 %]       Physical Exam:  Constitutional: Appears well-developed and well-nourished.   Head: Normocephalic and atraumatic.   Mouth/Throat: Oropharynx is clear and moist.   Eyes: EOM are normal. Pupils are equal, round, and reactive to light. No scleral icterus.   Neck: Normal range of motion. Neck supple.   Cardiovascular: Normal rate and regular rhythm.  No murmur heard.  Pulmonary/Chest: Effort normal and breath sounds normal. No respiratory distress. No wheezes, rales, or  rhonchi  Abdominal: Soft. Bowel sounds are normal.  No distension or tenderness  Musculoskeletal: Normal range of motion. No edema.   Neurological: Alert and oriented to person, place, and time.   Skin: Skin is warm and dry.   Psychiatric: Normal mood and affect. Behavior is normal.       Recent Labs  Lab 05/10/18  0423 05/11/18  0329 05/12/18  0715 05/13/18  0548 05/14/18  0345 05/15/18  0440   WBC 4.99 2.02* 1.45* 1.35* 4.55 4.36   HGB 8.5* 7.0* 7.7* 7.6* 7.5* 7.6*   HCT 27.6* 22.4* 25.0* 24.1* 23.8* 24.7*   PLT 53* 51* 46* 53* 44* 46*       Recent Labs  Lab 05/13/18  0548 05/14/18  0344 05/15/18  0440    140 138   K 3.4* 3.4* 4.2    102 103   CO2 28 30* 25   BUN 22* 17 20   CREATININE 1.3 1.3 1.3   * 153* 243*   CALCIUM 9.0 9.4 9.2   MG 2.1 1.8 1.6   PHOS 3.4 3.1 3.5       Recent Labs  Lab 05/13/18  0548 05/14/18  0344 05/15/18  0440   ALKPHOS 38* 42* 47*   ALT 20 18 20   AST 24 18 24   ALBUMIN 3.4* 3.1* 3.2*   PROT 5.6* 5.3* 5.6*   BILITOT 0.7 0.9 0.7   INR 1.2 1.2 1.3*       Recent Labs  Lab 05/13/18  1158 05/13/18  1653 05/14/18  0736 05/14/18  2042 05/15/18  0833 05/15/18  1135   POCTGLUCOSE 185* 135* 141* 218* 232* 235*        ciprofloxacin HCl  500 mg Oral Daily    clonazePAM  1 mg Oral QHS    fluticasone  2 spray Each Nare Daily    folic acid  1 mg Oral Daily    insulin detemir U-100  5 Units Subcutaneous QHS    lactulose  30 g Oral BID    lidocaine  1 patch Transdermal Q24H    midodrine  15 mg Oral TID    traZODone  100 mg Oral QHS       Assessment and Plan     Ms. Michelle Pappas is a 58 y.o. female who presented to [facility] on 5/4/2018 with     Hospital Course:    Ms. Michelle Pappas was admitted to Hospital Medicine for management of     Active Hospital Problems    Diagnosis  POA    *Hepatic Hydrothorax [J94.8]  Yes    ARIANNE (acute kidney injury) [N17.9]  No    Idiopathic hypotension [I95.0]  Yes    Pancytopenia [D61.818]  Yes    CKD stage 3 due to type 2 diabetes  mellitus [E11.22, N18.3]  Yes    Hypokalemia [E87.6]  No    Decompensated HCV cirrhosis [B19.20, K74.69]  Yes     Chronic    Insomnia [G47.00]  Yes     Chronic    Pleural effusion [J90]  Yes    Hypoalbuminemia [E88.09]  Yes    Thrombocytopenia [D69.6]  Yes    Other cirrhosis of liver [K74.69]  Yes    Diabetes [E11.9]  Yes    Chronic hepatitis C without hepatic coma [B18.2]  Yes      Resolved Hospital Problems    Diagnosis Date Resolved POA   No resolved problems to display.     # Hepatic hydrothorax  # Ascites   - s/p thoracentesis 5/7 with 500 cc removed  - failed TIPS, planning on inpatient TIPS on Monday 05/14/2018    # Decompensated Hep C Cirrhosis with ascites   MELD-Na score: 12 at 5/15/2018  4:40 AM  MELD score: 12 at 5/15/2018  4:40 AM  Calculated from:  Serum Creatinine: 1.3 mg/dL at 5/15/2018  4:40 AM  Serum Sodium: 138 mmol/L (Rounded to 137) at 5/15/2018  4:40 AM  Total Bilirubin: 0.7 mg/dL (Rounded to 1) at 5/15/2018  4:40 AM  INR(ratio): 1.3 at 5/15/2018  4:40 AM  Age: 58 years  - s/p HARVONI treatment   - hepatology consult  - holding off on an inpatient liver tx evaluation with low meld and planning for TIPS to bridge patient  - holding diuretics due to ARIANNE     #Portal vein thrombosis  - started on Heparin Gtt  - might transition to a NOAC tomorrow     # ARIANNE  - stopping lasix and aldactone and giving albumin  - IV albumin   - Creatinine trend: 1.2-->1.5-->1.7-->1.5-->1.3  - 1 unit of PRBCs on 05/11/2018    # Hypotension  - increase midodrine 15 mg PO TID;   - blood cultures NGTD   unable to do IR paracentesis due to no pocket of fluid    # Pancytopenia  - one dose of Neupogen given on 05/13/18, resolution after dose    # DM2 with nephropathy   # CKD stage 3  - SSI  - Hba1c of 6.3    # Hypoalbuminemia  - PAB in AM    # Hypokalemia  -replace    Diet:  Low sodium  GI PPx:    DVT PPx:    Goals of Care:      High Risk Conditions:  Respiratory failure    Disposition:  Tomorrow assuming HH stable  after Heparin gtt     Jack Prater MD  Staff Hospitalist  Department of Hospital Medicine  [facility]   [phone]

## 2018-05-16 NOTE — PROGRESS NOTES
"[facility]  Hepatology  Progress Note    Patient Name: Michelle Pappas  MRN: [id number]  Admission Date: 5/4/2018  Hospital Length of Stay: 12 days  Attending Provider: Jack Cuevas MD   Primary Care Physician: Taj Luna DO  Principal Problem:Hydrothorax    Subjective:     Transplant status: No    HPI: This is a 57 y/o female with hx of HCV cirrhosis (complicated by prior SBP, ascites, hydrothorax) who [place] [facility] [place]ology evaluation.  Pt admitted to OSH (R Med) 4/26 - 4/29 for recurrent right hydrothorax. She underwent thoracentesis, removed 1.5L.   Fluid transudate: GLUC 183, LDH 24, PROT <23, WBC 85 - L66, S22, RBC 4600  She was discharged and then returned 5/4 with recurrent symptoms of SOB and reaccumulation of hydrothorax.   She was taking lasix 40mg BID and aldactone 100mg daily, although she was taking all her lasix at once.  She has required 4 thoracentesis on right in the past.    Today she feels better s/p thoracentesis last night. Still with abd distention.  She is on room air. She denies other complaints presently.    Prior records per Dr. Walton clinic note:  "2002- liver biopsy- fatty liver and some scar  2012- another biopsy- cirrhosis  fluid overload started 10/15- required LVP ; complicated by SBP    Since last year having hep hydrothorax that requires thoracentesis- 4 x since June 2018  Harvoni x 3 months 2015-cured  EGD- no varices by report 02/18 & Cholecystectomy denied due to cirrhosis"      Interval History:     Patient is upset regarding 1L fluid restriction. After reviewing sodium levels, we felt appropriate to loosen restriction to 2L. Started on heparin gtt yesterday 05/15, denies signs of bleeding. Abdominal pain stable. Denies dyspnea or chest pain.      Current Facility-Administered Medications   Medication    0.9%  NaCl infusion (for blood administration)    acetaminophen tablet 650 mg    albumin human 25% bottle 25 g    " albuterol-ipratropium 2.5mg-0.5mg/3mL nebulizer solution 3 mL    apixaban tablet 5 mg    ciprofloxacin HCl tablet 500 mg    clonazePAM tablet 1 mg    dextrose 50% injection 12.5 g    dextrose 50% injection 25 g    fluticasone 50 mcg/actuation nasal spray 100 mcg    folic acid tablet 1 mg    glucagon (human recombinant) injection 1 mg    glucose chewable tablet 16 g    glucose chewable tablet 24 g    hydrOXYzine HCl tablet 25 mg    hyoscyamine SL tablet 0.125 mg    insulin aspart U-100 pen 0-5 Units    insulin detemir U-100 pen 5 Units    lactulose 20 gram/30 mL solution Soln 30 g    lidocaine 5 % patch 1 patch    midodrine tablet 15 mg    omnipaque 350 iohexol 75 mL    ondansetron disintegrating tablet 8 mg    oxyCODONE immediate release tablet 5 mg    promethazine tablet 25 mg    sodium chloride 0.9% flush 5 mL    trazodone split tablet 100 mg       Objective:     Vital Signs (Most Recent):  Temp: 98.3 °F (36.8 °C) (05/16/18 0820)  Pulse: 77 (05/16/18 0820)  Resp: 18 (05/16/18 0820)  BP: (!) 101/51 (05/16/18 0820)  SpO2: 95 % (05/16/18 0820) Vital Signs (24h Range):  Temp:  [97.3 °F (36.3 °C)-98.4 °F (36.9 °C)] 98.3 °F (36.8 °C)  Pulse:  [66-79] 77  Resp:  [14-18] 18  SpO2:  [94 %-99 %] 95 %  BP: (101-111)/(51-61) 101/51     Weight: 67.4 kg (148 lb 8 oz) (05/16/18 0500)  Body mass index is 26.31 kg/m².    Physical Exam   Constitutional: She is oriented to person, place, and time. She appears well-developed and well-nourished.   HENT:   Head: Normocephalic and atraumatic.   Eyes: Pupils are equal, round, and reactive to light. No scleral icterus.   Cardiovascular: Normal rate.    No murmur heard.  Pulmonary/Chest: Effort normal.   Abdominal: Soft. Bowel sounds are normal. She exhibits distension. There is tenderness. There is no guarding.   Musculoskeletal: She exhibits no edema or tenderness.   Neurological: She is alert and oriented to person, place, and time.   Skin: Skin is warm and dry.  "  Psychiatric: She has a normal mood and affect. Her behavior is normal.   Vitals reviewed.      MELD-Na score: 14 at 5/16/2018  5:24 AM  MELD score: 14 at 5/16/2018  5:24 AM  Calculated from:  Serum Creatinine: 1.5 mg/dL at 5/16/2018  5:24 AM  Serum Sodium: 137 mmol/L at 5/16/2018  5:24 AM  Total Bilirubin: 1 mg/dL at 5/16/2018  5:24 AM  INR(ratio): 1.4 at 5/16/2018  5:24 AM  Age: 58 years    Significant Labs:  CBC:     Recent Labs  Lab 05/16/18 0523   WBC 3.37*  3.34*  3.34*   RBC 2.95*  2.97*  2.97*   HGB 7.7*  7.7*  7.7*   HCT 25.1*  25.3*  25.3*   PLT 57*  51*  51*     BMP:     Recent Labs  Lab 05/16/18 0524   *      K 3.3*      CO2 28   BUN 27*   CREATININE 1.5*   CALCIUM 9.5     CMP:     Recent Labs  Lab 05/16/18  0524   *   CALCIUM 9.5   ALBUMIN 3.4*   PROT 5.8*      K 3.3*   CO2 28      BUN 27*   CREATININE 1.5*   ALKPHOS 53*   ALT 60*   AST 87*   BILITOT 1.0     Coagulation:     Recent Labs  Lab 05/16/18  0524   INR 1.4*       Significant Imaging:  Labs: Reviewed    Assessment/Plan:     * Hepatic Hydrothorax    See  "decompensated HCV cirrhosis."        Decompensated HCV cirrhosis    59 y/o female with hx of HCV cirrhosis (complicated by prior SBP, ascites, hydrothorax, and hepatic encephalopathy) who was transferred to [facility] 5[place]4 for hepatology evaluation. Plan for TIPS to assist with volume control.    -s/p thoracentesis this admission on 5/5.   -Pleural fluid studies consistent with hepatic hydrothorax (transudative, SAAG 1.8 c/w portal HTN.)  -Echo & CT chest negative for alternative etiologies of hydrothorax   -Echo with normal heart function (EF 60-65%), trivial tricuspid regurgitation, pulmonary HTN 22 mmHg  -Not enough ascitic fluid for paracentesis.  -Right upper quadrant pain following procedure; CT abd negative for acute post-procedural complication, H/H stable.  - s/p 1 dose neupogen for leukopenia  - failed attempt at TIPS 05/10 due to PVT. " "Successful second attempt 05/15    Plan/Recs  -Titrate lactulose for 3-4 BM/day  -Strict I/ O; daily wt  - Per radiology recommendations, will anticoagulate to dissolve remainder of PVT. Started on hepartin gtt 05/15 with plan to discharge on apixiban. No s/s bleeding, Hb stable from yesterday.  - Worsening renal function overnight  with Cr 1.5 from 1.3- give albumin, follow up PM labs. Patient has been off home diuretics since admission due to initial renal dysfunction. Will plan to discharge off diuretics and check labs in 1wk.  - Diet: 2g Na, 2L fluid  - Pending stable renal function this afternoon, clinically stable for discharge.        Chronic hepatitis C without hepatic coma    See "decompensated HCV cirrhosis."            Thank you for your consult. I will follow-up with patient. Please contact us if you have any additional questions.    America Rico MD  Hepatology  [facility]  "

## 2018-05-16 NOTE — PLAN OF CARE
Problem: Patient Care Overview  Goal: Plan of Care Review  Outcome: Ongoing (interventions implemented as appropriate)  Pt AAOX4. AVSS. C/o pain to RUQ during shift somewhat controlled with PRN pain medication. Heparin gtt initiated. Pt educated on importance of bleeding precautions r/t heparin administration. BG covered with insulin as ordered. Pt safety maintained. Hourly rounding performed.

## 2018-05-16 NOTE — ASSESSMENT & PLAN NOTE
59 y/o female with hx of HCV cirrhosis (complicated by prior SBP, ascites, hydrothorax, and hepatic encephalopathy) who was transferred to [facility] 5/4 for hepatology evaluation. Plan for TIPS to assist with volume control.    -s/p thoracentesis this admission on 5/5.   -Pleural fluid studies consistent with hepatic hydrothorax (transudative, SAAG 1.8 c/w portal HTN.)  -Echo & CT chest negative for alternative etiologies of hydrothorax   -Echo with normal heart function (EF 60-65%), trivial tricuspid regurgitation, pulmonary HTN 22 mmHg  -Not enough ascitic fluid for paracentesis.  -Right upper quadrant pain following procedure; CT abd negative for acute post-procedural complication, H/H stable.  - s/p 1 dose neupogen for leukopenia  - failed attempt at TIPS 05/10 due to PVT. Successful second attempt 05/15    Plan/Recs  -Titrate lactulose for 3-4 BM/day  -Strict I/ O; daily wt  - Per radiology recommendations, will anticoagulate to dissolve remainder of PVT. Started on hepartin gtt 05/15 with plan to discharge on apixiban. No s/s bleeding, Hb stable from yesterday.  - Worsening renal function overnight  with Cr 1.5 from 1.3- give albumin, follow up PM labs. Patient has been off home diuretics since admission due to initial renal dysfunction. Will plan to discharge off diuretics and check labs in 1wk.  - Diet: 2g Na, 2L fluid  - Pending stable renal function this afternoon, clinically stable for discharge.

## 2018-05-16 NOTE — PROGRESS NOTES
Progress Note   Hospital Medicine         Patient Name: Michelle Pappas  MRN:  [id number]  [facility] Medicine Team: [facility] MED AMELIA Cuevas MD  Date of Admission:  5/4/2018     Length of Stay:  LOS: 12 days   Expected Discharge Date: 5/16/2018  Principal Problem:  Hydrothorax       Subjective:     Interval History/Overnight Events:  Patient had no acute events overnight.  Creatinine slightly elevated today, will repeat if normal can be discharged later today.     Review of Systems   Constitutional: Negative for chills, fatigue, fever.   HENT: Negative for sore throat, trouble swallowing.    Eyes: Negative for photophobia, visual disturbance.   Respiratory: Negative for cough, shortness of breath.    Cardiovascular: Negative for chest pain, palpitations, leg swelling.   Gastrointestinal: Negative for abdominal pain, constipation, diarrhea, nausea, vomiting.   Endocrine: Negative for cold intolerance, heat intolerance.   Genitourinary: Negative for dysuria, frequency.   Musculoskeletal: Negative for arthralgias, myalgias.   Skin: Negative for rash, wound, erythema   Neurological: Negative for dizziness, syncope, weakness, light-headedness.   Psychiatric/Behavioral: Negative for confusion, hallucinations, anxiety  All other systems reviewed and are negative.    Objective:     Temp:  [97.3 °F (36.3 °C)-98.4 °F (36.9 °C)]   Pulse:  [64-79]   Resp:  [14-18]   BP: (101-111)/(51-61)   SpO2:  [94 %-99 %]       Physical Exam:  Constitutional: Appears well-developed and well-nourished.   Head: Normocephalic and atraumatic.   Mouth/Throat: Oropharynx is clear and moist.   Eyes: EOM are normal. Pupils are equal, round, and reactive to light. No scleral icterus.   Neck: Normal range of motion. Neck supple.   Cardiovascular: Normal rate and regular rhythm.  No murmur heard.  Pulmonary/Chest: Effort normal and breath sounds normal. No respiratory distress. No wheezes, rales, or rhonchi  Abdominal: Soft. Bowel sounds are  normal.  No distension or tenderness  Musculoskeletal: Normal range of motion. No edema.   Neurological: Alert and oriented to person, place, and time.   Skin: Skin is warm and dry.   Psychiatric: Normal mood and affect. Behavior is normal.       Recent Labs  Lab 05/11/18  0329 05/12/18  0715 05/13/18  0548 05/14/18  0345 05/15/18  0440 05/16/18  0523   WBC 2.02* 1.45* 1.35* 4.55 4.36 3.37*  3.34*  3.34*   HGB 7.0* 7.7* 7.6* 7.5* 7.6* 7.7*  7.7*  7.7*   HCT 22.4* 25.0* 24.1* 23.8* 24.7* 25.1*  25.3*  25.3*   PLT 51* 46* 53* 44* 46* 57*  51*  51*       Recent Labs  Lab 05/14/18  0344 05/15/18  0440 05/16/18  0524    138 137   K 3.4* 4.2 3.3*    103 100   CO2 30* 25 28   BUN 17 20 27*   CREATININE 1.3 1.3 1.5*   * 243* 237*   CALCIUM 9.4 9.2 9.5   MG 1.8 1.6 1.8   PHOS 3.1 3.5 3.5       Recent Labs  Lab 05/14/18  0344 05/15/18  0440 05/16/18  0524   ALKPHOS 42* 47* 53*   ALT 18 20 60*   AST 18 24 87*   ALBUMIN 3.1* 3.2* 3.4*   PROT 5.3* 5.6* 5.8*   BILITOT 0.9 0.7 1.0   INR 1.2 1.3* 1.4*       Recent Labs  Lab 05/15/18  0833 05/15/18  1135 05/15/18  1307 05/15/18  1733 05/15/18  2125 05/16/18  0815   POCTGLUCOSE 232* 235* 213* 170* 189* 215*        albumin human 25%  25 g Intravenous Q6H    apixaban  5 mg Oral BID    ciprofloxacin HCl  500 mg Oral Daily    clonazePAM  1 mg Oral QHS    fluticasone  2 spray Each Nare Daily    folic acid  1 mg Oral Daily    insulin detemir U-100  5 Units Subcutaneous QHS    lactulose  30 g Oral BID    lidocaine  1 patch Transdermal Q24H    midodrine  15 mg Oral TID    traZODone  100 mg Oral QHS       Assessment and Plan     Ms. Michelle Pappas is a 58 y.o. female who presented to [facility] on 5/4/2018 with     Hospital Course:    Ms. Michelle Pappas was admitted to Hospital Medicine for management of     Active Hospital Problems    Diagnosis  POA    *Hepatic Hydrothorax [J94.8]  Yes    ARIANNE (acute kidney injury) [N17.9]  No    Idiopathic  hypotension [I95.0]  Yes    Pancytopenia [D61.818]  Yes    CKD stage 3 due to type 2 diabetes mellitus [E11.22, N18.3]  Yes    Hypokalemia [E87.6]  No    Decompensated HCV cirrhosis [B19.20, K74.69]  Yes     Chronic    Insomnia [G47.00]  Yes     Chronic    Pleural effusion [J90]  Yes    Hypoalbuminemia [E88.09]  Yes    Thrombocytopenia [D69.6]  Yes    Other cirrhosis of liver [K74.69]  Yes    Diabetes [E11.9]  Yes    Chronic hepatitis C without hepatic coma [B18.2]  Yes      Resolved Hospital Problems    Diagnosis Date Resolved POA   No resolved problems to display.     # Hepatic hydrothorax  # Ascites   - s/p thoracentesis 5/7 with 500 cc removed  - inpatient TIPS on Monday 05/14/2018    # Decompensated Hep C Cirrhosis with ascites   MELD-Na score: 14 at 5/16/2018  5:24 AM  MELD score: 14 at 5/16/2018  5:24 AM  Calculated from:  Serum Creatinine: 1.5 mg/dL at 5/16/2018  5:24 AM  Serum Sodium: 137 mmol/L at 5/16/2018  5:24 AM  Total Bilirubin: 1 mg/dL at 5/16/2018  5:24 AM  INR(ratio): 1.4 at 5/16/2018  5:24 AM  Age: 58 years  - s/p HARVONI treatment   - hepatology consult  - holding off on an inpatient liver tx evaluation with low meld and planning for TIPS to bridge patient  - holding diuretics due to ARIANNE     #Portal vein thrombosis  - started on Heparin Gtt  - transitioned to PO apixiban     # ARIANNE  - stopping lasix and aldactone and giving albumin  - IV albumin x 2  - Creatinine trend: 1.2-->1.5-->1.7-->1.5-->1.3-->1.5  - 1 unit of PRBCs on 05/11/2018    # Hypotension  - increase midodrine 15 mg PO TID;   - blood cultures NGTD   unable to do IR paracentesis due to no pocket of fluid    # Pancytopenia  - one dose of Neupogen given on 05/13/18, resolution after dose    # DM2 with nephropathy   # CKD stage 3  - SSI  - Hba1c of 6.3    # Hypoalbuminemia  - PAB in AM    # Hypokalemia  -replace    Diet:  Low sodium  GI PPx:    DVT PPx:    Goals of Care:      High Risk Conditions:  Respiratory  failure    Disposition:  Today if Creatinine improves    Jack Prater MD  Staff Hospitalist  Department of Hospital Medicine  [facility]   [phone]

## 2018-05-16 NOTE — PLAN OF CARE
Problem: Patient Care Overview  Goal: Plan of Care Review  Outcome: Ongoing (interventions implemented as appropriate)  Patient remains AAOx4, forgetful of POC at times. Patient without changes to assessment. Able to ambulate safely around unit and room. D/C pending improvement of BMP, patient aware.

## 2018-05-16 NOTE — NURSING
Patient needing constant reinforcement to fluid restriction and dietary restrictions. Will continue to educate and reinforce.

## 2018-05-16 NOTE — PLAN OF CARE
Problem: Patient Care Overview  Goal: Plan of Care Review  Outcome: Ongoing (interventions implemented as appropriate)  POC reviewed with pt. Questions and concerns addressed. No acute events overnight, VSS.  Continuous heparin gtt infufusing @ 19 un/kg/hr. Anti xa therapeutic last draw; will recheck this AM. Pt independently ambulating in [place]. Pain managed with PRN oxycodone.

## 2018-05-16 NOTE — SUBJECTIVE & OBJECTIVE
Interval History:     Patient is upset regarding 1L fluid restriction. After reviewing sodium levels, we felt appropriate to loosen restriction to 2L. Started on heparin gtt yesterday 05/15, denies signs of bleeding. Abdominal pain stable. Denies dyspnea or chest pain.      Current Facility-Administered Medications   Medication    0.9%  NaCl infusion (for blood administration)    acetaminophen tablet 650 mg    albumin human 25% bottle 25 g    albuterol-ipratropium 2.5mg-0.5mg/3mL nebulizer solution 3 mL    apixaban tablet 5 mg    ciprofloxacin HCl tablet 500 mg    clonazePAM tablet 1 mg    dextrose 50% injection 12.5 g    dextrose 50% injection 25 g    fluticasone 50 mcg/actuation nasal spray 100 mcg    folic acid tablet 1 mg    glucagon (human recombinant) injection 1 mg    glucose chewable tablet 16 g    glucose chewable tablet 24 g    hydrOXYzine HCl tablet 25 mg    hyoscyamine SL tablet 0.125 mg    insulin aspart U-100 pen 0-5 Units    insulin detemir U-100 pen 5 Units    lactulose 20 gram/30 mL solution Soln 30 g    lidocaine 5 % patch 1 patch    midodrine tablet 15 mg    omnipaque 350 iohexol 75 mL    ondansetron disintegrating tablet 8 mg    oxyCODONE immediate release tablet 5 mg    promethazine tablet 25 mg    sodium chloride 0.9% flush 5 mL    trazodone split tablet 100 mg       Objective:     Vital Signs (Most Recent):  Temp: 98.3 °F (36.8 °C) (05/16/18 0820)  Pulse: 77 (05/16/18 0820)  Resp: 18 (05/16/18 0820)  BP: (!) 101/51 (05/16/18 0820)  SpO2: 95 % (05/16/18 0820) Vital Signs (24h Range):  Temp:  [97.3 °F (36.3 °C)-98.4 °F (36.9 °C)] 98.3 °F (36.8 °C)  Pulse:  [66-79] 77  Resp:  [14-18] 18  SpO2:  [94 %-99 %] 95 %  BP: (101-111)/(51-61) 101/51     Weight: 67.4 kg (148 lb 8 oz) (05/16/18 0500)  Body mass index is 26.31 kg/m².    Physical Exam   Constitutional: She is oriented to person, place, and time. She appears well-developed and well-nourished.   HENT:   Head:  Normocephalic and atraumatic.   Eyes: Pupils are equal, round, and reactive to light. No scleral icterus.   Cardiovascular: Normal rate.    No murmur heard.  Pulmonary/Chest: Effort normal.   Abdominal: Soft. Bowel sounds are normal. She exhibits distension. There is tenderness. There is no guarding.   Musculoskeletal: She exhibits no edema or tenderness.   Neurological: She is alert and oriented to person, place, and time.   Skin: Skin is warm and dry.   Psychiatric: She has a normal mood and affect. Her behavior is normal.   Vitals reviewed.      MELD-Na score: 14 at 5/16/2018  5:24 AM  MELD score: 14 at 5/16/2018  5:24 AM  Calculated from:  Serum Creatinine: 1.5 mg/dL at 5/16/2018  5:24 AM  Serum Sodium: 137 mmol/L at 5/16/2018  5:24 AM  Total Bilirubin: 1 mg/dL at 5/16/2018  5:24 AM  INR(ratio): 1.4 at 5/16/2018  5:24 AM  Age: 58 years    Significant Labs:  CBC:     Recent Labs  Lab 05/16/18 0523   WBC 3.37*  3.34*  3.34*   RBC 2.95*  2.97*  2.97*   HGB 7.7*  7.7*  7.7*   HCT 25.1*  25.3*  25.3*   PLT 57*  51*  51*     BMP:     Recent Labs  Lab 05/16/18  0524   *      K 3.3*      CO2 28   BUN 27*   CREATININE 1.5*   CALCIUM 9.5     CMP:     Recent Labs  Lab 05/16/18 0524   *   CALCIUM 9.5   ALBUMIN 3.4*   PROT 5.8*      K 3.3*   CO2 28      BUN 27*   CREATININE 1.5*   ALKPHOS 53*   ALT 60*   AST 87*   BILITOT 1.0     Coagulation:     Recent Labs  Lab 05/16/18 0524   INR 1.4*       Significant Imaging:  Labs: Reviewed

## 2018-05-17 NOTE — PLAN OF CARE
Problem: Patient Care Overview  Goal: Plan of Care Review  Outcome: Ongoing (interventions implemented as appropriate)  Patient A&O x 4. Pleasant and cooperative with care. Vitals stable. Abdomen round, firm, tender to touch. Oxycodone given for abdominal pain. Had several loose BMs throughout the day and into the evening. Ambulates independently around room and in hallway. Fluid restriction reinforced. No complaints or changes overnight. Possible discharge home.

## 2018-05-17 NOTE — PROGRESS NOTES
"[facility]  Hepatology  Progress Note    Patient Name: Michelle Pappas  MRN: [id number]  Admission Date: 5/4/2018  Hospital Length of Stay: 13 days  Attending Provider: Jack Cuevas MD   Primary Care Physician: Taj Luna DO  Principal Problem:Hydrothorax    Subjective:     Transplant status: No    HPI: This is a 57 y/o female with hx of HCV cirrhosis (complicated by prior SBP, ascites, hydrothorax) who was transferred to [facility] 5/4 for hepatology evaluation.  Pt admitted to OSH ([facility]) 4/26 - 4/29 for recurrent right hydrothorax. She underwent thoracentesis, removed 1.5L.   Fluid transudate: GLUC 183, LDH 24, PROT <23, WBC 85 - L66, S22, RBC 4600  She was discharged and then returned 5/4 with recurrent symptoms of SOB and reaccumulation of hydrothorax.   She was taking lasix 40mg BID and aldactone 100mg daily, although she was taking all her lasix at once.  She has required 4 thoracentesis on right in the past.    Today she feels better s/p thoracentesis last night. Still with abd distention.  She is on room air. She denies other complaints presently.    Prior records per Dr. Walton clinic note:  "2002- liver biopsy- fatty liver and some scar  2012- another biopsy- cirrhosis  fluid overload started 10/15- required LVP ; complicated by SBP    Since last year having hep hydrothorax that requires thoracentesis- 4 x since June 2018  Harvoni x 3 months 2015-cured  EGD- no varices by report 02/18 & Cholecystectomy denied due to cirrhosis"      Interval History:     Feels ok.  Her Hgb slightly lower, getting 1 unit prbc.  She has not seen any bleeding from anywhere.    Again counseled on risks of bleeding and to inform us if she were to have any.    Current Facility-Administered Medications   Medication    0.9%  NaCl infusion (for blood administration)    acetaminophen tablet 650 mg    albuterol-ipratropium 2.5mg-0.5mg/3mL nebulizer solution 3 mL    ciprofloxacin HCl tablet 500 mg    " clonazePAM tablet 1 mg    dextrose 50% injection 12.5 g    dextrose 50% injection 25 g    fluticasone 50 mcg/actuation nasal spray 100 mcg    folic acid tablet 1 mg    glucagon (human recombinant) injection 1 mg    glucose chewable tablet 16 g    glucose chewable tablet 24 g    hydrOXYzine HCl tablet 25 mg    hyoscyamine SL tablet 0.125 mg    insulin aspart U-100 pen 0-5 Units    insulin detemir U-100 pen 5 Units    lactulose 20 gram/30 mL solution Soln 30 g    lidocaine 5 % patch 1 patch    midodrine tablet 15 mg    omnipaque 350 iohexol 75 mL    ondansetron disintegrating tablet 8 mg    oxyCODONE immediate release tablet 5 mg    potassium, sodium phosphates 280-160-250 mg packet 2 packet    promethazine tablet 25 mg    sodium chloride 0.9% flush 5 mL    trazodone split tablet 100 mg       Objective:     Vital Signs (Most Recent):  Temp: 98.5 °F (36.9 °C) (05/17/18 1210)  Pulse: 68 (05/17/18 1210)  Resp: 18 (05/17/18 1210)  BP: (!) 99/59 (05/17/18 1210)  SpO2: 95 % (05/17/18 1210) Vital Signs (24h Range):  Temp:  [97.3 °F (36.3 °C)-98.5 °F (36.9 °C)] 98.5 °F (36.9 °C)  Pulse:  [62-72] 68  Resp:  [16-20] 18  SpO2:  [91 %-100 %] 95 %  BP: ()/(50-59) 99/59     Weight: 67.4 kg (148 lb 8 oz) (05/16/18 0500)  Body mass index is 26.31 kg/m².    Physical Exam   Constitutional: She is oriented to person, place, and time. She appears well-developed and well-nourished.   HENT:   Head: Normocephalic and atraumatic.   Eyes: No scleral icterus.   Cardiovascular: Normal rate.    No murmur heard.  Pulmonary/Chest: Effort normal.   Abdominal: Soft. Bowel sounds are normal. She exhibits distension. There is tenderness. There is no guarding.   Musculoskeletal: She exhibits no edema or tenderness.   Neurological: She is alert and oriented to person, place, and time.   Psychiatric: She has a normal mood and affect. Her behavior is normal.   Vitals reviewed.      MELD-Na score: 15 at 5/17/2018  4:53 AM  MELD  score: 15 at 5/17/2018  4:53 AM  Calculated from:  Serum Creatinine: 1.4 mg/dL at 5/17/2018  4:53 AM  Serum Sodium: 138 mmol/L (Rounded to 137) at 5/17/2018  4:53 AM  Total Bilirubin: 0.6 mg/dL (Rounded to 1) at 5/17/2018  4:53 AM  INR(ratio): 1.6 at 5/17/2018  4:53 AM  Age: 58 years    Significant Labs:  CBC:   Recent Labs  Lab 05/17/18  1302   WBC 2.81*   RBC 2.76*   HGB 7.3*   HCT 24.0*   PLT 47*     BMP:   Recent Labs  Lab 05/17/18  0453   *      K 3.5      CO2 25   BUN 24*   CREATININE 1.4   CALCIUM 8.9     CMP:   Recent Labs  Lab 05/17/18  0453   *   CALCIUM 8.9   ALBUMIN 3.5   PROT 5.3*      K 3.5   CO2 25      BUN 24*   CREATININE 1.4   ALKPHOS 43*   ALT 82*   *   BILITOT 0.6     Coagulation:   Recent Labs  Lab 05/17/18  0453   INR 1.6*       Significant Imaging:  Labs: Reviewed    Assessment/Plan:     Decompensated HCV cirrhosis    57 y/o female with hx of HCV cirrhosis (complicated by prior SBP, ascites, hydrothorax, and hepatic encephalopathy) who was transferred to [facility] 5/4 for hepatology evaluation. Plan for TIPS to assist with volume control.    -s/p thoracentesis this admission on 5/5.   -Pleural fluid studies consistent with hepatic hydrothorax (transudative, SAAG 1.8 c/w portal HTN.)  -Echo & CT chest negative for alternative etiologies of hydrothorax   -Echo with normal heart function (EF 60-65%), trivial tricuspid regurgitation, pulmonary HTN 22 mmHg  -Not enough ascitic fluid for paracentesis.  -Right upper quadrant pain following procedure; CT abd negative for acute post-procedural complication, H/H stable.  - s/p 1 dose neupogen for leukopenia  - failed attempt at TIPS 05/10 due to PVT. Successful second attempt 05/15    Plan/Recs  - CXR today  Titrate lactulose for 3-4 BM/day  -Strict I/ O; daily wt  - continue apixiban  - watch for bleeding. Repeat Hgb this afternoon  - Will plan to discharge off diuretics and check labs in 1wk.  - Diet: 2g Na, 2L  fluid    - Pending stable Hgb this afternoon, clinically stable for discharge.            Thank you for your consult. I will follow-up with patient. Please contact us if you have any additional questions.    Onesimo Juares MD  Hepatology  [facility]

## 2018-05-17 NOTE — NURSING
All d/c instructions reviewed with patient and her brother. Patient's follow-up appointments reviewed and patient needing blood work drawn that is not included in appointments. Per MD transplant CM to call patient tomorrow or next week to arrange blood work. PIV removed. Patient to ambulate off unit with brother to obtain rx in pharmacy. Patient and brother with no further questions or needs.

## 2018-05-17 NOTE — SUBJECTIVE & OBJECTIVE
Interval History:     Feels ok.  Her Hgb slightly lower, getting 1 unit prbc.  She has not seen any bleeding from anywhere.    Again counseled on risks of bleeding and to inform us if she were to have any.    Current Facility-Administered Medications   Medication    0.9%  NaCl infusion (for blood administration)    acetaminophen tablet 650 mg    albuterol-ipratropium 2.5mg-0.5mg/3mL nebulizer solution 3 mL    ciprofloxacin HCl tablet 500 mg    clonazePAM tablet 1 mg    dextrose 50% injection 12.5 g    dextrose 50% injection 25 g    fluticasone 50 mcg/actuation nasal spray 100 mcg    folic acid tablet 1 mg    glucagon (human recombinant) injection 1 mg    glucose chewable tablet 16 g    glucose chewable tablet 24 g    hydrOXYzine HCl tablet 25 mg    hyoscyamine SL tablet 0.125 mg    insulin aspart U-100 pen 0-5 Units    insulin detemir U-100 pen 5 Units    lactulose 20 gram/30 mL solution Soln 30 g    lidocaine 5 % patch 1 patch    midodrine tablet 15 mg    omnipaque 350 iohexol 75 mL    ondansetron disintegrating tablet 8 mg    oxyCODONE immediate release tablet 5 mg    potassium, sodium phosphates 280-160-250 mg packet 2 packet    promethazine tablet 25 mg    sodium chloride 0.9% flush 5 mL    trazodone split tablet 100 mg       Objective:     Vital Signs (Most Recent):  Temp: 98.5 °F (36.9 °C) (05/17/18 1210)  Pulse: 68 (05/17/18 1210)  Resp: 18 (05/17/18 1210)  BP: (!) 99/59 (05/17/18 1210)  SpO2: 95 % (05/17/18 1210) Vital Signs (24h Range):  Temp:  [97.3 °F (36.3 °C)-98.5 °F (36.9 °C)] 98.5 °F (36.9 °C)  Pulse:  [62-72] 68  Resp:  [16-20] 18  SpO2:  [91 %-100 %] 95 %  BP: ()/(50-59) 99/59     Weight: 67.4 kg (148 lb 8 oz) (05/16/18 0500)  Body mass index is 26.31 kg/m².    Physical Exam   Constitutional: She is oriented to person, place, and time. She appears well-developed and well-nourished.   HENT:   Head: Normocephalic and atraumatic.   Eyes: No scleral icterus.    Cardiovascular: Normal rate.    No murmur heard.  Pulmonary/Chest: Effort normal.   Abdominal: Soft. Bowel sounds are normal. She exhibits distension. There is tenderness. There is no guarding.   Musculoskeletal: She exhibits no edema or tenderness.   Neurological: She is alert and oriented to person, place, and time.   Psychiatric: She has a normal mood and affect. Her behavior is normal.   Vitals reviewed.      MELD-Na score: 15 at 5/17/2018  4:53 AM  MELD score: 15 at 5/17/2018  4:53 AM  Calculated from:  Serum Creatinine: 1.4 mg/dL at 5/17/2018  4:53 AM  Serum Sodium: 138 mmol/L (Rounded to 137) at 5/17/2018  4:53 AM  Total Bilirubin: 0.6 mg/dL (Rounded to 1) at 5/17/2018  4:53 AM  INR(ratio): 1.6 at 5/17/2018  4:53 AM  Age: 58 years    Significant Labs:  CBC:   Recent Labs  Lab 05/17/18  1302   WBC 2.81*   RBC 2.76*   HGB 7.3*   HCT 24.0*   PLT 47*     BMP:   Recent Labs  Lab 05/17/18  0453   *      K 3.5      CO2 25   BUN 24*   CREATININE 1.4   CALCIUM 8.9     CMP:   Recent Labs  Lab 05/17/18  0453   *   CALCIUM 8.9   ALBUMIN 3.5   PROT 5.3*      K 3.5   CO2 25      BUN 24*   CREATININE 1.4   ALKPHOS 43*   ALT 82*   *   BILITOT 0.6     Coagulation:   Recent Labs  Lab 05/17/18  0453   INR 1.6*       Significant Imaging:  Labs: Reviewed

## 2018-05-17 NOTE — ASSESSMENT & PLAN NOTE
57 y/o female with hx of HCV cirrhosis (complicated by prior SBP, ascites, hydrothorax, and hepatic encephalopathy) who [place] [facility] [place]ology evaluation. Plan for TIPS to assist with volume control.    -s/p thoracentesis this admission on 5/5.   -Pleural fluid studies consistent with hepatic hydrothorax (transudative, SAAG 1.8 c/w portal HTN.)  -Echo & CT chest negative for alternative etiologies of hydrothorax   -Echo with normal heart function (EF 60-65%), trivial tricuspid regurgitation, pulmonary HTN 22 mmHg  -Not enough ascitic fluid for paracentesis.  -Right upper quadrant pain following procedure; CT abd negative for acute post-procedural complication, H/H stable.  - s/p 1 dose neupogen for leukopenia  - failed attempt at TIPS 05/10 due to PVT. Successful second attempt 05/15    Plan/Recs  - CXR today  Titrate lactulose for 3-4 BM/day  -Strict I/ O; daily wt  - continue apixiban  - watch for bleeding. Repeat Hgb this afternoon  - Will plan to discharge off diuretics and check labs in 1wk.  - Diet: 2g Na, 2L fluid    - Pending stable Hgb this afternoon, clinically stable for discharge.

## 2018-05-17 NOTE — PLAN OF CARE
Problem: Patient Care Overview  Goal: Plan of Care Review  Outcome: Ongoing (interventions implemented as appropriate)  1 unit PRBC transfused, H/H now 7.5/24.8. VS stable. Patient AAOx4, sometimes forgetful. Four bowel movements reported by patient today. No acute changes.

## 2018-05-17 NOTE — PROGRESS NOTES
Hgb dropped from 7.7 to 6.5 this morning. Notified MARGARET NELSON. Type and screen and 1 unit PRBC ordered. Type and screen results pending at 0736. Report given to day JOSE ARMANDO Tian.

## 2018-05-17 NOTE — DISCHARGE SUMMARY
Discharge Summary  Hospital Medicine    Attending Provider on Discharge: Jack Cuevas MD  [facility] Medicine Team: [facility]OS[facility] MED L  Date of Admission:  5/4/2018     Date of Discharge:  5/17/2018  Code status: Full Code    Active Hospital Problems    Diagnosis  POA    *Hepatic Hydrothorax [J94.8]  Yes    ARIANNE (acute kidney injury) [N17.9]  No    Idiopathic hypotension [I95.0]  Yes    Pancytopenia [D61.818]  Yes    CKD stage 3 due to type 2 diabetes mellitus [E11.22, N18.3]  Yes    Hypokalemia [E87.6]  No    Decompensated HCV cirrhosis [B19.20, K74.69]  Yes     Chronic    Insomnia [G47.00]  Yes     Chronic    Pleural effusion [J90]  Yes    Hypoalbuminemia [E88.09]  Yes    Thrombocytopenia [D69.6]  Yes    Other cirrhosis of liver [K74.69]  Yes    Diabetes [E11.9]  Yes    Chronic hepatitis C without hepatic coma [B18.2]  Yes      Resolved Hospital Problems    Diagnosis Date Resolved POA   No resolved problems to display.        HPI    57 yo lady with HCV cirrhosis who was transferred from [place], [place] for recurrent hepatic hydrothorax and need for higher level of care.      She was recently admitted to [facility] on 4/26/18-4/29/18 due to recurrence of her right hydrothorax.  She underwent thoracentesis by IR while there, removing 1.5L total.  The fluid on this was transudative: GLUC 183, LDH 24, PROT <23, WBC 85 - L66, S22, RBC 4600.  The cultures were negative from the tap. She was discharged home only to return 5/4 with recurrent shortness of breath and re-accumulation of her right hydrothorax. She reports that she's had 4 thoracentesis in the past (2 in 2017 and 2 in 2018. The first two were done in succession and the second two were able to be spaced apart. She reports that she's had a few paracentesis before but does not regularly have them done. She adheres to a low sodium diet and takes all of her diuretics as prescribed. She does report that she's been taking them differently, however. She  indicates that instead of taking her lasix twice per day, she's been taking them all at once. She does reports shortness of breath and a cough. She says that the cough is dry and has been present for the last year. She denies any fever, chills, CP, n/v, constipation, melena, hematochezia, abdominal pain or dysuria.      She was recently evaluated by Dr. Walton in Hepatology clinic on 5/1/18 for the first time.  She notes, 2002- liver biopsy- fatty liver and some scar.  2012- another biopsy- cirrhosis.  fluid overload started 10/15- required LVP ; complicated by SBP.  Since last year having hep hydrothorax that requires thoracentesis- 4 x since June 2018.  Harvoni x 3 months 2015-cured  EGD- no varices by report 02/18 & Cholecystectomy denied due to cirrhosis.       Hospital Course    Hepatic hydrothorax  # Ascites   - s/p thoracentesis 5/7 with 500 cc removed  - inpatient TIPS on 05/14/2018     # Decompensated Hep C Cirrhosis with ascites   MELD-Na score: 15 at 5/17/2018  4:53 AM  MELD score: 15 at 5/17/2018  4:53 AM  Calculated from:  Serum Creatinine: 1.4 mg/dL at 5/17/2018  4:53 AM  Serum Sodium: 138 mmol/L (Rounded to 137) at 5/17/2018  4:53 AM  Total Bilirubin: 0.6 mg/dL (Rounded to 1) at 5/17/2018  4:53 AM  INR(ratio): 1.6 at 5/17/2018  4:53 AM  Age: 58 years    - s/p HARVONI treatment   - hepatology consult  - holding off on an inpatient liver tx evaluation with low meld and planning for TIPS to bridge patient  - holding diuretics due to ARIANNE      #Portal vein thrombosis  - started on Heparin Gtt  - transitioned to PO Eliquis BID    #Anemia  - Hgb 6.5, transfused one unit of PRBCs prior to discharge  -needs repeat labs early next week       # ARIANNE  - stopping lasix and aldactone and giving albumin  - IV albumin x 2  - Creatinine trend: 1.2-->1.5-->1.7-->1.5-->1.3-->1.5-->1.4  - 1 unit of PRBCs on 05/11/2018     # Hypotension  - increase midodrine 15 mg PO TID, monitor vital signs closely, stop if hypertension    - blood cultures NGTD  - unable to do IR paracentesis due to no pocket of fluid     # Pancytopenia  - one dose of Neupogen given on 05/13/18, resolution after dose     # DM2 with nephropathy   # CKD stage 3  - SSI  - Hba1c of 6.3      Recent Labs  Lab 05/17/18  0453 05/17/18  1302 05/17/18  1449   WBC 1.37* 2.81* 2.34*   HGB 6.5* 7.3* 7.5*   HCT 21.4* 24.0* 24.8*   PLT 44* 47* 43*       Recent Labs  Lab 05/15/18  0440 05/16/18  0524 05/16/18  1318 05/17/18  0453    137 137 138   K 4.2 3.3* 3.4* 3.5    100 100 104   CO2 25 28 27 25   BUN 20 27* 26* 24*   CREATININE 1.3 1.5* 1.5* 1.4   * 237* 200* 193*   CALCIUM 9.2 9.5 9.5 8.9   MG 1.6 1.8  --  1.8   PHOS 3.5 3.5  --  2.1*       Recent Labs  Lab 05/15/18  0440 05/16/18  0524 05/17/18  0453   ALKPHOS 47* 53* 43*   ALT 20 60* 82*   AST 24 87* 141*   ALBUMIN 3.2* 3.4* 3.5   PROT 5.6* 5.8* 5.3*   BILITOT 0.7 1.0 0.6   INR 1.3* 1.4* 1.6*        Recent Labs  Lab 05/16/18  1201 05/16/18  1724 05/16/18  2118 05/17/18  0832 05/17/18  1155 05/17/18  1623   POCTGLUCOSE 163* 197* 225* 226* 226* 207*     No results for input(s): CPK, CPKMB, MB, TROPONINI in the last 72 hours.    No results for input(s): LACTATE in the last 72 hours.     Procedures:   TIPS:  TIPS placement with reduction of portosystemic gradient from 15 mmHg to 8 mmHg.    Mechanical thrombectomy and balloon angioplasty of the patient's known portal vein thrombosis was performed with restoration of flow; there is residual clot noted however there is brisk flow from the splenic vein through the TIPS shunt into the right atrium; patient will have follow-up with interventional radiology in approximately 2 weeks for follow-up venography to ensure that there is persistent flow.    Consultants: Hepatology, interventional radiology    Discharge Medication List as of 5/17/2018  5:46 PM      START taking these medications    Details   apixaban 5 mg Tab Take 1 tablet (5 mg total) by mouth 2 (two) times  daily., Starting Wed 5/16/2018, Normal      midodrine (PROAMATINE) 5 MG Tab Take 3 tablets (15 mg total) by mouth 3 (three) times daily., Starting Thu 5/17/2018, Until Fri 5/17/2019, Normal         CONTINUE these medications which have CHANGED    Details   lactulose (CHRONULAC) 10 gram/15 mL solution Take 45 mLs (30 g total) by mouth 2 (two) times daily. Adjust to 3-4 bowel movements daily., Starting Thu 5/17/2018, No Print         CONTINUE these medications which have NOT CHANGED    Details   clonazePAM (KLONOPIN) 1 MG tablet Take 1 tablet by mouth twice daily as needed for anxiety, Historical Med      glimepiride (AMARYL) 2 MG tablet Take 2 mg by mouth as needed., Historical Med      hyoscyamine (LEVSIN/SL) 0.125 mg Subl Take 1 tablet by mouth up to four times daily, Historical Med      promethazine (PHENERGAN) 25 MG tablet Take 25 mg by mouth as needed for Nausea., Historical Med      traZODone (DESYREL) 100 MG tablet Take 100 mg by mouth every evening., Historical Med      ciprofloxacin HCl (CIPRO) 500 MG tablet Take 1 tablet (500 mg total) by mouth once daily., Starting Tue 5/1/2018, Normal      hydrOXYzine HCl (ATARAX) 25 MG tablet Take 1 tablet (25 mg total) by mouth daily as needed for Itching., Starting Tue 5/1/2018, Normal         STOP taking these medications       furosemide (LASIX) 40 MG tablet Comments:   Reason for Stopping:         metOLazone (ZAROXOLYN) 5 MG tablet Comments:   Reason for Stopping:         spironolactone (ALDACTONE) 100 MG tablet Comments:   Reason for Stopping:               Discharge Diet:2 gram sodium diet with Fluid restriction 1000 per day    Activity: activity as tolerated    Discharge Condition: Stable    Disposition: Home or Self Care    Tests pending at the time of discharge: none      Time spent  on the discharge of the patient including review of hospital course with the patient. reviewing discharge medications and arranging follow-up care 35 minutes     Discharge  examination Patient was seen and examined on the date of discharge and determined to be suitable for discharge.    Discharge plan and follow up:  Follow up with Hepatology, repeat labs next week     Future Appointments  Date Time Provider Department Center   5/25/2018 8:00 AM [facility] [id number] [facility] RAD IR [facility]   5/25/2018 9:30 AM [facility]-US1 MASTER [facility] ULTR IC [facility] [address]   5/25/2018 10:15 AM ECHO, MAIN CAMPUS [facility] ECHOLAB [facility] [address]   5/25/2018 1:00 PM [facility]-CT1 500 LB LIMIT [facility] IC [facility] [address]   6/8/2018 4:20 PM Andreea Walton MD [facility] HEPAT [facility] [address]       Provider  Jack Prater MD  Staff Hospitalist  Department of [facility] Medicine  [facility]   [phone]

## 2018-05-18 NOTE — PHYSICIAN QUERY
"PT Name: Michelle Pappas  MR #: [id number]    Physician Query Form - Hematology Clarification      CDS/: Wilfrido DUNAWAY,RN,CDI            Contact information:[phone]    This form is a permanent document in the medical record.      Query Date: May 18, 2018    By submitting this query, we are merely seeking further clarification of documentation. Please utilize your independent clinical judgment when addressing the question(s) below.    The Medical record contains the following:   Indicators  Supporting Clinical Findings Location in Medical Record   x "Anemia" documented         #Anemia        05/17/18 [facility] Medicine Discharge Summaries    x H & H =            HGB= 7.4---->6.9----->7.6------>6.5---->7.5            HCT= 24.7--->22.8--->24.1--->21.4---->24.8               05/04----------------------------->05/17 Labs      BP =                     HR=      "GI bleeding" documented      Acute bleeding (Non GI site)     x Transfusion(s)             Transfuse RBC  1 Unit= 588.33ml          Transfuse RBC  1 Unit= 350ml         05/05/18 Transfusion Orders         05/17/18 Transfusion Orders      Treatment:      Other:        Provider, please specify diagnosis or diagnoses associated with above clinical findings.    [ x ] Acute blood loss anemia    [  ] Precipitous drop in Hematocrit    [  ] Other Please Specify_______________________________    [  ] Clinically Undetermined       Please document in your progress notes daily for the duration of treatment, until resolved, and include in your discharge summary.                                                                                                      "

## 2018-05-19 NOTE — PROGRESS NOTES
[facility] Post-Discharge call completed with patient today. C3 nurse noted the following concerns:  Requested Andreea Walton MD (Transplant) call her to discuss:  ·  If she needs to keep procedure appointments on 5/25/18 (thinks these were completed while in hosp.)  · note routed to provider.

## 2018-05-22 PROBLEM — I81 PORTAL VEIN THROMBOSIS: Status: ACTIVE | Noted: 2018-01-01

## 2018-05-22 PROBLEM — J90 RECURRENT RIGHT PLEURAL EFFUSION: Status: ACTIVE | Noted: 2018-01-01

## 2018-05-22 NOTE — ED PROVIDER NOTES
Encounter Date: 2018       History     Chief Complaint   Patient presents with    Shortness of Breath     Discharge thursday, SOB got worse yesterday. Hx of plureal effusion.      58 year old female with medical history of HCV with decompensated cirrhosis with recurrent hepatic hydrothorax, HTN, CKD3 presenting to the ED with the chief complaint of SOB. Patient recently discharged on  after receiving therapeutic thoracentesis and TIPs. Patient prescribed prophylactic Cipro after discharge and she has yet to fill the prescription. Patient reports she was breathing well at the time of discharge but has been progressively worsening. She reports not being able to do activities like she normally does and feels SOB after a few steps. She does not use oxygen or inhalers at home. She reports having a decreased appetite. She has a chronic dry cough. No fever, chest pain, abdominal pain, nausea, vomiting, dysuria, hematuria. She reports having diarrhea 2/2 to Lactulose and averages 5 non-bloody BM per day.           Review of patient's allergies indicates:  No Known Allergies  Past Medical History:   Diagnosis Date    Anxiety 2018    Depression 2018    Essential hypertension 2018    Hepatic Hydrothorax 2018    Hypoalbuminemia 2018    Other cirrhosis of liver 2018    Thrombocytopenia 2018     Past Surgical History:   Procedure Laterality Date    breast reduction       SECTION      HYSTERECTOMY       History reviewed. No pertinent family history.  Social History   Substance Use Topics    Smoking status: Former Smoker     Types: Cigarettes     Quit date: 3/6/2018    Smokeless tobacco: Not on file    Alcohol use No     Review of Systems   Constitutional: Positive for appetite change. Negative for chills, diaphoresis and fever.   HENT: Negative for congestion and sore throat.    Eyes: Negative for pain and redness.   Respiratory: Positive for cough and shortness of breath.     Cardiovascular: Negative for chest pain.   Gastrointestinal: Negative for abdominal pain, diarrhea, nausea and vomiting.   Genitourinary: Negative for dysuria and hematuria.   Musculoskeletal: Negative for back pain.   Skin: Negative for rash and wound.   Neurological: Negative for light-headedness and headaches.       Physical Exam     Initial Vitals [05/22/18 1211]   BP Pulse Resp Temp SpO2   (!) 125/58 96 (!) 28 98.5 °F (36.9 °C) (!) 90 %      MAP       80.33         Physical Exam    Constitutional: She appears well-developed and well-nourished. No distress.   HENT:   Head: Normocephalic and atraumatic.   Mouth/Throat: Oropharynx is clear and moist. No oropharyngeal exudate.   Eyes: EOM are normal. Pupils are equal, round, and reactive to light.   Neck: Normal range of motion. Neck supple.   Cardiovascular: Normal rate and regular rhythm.   No edema to BLE   Pulmonary/Chest: Tachypnea noted. She is in respiratory distress. She has no wheezes.   Decreased breath sounds RLL   Abdominal: Soft. Bowel sounds are normal. There is no tenderness.   Musculoskeletal: Normal range of motion. She exhibits no edema or tenderness.   Neurological: She is alert and oriented to person, place, and time. She has normal strength. No cranial nerve deficit or sensory deficit.   Skin: Skin is warm and dry. No erythema.       Imaging Results          X-Ray Chest 1 View (Final result)  Result time 05/22/18 13:03:21    Final result by Taj Luna MD (05/22/18 13:03:21)                 Impression:      Interval development of near complete opacification of the right hemithorax concerning for large right pleural effusion.  Less likely differential consideration also includes atelectasis.      Electronically signed by: Taj Luna MD  Date:    05/22/2018  Time:    13:03             Narrative:    EXAMINATION:  XR CHEST 1 VIEW    CLINICAL HISTORY:  Shortness of breath    TECHNIQUE:  Single frontal view of the chest was  performed.    COMPARISON:  Chest radiograph from 05/17/2018    FINDINGS:  Interval development of near complete opacification of the right hemithorax with only the right upper lung zone aerated.  Findings are favored to represent interval development of large right-sided pleural effusion although less likely differential consideration also includes atelectasis.  Cardiac silhouette is at the upper limits of normal in size.  Atherosclerosis of the aortic arch.  Left lung is well aerated.                              ED Course   Procedures  Labs Reviewed   CBC W/ AUTO DIFFERENTIAL - Abnormal; Notable for the following:        Result Value    WBC 2.66 (*)     RBC 3.64 (*)     Hemoglobin 9.7 (*)     Hematocrit 30.9 (*)     MCH 26.6 (*)     MCHC 31.4 (*)     RDW 18.1 (*)     Platelets 42 (*)     Immature Granulocytes 1.1 (*)     Lymph # 0.2 (*)     Mono # 0.2 (*)     nRBC 1 (*)     Gran% 79.8 (*)     Lymph% 7.1 (*)     Platelet Estimate Decreased (*)     All other components within normal limits    Narrative:     Add on order [id number] PT, [id number] APTT. Per Dr. Pickett  05/22/2018    13:20    COMPREHENSIVE METABOLIC PANEL - Abnormal; Notable for the following:     Glucose 280 (*)     Total Protein 5.8 (*)     Albumin 3.0 (*)     Total Bilirubin 1.7 (*)     AST 50 (*)     All other components within normal limits    Narrative:     Add on order [id number] PT, [id number] APTT. Per Dr. Pickett  05/22/2018    13:20    URINALYSIS, REFLEX TO URINE CULTURE - Abnormal; Notable for the following:     Glucose, UA 3+ (*)     Urobilinogen, UA 2.0-3.0 (*)     All other components within normal limits    Narrative:     Preferred Collection Type->Urine, Clean Catch   B-TYPE NATRIURETIC PEPTIDE - Abnormal; Notable for the following:      (*)     All other components within normal limits    Narrative:     Add on order [id number] PT, [id number] APTT. Per Dr. Pickett  05/22/2018    13:20    PROTIME-INR - Abnormal; Notable for the following:      Prothrombin Time 14.0 (*)     INR 1.4 (*)     All other components within normal limits    Narrative:     Add on order [id number] PT, [id number] APTT. Per Dr. Pickett  05/22/2018    13:20    URINALYSIS MICROSCOPIC - Abnormal; Notable for the following:     Hyaline Casts, UA 4 (*)     All other components within normal limits    Narrative:     Preferred Collection Type->Urine, Clean Catch   LIPASE    Narrative:     Add on order [id number] PT, [id number] APTT. Per Dr. Pickett  05/22/2018    13:20    AMMONIA    Narrative:     Add on order [id number] PT, [id number] APTT. Per Dr. Pickett  05/22/2018    13:20    TROPONIN I    Narrative:     Add on order [id number] PT, [id number] APTT. Per Dr. Pickett  05/22/2018    13:20    APTT   PROTIME-INR   APTT    Narrative:     Add on order [id number] PT, [id number] APTT. Per Dr. Pickett  05/22/2018    13:20    POCT GLUCOSE MONITORING CONTINUOUS                   APC / Resident Notes:   58 year old female with medical history of HCV with decompensated cirrhosis with recurrent hepatic hydrothorax, HTN, CKD3 presenting to the ED c/o SOB. Recently discharged on 5/17 after receiving therapeutic thoracentesis and TIPs. DDx includes but not limited to recurrent pleural effusion, pneumonia, symptomatic anemia, pneumothorax, electrolyte disturbance, cardiac arrhythmia. Will proceed with labs and CXR. Placed on 2L NC for hypoxia and tachypnea.     Work-up shows WBC 2.6, H/H 9.7/31, Increased Tbili 1.7 (0.6 on 5/17), Alk phos 61, AST/ALT 50/44, Lipase 16, Trp <0.006  ECG - NSR at 72 bpm without ST ischemic changes. No previous for comparison.  CXR - Interval development of near complete opacification of the right hemithorax concerning for large right pleural effusion    Etiology consistent with worsening right pleural effusion and will likely require therapeutic thoracentesis. Will admit the patient for ongoing management. All of the patient's questions were answered. I have discussed the care of this patient  with my supervising physician.                    Clinical Impression:   The primary encounter diagnosis was Recurrent right pleural effusion. Diagnoses of SOB (shortness of breath) and Decompensated HCV cirrhosis were also pertinent to this visit.    Disposition:   Disposition: Admitted  Condition: Rosy Tillman PA-C  05/22/18 1531

## 2018-05-22 NOTE — PROGRESS NOTES
Patient admitted to floor in stable condition.  Alert and oriented.  C/o pain 7/10 to back and rib cage- can see some slight bruising from last time she was admitted and the procedures she had.  Called and spoke with Dr. Hare who gave TO for oxycodone 5 PO Q6 PRN and a lidocaine patch.

## 2018-05-22 NOTE — PLAN OF CARE
Problem: Patient Care Overview  Goal: Plan of Care Review  Outcome: Ongoing (interventions implemented as appropriate)  Patient remained in stable condition this afternoon.  Oriented to room and unit policies.  Informed that we need a urine specimen and that a collection container is in the toiliet.  She verbalized understanding.  Will administer pain medication as orders for back/rib pain 7/10.  Gait steady, bed in lowest and locked position, call light in easy reach, able to make needs known to staff.

## 2018-05-22 NOTE — TELEPHONE ENCOUNTER
Received a call through the  from the patient.  Patient stated she was just discharged from the Hospital 2 days ago. They drained my lungs and I had a Tips done.  I am calling because I cannot breathe.  Patient instructed that if you cannot breathe you need to get to the nearest emergency room near your home. Patient lives in [place], [place].  Stressed again Mrs Pappas If you cannot breathe - what would you do?  But they told me to call the clinic. Mrs Pappas we cannot help you in the clinic you must go to the ER for Help.    Then son, got on the phone yelling. Stating they told us to call the clinic. So,are we coming to the clinic? Sir, if your mother cannot breathe this is urgent. This is her life. Clinical appts are non urgent. We cannot help your mother in the clinic if she cannot not breathe. She needs to go to the nearest ER near your home.  Son stated,We are across the street. Instructed just to go to the ER. He hung up.  Will notify the Provider.     SonBe arrived to the  asking if they have to do the appts scheduled for Friday 5/25/18 with Office Visit.  Spoke with Dr Walton. She stated to keep the appts.  Spoke with the son, Be and friend.  Son and friend stating you all have been very rude. We had you on speaker phone. I told them I was sorry for sounding as if I was rude.   I was trying to convey the urgency of needing to go to the ER if you cannot breathe.    Dr Walton stated she just had the procedure (tips) and it may take a couple of weeks for it to be effective. She may just accumulate fluid again and have difficulty breathing. Dr Walton says she needs to go to the ER.  The Drs in the hospital will let her know what test she needs. Do Not Cancel the test she may be discharged and will have to do them.    If you like I can try to get the Dr to talk to you? Be stated No. You have answered all of my questions. She has not gone to the ER yet. I don't know if she will go the the ER.  I think it is all Psychological. I am sorry but for sounding rude. I just wanted to stress the need to go to the ER, and getting on the road if she cannot breathe.    Van and friend left.    Nayeli Murrieta RN, Supervisor notified.

## 2018-05-22 NOTE — ED TRIAGE NOTES
Pt c/o SOB, started about 3 days ago and has continued to become worse. SOB even at rest without activity. Becomes better with rest but does not completely subside. Pt not on any Oxygen at home. Pt admitted to facility for past 2 weeks, DC on Thursday, d/t pleural effusion. Pt was tapped and drained twice while admitted, 3L fluid off. Pt also had TIPS procedure and 3 blood transfusions while admitted. Pt A&O x4 during triage. Pt denies any chest pain, N/V/D, or edema in extremities at this time. Pt abdominal distention, states is chronic d/t PMH.

## 2018-05-22 NOTE — HPI
The patient is a 59 y/o female with Hep C cirrhosis, anxiety, insomnia, portal vein thrombosis, and diabetes. She was recently discharged last Thursday after being admitted for right hydrothorax. During that admit she was transferred from another facility for management of her hydrothorax. Prior to that admit she had a similar admission. Taps were found to be transudative and cultures negative. During the last admit, in addition to her thoracentesis, she also underwent TIPS. She was placed on cipro prophylactically on dc but has not started to take it as she had not returned home yet since dc and never got it filled. Her SOB has rapidly worsened after dc but she denies any fevers, chills, CP or other symptoms. She does report some right sided pain from her last thoracentesis. Brother is present at bedside and corroborates her history.

## 2018-05-23 NOTE — PROGRESS NOTES
[facility] Hospital Medicine  Progress Note    Patient Name: Michelle Pappas  MRN: [id number]  Patient Class: IP- Inpatient   Admission Date: 5/22/2018  Length of Stay: 1 days  Attending Physician: Lawson Hare MD  Primary Care Provider: Taj Luna DO    [facility] Medicine Team: [facility]  Lawson Hare MD    Subjective:     Principal Problem:Recurrent right pleural effusion    HPI:  The patient is a 59 y/o female with Hep C cirrhosis, anxiety, insomnia, portal vein thrombosis, and diabetes. She was recently discharged last Thursday after being admitted for right hydrothorax. During that admit she was transferred from another facility for management of her hydrothorax. Prior to that admit she had a similar admission. Taps were found to be transudative and cultures negative. During the last admit, in addition to her thoracentesis, she also underwent TIPS. She was placed on cipro prophylactically on dc but has not started to take it as she had not returned home yet since dc and never got it filled. Her SOB has rapidly worsened after dc but she denies any fevers, chills, CP or other symptoms. She does report some right sided pain from her last thoracentesis. Brother is present at bedside and corroborates her history.         Hospital Course:  No notes on file    Interval History: pain in right side well controlled; SOB well controlled     Review of Systems   Constitutional: Negative for diaphoresis, fatigue and fever.   Respiratory: Positive for shortness of breath. Negative for cough.    Cardiovascular: Negative for chest pain and palpitations.   Gastrointestinal:        Minimal abdominal discomfort from ascites.   Genitourinary: Negative for difficulty urinating.     Objective:     Vital Signs (Most Recent):  Temp: 97.1 °F (36.2 °C) (05/23/18 1414)  Pulse: 69 (05/23/18 1056)  Resp: 15 (05/23/18 0840)  BP: 112/66 (05/23/18 0840)  SpO2: 99 % (05/23/18 0840) Vital Signs (24h  Range):  Temp:  [97.1 °F (36.2 °C)-98.8 °F (37.1 °C)] 97.1 °F (36.2 °C)  Pulse:  [64-93] 69  Resp:  [15-24] 15  SpO2:  [95 %-99 %] 99 %  BP: (112-125)/(66-74) 112/66     Weight: 74 kg (163 lb 2.3 oz)  Body mass index is 28.9 kg/m².  No intake or output data in the 24 hours ending 05/23/18 1456   Physical Exam   Constitutional: No distress.   Sitting up comfortably. In NAD.    Cardiovascular: Normal rate and regular rhythm.    Pulmonary/Chest: Effort normal. No respiratory distress.   Diminished bs on right    Abdominal: Bowel sounds are normal.   Protuberant but soft. Not tense    Neurological: She is alert.   Vitals reviewed.      Significant Labs: All pertinent labs within the past 24 hours have been reviewed.    Significant Imaging: I have reviewed all pertinent imaging results/findings within the past 24 hours.    Assessment/Plan:      * Recurrent right pleural effusion    - per hydrothorax         Portal vein thrombosis      - patient was started on apixaban during her last admit  - will hold pm and am dose in anticipation of thoracentesis / paracentesis         Decompensated HCV cirrhosis    - hepatology consulted   - plan for US to evaluate TIPS patency   - will also assess for any ascites that may be amenable to paracentesis   - pulmonary consult per pleural effusion   - continue lactulose   - midodrine for hypotension   - continue cipro prophylaxis dose as prescribed on recent dc           Insomnia    - continue trazodone and klonopin prn           Diabetes    - last A1C of 6.3   - regular diet for now  - continue to monitor  - not on any diabetic medications         Hepatic Hydrothorax    - pulmonary consulted and appreciate recs  - if patient is in need of a paracentesis then will plan to her do both thora and para in radiology at the same time  - respiratory status very stable; on RA with normal sats; appears comfortable on exam             Thrombocytopenia      - stable  - may need transfusion for  thoracentesis   - no evidence of bleeding           VTE Risk Mitigation     None              Lawson Hare MD  Department of Hospital Medicine   [facility]

## 2018-05-23 NOTE — ASSESSMENT & PLAN NOTE
- last A1C of 6.3   - regular diet for now  - continue to monitor  - not on any diabetic medications

## 2018-05-23 NOTE — HPI
Ms. Pappas is a 59 yo WF with a PMH of HTN, HCV cirrhosis s/p Harvoni w/SVR in 2015, hepatic hydrothorax requiring 5 thoracenteses in the last year, history of SBP, here with recurrent SOB after placement of TIPS last week.    The patient was recently admitted to [facility] with recurrent right hepatic hydrothorax. Diuresis was continued without improvement, IR did another thoracentesis, and finally TIPS procedure was attempted on 5/9 but failed. TIPS was re-attempted on 5/14 and performed successfully. Patient was also started on Heparin drip and transitioned to Apixiban for portal vein thrombosis. Patient was then discharged on 5/17.      The patient returns to hospital with shortness of breath.  Denies fevers, chills, rigors, abdominal pain.  A CXR showed reaccumulation of the large right pleural effusion.

## 2018-05-23 NOTE — NURSING
Ultrasound notified nursing that patient needs to be NPO for 8 hours prior to abdominal ultrasound. Patient made aware,states last drank prior to 8 am, not sure of exact hour but knows it was before 8 am.  Notified ultrasound of patient's time of last PO intake.

## 2018-05-23 NOTE — HPI
Ms. Pappas is a 59 yo WF with a PMH of HTN, liver cirrhosis based on liver biopsy 2012, Hep C s/p Harvoni x3 months in 2015, hepatic hydrothorax requiring 5 thoracenteses in the last year, history of SBP. Pulmonology was consulted today for thoracentesis.     Patient was recently admitted to [facility] 4/26-4/29 for recurrence of R hydrothorax. She underwent thoracentesis by IR with removal of 1.5L. Fluid was found to be transudative (LDH 24, Protein <2.3) with (-) cultures and a body fluid WBC count of 85. She was discharged home, had recurrent SOB due to re-accumulation, and she was transferred to [facility] for higher level of care and hepatology on 5/5/18. At that time, another thoracentesis was done which was again transudative and thought to be a hepatic hydrothorax. Diuresis was continued without improvement, IR did another thoracentesis, and finally TIPS procedure was attempted on 5/9 but failed. TIPS was re-attempted on 5/14 and performed successfully. Patient was also started on Heparin drip and transitioned to Apixiban for portal vein thrombosis. Patient was then discharged on 5/17.    Today, 5/22, patient presented to the ED after having SOB for three days that continues to worsen. She is now having SOB at rest as well as with activity. She is not on home O2. Pulmonary has been consulted for thoracentesis of her hepatic hydrothorax. She is currently not on any blood thinners. Platelet count is 42 and INR is 1.4. ECHO done on 5/5 did not show systolic or diastolic dysfunction.

## 2018-05-23 NOTE — ASSESSMENT & PLAN NOTE
- hepatology consulted   - plan for US to evaluate TIPS patency   - will also assess for any ascites that may be amenable to paracentesis   - pulmonary consult per pleural effusion   - continue lactulose   - midodrine for hypotension   - continue cipro prophylaxis dose as prescribed on recent dc

## 2018-05-23 NOTE — CONSULTS
[facility]  Pulmonology  Consult Note    Patient Name: Michelle Pappas  MRN: [id number]  Admission Date: 5/22/2018  Hospital Length of Stay: 1 days  Code Status: Full Code  Attending Physician: Lawson Flood MD  Primary Care Provider: Taj Luna DO   Principal Problem: Recurrent right pleural effusion    Inpatient consult to Pulmonology  Consult performed by: LAKIA ZHU  Consult ordered by: LAWSON FLOOD        Subjective:     HPI:  Ms. Pappas is a 59 yo WF with a PMH of HTN, liver cirrhosis based on liver biopsy 2012, Hep C s/p Harvoni x3 months in 2015, hepatic hydrothorax requiring 5 thoracenteses in the last year, history of SBP. Pulmonology was consulted today for thoracentesis.     Patient was recently admitted to [facility] 4/26-4/29 for recurrence of R hydrothorax. She underwent thoracentesis by IR with removal of 1.5L. Fluid was found to be transudative (LDH 24, Protein <2.3) with (-) cultures and a body fluid WBC count of 85. She was discharged home, had recurrent SOB due to re-accumulation, and she was transferred to [facility] for higher level of care and hepatology on 5/5/18. At that time, another thoracentesis was done which was again transudative and thought to be a hepatic hydrothorax. Diuresis was continued without improvement, IR did another thoracentesis, and finally TIPS procedure was attempted on 5/9 but failed. TIPS was re-attempted on 5/14 and performed successfully. Patient was also started on Heparin drip and transitioned to Apixiban for portal vein thrombosis. Patient was then discharged on 5/17.    Today, 5/22, patient presented to the ED after having SOB for three days that continues to worsen. She is now having SOB at rest as well as with activity. She is not on home O2. Pulmonary has been consulted for thoracentesis of her hepatic hydrothorax. She is currently not on any blood thinners. Platelet count is 42 and INR is 1.4. ECHO done on 5/5 did  not show systolic or diastolic dysfunction.    Past Medical History:   Diagnosis Date    Anxiety 2018    Depression 2018    Essential hypertension 2018    Hepatic Hydrothorax 2018    Hypoalbuminemia 2018    Other cirrhosis of liver 2018    Thrombocytopenia 2018     Past Surgical History:   Procedure Laterality Date    breast reduction       SECTION      HYSTERECTOMY       Review of patient's allergies indicates:  No Known Allergies    Family History     None        Social History Main Topics    Smoking status: Former Smoker     Types: Cigarettes     Quit date: 3/6/2018    Smokeless tobacco: Not on file    Alcohol use No    Drug use: Unknown    Sexual activity: Not on file     Review of Systems   Constitutional: Negative for chills and fever.   HENT: Negative for congestion, postnasal drip, rhinorrhea and sinus pressure.    Eyes: Negative.    Respiratory: Positive for shortness of breath. Negative for cough, chest tightness, wheezing and stridor.    Gastrointestinal: Negative for abdominal pain, diarrhea, nausea and vomiting.   Endocrine: Negative.    Genitourinary: Negative for dysuria and frequency.   Musculoskeletal: Negative.    Neurological: Negative for dizziness and headaches.     Objective:     Vital Signs (Most Recent):  Temp: 97.5 °F (36.4 °C) (18 1026)  Pulse: 88 (18 0840)  Resp: 15 (18 0840)  BP: 112/66 (18 0840)  SpO2: 99 % (18 0840) Vital Signs (24h Range):  Temp:  [97.5 °F (36.4 °C)-98.8 °F (37.1 °C)] 97.5 °F (36.4 °C)  Pulse:  [64-96] 88  Resp:  [15-34] 15  SpO2:  [90 %-99 %] 99 %  BP: (108-125)/(54-83) 112/66     Weight: 74 kg (163 lb 2.3 oz)  Body mass index is 28.9 kg/m².    No intake or output data in the 24 hours ending 18 1054    Physical Exam   Constitutional: She is oriented to person, place, and time. She appears well-developed and well-nourished.   HENT:   Head: Normocephalic and atraumatic.   Eyes:  Conjunctivae are normal. Pupils are equal, round, and reactive to light.   Neck: Normal range of motion. Neck supple.   Cardiovascular: Normal rate and regular rhythm.    Pulmonary/Chest: Effort normal.   Decreased breath sounds R lung   Abdominal: Soft. Bowel sounds are normal. She exhibits distension.   Musculoskeletal: Normal range of motion. She exhibits no edema.   Neurological: She is alert and oriented to person, place, and time.   Skin: Skin is warm and dry.   Nursing note and vitals reviewed.    Lines/Drains/Airways     Peripheral Intravenous Line                 Peripheral IV - Single Lumen 05/22/18 1342 Left Hand less than 1 day              Significant Labs:    CBC/Anemia Profile:    Recent Labs  Lab 05/22/18  1247 05/23/18  0631   WBC 2.66* 2.68*   HGB 9.7* 8.8*   HCT 30.9* 29.0*   PLT 42* 45*   MCV 85 86   RDW 18.1* 18.2*     Chemistries:    Recent Labs  Lab 05/22/18  1247 05/23/18  0631    138   K 4.2 4.2    105   CO2 23 26   BUN 11 12   CREATININE 1.0 1.0   CALCIUM 9.4 9.1   ALBUMIN 3.0* 3.0*   PROT 5.8* 5.5*   BILITOT 1.7* 1.7*   ALKPHOS 61 62   ALT 44 37   AST 50* 31   MG  --  1.5*   PHOS  --  3.2     All pertinent labs within the past 24 hours have been reviewed.    Significant Imaging:   I have reviewed and interpreted all pertinent imaging results/findings within the past 24 hours.    Assessment/Plan:     Hepatic Hydrothorax    This is a case of hepatic hydrothorax in the setting of liver cirrhosis with recent TIPS procedure on 5/14. Previous thoracenteses have shown transudative fluid. Paracenteses have been difficult in the past month because patient only has pockets of fluid. Patient's abdomen is more distended than previously - concern for ascites on this admission which could be contributing to R pleural effusion. According to the radiology notes, patient will likely continue re-accumulating for 4-6 weeks post-TIPS, and afterwards, effusion will likely not completely  resolve.    At this time, patient is amenable to thoracentesis. CXR shows large amounts of pleural fluid and patient is symptomatic. Have spoken with primary team regarding abdominal U/S to assess for ascites - if patient has large fluid collection, she should receive paracentesis preceding thoracentesis. If she does not have fluid pockets that can be tapped, will perform thoracentesis this morning.        Thank you for involving us in the care of this patient. We will continue to follow along. Please call with any questions.    Nora Dutton MD  [facility] PCCM Fellow, PGY 4  [facility]  Pager: [phone]

## 2018-05-23 NOTE — ASSESSMENT & PLAN NOTE
- pulmonary consulted and appreciate recs  - if patient is in need of a paracentesis then will plan to her do both thora and para in radiology at the same time  - respiratory status very stable; on RA with normal sats; appears comfortable on exam

## 2018-05-23 NOTE — PHYSICIAN QUERY
PT Name: Michelle Pappas  MR #: [id number]    Physician Query Form - Hematology Clarification      CDS/: Grace Herman RN, CCDS              Contact information: [email or web address]    This form is a permanent document in the medical record.      Query Date: May 23, 2018    By submitting this query, we are merely seeking further clarification of documentation. Please utilize your independent clinical judgment when addressing the question(s) below.    The Medical record contains the following:   Indicators    Supporting Clinical Findings Location in Medical Record   X Anemia, Thrombocytopenia, Neutropenia, Pancytopenia documented thrombocytopenia 5/22 ed note, h/p  5/23 prog note   X H & H 9.7/30.9-->8.8/29.0 5/22, 5/23 lab   X WBC 2.66-->2.68 5/22, 5/23 lab    Neutrophils      Granulocytes     X Platelets 42-->45 5/22, 5/23 lab    Transfusion(s)      Treatments:     X Other: Decompensated HCV cirrhosis    Portal Vein Thrombosis--patient was started on apixaban during her last admit 5/22 h/p     Provider, please specify diagnosis or diagnoses associated with above clinical findings.      [  ] Pancytopenia due to other drug  [ x ] Pancytopenia  [  ] Other Hematological Diagnosis (please specify) ______________________________  [  ] Clinically Undetermined      Please document in your progress notes daily for the duration of treatment, until resolved, and include in your discharge summary.

## 2018-05-23 NOTE — ASSESSMENT & PLAN NOTE
- pulmonary consulted   - almost near white out of right hemithorax  - appears comfortable on 2L NC  - trish prn  - discuss with hepatology and pulmonary regarding further long term management given recurrence despite TIPS

## 2018-05-23 NOTE — ASSESSMENT & PLAN NOTE
- patient was started on apixaban during her last admit  - will hold pm and am dose in anticipation of thoracentesis / paracentesis

## 2018-05-23 NOTE — H&P
[facility] Hospital Medicine  History & Physical    Patient Name: Michelle Pappas  MRN: [id number]  Admission Date: 2018  Attending Physician: Lawson Hare MD  Primary Care Provider: Taj Luna DO    [facility] Medicine Team: [facility] Lawson aHre MD     Patient information was obtained from patient, relative(s) and ER records.     Subjective:     Principal Problem:Recurrent right pleural effusion    Chief Complaint:   Chief Complaint   Patient presents with    Shortness of Breath     Discharge thursday, SOB got worse yesterday. Hx of plureal effusion.         HPI: The patient is a 59 y/o female with Hep C cirrhosis, anxiety, insomnia, portal vein thrombosis, and diabetes. She was recently discharged last Thursday after being admitted for right hydrothorax. During that admit she was transferred from another facility for management of her hydrothorax. Prior to that admit she had a similar admission. Taps were found to be transudative and cultures negative. During the last admit, in addition to her thoracentesis, she also underwent TIPS. She was placed on cipro prophylactically on dc but has not started to take it as she had not returned home yet since dc and never got it filled. Her SOB has rapidly worsened after dc but she denies any fevers, chills, CP or other symptoms. She does report some right sided pain from her last thoracentesis. Brother is present at bedside and corroborates her history.         Past Medical History:   Diagnosis Date    Anxiety 2018    Depression 2018    Essential hypertension 2018    Hepatic Hydrothorax 2018    Hypoalbuminemia 2018    Other cirrhosis of liver 2018    Thrombocytopenia 2018       Past Surgical History:   Procedure Laterality Date    breast reduction       SECTION      HYSTERECTOMY         Review of patient's allergies indicates:  No Known Allergies    No current facility-administered  medications on file prior to encounter.      Current Outpatient Prescriptions on File Prior to Encounter   Medication Sig    apixaban 5 mg Tab Take 1 tablet (5 mg total) by mouth 2 (two) times daily.    clonazePAM (KLONOPIN) 1 MG tablet Take 1 tablet by mouth twice daily as needed for anxiety    glimepiride (AMARYL) 2 MG tablet Take 2 mg by mouth as needed.    lactulose (CHRONULAC) 10 gram/15 mL solution Take 45 mLs (30 g total) by mouth 2 (two) times daily. Adjust to 3-4 bowel movements daily.    midodrine (PROAMATINE) 5 MG Tab Take 3 tablets (15 mg total) by mouth 3 (three) times daily.    promethazine (PHENERGAN) 25 MG tablet Take 25 mg by mouth as needed for Nausea.    traZODone (DESYREL) 100 MG tablet Take 100 mg by mouth every evening.    ciprofloxacin HCl (CIPRO) 500 MG tablet Take 1 tablet (500 mg total) by mouth once daily.    hydrOXYzine HCl (ATARAX) 25 MG tablet Take 1 tablet (25 mg total) by mouth daily as needed for Itching.    hyoscyamine (LEVSIN/SL) 0.125 mg Subl Take 1 tablet by mouth up to four times daily     Family History     None        Social History Main Topics    Smoking status: Former Smoker     Types: Cigarettes     Quit date: 3/6/2018    Smokeless tobacco: Not on file    Alcohol use No    Drug use: Unknown    Sexual activity: Not on file     Review of Systems   Constitutional: Positive for fatigue. Negative for diaphoresis and fever.   HENT: Negative for congestion, rhinorrhea and sore throat.    Respiratory: Positive for shortness of breath. Negative for cough and wheezing.    Cardiovascular: Negative for chest pain and palpitations.   Gastrointestinal: Positive for abdominal distention. Negative for abdominal pain.   Genitourinary: Positive for dysuria. Negative for difficulty urinating.   Musculoskeletal: Negative for arthralgias.        + right chest wall pain    Skin: Negative for rash.   Neurological: Negative for headaches.   Hematological: Negative for adenopathy.      Objective:     Vital Signs (Most Recent):  Temp: 98.1 °F (36.7 °C) (05/22/18 1554)  Pulse: 64 (05/22/18 1838)  Resp: 20 (05/22/18 1554)  BP: 125/74 (05/22/18 1554)  SpO2: 96 % (05/22/18 1554) Vital Signs (24h Range):  Temp:  [98.1 °F (36.7 °C)-98.5 °F (36.9 °C)] 98.1 °F (36.7 °C)  Pulse:  [64-96] 64  Resp:  [20-34] 20  SpO2:  [90 %-96 %] 96 %  BP: (108-125)/(54-83) 125/74     Weight: 74 kg (163 lb 2.3 oz)  Body mass index is 28.9 kg/m².    Physical Exam   Constitutional: She is oriented to person, place, and time. She appears well-developed and well-nourished.   Cardiovascular: Normal rate and regular rhythm.    Pulmonary/Chest: Effort normal.   Diminished right sided breath sounds    Abdominal: Soft. She exhibits distension.   Abdomen distended but not tense    Musculoskeletal: She exhibits no edema.   Neurological: She is alert and oriented to person, place, and time.   Skin: Skin is warm.   Mild bruising on right side of chest    Psychiatric: She has a normal mood and affect.   Vitals reviewed.          Significant Labs: All pertinent labs within the past 24 hours have been reviewed.    Significant Imaging: I have reviewed and interpreted all pertinent imaging results/findings within the past 24 hours.    Assessment/Plan:     * Recurrent right pleural effusion    - per hydrothorax         Portal vein thrombosis      - patient was started on apixaban during her last admit  - will hold pm and am dose in anticipation of thoracentesis         Decompensated HCV cirrhosis    - hepatology consult  - pulmonary consult per pleural effusion   - continue lactulose   - midodrine for hypotension   - continue cipro prophylaxis dose as prescribed on recent dc           Insomnia    - continue trazodone and klonopin prn           Diabetes    - last A1C of 6.3   - regular diet for now  - continue to monitor  - not on any diabetic medications         Hepatic Hydrothorax    - pulmonary consulted   - almost near white out of  right hemithorax  - appears comfortable on 2L NC  - duonebs prn  - discuss with hepatology and pulmonary regarding further long term management given recurrence despite TIPS           Thrombocytopenia      - stable  - may need transfusion for thoracentesis   - no evidence of bleeding           VTE Risk Mitigation     None             Lawson Hare MD  Department of Hospital Medicine   [facility]

## 2018-05-23 NOTE — ASSESSMENT & PLAN NOTE
Recurrent right pleural effusion may be 2/2 TIPS dysfunction vs post-TIPS placement equilibriation.    Recommendations:  RUQ U/S w/doppler to evaluate TIPS patency  Appreciate Pulmonary recs

## 2018-05-23 NOTE — SUBJECTIVE & OBJECTIVE
Review of Systems   Constitutional: Positive for fatigue. Negative for chills, diaphoresis, fever and unexpected weight change.   HENT: Negative for trouble swallowing.    Respiratory: Positive for shortness of breath. Negative for cough and choking.    Cardiovascular: Negative for chest pain and palpitations.   Gastrointestinal: Negative for abdominal distention, abdominal pain, anal bleeding, blood in stool, constipation, diarrhea, nausea, rectal pain and vomiting.   Genitourinary: Negative.  Negative for dysuria and flank pain.   Musculoskeletal: Negative.    Skin: Negative.    Neurological: Negative for dizziness and tremors.   Psychiatric/Behavioral: Negative.  Negative for confusion and hallucinations.       Past Medical History:   Diagnosis Date    Anxiety 2018    Depression 2018    Essential hypertension 2018    Hepatic Hydrothorax 2018    Hypoalbuminemia 2018    Other cirrhosis of liver 2018    Thrombocytopenia 2018       Past Surgical History:   Procedure Laterality Date    breast reduction       SECTION      HYSTERECTOMY         Family history of liver disease: No    Review of patient's allergies indicates:  No Known Allergies    Social History Main Topics    Smoking status: Former Smoker     Types: Cigarettes     Quit date: 3/6/2018    Smokeless tobacco: Not on file    Alcohol use No    Drug use: Unknown    Sexual activity: Not on file       Prescriptions Prior to Admission   Medication Sig Dispense Refill Last Dose    apixaban 5 mg Tab Take 1 tablet (5 mg total) by mouth 2 (two) times daily. 60 tablet 2 2018    clonazePAM (KLONOPIN) 1 MG tablet Take 1 tablet by mouth twice daily as needed for anxiety   Past Week    glimepiride (AMARYL) 2 MG tablet Take 2 mg by mouth as needed.   Past Month    lactulose (CHRONULAC) 10 gram/15 mL solution Take 45 mLs (30 g total) by mouth 2 (two) times daily. Adjust to 3-4 bowel movements daily.  0 2018     midodrine (PROAMATINE) 5 MG Tab Take 3 tablets (15 mg total) by mouth 3 (three) times daily. 270 tablet 0 5/22/2018    promethazine (PHENERGAN) 25 MG tablet Take 25 mg by mouth as needed for Nausea.   Past Month    traZODone (DESYREL) 100 MG tablet Take 100 mg by mouth every evening.   5/21/2018    ciprofloxacin HCl (CIPRO) 500 MG tablet Take 1 tablet (500 mg total) by mouth once daily. 30 tablet 11 Unknown    hydrOXYzine HCl (ATARAX) 25 MG tablet Take 1 tablet (25 mg total) by mouth daily as needed for Itching. 30 tablet 1 Taking    hyoscyamine (LEVSIN/SL) 0.125 mg Subl Take 1 tablet by mouth up to four times daily   Not Taking       Objective:     Vital Signs (Most Recent):  Temp: 97.5 °F (36.4 °C) (05/23/18 1026)  Pulse: 69 (05/23/18 1056)  Resp: 15 (05/23/18 0840)  BP: 112/66 (05/23/18 0840)  SpO2: 99 % (05/23/18 0840) Vital Signs (24h Range):  Temp:  [97.5 °F (36.4 °C)-98.8 °F (37.1 °C)] 97.5 °F (36.4 °C)  Pulse:  [64-93] 69  Resp:  [15-34] 15  SpO2:  [91 %-99 %] 99 %  BP: (108-125)/(54-83) 112/66     Weight: 74 kg (163 lb 2.3 oz) (05/22/18 1600)  Body mass index is 28.9 kg/m².    Physical Exam   Constitutional: She is oriented to person, place, and time. No distress.   HENT:   Mouth/Throat: Oropharynx is clear and moist. No oropharyngeal exudate.   Eyes: Conjunctivae are normal. No scleral icterus.   Neck: Neck supple.   Cardiovascular: Normal rate, regular rhythm, normal heart sounds and intact distal pulses.  Exam reveals no friction rub.    No murmur heard.  Pulmonary/Chest:   Absent breath sounds on right  Increased work of breathing   Abdominal: Soft. Bowel sounds are normal. She exhibits no distension. There is no tenderness.   Lymphadenopathy:     She has no cervical adenopathy.   Neurological: She is alert and oriented to person, place, and time.   Skin: Skin is warm and dry. She is not diaphoretic.   Psychiatric: She has a normal mood and affect.       MELD-Na score: 11 at 5/23/2018  6:31  AM  MELD score: 11 at 5/23/2018  6:31 AM  Calculated from:  Serum Creatinine: 1 mg/dL at 5/23/2018  6:31 AM  Serum Sodium: 138 mmol/L (Rounded to 137) at 5/23/2018  6:31 AM  Total Bilirubin: 1.7 mg/dL at 5/23/2018  6:31 AM  INR(ratio): 1.3 at 5/23/2018  6:31 AM  Age: 58 years    Significant Labs:  CBC:   Recent Labs  Lab 05/23/18  0631   WBC 2.68*   RBC 3.37*   HGB 8.8*   HCT 29.0*   PLT 45*     CMP:   Recent Labs  Lab 05/23/18  0631   *   CALCIUM 9.1   ALBUMIN 3.0*   PROT 5.5*      K 4.2   CO2 26      BUN 12   CREATININE 1.0   ALKPHOS 62   ALT 37   AST 31   BILITOT 1.7*     Coagulation:   Recent Labs  Lab 05/22/18  1247 05/23/18  0631   INR 1.4* 1.3*   APTT 26.5  --        Significant Imaging:  Labs: Reviewed  .

## 2018-05-23 NOTE — SUBJECTIVE & OBJECTIVE
Past Medical History:   Diagnosis Date    Anxiety 2018    Depression 2018    Essential hypertension 2018    Hepatic Hydrothorax 2018    Hypoalbuminemia 2018    Other cirrhosis of liver 2018    Thrombocytopenia 2018     Past Surgical History:   Procedure Laterality Date    breast reduction       SECTION      HYSTERECTOMY       Review of patient's allergies indicates:  No Known Allergies    Family History     None        Social History Main Topics    Smoking status: Former Smoker     Types: Cigarettes     Quit date: 3/6/2018    Smokeless tobacco: Not on file    Alcohol use No    Drug use: Unknown    Sexual activity: Not on file     Review of Systems   Constitutional: Negative for chills and fever.   HENT: Negative for congestion, postnasal drip, rhinorrhea and sinus pressure.    Eyes: Negative.    Respiratory: Positive for shortness of breath. Negative for cough, chest tightness, wheezing and stridor.    Gastrointestinal: Negative for abdominal pain, diarrhea, nausea and vomiting.   Endocrine: Negative.    Genitourinary: Negative for dysuria and frequency.   Musculoskeletal: Negative.    Neurological: Negative for dizziness and headaches.     Objective:     Vital Signs (Most Recent):  Temp: 97.5 °F (36.4 °C) (18 1026)  Pulse: 88 (18 0840)  Resp: 15 (18 0840)  BP: 112/66 (18 0840)  SpO2: 99 % (18 0840) Vital Signs (24h Range):  Temp:  [97.5 °F (36.4 °C)-98.8 °F (37.1 °C)] 97.5 °F (36.4 °C)  Pulse:  [64-96] 88  Resp:  [15-34] 15  SpO2:  [90 %-99 %] 99 %  BP: (108-125)/(54-83) 112/66     Weight: 74 kg (163 lb 2.3 oz)  Body mass index is 28.9 kg/m².    No intake or output data in the 24 hours ending 18 1054    Physical Exam   Constitutional: She is oriented to person, place, and time. She appears well-developed and well-nourished.   HENT:   Head: Normocephalic and atraumatic.   Eyes: Conjunctivae are normal. Pupils are equal, round, and  reactive to light.   Neck: Normal range of motion. Neck supple.   Cardiovascular: Normal rate and regular rhythm.    Pulmonary/Chest: Effort normal.   Decreased breath sounds R lung   Abdominal: Soft. Bowel sounds are normal. She exhibits distension.   Musculoskeletal: Normal range of motion. She exhibits no edema.   Neurological: She is alert and oriented to person, place, and time.   Skin: Skin is warm and dry.   Nursing note and vitals reviewed.    Lines/Drains/Airways     Peripheral Intravenous Line                 Peripheral IV - Single Lumen 05/22/18 1342 Left Hand less than 1 day              Significant Labs:    CBC/Anemia Profile:    Recent Labs  Lab 05/22/18  1247 05/23/18  0631   WBC 2.66* 2.68*   HGB 9.7* 8.8*   HCT 30.9* 29.0*   PLT 42* 45*   MCV 85 86   RDW 18.1* 18.2*     Chemistries:    Recent Labs  Lab 05/22/18  1247 05/23/18  0631    138   K 4.2 4.2    105   CO2 23 26   BUN 11 12   CREATININE 1.0 1.0   CALCIUM 9.4 9.1   ALBUMIN 3.0* 3.0*   PROT 5.8* 5.5*   BILITOT 1.7* 1.7*   ALKPHOS 61 62   ALT 44 37   AST 50* 31   MG  --  1.5*   PHOS  --  3.2     All pertinent labs within the past 24 hours have been reviewed.    Significant Imaging:   I have reviewed and interpreted all pertinent imaging results/findings within the past 24 hours.

## 2018-05-23 NOTE — PLAN OF CARE
CM met with patient for discharge planning.  Plan is to discharge home with no needs.    Pharmacy:    [facility] [address] [place], [place] - [address] AT [facility] [address]  [address] [facility] [address] [place]  [place] [address]  Phone: [phone] Fax: [phone]    [facility]  [address]  [facility] [address]  Phone: [phone] Fax: [phone]    PCP:  Taj Luna DO    Payor: MEDICARE / Plan: MEDICARE PART A & B / Product Type: [facility] /      05/23/18 1336   Discharge Assessment   Assessment Type Discharge Planning Assessment   Confirmed/corrected address and phone number on facesheet? Yes   Assessment information obtained from? Patient   Expected Length of Stay (days) 3   Communicated expected length of stay with patient/caregiver yes   Prior to hospitilization cognitive status: Alert/Oriented   Prior to hospitalization functional status: Independent   Current cognitive status: Alert/Oriented   Current Functional Status: Independent   Facility Arrived From: Emergency room   Lives With alone   Able to Return to Prior Arrangements yes   Is patient able to care for self after discharge? Unable to determine at this time (comments)   Who are your caregiver(s) and their phone number(s)? T[place] Rakelgalack - brother [phone]   Patient's perception of discharge disposition home or selfcare   Readmission Within The Last 30 Days previous discharge plan unsuccessful   If yes, most recent facility name: [facility]   Patient currently being followed by outpatient case management? No   Patient currently receives any other outside agency services? No   Equipment Currently Used at Home cane, straight;glucometer   Do you have any problems affording any of your prescribed medications? No   Is the patient taking medications as prescribed? yes   Does the patient have transportation home? Yes   Transportation Available family or friend will provide   Does the patient receive services at the  [facility]? No   Discharge Plan A Home with family   Discharge Plan B Home Health   Patient/Family In Agreement With Plan yes   Readmission Questionnaire   At the time of your discharge, did someone talk to you about what your health problems were? Yes   At the time of discharge, did someone talk to you about what to watch out for regarding worsening of your health problem? Yes   At the time of discharge, did someone talk to you about what to do if you experienced worsening of your health problem? Yes   At the time of discharge, did someone talk to you about which medication to take when you left the hospital and which ones to stop taking? Yes   At the time of discharge, did someone talk to you about when and where to follow up with a doctor after you left the hospital? Yes   How often do you need to have someone help you when you read instructions, pamphlets, or other written material from your doctor or pharmacy? Never   Do you have problems taking your medications as prescribed? No   Do you have any problems affording any of  your prescribed medications? No   Do you have problems obtaining/receiving your medications? No   Living Arrangements apartment   Are you currently feeling confused? No   Are you currently having problems thinking? No   Are you currently having memory problems? No   Have you felt down, depressed, or hopeless? 0   Have you felt little interest or pleasure in doing things? 0

## 2018-05-23 NOTE — CONSULTS
[facility]  Hepatology  Consult Note    Patient Name: Michelle Pappas  MRN: [id number]  Admission Date: 5/22/2018  Hospital Length of Stay: 1 days  Attending Provider: Lawson Flood MD   Primary Care Physician: Taj Luna DO  Principal Problem:Recurrent right pleural effusion    Inpatient consult to Hepatology  Consult performed by: ANA HAWKINS  Consult ordered by: LAWSON FLOOD        Subjective:     Transplant status: No    HPI:  Ms. Pappas is a 59 yo WF with a PMH of HTN, HCV cirrhosis s/p Harvoni w/SVR in 2015, hepatic hydrothorax requiring 5 thoracenteses in the last year, history of SBP, here with recurrent SOB after placement of TIPS last week.    The patient was recently [place] [facility] [place] right hepatic hydrothorax. Diuresis was continued without improvement, IR did another thoracentesis, and finally TIPS procedure was attempted on 5/9 but failed. TIPS was re-attempted on 5/14 and performed successfully. Patient was also started on Heparin drip and transitioned to Apixiban for portal vein thrombosis. Patient was then discharged on 5/17.      The patient returns to hospital with shortness of breath.  Denies fevers, chills, rigors, abdominal pain.  A CXR showed reaccumulation of the large right pleural effusion.        Review of Systems   Constitutional: Positive for fatigue. Negative for chills, diaphoresis, fever and unexpected weight change.   HENT: Negative for trouble swallowing.    Respiratory: Positive for shortness of breath. Negative for cough and choking.    Cardiovascular: Negative for chest pain and palpitations.   Gastrointestinal: Negative for abdominal distention, abdominal pain, anal bleeding, blood in stool, constipation, diarrhea, nausea, rectal pain and vomiting.   Genitourinary: Negative.  Negative for dysuria and flank pain.   Musculoskeletal: Negative.    Skin: Negative.    Neurological: Negative for dizziness and tremors.    Psychiatric/Behavioral: Negative.  Negative for confusion and hallucinations.       Past Medical History:   Diagnosis Date    Anxiety 2018    Depression 2018    Essential hypertension 2018    Hepatic Hydrothorax 2018    Hypoalbuminemia 2018    Other cirrhosis of liver 2018    Thrombocytopenia 2018       Past Surgical History:   Procedure Laterality Date    breast reduction       SECTION      HYSTERECTOMY         Family history of liver disease: No    Review of patient's allergies indicates:  No Known Allergies    Social History Main Topics    Smoking status: Former Smoker     Types: Cigarettes     Quit date: 3/6/2018    Smokeless tobacco: Not on file    Alcohol use No    Drug use: Unknown    Sexual activity: Not on file       Prescriptions Prior to Admission   Medication Sig Dispense Refill Last Dose    apixaban 5 mg Tab Take 1 tablet (5 mg total) by mouth 2 (two) times daily. 60 tablet 2 2018    clonazePAM (KLONOPIN) 1 MG tablet Take 1 tablet by mouth twice daily as needed for anxiety   Past Week    glimepiride (AMARYL) 2 MG tablet Take 2 mg by mouth as needed.   Past Month    lactulose (CHRONULAC) 10 gram/15 mL solution Take 45 mLs (30 g total) by mouth 2 (two) times daily. Adjust to 3-4 bowel movements daily.  0 2018    midodrine (PROAMATINE) 5 MG Tab Take 3 tablets (15 mg total) by mouth 3 (three) times daily. 270 tablet 0 2018    promethazine (PHENERGAN) 25 MG tablet Take 25 mg by mouth as needed for Nausea.   Past Month    traZODone (DESYREL) 100 MG tablet Take 100 mg by mouth every evening.   2018    ciprofloxacin HCl (CIPRO) 500 MG tablet Take 1 tablet (500 mg total) by mouth once daily. 30 tablet 11 Unknown    hydrOXYzine HCl (ATARAX) 25 MG tablet Take 1 tablet (25 mg total) by mouth daily as needed for Itching. 30 tablet 1 Taking    hyoscyamine (LEVSIN/SL) 0.125 mg Subl Take 1 tablet by mouth up to four times daily   Not  Taking       Objective:     Vital Signs (Most Recent):  Temp: 97.5 °F (36.4 °C) (05/23/18 1026)  Pulse: 69 (05/23/18 1056)  Resp: 15 (05/23/18 0840)  BP: 112/66 (05/23/18 0840)  SpO2: 99 % (05/23/18 0840) Vital Signs (24h Range):  Temp:  [97.5 °F (36.4 °C)-98.8 °F (37.1 °C)] 97.5 °F (36.4 °C)  Pulse:  [64-93] 69  Resp:  [15-34] 15  SpO2:  [91 %-99 %] 99 %  BP: (108-125)/(54-83) 112/66     Weight: 74 kg (163 lb 2.3 oz) (05/22/18 1600)  Body mass index is 28.9 kg/m².    Physical Exam   Constitutional: She is oriented to person, place, and time. No distress.   HENT:   Mouth/Throat: Oropharynx is clear and moist. No oropharyngeal exudate.   Eyes: Conjunctivae are normal. No scleral icterus.   Neck: Neck supple.   Cardiovascular: Normal rate, regular rhythm, normal heart sounds and intact distal pulses.  Exam reveals no friction rub.    No murmur heard.  Pulmonary/Chest:   Absent breath sounds on right  Increased work of breathing   Abdominal: Soft. Bowel sounds are normal. She exhibits no distension. There is no tenderness.   Lymphadenopathy:     She has no cervical adenopathy.   Neurological: She is alert and oriented to person, place, and time.   Skin: Skin is warm and dry. She is not diaphoretic.   Psychiatric: She has a normal mood and affect.       MELD-Na score: 11 at 5/23/2018  6:31 AM  MELD score: 11 at 5/23/2018  6:31 AM  Calculated from:  Serum Creatinine: 1 mg/dL at 5/23/2018  6:31 AM  Serum Sodium: 138 mmol/L (Rounded to 137) at 5/23/2018  6:31 AM  Total Bilirubin: 1.7 mg/dL at 5/23/2018  6:31 AM  INR(ratio): 1.3 at 5/23/2018  6:31 AM  Age: 58 years    Significant Labs:  CBC:   Recent Labs  Lab 05/23/18  0631   WBC 2.68*   RBC 3.37*   HGB 8.8*   HCT 29.0*   PLT 45*     CMP:   Recent Labs  Lab 05/23/18  0631   *   CALCIUM 9.1   ALBUMIN 3.0*   PROT 5.5*      K 4.2   CO2 26      BUN 12   CREATININE 1.0   ALKPHOS 62   ALT 37   AST 31   BILITOT 1.7*     Coagulation:   Recent Labs  Lab  05/22/18  1247 05/23/18  0631   INR 1.4* 1.3*   APTT 26.5  --        Significant Imaging:  Labs: Reviewed  .    Assessment/Plan:     Hepatic Hydrothorax    Recurrent right pleural effusion may be 2/2 TIPS dysfunction vs post-TIPS placement equilibriation.    Recommendations:  RUQ U/S w/doppler to evaluate TIPS patency  Appreciate Pulmonary recs                Thank you for your consult. I will follow-up with patient. Please contact us if you have any additional questions.     Daniel Salvador   Gastroenterology Fellow PGY VI  [phone]

## 2018-05-23 NOTE — ASSESSMENT & PLAN NOTE
This is a case of hepatic hydrothorax in the setting of liver cirrhosis with recent TIPS procedure on 5/14. Previous thoracenteses have shown transudative fluid. Paracenteses have been difficult in the past month because patient only has pockets of fluid. Patient's abdomen is more distended than previously - concern for ascites on this admission which could be contributing to R pleural effusion. According to the radiology notes, patient will likely continue re-accumulating for 4-6 weeks post-TIPS, and afterwards, effusion will likely not completely resolve.    At this time, patient is amenable to thoracentesis. CXR shows large amounts of pleural fluid and patient is symptomatic. Have spoken with primary team regarding abdominal U/S to assess for ascites - if patient has large fluid collection, she should receive paracentesis preceding thoracentesis. If she does not have fluid pockets that can be tapped, will perform thoracentesis this morning.

## 2018-05-23 NOTE — SUBJECTIVE & OBJECTIVE
Interval History: pain in right side well controlled; SOB well controlled     Review of Systems   Constitutional: Negative for diaphoresis, fatigue and fever.   Respiratory: Positive for shortness of breath. Negative for cough.    Cardiovascular: Negative for chest pain and palpitations.   Gastrointestinal:        Minimal abdominal discomfort from ascites.   Genitourinary: Negative for difficulty urinating.     Objective:     Vital Signs (Most Recent):  Temp: 97.1 °F (36.2 °C) (05/23/18 1414)  Pulse: 69 (05/23/18 1056)  Resp: 15 (05/23/18 0840)  BP: 112/66 (05/23/18 0840)  SpO2: 99 % (05/23/18 0840) Vital Signs (24h Range):  Temp:  [97.1 °F (36.2 °C)-98.8 °F (37.1 °C)] 97.1 °F (36.2 °C)  Pulse:  [64-93] 69  Resp:  [15-24] 15  SpO2:  [95 %-99 %] 99 %  BP: (112-125)/(66-74) 112/66     Weight: 74 kg (163 lb 2.3 oz)  Body mass index is 28.9 kg/m².  No intake or output data in the 24 hours ending 05/23/18 1456   Physical Exam   Constitutional: No distress.   Sitting up comfortably. In NAD.    Cardiovascular: Normal rate and regular rhythm.    Pulmonary/Chest: Effort normal. No respiratory distress.   Diminished bs on right    Abdominal: Bowel sounds are normal.   Protuberant but soft. Not tense    Neurological: She is alert.   Vitals reviewed.      Significant Labs: All pertinent labs within the past 24 hours have been reviewed.    Significant Imaging: I have reviewed all pertinent imaging results/findings within the past 24 hours.

## 2018-05-23 NOTE — ASSESSMENT & PLAN NOTE
- hepatology consult  - pulmonary consult per pleural effusion   - continue lactulose   - midodrine for hypotension   - continue cipro prophylaxis dose as prescribed on recent dc

## 2018-05-23 NOTE — ASSESSMENT & PLAN NOTE
- patient was started on apixaban during her last admit  - will hold pm and am dose in anticipation of thoracentesis

## 2018-05-23 NOTE — SUBJECTIVE & OBJECTIVE
Past Medical History:   Diagnosis Date    Anxiety 2018    Depression 2018    Essential hypertension 2018    Hepatic Hydrothorax 2018    Hypoalbuminemia 2018    Other cirrhosis of liver 2018    Thrombocytopenia 2018       Past Surgical History:   Procedure Laterality Date    breast reduction       SECTION      HYSTERECTOMY         Review of patient's allergies indicates:  No Known Allergies    No current facility-administered medications on file prior to encounter.      Current Outpatient Prescriptions on File Prior to Encounter   Medication Sig    apixaban 5 mg Tab Take 1 tablet (5 mg total) by mouth 2 (two) times daily.    clonazePAM (KLONOPIN) 1 MG tablet Take 1 tablet by mouth twice daily as needed for anxiety    glimepiride (AMARYL) 2 MG tablet Take 2 mg by mouth as needed.    lactulose (CHRONULAC) 10 gram/15 mL solution Take 45 mLs (30 g total) by mouth 2 (two) times daily. Adjust to 3-4 bowel movements daily.    midodrine (PROAMATINE) 5 MG Tab Take 3 tablets (15 mg total) by mouth 3 (three) times daily.    promethazine (PHENERGAN) 25 MG tablet Take 25 mg by mouth as needed for Nausea.    traZODone (DESYREL) 100 MG tablet Take 100 mg by mouth every evening.    ciprofloxacin HCl (CIPRO) 500 MG tablet Take 1 tablet (500 mg total) by mouth once daily.    hydrOXYzine HCl (ATARAX) 25 MG tablet Take 1 tablet (25 mg total) by mouth daily as needed for Itching.    hyoscyamine (LEVSIN/SL) 0.125 mg Subl Take 1 tablet by mouth up to four times daily     Family History     None        Social History Main Topics    Smoking status: Former Smoker     Types: Cigarettes     Quit date: 3/6/2018    Smokeless tobacco: Not on file    Alcohol use No    Drug use: Unknown    Sexual activity: Not on file     Review of Systems   Constitutional: Positive for fatigue. Negative for diaphoresis and fever.   HENT: Negative for congestion, rhinorrhea and sore throat.    Respiratory:  Positive for shortness of breath. Negative for cough and wheezing.    Cardiovascular: Negative for chest pain and palpitations.   Gastrointestinal: Positive for abdominal distention. Negative for abdominal pain.   Genitourinary: Positive for dysuria. Negative for difficulty urinating.   Musculoskeletal: Negative for arthralgias.        + right chest wall pain    Skin: Negative for rash.   Neurological: Negative for headaches.   Hematological: Negative for adenopathy.     Objective:     Vital Signs (Most Recent):  Temp: 98.1 °F (36.7 °C) (05/22/18 1554)  Pulse: 64 (05/22/18 1838)  Resp: 20 (05/22/18 1554)  BP: 125/74 (05/22/18 1554)  SpO2: 96 % (05/22/18 1554) Vital Signs (24h Range):  Temp:  [98.1 °F (36.7 °C)-98.5 °F (36.9 °C)] 98.1 °F (36.7 °C)  Pulse:  [64-96] 64  Resp:  [20-34] 20  SpO2:  [90 %-96 %] 96 %  BP: (108-125)/(54-83) 125/74     Weight: 74 kg (163 lb 2.3 oz)  Body mass index is 28.9 kg/m².    Physical Exam   Constitutional: She is oriented to person, place, and time. She appears well-developed and well-nourished.   Cardiovascular: Normal rate and regular rhythm.    Pulmonary/Chest: Effort normal.   Diminished right sided breath sounds    Abdominal: Soft. She exhibits distension.   Abdomen distended but not tense    Musculoskeletal: She exhibits no edema.   Neurological: She is alert and oriented to person, place, and time.   Skin: Skin is warm.   Mild bruising on right side of chest    Psychiatric: She has a normal mood and affect.   Vitals reviewed.          Significant Labs: All pertinent labs within the past 24 hours have been reviewed.    Significant Imaging: I have reviewed and interpreted all pertinent imaging results/findings within the past 24 hours.

## 2018-05-24 NOTE — SUBJECTIVE & OBJECTIVE
Interval History: NC removed. Pt complaining of mild SOB at rest.    Objective:     Vital Signs (Most Recent):  Temp: 98.1 °F (36.7 °C) (05/24/18 0535)  Pulse: 81 (05/24/18 0743)  Resp: 19 (05/24/18 0743)  BP: 107/62 (05/24/18 0743)  SpO2: 95 % (05/24/18 0743) Vital Signs (24h Range):  Temp:  [96.6 °F (35.9 °C)-98.1 °F (36.7 °C)] 98.1 °F (36.7 °C)  Pulse:  [63-89] 81  Resp:  [19-24] 19  SpO2:  [95 %-98 %] 95 %  BP: (107-120)/(62-80) 107/62     Weight: 74 kg (163 lb 2.3 oz)  Body mass index is 28.9 kg/m².    No intake or output data in the 24 hours ending 05/24/18 0943    Physical Exam   Constitutional: She is oriented to person, place, and time. She appears well-developed and well-nourished.   HENT:   Head: Normocephalic and atraumatic.   Eyes: Conjunctivae are normal. Pupils are equal, round, and reactive to light.   Neck: Normal range of motion. Neck supple.   Cardiovascular: Normal rate and regular rhythm.    Pulmonary/Chest: Effort normal.   Decreased breath sounds R lung   Abdominal: Soft. Bowel sounds are normal. She exhibits distension.   Musculoskeletal: Normal range of motion. She exhibits no edema.   Neurological: She is alert and oriented to person, place, and time.   Skin: Skin is warm and dry.   Nursing note and vitals reviewed.      Vents:       Lines/Drains/Airways     Peripheral Intravenous Line                 Peripheral IV - Single Lumen 05/22/18 1342 Left Hand 1 day                Significant Labs:    CBC/Anemia Profile:    Recent Labs  Lab 05/22/18  1247 05/23/18  0631 05/24/18  0614   WBC 2.66* 2.68* 2.43*   HGB 9.7* 8.8* 7.9*   HCT 30.9* 29.0* 25.8*   PLT 42* 45* 41*   MCV 85 86 85   RDW 18.1* 18.2* 18.2*        Chemistries:    Recent Labs  Lab 05/22/18  1247 05/23/18  0631 05/24/18  0614    138 138   K 4.2 4.2 4.7    105 106   CO2 23 26 26   BUN 11 12 11   CREATININE 1.0 1.0 0.8   CALCIUM 9.4 9.1 9.3   ALBUMIN 3.0* 3.0* 2.6*   PROT 5.8* 5.5* 4.6*   BILITOT 1.7* 1.7* 1.5*   ALKPHOS  61 62 54*   ALT 44 37 25   AST 50* 31 25   MG  --  1.5* 1.6   PHOS  --  3.2 3.3       All pertinent labs within the past 24 hours have been reviewed.    Significant Imaging:  I have reviewed all pertinent imaging results/findings within the past 24 hours.

## 2018-05-24 NOTE — SUBJECTIVE & OBJECTIVE
Interval History:pain controlled; sob improved post thoracentesis     Review of Systems   Constitutional: Negative for diaphoresis, fatigue and fever.   Respiratory: Positive for shortness of breath. Negative for cough.         Improved SOB   Cardiovascular: Negative for chest pain and palpitations.   Genitourinary: Negative for difficulty urinating.     Objective:     Vital Signs (Most Recent):  Temp: 98.1 °F (36.7 °C) (05/24/18 0535)  Pulse: 81 (05/24/18 0743)  Resp: 19 (05/24/18 0743)  BP: 107/62 (05/24/18 0743)  SpO2: 95 % (05/24/18 0743) Vital Signs (24h Range):  Temp:  [96.6 °F (35.9 °C)-98.1 °F (36.7 °C)] 98.1 °F (36.7 °C)  Pulse:  [63-89] 81  Resp:  [19-24] 19  SpO2:  [95 %-98 %] 95 %  BP: (107-120)/(62-80) 107/62     Weight: 74 kg (163 lb 2.3 oz)  Body mass index is 28.9 kg/m².  No intake or output data in the 24 hours ending 05/24/18 1034   Physical Exam   Constitutional: No distress.   Sitting up comfortably. In NAD.    Cardiovascular: Normal rate and regular rhythm.    Pulmonary/Chest: Effort normal. No respiratory distress.   Able to auscultate some breath sounds now in the right posterior lung fields. Appears comfortable.    Abdominal: Bowel sounds are normal.   Protuberant but soft. Not tense    Neurological: She is alert.   Vitals reviewed.      Significant Labs: All pertinent labs within the past 24 hours have been reviewed.    Significant Imaging: I have reviewed all pertinent imaging results/findings within the past 24 hours.

## 2018-05-24 NOTE — PROGRESS NOTES
[facility]  Pulmonology  Progress Note    Patient Name: Michelle Pappas  MRN: [id number]  Admission Date: 5/22/2018  Hospital Length of Stay: 2 days  Code Status: Full Code  Attending Provider: Lawson Hare MD  Primary Care Provider: Taj Luna DO   Principal Problem: Recurrent right pleural effusion    Subjective:     Interval History: NC removed. Pt complaining of mild SOB at rest.    Objective:     Vital Signs (Most Recent):  Temp: 98.1 °F (36.7 °C) (05/24/18 0535)  Pulse: 81 (05/24/18 0743)  Resp: 19 (05/24/18 0743)  BP: 107/62 (05/24/18 0743)  SpO2: 95 % (05/24/18 0743) Vital Signs (24h Range):  Temp:  [96.6 °F (35.9 °C)-98.1 °F (36.7 °C)] 98.1 °F (36.7 °C)  Pulse:  [63-89] 81  Resp:  [19-24] 19  SpO2:  [95 %-98 %] 95 %  BP: (107-120)/(62-80) 107/62     Weight: 74 kg (163 lb 2.3 oz)  Body mass index is 28.9 kg/m².    No intake or output data in the 24 hours ending 05/24/18 0943    Physical Exam   Constitutional: She is oriented to person, place, and time. She appears well-developed and well-nourished.   HENT:   Head: Normocephalic and atraumatic.   Eyes: Conjunctivae are normal. Pupils are equal, round, and reactive to light.   Neck: Normal range of motion. Neck supple.   Cardiovascular: Normal rate and regular rhythm.    Pulmonary/Chest: Effort normal.   Decreased breath sounds R lung   Abdominal: Soft. Bowel sounds are normal. She exhibits distension.   Musculoskeletal: Normal range of motion. She exhibits no edema.   Neurological: She is alert and oriented to person, place, and time.   Skin: Skin is warm and dry.   Nursing note and vitals reviewed.      Vents:       Lines/Drains/Airways     Peripheral Intravenous Line                 Peripheral IV - Single Lumen 05/22/18 1342 Left Hand 1 day                Significant Labs:    CBC/Anemia Profile:    Recent Labs  Lab 05/22/18  1247 05/23/18  0631 05/24/18  0614   WBC 2.66* 2.68* 2.43*   HGB 9.7* 8.8* 7.9*   HCT 30.9* 29.0* 25.8*    PLT 42* 45* 41*   MCV 85 86 85   RDW 18.1* 18.2* 18.2*        Chemistries:    Recent Labs  Lab 05/22/18  1247 05/23/18  0631 05/24/18  0614    138 138   K 4.2 4.2 4.7    105 106   CO2 23 26 26   BUN 11 12 11   CREATININE 1.0 1.0 0.8   CALCIUM 9.4 9.1 9.3   ALBUMIN 3.0* 3.0* 2.6*   PROT 5.8* 5.5* 4.6*   BILITOT 1.7* 1.7* 1.5*   ALKPHOS 61 62 54*   ALT 44 37 25   AST 50* 31 25   MG  --  1.5* 1.6   PHOS  --  3.2 3.3       All pertinent labs within the past 24 hours have been reviewed.    Significant Imaging:  I have reviewed all pertinent imaging results/findings within the past 24 hours.    Assessment/Plan:     Hepatic Hydrothorax    · TIPSS procedure on 5/14  · Multiple previous thoracenteses have shown transudative fluid.   · Abdominal US from 5/23 did not show any pockets of ascites amenable to paracentesis  · Plan for thoracentesis today                  Sebastien Castillo MD  Pulmonology  [facility]

## 2018-05-24 NOTE — PROCEDURES
"Michelle Pappas is a 58 y.o. female patient.    Temp: 98.1 °F (36.7 °C) (18 0535)  Pulse: 78 (18 1103)  Resp: 19 (18 0743)  BP: 107/62 (18 0743)  SpO2: 95 % (18 0743)  Weight: 74 kg (163 lb 2.3 oz) (18 1600)  Height: 5' 3" (160 cm) (18 1600)       Thoracentesis  Date/Time: 2018 11:31 AM  Performed by: VERA AZEVEDO  Authorized by: VERA AZEVEDO   Consent Done: Yes  Consent: Verbal consent obtained. Written consent obtained.  Risks and benefits: risks, benefits and alternatives were discussed  Consent given by: patient  Patient understanding: patient states understanding of the procedure being performed  Patient consent: the patient's understanding of the procedure matches consent given  Procedure consent: procedure consent matches procedure scheduled  Relevant documents: relevant documents present and verified  Test results: test results available and properly labeled  Site marked: the operative site was marked  Imaging studies: imaging studies available  Required items: required blood products, implants, devices, and special equipment available  Patient identity confirmed: , name, MRN and verbally with patient  Time out: Immediately prior to procedure a "time out" was called to verify the correct patient, procedure, equipment, support staff and site/side marked as required.  Procedure purpose: therapeutic  Indications: pleural effusion  Preparation: Patient was prepped and draped in the usual sterile fashion.  Local anesthesia used: yes    Anesthesia:  Local anesthesia used: yes  Local Anesthetic: lidocaine 1% without epinephrine  Anesthetic total: 8 mL  Patient sedated: no  Preparation: skin prepped with ChloraPrep  Patient position: sitting  Ultrasound guidance: yes  Location: right posterior  Intercostal space: 7th  Puncture method: over-the-needle catheter  Needle size: 16  Catheter size: 14 gauge  Number of attempts: 1  Drainage amount:  " ml  Drainage characteristics: serosanguinous  Patient tolerance: Patient tolerated the procedure well with no immediate complications  Chest x-ray performed: no  Complications: No  Specimens: No  Implants: No          Sebastien Castillo  5/24/2018

## 2018-05-24 NOTE — ASSESSMENT & PLAN NOTE
· TIPSS procedure on 5/14  · Multiple previous thoracenteses have shown transudative fluid.   · Abdominal US from 5/23 did not show any pockets of ascites amenable to paracentesis  · Plan for thoracentesis today

## 2018-05-24 NOTE — SUBJECTIVE & OBJECTIVE
Interval History: No acute events overnight.  Underwent thoracentesis this AM with 2L removed.     Current Facility-Administered Medications   Medication    ciprofloxacin HCl tablet 500 mg    clonazePAM tablet 1 mg    dextrose 50% injection 12.5 g    dextrose 50% injection 25 g    furosemide tablet 40 mg    glucagon (human recombinant) injection 1 mg    glucose chewable tablet 16 g    glucose chewable tablet 24 g    hydrOXYzine HCl tablet 25 mg    lidocaine 5 % patch 1 patch    midodrine tablet 15 mg    oxyCODONE immediate release tablet 5 mg    promethazine tablet 25 mg    sodium chloride 0.9% flush 3 mL    sodium chloride 0.9% flush 5 mL    spironolactone tablet 100 mg    traZODone tablet 100 mg       Objective:     Vital Signs (Most Recent):  Temp: 98 °F (36.7 °C) (05/24/18 1154)  Pulse: 78 (05/24/18 1103)  Resp: 19 (05/24/18 0743)  BP: 107/62 (05/24/18 0743)  SpO2: 95 % (05/24/18 0743) Vital Signs (24h Range):  Temp:  [96.6 °F (35.9 °C)-98.1 °F (36.7 °C)] 98 °F (36.7 °C)  Pulse:  [63-89] 78  Resp:  [19-24] 19  SpO2:  [95 %-98 %] 95 %  BP: (107-120)/(62-80) 107/62     Weight: 74 kg (163 lb 2.3 oz) (05/22/18 1600)  Body mass index is 28.9 kg/m².    Physical Exam   Constitutional: She is oriented to person, place, and time. No distress.   HENT:   Mouth/Throat: Oropharynx is clear and moist. No oropharyngeal exudate.   Eyes: Conjunctivae are normal. No scleral icterus.   Neck: Neck supple.   Cardiovascular: Normal rate, regular rhythm, normal heart sounds and intact distal pulses.  Exam reveals no friction rub.    No murmur heard.  Pulmonary/Chest: Effort normal. No respiratory distress.   Abdominal: Soft. Bowel sounds are normal. She exhibits no distension. There is no tenderness.   Lymphadenopathy:     She has no cervical adenopathy.   Neurological: She is alert and oriented to person, place, and time.   Skin: Skin is warm and dry. She is not diaphoretic.   Psychiatric: She has a normal mood and  affect.       MELD-Na score: 11 at 5/24/2018  6:14 AM  MELD score: 11 at 5/24/2018  6:14 AM  Calculated from:  Serum Creatinine: 0.8 mg/dL (Rounded to 1) at 5/24/2018  6:14 AM  Serum Sodium: 138 mmol/L (Rounded to 137) at 5/24/2018  6:14 AM  Total Bilirubin: 1.5 mg/dL at 5/24/2018  6:14 AM  INR(ratio): 1.3 at 5/24/2018  6:14 AM  Age: 58 years    Significant Labs:  CBC:   Recent Labs  Lab 05/24/18 0614   WBC 2.43*   RBC 3.02*   HGB 7.9*   HCT 25.8*   PLT 41*     CMP:   Recent Labs  Lab 05/24/18 0614   *   CALCIUM 9.3   ALBUMIN 2.6*   PROT 4.6*      K 4.7   CO2 26      BUN 11   CREATININE 0.8   ALKPHOS 54*   ALT 25   AST 25   BILITOT 1.5*     Coagulation:   Recent Labs  Lab 05/22/18  1247  05/24/18 0614   INR 1.4*  < > 1.3*   APTT 26.5  --   --    < > = values in this interval not displayed.    Significant Imaging:  Labs: Reviewed  .

## 2018-05-24 NOTE — ASSESSMENT & PLAN NOTE
U/S revealed patent TIPS    Reaccumulation of fluid likely 2/2 equilibration period and patient being off diuretics (given previous concern for kidney function)    Recommendations:  Restart diuretics with Lasix 40mg and Spironolactone 100mg daily  Restart apixaban tomorrow AM    Follow up in Hepatology clinic

## 2018-05-24 NOTE — PROGRESS NOTES
[facility]  Hepatology  Progress Note    Patient Name: Michelle Pappas  MRN: [id number]  Admission Date: 5/22/2018  Hospital Length of Stay: 2 days  Attending Provider: Lawson Hare MD   Primary Care Physician: Taj Luna DO  Principal Problem:Recurrent right pleural effusion    Subjective:     Transplant status: No    HPI: Ms. Pappas is a 57 yo WF with a PMH of HTN, HCV cirrhosis s/p Harvoni w/SVR in 2015, hepatic hydrothorax requiring 5 thoracenteses in the last year, history of SBP, here with recurrent SOB after placement of TIPS last week.    The patient was recently admitted to [facility] with recurrent right hepatic hydrothorax. Diuresis was continued without improvement, IR did another thoracentesis, and finally TIPS procedure was attempted on 5/9 but failed. TIPS was re-attempted on 5/14 and performed successfully. Patient was also started on Heparin drip and transitioned to Apixiban for portal vein thrombosis. Patient was then discharged on 5/17.      The patient returns to hospital with shortness of breath.  Denies fevers, chills, rigors, abdominal pain.  A CXR showed reaccumulation of the large right pleural effusion.        Interval History: No acute events overnight.  Underwent thoracentesis this AM with 2L removed.     Current Facility-Administered Medications   Medication    ciprofloxacin HCl tablet 500 mg    clonazePAM tablet 1 mg    dextrose 50% injection 12.5 g    dextrose 50% injection 25 g    furosemide tablet 40 mg    glucagon (human recombinant) injection 1 mg    glucose chewable tablet 16 g    glucose chewable tablet 24 g    hydrOXYzine HCl tablet 25 mg    lidocaine 5 % patch 1 patch    midodrine tablet 15 mg    oxyCODONE immediate release tablet 5 mg    promethazine tablet 25 mg    sodium chloride 0.9% flush 3 mL    sodium chloride 0.9% flush 5 mL    spironolactone tablet 100 mg    traZODone tablet 100 mg       Objective:     Vital Signs (Most  Recent):  Temp: 98 °F (36.7 °C) (05/24/18 1154)  Pulse: 78 (05/24/18 1103)  Resp: 19 (05/24/18 0743)  BP: 107/62 (05/24/18 0743)  SpO2: 95 % (05/24/18 0743) Vital Signs (24h Range):  Temp:  [96.6 °F (35.9 °C)-98.1 °F (36.7 °C)] 98 °F (36.7 °C)  Pulse:  [63-89] 78  Resp:  [19-24] 19  SpO2:  [95 %-98 %] 95 %  BP: (107-120)/(62-80) 107/62     Weight: 74 kg (163 lb 2.3 oz) (05/22/18 1600)  Body mass index is 28.9 kg/m².    Physical Exam   Constitutional: She is oriented to person, place, and time. No distress.   HENT:   Mouth/Throat: Oropharynx is clear and moist. No oropharyngeal exudate.   Eyes: Conjunctivae are normal. No scleral icterus.   Neck: Neck supple.   Cardiovascular: Normal rate, regular rhythm, normal heart sounds and intact distal pulses.  Exam reveals no friction rub.    No murmur heard.  Pulmonary/Chest: Effort normal. No respiratory distress.   Abdominal: Soft. Bowel sounds are normal. She exhibits no distension. There is no tenderness.   Lymphadenopathy:     She has no cervical adenopathy.   Neurological: She is alert and oriented to person, place, and time.   Skin: Skin is warm and dry. She is not diaphoretic.   Psychiatric: She has a normal mood and affect.       MELD-Na score: 11 at 5/24/2018  6:14 AM  MELD score: 11 at 5/24/2018  6:14 AM  Calculated from:  Serum Creatinine: 0.8 mg/dL (Rounded to 1) at 5/24/2018  6:14 AM  Serum Sodium: 138 mmol/L (Rounded to 137) at 5/24/2018  6:14 AM  Total Bilirubin: 1.5 mg/dL at 5/24/2018  6:14 AM  INR(ratio): 1.3 at 5/24/2018  6:14 AM  Age: 58 years    Significant Labs:  CBC:   Recent Labs  Lab 05/24/18  0614   WBC 2.43*   RBC 3.02*   HGB 7.9*   HCT 25.8*   PLT 41*     CMP:   Recent Labs  Lab 05/24/18  0614   *   CALCIUM 9.3   ALBUMIN 2.6*   PROT 4.6*      K 4.7   CO2 26      BUN 11   CREATININE 0.8   ALKPHOS 54*   ALT 25   AST 25   BILITOT 1.5*     Coagulation:   Recent Labs  Lab 05/22/18  1247  05/24/18  0614   INR 1.4*  < > 1.3*   APTT  26.5  --   --    < > = values in this interval not displayed.    Significant Imaging:  Labs: Reviewed  .    Assessment/Plan:     Hepatic Hydrothorax    U/S revealed patent TIPS    Reaccumulation of fluid likely 2/2 equilibration period and patient being off diuretics (given previous concern for kidney function)    Recommendations:  Restart diuretics with Lasix 40mg and Spironolactone 100mg daily  Restart apixaban tomorrow AM    Follow up in Hepatology clinic              Thank you for your consult. I will follow-up with patient. Please contact us if you have any additional questions.    Daniel Salvador   Gastroenterology Fellow PGY VI  [phone]

## 2018-05-24 NOTE — NURSING
Home Oxygen Evaluation    Date Performed: 2018    1) Patient's Home O2 Sat on room air, while at rest: 90%        If O2 sats on room air at rest are 88% or below, patient qualifies. No additional testing needed. Document N/A in steps 2 and 3. If 89% or above, complete steps 2.      2) Patient's O2 Sat on room air while exercisin%        If O2 sats on room air while exercising remain 89% or above patient does not qualify, no further testing needed Document N/A in step 3. If O2 sats on room air while exercising are 88% or below, continue to step 3.      3) Patient's O2 Sat while exercising on O2: 93% at 2 L NC         (Must show improvement from #2 for patients to qualify)    If O2 sats improve on oxygen, patient qualifies for portable oxygen. If not, the patient does not qualify.

## 2018-05-25 NOTE — ASSESSMENT & PLAN NOTE
- since no pockets of significant ascites found, US suggested that pleural effusion may be less likely due to hepatic in origin; unclear why else patient would have this degree of recurrent effusions  - will follow fluid analysis results  - s/p thoracentesis today with removal of 2L if serosanguinous fluid  - patient feels improved

## 2018-05-25 NOTE — PLAN OF CARE
KORTNEY spoke to [facility] [phone] who said patient's brother declined to pay co-pay therefore the O2 cannot be delivered.  KORTNEY went to see patient who states she can pay for the co-pay.  KORTNEY then called [facility] and got a representative on the line for patient to pay co-pay.  Lane then stated the O2 will be delivered anytime between now and 6:00 PM.

## 2018-05-25 NOTE — PLAN OF CARE
Problem: Diabetes, Type 2 (Adult)  Goal: Signs and Symptoms of Listed Potential Problems Will be Absent, Minimized or Managed (Diabetes, Type 2)  Signs and symptoms of listed potential problems will be absent, minimized or managed by discharge/transition of care (reference Diabetes, Type 2 (Adult) CPG).   Outcome: Outcome(s) achieved Date Met: 05/25/18 05/25/18 1558   Diabetes, Type 2   Problems Assessed (Type 2 Diabetes) all   Problems Present (Type 2 Diabetes) none       Problem: Fall Risk (Adult)  Goal: Identify Related Risk Factors and Signs and Symptoms  Related risk factors and signs and symptoms are identified upon initiation of Human Response Clinical Practice Guideline (CPG)   Outcome: Outcome(s) achieved Date Met: 05/25/18 05/25/18 1558   Fall Risk   Related Risk Factors (Fall Risk) environment unfamiliar   Signs and Symptoms (Fall Risk) presence of risk factors     Goal: Absence of Falls  Patient will demonstrate the desired outcomes by discharge/transition of care.   Outcome: Outcome(s) achieved Date Met: 05/25/18 05/25/18 1558   Fall Risk (Adult)   Absence of Falls achieves outcome       Problem: Patient Care Overview  Goal: Plan of Care Review  Outcome: Outcome(s) achieved Date Met: 05/25/18  Pt turns and repositions selfindependently. No skin breakdown noted. Pt pain and safety monitored q 1-2 hrs this shift. Blood glucose monitored throughout shift.  Bed locked and in lowest position. Rails elevated x 3. Brakes on. Call light and personal belongings in reach.Patientready for discharge home.   05/25/18 1558   Coping/Psychosocial   Plan Of Care Reviewed With patient

## 2018-05-25 NOTE — ASSESSMENT & PLAN NOTE
U/S revealed patent TIPS  Reaccumulation of fluid likely 2/2 equilibration period and patient being off diuretics (given previous concern for kidney function)    Recommendations:  Continue diuretics with Lasix 40mg and Spironolactone 100mg daily  Continue apixaban  Continue abx as per secondary SBP prophlaxis  Follow up in Hepatology clinic with Dr. Walton

## 2018-05-25 NOTE — ASSESSMENT & PLAN NOTE
- hepatology consulted   - US shows TIPS patent and no real pockets of ascites for paracentesis   - start spironolactone and furosemide at 100mg and 40mg respecively   - continue lactulose   - midodrine for hypotension   - continue cipro prophylaxis dose as prescribed on recent dc

## 2018-05-25 NOTE — PLAN OF CARE
KORTNEY received a phone call from Sowmya Sherman, [phone] concerning patient's home O2 order.  Sowmya states the order needs to be signed by the MD and faxed back to her.  KORTNEY received order form via fax, Dr. Hare signed it and was faxed back to Sowmya at [phone].

## 2018-05-25 NOTE — SUBJECTIVE & OBJECTIVE
Interval History: No acute events overnight.  Underwent thoracentesis this AM with 2L removed. Negative for infection by cell count criteria.  Doppler showed patent TIPS   Reports improved SOB.   Denies having any new complaints    Current Facility-Administered Medications   Medication    apixaban tablet 2.5 mg    ciprofloxacin HCl tablet 500 mg    clonazePAM tablet 1 mg    dextrose 50% injection 12.5 g    dextrose 50% injection 25 g    furosemide tablet 40 mg    glucagon (human recombinant) injection 1 mg    glucose chewable tablet 16 g    glucose chewable tablet 24 g    hydrOXYzine HCl tablet 25 mg    lidocaine 5 % patch 1 patch    magnesium sulfate 2 g/50 ml IVPB    midodrine tablet 15 mg    oxyCODONE immediate release tablet 5 mg    promethazine tablet 25 mg    sodium chloride 0.9% flush 3 mL    sodium chloride 0.9% flush 5 mL    spironolactone tablet 100 mg    traZODone tablet 100 mg       Objective:     Vital Signs (Most Recent):  Temp: 98 °F (36.7 °C) (05/25/18 1100)  Pulse: 64 (05/25/18 1123)  Resp: 20 (05/25/18 0806)  BP: (!) 90/55 (05/25/18 0806)  SpO2: 96 % (05/25/18 0806) Vital Signs (24h Range):  Temp:  [98 °F (36.7 °C)-99.2 °F (37.3 °C)] 98 °F (36.7 °C)  Pulse:  [64-84] 64  Resp:  [11-20] 20  SpO2:  [96 %-100 %] 96 %  BP: ()/(55-64) 90/55     Weight: 74 kg (163 lb 2.3 oz) (05/22/18 1600)  Body mass index is 28.9 kg/m².    Physical Exam   Constitutional: She is oriented to person, place, and time. No distress.   HENT:   Mouth/Throat: Oropharynx is clear and moist. No oropharyngeal exudate.   Eyes: Conjunctivae are normal. No scleral icterus.   Neck: Neck supple.   Cardiovascular: Normal rate, regular rhythm, normal heart sounds and intact distal pulses.  Exam reveals no friction rub.    No murmur heard.  Pulmonary/Chest: Effort normal. No respiratory distress.   Abdominal: Soft. Bowel sounds are normal. She exhibits no distension. There is no tenderness.   Lymphadenopathy:      She has no cervical adenopathy.   Neurological: She is alert and oriented to person, place, and time.   Skin: Skin is warm and dry. She is not diaphoretic.   Psychiatric: She has a normal mood and affect.       MELD-Na score: 11 at 5/25/2018  6:27 AM  MELD score: 11 at 5/25/2018  6:27 AM  Calculated from:  Serum Creatinine: 0.9 mg/dL (Rounded to 1) at 5/25/2018  6:27 AM  Serum Sodium: 139 mmol/L (Rounded to 137) at 5/25/2018  6:27 AM  Total Bilirubin: 1.6 mg/dL at 5/25/2018  6:27 AM  INR(ratio): 1.3 at 5/25/2018  6:27 AM  Age: 58 years    Significant Labs:  CBC:     Recent Labs  Lab 05/25/18 0627   WBC 2.85*   RBC 3.00*   HGB 8.0*   HCT 25.3*   PLT 49*     CMP:     Recent Labs  Lab 05/25/18 0627   *   CALCIUM 8.4*   ALBUMIN 2.4*   PROT 4.5*      K 3.7   CO2 28      BUN 10   CREATININE 0.9   ALKPHOS 50*   ALT 20   AST 24   BILITOT 1.6*     Coagulation:   Recent Labs  Lab 05/22/18  1247  05/25/18 0627   INR 1.4*  < > 1.3*   APTT 26.5  --   --    < > = values in this interval not displayed.    Significant Imaging:  Labs: Reviewed  .

## 2018-05-25 NOTE — PROGRESS NOTES
[facility]  Hepatology  Progress Note    Patient Name: Michelle Pappas  MRN: [id number]  Admission Date: 5/22/2018  Hospital Length of Stay: 3 days  Attending Provider: Lawson Hare MD   Primary Care Physician: Taj Luna DO  Principal Problem:Recurrent right pleural effusion    Subjective:     Transplant status: No    HPI: Ms. Pappas is a 59 yo WF with a PMH of HTN, HCV cirrhosis s/p Harvoni w/SVR in 2015, hepatic hydrothorax requiring 5 thoracenteses in the last year, history of SBP, here with recurrent SOB after placement of TIPS last week.    The patient was recently [place] [facility] [place] hepatic hydrothorax. Diuresis was continued without improvement, IR did another thoracentesis, and finally TIPS procedure was attempted on 5/9 but failed. TIPS was re-attempted on 5/14 and performed successfully. Patient was also started on Heparin drip and transitioned to Apixiban for portal vein thrombosis. Patient was then discharged on 5/17.      The patient returns to hospital with shortness of breath.  Denies fevers, chills, rigors, abdominal pain.  A CXR showed reaccumulation of the large right pleural effusion.        Interval History: No acute events overnight.  Underwent thoracentesis this AM with 2L removed. Negative for infection by cell count criteria.  Doppler showed patent TIPS   Reports improved SOB.   Denies having any new complaints    Current Facility-Administered Medications   Medication    apixaban tablet 2.5 mg    ciprofloxacin HCl tablet 500 mg    clonazePAM tablet 1 mg    dextrose 50% injection 12.5 g    dextrose 50% injection 25 g    furosemide tablet 40 mg    glucagon (human recombinant) injection 1 mg    glucose chewable tablet 16 g    glucose chewable tablet 24 g    hydrOXYzine HCl tablet 25 mg    lidocaine 5 % patch 1 patch    magnesium sulfate 2 g/50 ml IVPB    midodrine tablet 15 mg    oxyCODONE immediate release tablet 5 mg     promethazine tablet 25 mg    sodium chloride 0.9% flush 3 mL    sodium chloride 0.9% flush 5 mL    spironolactone tablet 100 mg    traZODone tablet 100 mg       Objective:     Vital Signs (Most Recent):  Temp: 98 °F (36.7 °C) (05/25/18 1100)  Pulse: 64 (05/25/18 1123)  Resp: 20 (05/25/18 0806)  BP: (!) 90/55 (05/25/18 0806)  SpO2: 96 % (05/25/18 0806) Vital Signs (24h Range):  Temp:  [98 °F (36.7 °C)-99.2 °F (37.3 °C)] 98 °F (36.7 °C)  Pulse:  [64-84] 64  Resp:  [11-20] 20  SpO2:  [96 %-100 %] 96 %  BP: ()/(55-64) 90/55     Weight: 74 kg (163 lb 2.3 oz) (05/22/18 1600)  Body mass index is 28.9 kg/m².    Physical Exam   Constitutional: She is oriented to person, place, and time. No distress.   HENT:   Mouth/Throat: Oropharynx is clear and moist. No oropharyngeal exudate.   Eyes: Conjunctivae are normal. No scleral icterus.   Neck: Neck supple.   Cardiovascular: Normal rate, regular rhythm, normal heart sounds and intact distal pulses.  Exam reveals no friction rub.    No murmur heard.  Pulmonary/Chest: Effort normal. No respiratory distress.   Abdominal: Soft. Bowel sounds are normal. She exhibits no distension. There is no tenderness.   Lymphadenopathy:     She has no cervical adenopathy.   Neurological: She is alert and oriented to person, place, and time.   Skin: Skin is warm and dry. She is not diaphoretic.   Psychiatric: She has a normal mood and affect.       MELD-Na score: 11 at 5/25/2018  6:27 AM  MELD score: 11 at 5/25/2018  6:27 AM  Calculated from:  Serum Creatinine: 0.9 mg/dL (Rounded to 1) at 5/25/2018  6:27 AM  Serum Sodium: 139 mmol/L (Rounded to 137) at 5/25/2018  6:27 AM  Total Bilirubin: 1.6 mg/dL at 5/25/2018  6:27 AM  INR(ratio): 1.3 at 5/25/2018  6:27 AM  Age: 58 years    Significant Labs:  CBC:     Recent Labs  Lab 05/25/18 0627   WBC 2.85*   RBC 3.00*   HGB 8.0*   HCT 25.3*   PLT 49*     CMP:     Recent Labs  Lab 05/25/18 0627   *   CALCIUM 8.4*   ALBUMIN 2.4*   PROT 4.5*       K 3.7   CO2 28      BUN 10   CREATININE 0.9   ALKPHOS 50*   ALT 20   AST 24   BILITOT 1.6*     Coagulation:   Recent Labs  Lab 05/22/18  1247  05/25/18  0627   INR 1.4*  < > 1.3*   APTT 26.5  --   --    < > = values in this interval not displayed.    Significant Imaging:  Labs: Reviewed  .    Assessment/Plan:     Hepatic Hydrothorax    U/S revealed patent TIPS  Reaccumulation of fluid likely 2/2 equilibration period and patient being off diuretics (given previous concern for kidney function)    Recommendations:  Continue diuretics with Lasix 40mg and Spironolactone 100mg daily  Continue apixaban  Continue abx as per secondary SBP prophlaxis  Follow up in Hepatology clinic with Dr. Walton              Thank you for your consult. I will sign off. Please contact us if you have any additional questions.    Dillon Scanlon MD  Hepatology  [facility]

## 2018-05-25 NOTE — DISCHARGE SUMMARY
[facility] Hospital Medicine  Discharge Summary      Patient Name: Michelle Pappas  MRN: [id number]  Admission Date: 5/22/2018  Hospital Length of Stay: 3 days  Discharge Date and Time:  05/25/2018 4:11 PM  Attending Physician: Lawson Hare MD   Discharging Provider: Lawson Hare MD  Primary Care Provider: Taj Luna DO  [facility] Medicine Team: [facility] Lawson Hare MD    HPI:   The patient is a 57 y/o female with Hep C cirrhosis, anxiety, insomnia, portal vein thrombosis, and diabetes. She was recently discharged last Thursday after being admitted for right hydrothorax. During that admit she was transferred from another facility for management of her hydrothorax. Prior to that admit she had a similar admission. Taps were found to be transudative and cultures negative. During the last admit, in addition to her thoracentesis, she also underwent TIPS. She was placed on cipro prophylactically on dc but has not started to take it as she had not returned home yet since dc and never got it filled. Her SOB has rapidly worsened after dc but she denies any fevers, chills, CP or other symptoms. She does report some right sided pain from her last thoracentesis. Brother is present at bedside and corroborates her history.         * No surgery found *      Hospital Course:   Patient was admitted for pleural effusion. She underwent thoracentesis with drainage of 2L of serosanguinous fluid with improvement in her symptoms. She did qualify for home O2. She was evaluated by hepatology and they recommended resumption of her spironolactone at 100 mg daily and furosemide at 40 mg daily. Her BPs were mostly in the 90s after resumption of these two diuretics and she was asymptomatic. The patient states that her BPs normally run low in the 70s - 80s. She takes midodrine chronically. She will also continue with her cipro for SBP prophylaxis which was prescribed on her last dc and she will also  "continue with her apixaban. US of the abdomen showed that her TIPS remained patent. She will follow up with hepatology as scheduled.        Consults:   Consults         Status Ordering Provider     Inpatient consult to Hepatology  Once     Provider:  (Not yet assigned)    Completed ERICK FLOOD     Inpatient consult to Pulmonology  Once     Provider:  (Not yet assigned)    Completed ERICK FLOOD          No new Assessment & Plan notes have been filed under this hospital service since the last note was generated.  Service: Hospital Medicine    Final Active Diagnoses:    Diagnosis Date Noted POA    PRINCIPAL PROBLEM:  Recurrent right pleural effusion [J90] 05/22/2018 Yes    Portal vein thrombosis [I81] 05/22/2018 Yes    Decompensated HCV cirrhosis [B19.20, K74.69] 05/06/2018 Yes     Chronic    Insomnia [G47.00] 05/05/2018 Yes     Chronic    Chronic hepatitis C without hepatic coma [B18.2] 05/01/2018 Yes    Diabetes [E11.9] 05/01/2018 Yes    Thrombocytopenia [D69.6] 05/01/2018 Yes    Hepatic Hydrothorax [J94.8] 05/01/2018 Yes      Problems Resolved During this Admission:    Diagnosis Date Noted Date Resolved POA       Discharged Condition: good    Disposition: Home or Self Care    Follow Up:    Patient Instructions:     OXYGEN FOR HOME USE   Order Specific Question Answer Comments   Liter Flow 2    Duration Continuous    Qualifying SpO2: 88%    Testing done at: Exercise/Activity    Route nasal cannula    Device home concentrator with portable unit    Height: 5' 3" (1.6 m)    Weight: 74 kg (163 lb 2.3 oz)    Does patient have medical equipment at home? cane, straight    Does patient have medical equipment at home? glucometer    Alternative treatment measures have been tried or considered and deemed clinically ineffective. Yes    Vendor: [employer]    Expected Date of Delivery: 5/25/2018          Significant Diagnostic Studies: none    Pending Diagnostic Studies:     None         Medications:  Reconciled Home " Medications:      Medication List      START taking these medications    furosemide 40 MG tablet  Commonly known as:  LASIX  Take 1 tablet (40 mg total) by mouth once daily.  Start taking on:  5/26/2018     spironolactone 100 MG tablet  Commonly known as:  ALDACTONE  Take 1 tablet (100 mg total) by mouth once daily.  Start taking on:  5/26/2018        CONTINUE taking these medications    ciprofloxacin HCl 500 MG tablet  Commonly known as:  CIPRO  Take 1 tablet (500 mg total) by mouth once daily.     clonazePAM 1 MG tablet  Commonly known as:  KLONOPIN  Take 1 tablet by mouth twice daily as needed for anxiety     ELIQUIS 5 mg Tab  Generic drug:  apixaban  Take 1 tablet (5 mg total) by mouth 2 (two) times daily.     glimepiride 2 MG tablet  Commonly known as:  AMARYL  Take 2 mg by mouth as needed.     hydrOXYzine HCl 25 MG tablet  Commonly known as:  ATARAX  Take 1 tablet (25 mg total) by mouth daily as needed for Itching.     hyoscyamine 0.125 mg Subl  Commonly known as:  LEVSIN/SL  Take 1 tablet by mouth up to four times daily     lactulose 10 gram/15 mL solution  Commonly known as:  CHRONULAC  Take 45 mLs (30 g total) by mouth 2 (two) times daily. Adjust to 3-4 bowel movements daily.     midodrine 5 MG Tab  Commonly known as:  PROAMATINE  Take 3 tablets (15 mg total) by mouth 3 (three) times daily.     promethazine 25 MG tablet  Commonly known as:  PHENERGAN  Take 25 mg by mouth as needed for Nausea.     traZODone 100 MG tablet  Commonly known as:  DESYREL  Take 100 mg by mouth every evening.            Indwelling Lines/Drains at time of discharge:   Lines/Drains/Airways          No matching active lines, drains, or airways               Lawson Hare MD  Department of Hospital Medicine  [facility]

## 2018-05-25 NOTE — PROGRESS NOTES
[facility] Hospital Medicine  Progress Note    Patient Name: Michelle Pappas  MRN: [id number]  Patient Class: IP- Inpatient   Admission Date: 5/22/2018  Length of Stay: 2 days  Attending Physician: Lawson Hare MD  Primary Care Provider: Taj Luna DO    [facility] Medicine Team: [facility]  Lawson Hare MD    Subjective:     Principal Problem:Recurrent right pleural effusion    HPI:  The patient is a 57 y/o female with Hep C cirrhosis, anxiety, insomnia, portal vein thrombosis, and diabetes. She was recently discharged last Thursday after being admitted for right hydrothorax. During that admit she was transferred from another facility for management of her hydrothorax. Prior to that admit she had a similar admission. Taps were found to be transudative and cultures negative. During the last admit, in addition to her thoracentesis, she also underwent TIPS. She was placed on cipro prophylactically on dc but has not started to take it as she had not returned home yet since dc and never got it filled. Her SOB has rapidly worsened after dc but she denies any fevers, chills, CP or other symptoms. She does report some right sided pain from her last thoracentesis. Brother is present at bedside and corroborates her history.         Hospital Course:  No notes on file    Interval History:pain controlled; sob improved post thoracentesis     Review of Systems   Constitutional: Negative for diaphoresis, fatigue and fever.   Respiratory: Positive for shortness of breath. Negative for cough.         Improved SOB   Cardiovascular: Negative for chest pain and palpitations.   Genitourinary: Negative for difficulty urinating.     Objective:     Vital Signs (Most Recent):  Temp: 98.1 °F (36.7 °C) (05/24/18 0535)  Pulse: 81 (05/24/18 0743)  Resp: 19 (05/24/18 0743)  BP: 107/62 (05/24/18 0743)  SpO2: 95 % (05/24/18 0743) Vital Signs (24h Range):  Temp:  [96.6 °F (35.9 °C)-98.1 °F (36.7 °C)] 98.1 °F (36.7  °C)  Pulse:  [63-89] 81  Resp:  [19-24] 19  SpO2:  [95 %-98 %] 95 %  BP: (107-120)/(62-80) 107/62     Weight: 74 kg (163 lb 2.3 oz)  Body mass index is 28.9 kg/m².  No intake or output data in the 24 hours ending 05/24/18 1034   Physical Exam   Constitutional: No distress.   Sitting up comfortably. In NAD.    Cardiovascular: Normal rate and regular rhythm.    Pulmonary/Chest: Effort normal. No respiratory distress.   Able to auscultate some breath sounds now in the right posterior lung fields. Appears comfortable.    Abdominal: Bowel sounds are normal.   Protuberant but soft. Not tense    Neurological: She is alert.   Vitals reviewed.      Significant Labs: All pertinent labs within the past 24 hours have been reviewed.    Significant Imaging: I have reviewed all pertinent imaging results/findings within the past 24 hours.    Assessment/Plan:      * Recurrent right pleural effusion    - per hydrothorax         Portal vein thrombosis      - patient was started on apixaban during her last admit  - will hold pm and am dose in anticipation of thoracentesis / paracentesis         Decompensated HCV cirrhosis    - hepatology consulted   - US shows TIPS patent and no real pockets of ascites for paracentesis   - start spironolactone and furosemide at 100mg and 40mg respecively   - continue lactulose   - midodrine for hypotension   - continue cipro prophylaxis dose as prescribed on recent dc           Insomnia    - continue trazodone and klonopin prn           Diabetes    - last A1C of 6.3   - regular diet for now  - continue to monitor  - not on any diabetic medications         Hepatic Hydrothorax    - since no pockets of significant ascites found, US suggested that pleural effusion may be less likely due to hepatic in origin; unclear why else patient would have this degree of recurrent effusions  - will follow fluid analysis results  - s/p thoracentesis today with removal of 2L if serosanguinous fluid  - patient feels  improved             Thrombocytopenia      - stable  - no evidence of bleeding           VTE Risk Mitigation     None              Lawson Hare MD  Department of Hospital Medicine   [facility]

## 2018-05-25 NOTE — HOSPITAL COURSE
Patient was admitted for pleural effusion. She underwent thoracentesis with drainage of 2L of serosanguinous fluid with improvement in her symptoms. She did qualify for home O2. She was evaluated by hepatology and they recommended resumption of her spironolactone at 100 mg daily and furosemide at 40 mg daily. Her BPs were mostly in the 90s after resumption of these two diuretics and she was asymptomatic. The patient states that her BPs normally run low in the 70s - 80s. She takes midodrine chronically. She will also continue with her cipro for SBP prophylaxis which was prescribed on her last dc and she will also continue with her apixaban. US of the abdomen showed that her TIPS remained patent. She will follow up with hepatology as scheduled.

## 2018-05-25 NOTE — NURSING
Pt with orders to d/c IV and d/c home.  Tolerated d/c of IV.   Awake, alert, and oriented with no acute distress noted. Reviewed discharge orders including ;medicine orders, prescriptions, followup appts, and patient education materials for diet and diagnosis.  Belongings packed for transport to home. Ambulating out per wheelchair at time of discharge.

## 2018-05-26 NOTE — PLAN OF CARE
Plan is to discharge home with family and home O2 from [employer].    Future Appointments  Date Time Provider Department Center   6/8/2018 4:20 PM Andreea Walton MD [facility] HEPAT [facility]        05/26/18 0713   Final Note   Assessment Type Final Discharge Note   Discharge Disposition Home   Hospital Follow Up  Appt(s) scheduled? Yes   Discharge plans and expectations educations in teach back method with documentation complete? Yes   Right Care Referral Info   Post Acute Recommendation No Care

## 2018-06-08 NOTE — PROGRESS NOTES
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 She has required a thoracentesis since then, but now is on room air and is not SOB. Her abdomen remains slightly distended but US 5/23/18- no significant ascites. She does have intermittent confusion according to her brother. She has days when she does not have at least 2 BMs per day.    Labs: Tbil 1.4, alb 2.6, Na 144, creat 1.0, INR 1.3  MELD-Na score: 11 at 6/8/2018  4:50 PM  MELD score: 11 at 6/8/2018  4:50 PM  Calculated from:  Serum Creatinine: 1 mg/dL at 6/8/2018  4:50 PM  Serum Sodium: 144 mmol/L (Rounded to 137) at 6/8/2018  4:50 PM  Total Bilirubin: 1.4 mg/dL at 6/8/2018  4:50 PM  INR(ratio): 1.3 at 6/8/2018  4:50 PM  Age: 58 years    Outpatient Encounter Prescriptions as of 6/8/2018   Medication Sig Dispense Refill    apixaban 5 mg Tab Take 1 tablet (5 mg total) by mouth 2 (two) times daily. 60 tablet 2    ciprofloxacin HCl (CIPRO) 500 MG tablet Take 1 tablet (500 mg total) by mouth once daily. 30 tablet 11    clonazePAM (KLONOPIN) 1 MG tablet Take 1 tablet by mouth twice daily as needed for anxiety      furosemide (LASIX) 40 MG tablet Take 1 tablet (40 mg total) by mouth once daily. 30 tablet 11    glimepiride (AMARYL) 2 MG tablet Take 2 mg by mouth as needed.      hydrOXYzine HCl (ATARAX) 25 MG tablet Take 1 tablet (25 mg total) by mouth daily as needed for Itching. 30 tablet 1    hyoscyamine (LEVSIN/SL) 0.125 mg Subl Take 1 tablet by mouth up to four times daily      lactulose (CHRONULAC) 10 gram/15 mL solution Take 45 mLs (30 g total) by mouth 2 (two) times daily. Adjust to 3-4 bowel movements daily.  0    midodrine (PROAMATINE) 5 MG Tab Take 3 tablets (15 mg total) by mouth 3 (three)  times daily. 270 tablet 0    promethazine (PHENERGAN) 25 MG tablet Take 25 mg by mouth as needed for Nausea.      spironolactone (ALDACTONE) 100 MG tablet Take 1 tablet (100 mg total) by mouth once daily. 30 tablet 11    traZODone (DESYREL) 100 MG tablet Take 100 mg by mouth every evening.      [DISCONTINUED] furosemide (LASIX) 40 MG tablet Take 40 mg by mouth 2 (two) times daily.      [DISCONTINUED] lactulose (CHRONULAC) 10 gram/15 mL solution Take 30 g by mouth 2 (two) times daily.      [DISCONTINUED] metOLazone (ZAROXOLYN) 5 MG tablet Take 5 mg by mouth once daily.      [DISCONTINUED] spironolactone (ALDACTONE) 100 MG tablet Take 100 mg by mouth 2 (two) times daily.      furosemide injection 20 mg       [] magnesium sulfate 2g in water 50mL IVPB (premix)       [] omnipaque 350 iohexol 75 mL       [] potassium chloride CR capsule 30 mEq       [DISCONTINUED] 0.9%  NaCl infusion (for blood administration)       [DISCONTINUED] acetaminophen tablet 650 mg       [DISCONTINUED] albuterol-ipratropium 2.5mg-0.5mg/3mL nebulizer solution 3 mL       [DISCONTINUED] ciprofloxacin HCl tablet 500 mg       [DISCONTINUED] clonazePAM tablet 1 mg       [DISCONTINUED] dextrose 50% injection 12.5 g       [DISCONTINUED] dextrose 50% injection 25 g       [DISCONTINUED] enoxaparin injection 40 mg       [DISCONTINUED] fluticasone 50 mcg/actuation nasal spray 100 mcg       [DISCONTINUED] furosemide tablet 40 mg       [DISCONTINUED] glucagon (human recombinant) injection 1 mg       [DISCONTINUED] glucose chewable tablet 16 g       [DISCONTINUED] glucose chewable tablet 24 g       [DISCONTINUED] hydrOXYzine HCl tablet 25 mg       [DISCONTINUED] hyoscyamine SL tablet 0.125 mg       [DISCONTINUED] insulin aspart U-100 pen 0-5 Units       [DISCONTINUED] lactulose 20 gram/30 mL solution Soln 30 g       [DISCONTINUED] lidocaine HCL 10 mg/ml (1%) injection 10 mL       [DISCONTINUED] metOLazone  tablet 5 mg       [DISCONTINUED] ondansetron disintegrating tablet 8 mg       [DISCONTINUED] promethazine tablet 25 mg       [DISCONTINUED] sodium chloride 0.9% flush 5 mL       [DISCONTINUED] spironolactone tablet 100 mg       [DISCONTINUED] trazodone split tablet 100 mg        No facility-administered encounter medications on file as of 6/8/2018.      Review of patient's allergies indicates:  No Known Allergies  Past Medical History:   Diagnosis Date    Anxiety 5/1/2018    Depression 5/1/2018    Essential hypertension 5/1/2018    Hepatic Hydrothorax 5/1/2018    Hypoalbuminemia 5/1/2018    Other cirrhosis of liver 5/1/2018    Thrombocytopenia 5/1/2018       Review of Systems   Constitutional: Negative.    HENT: Negative.    Eyes: Negative.    Respiratory: Negative.    Cardiovascular: Negative.    Gastrointestinal: Negative.    Genitourinary: Negative.    Musculoskeletal: Negative.    Skin: Negative.    Neurological: Negative.    Psychiatric/Behavioral: Positive for confusion and decreased concentration.     Vitals:    06/08/18 1604   BP: 128/61   Pulse: 93   Resp: 18   Temp: 98 °F (36.7 °C)       Physical Exam   Constitutional: She is oriented to person, place, and time. She appears well-developed and well-nourished.   HENT:   Head: Normocephalic and atraumatic.   Eyes: Conjunctivae and EOM are normal. Pupils are equal, round, and reactive to light. No scleral icterus.   Neck: Normal range of motion. Neck supple. No thyromegaly present.   Cardiovascular: Normal rate, regular rhythm and normal heart sounds.    Pulmonary/Chest: Effort normal and breath sounds normal. She has no rales.   Abdominal: Soft. Bowel sounds are normal. She exhibits distension. She exhibits no mass. There is no tenderness.   Musculoskeletal: Normal range of motion. She exhibits no edema.   Neurological: She is alert and oriented to person, place, and time.   Skin: Skin is warm and dry. No rash noted.   Psychiatric: She has a  normal mood and affect.   Vitals reviewed.      MELD-Na score: 11 at 5/25/2018  6:27 AM  MELD score: 11 at 5/25/2018  6:27 AM  Calculated from:  Serum Creatinine: 0.9 mg/dL (Rounded to 1) at 5/25/2018  6:27 AM  Serum Sodium: 139 mmol/L (Rounded to 137) at 5/25/2018  6:27 AM  Total Bilirubin: 1.6 mg/dL at 5/25/2018  6:27 AM  INR(ratio): 1.3 at 5/25/2018  6:27 AM  Age: 58 years    Lab Results   Component Value Date     (H) 05/25/2018    BUN 10 05/25/2018    CREATININE 0.9 05/25/2018    CALCIUM 8.4 (L) 05/25/2018     05/25/2018    K 3.7 05/25/2018     05/25/2018    PROT 4.5 (L) 05/25/2018    CO2 28 05/25/2018    ANIONGAP 7 (L) 05/25/2018    WBC 2.85 (L) 05/25/2018    RBC 3.00 (L) 05/25/2018    HGB 8.0 (L) 05/25/2018    HCT 25.3 (L) 05/25/2018    MCV 84 05/25/2018    MCH 26.7 (L) 05/25/2018    MCHC 31.6 (L) 05/25/2018     Lab Results   Component Value Date    RDW 18.7 (H) 05/25/2018    PLT 49 (L) 05/25/2018    MPV 12.3 05/25/2018    GRAN 2.2 05/25/2018    GRAN 77.4 (H) 05/25/2018    LYMPH 0.3 (L) 05/25/2018    LYMPH 9.8 (L) 05/25/2018    MONO 0.3 05/25/2018    MONO 9.5 05/25/2018    EOSINOPHIL 2.5 05/25/2018    BASOPHIL 0.4 05/25/2018    EOS 0.1 05/25/2018    BASO 0.01 05/25/2018    APTT 26.5 05/22/2018    GROUPTRH A POS 05/17/2018     (H) 05/22/2018    ALBUMIN 2.4 (L) 05/25/2018    AST 24 05/25/2018    ALT 20 05/25/2018    ALKPHOS 50 (L) 05/25/2018    MG 1.4 (L) 05/25/2018    LABPROT 13.4 (H) 05/25/2018    INR 1.3 (H) 05/25/2018       Assessment and Plan:    Michelle Pappas is a 58 y.o. female with decompensated cirrhosis, hepatic hydrothorax and Ascites  Current recommendations:  1. Decompensated cirrhosis, MELD <15: remains medically early for liver transplant: monitor  2. Ascites and hydrothorax: ongoing but seems to be better now that is off O2 and is s/p TIPS; currently on a blood thinner- need to know how long will be treated- follow up with IR  3. HE ongoing: increase lactulose and add  rifaximin  4. Hypotension, improved-  May wean down midodrine to 2 tabs per day down from 3.  Return 6-8 weeks

## 2018-06-08 NOTE — PATIENT INSTRUCTIONS
1. labs today and monthly  2. I have messaged to see how long to be on the blood thinner and whether you need another US  3. Start rifaximin; increase lactulose  4. Can try to decrease midodrine to 2 tablets per day from 3  Return 6-8 weeks

## 2018-06-27 NOTE — TELEPHONE ENCOUNTER
MA attempted to call patient back, she is unable to reached left her VM to please give us a call back.

## 2018-06-27 NOTE — TELEPHONE ENCOUNTER
----- Message from Porsha Haji sent at 6/27/2018 12:43 PM CDT -----  Contact: pt  Needs Advice    Reason for call:    Pt needs advising with her oxygen tank. She stated she may need it returned.   Communication Preference: [phone]  Additional Information: n/a

## 2018-07-02 NOTE — TELEPHONE ENCOUNTER
Patient called regarding a Paracentesis appointment.  Patient stated I do not need that right now. I had one done.    Patient stated I was calling to have the Oxygen stopped. I informed the patient it was ordered on discharge from the hospital. I cannot stopped the O2, a Dr would have to do that.  The telephone # to the oxygen co [address] should be on your papers to call if you would need more or have problems with the tank. I goggled the # it is [employer] [phone].   The patient wrote the number down.    While I have you on the phone, you will need a Follow up appt with Dr Walton.   She wanted you to return in 6-8 week. Pt ready to schedule.  Pt informed that she will not be seeing Dr Walton at the [facility], but at another [facility] not far from here.  It is [facility].  My brother lives in the city and can find it.  RTC appt is scheduled for Monday 7/23/18 by request of the patient.  Appt letter placed in the mail.

## 2018-07-27 PROBLEM — I95.9 HYPOTENSION: Status: ACTIVE | Noted: 2018-01-01

## 2018-07-27 NOTE — ED NOTES
Attempt to call report unsuccessful at this time; receiving floor refusing report; will attempt again shortly; will continue to monitor and provide care

## 2018-07-27 NOTE — ED NOTES
Report called to [facility]; pt to be transported to  [id number]; pt resting quietly at this time; will continue to monitor and provide care until transport

## 2018-07-27 NOTE — ED PROVIDER NOTES
Encounter Date: 2018    SCRIBE #1 NOTE: I, Estefany Briggs, am scribing for, and in the presence of,  Dr. De Jesus. I have scribed the following portions of the note - the EKG reading and the APC attestation.       History     Chief Complaint   Patient presents with    Shortness of Breath     i had my lung drained 6 times, cirrhosis     Patient is a 58 year old female with PMHx of chronic hepatitis C, hepatic hydrothorax, cirrhosis, HTN, chronic anticoagulation on Eliquis 5 mg, hypoalbuminemia, thrombocytopenia, anxiety, depression, and hx of encephalopathy. She presents to the ED for SOB. Patient reports having increased SOB for approximately four days. Reports associated dry cough, chest heaviness, and diarrhea. Reports being prescribed PRN home oxygen, denies recent use. She denies fever,chills, nausea, vomiting, chest pain, abd pain, abdominal swelling, dysuria, or constipation. She is a former smoker and denies alcohol use. Patient is followed by Dr. Walton in hepatology. According to our records, patient last underwent TIPS procedure on 18. Patient last hospitalized 18.           Review of patient's allergies indicates:  No Known Allergies  Past Medical History:   Diagnosis Date    Anemia     Anxiety 2018    CAH (chronic active hepatitis)     Depression 2018    Encephalopathy     Essential hypertension 2018    Hepatic Hydrothorax 2018    Hypoalbuminemia 2018    Other cirrhosis of liver 2018    Pleural effusion     Thrombocytopenia 2018     Past Surgical History:   Procedure Laterality Date    breast reduction       SECTION      HYSTERECTOMY       History reviewed. No pertinent family history.  Social History   Substance Use Topics    Smoking status: Former Smoker     Types: Cigarettes     Quit date: 3/6/2018    Smokeless tobacco: Never Used      Comment: quit 2018    Alcohol use No     Review of Systems   Constitutional: Negative for  fever.   HENT: Negative for sore throat.    Respiratory: Positive for chest tightness and shortness of breath.    Cardiovascular: Negative for chest pain.   Gastrointestinal: Positive for diarrhea. Negative for abdominal distention, abdominal pain, nausea and vomiting.   Genitourinary: Negative for dysuria.   Musculoskeletal: Negative for back pain.   Skin: Negative for rash.   Neurological: Negative for weakness.   Hematological: Does not bruise/bleed easily.       Physical Exam     Initial Vitals [07/27/18 1202]   BP Pulse Resp Temp SpO2   130/62 100 (!) 22 97.3 °F (36.3 °C) 96 %      MAP       --         Physical Exam    Vitals reviewed.  Constitutional: She appears well-developed and well-nourished. She is not diaphoretic. No distress.   HENT:   Head: Normocephalic and atraumatic.   Nose: Nose normal.   Eyes: Conjunctivae and EOM are normal. Pupils are equal, round, and reactive to light.   Neck: Normal range of motion.   Cardiovascular: Normal rate and regular rhythm. Exam reveals no friction rub.    Murmur heard.  Pulmonary/Chest: No respiratory distress. She has no wheezes. She has rales.   Abdominal: Soft. Bowel sounds are normal. She exhibits no distension. There is no tenderness. There is no rebound.   Reports abdomen at baseline.    Musculoskeletal: Normal range of motion.   Neurological: She is alert and oriented to person, place, and time. She has normal strength. No sensory deficit.   Skin: Skin is warm and dry. No erythema.   Psychiatric: She has a normal mood and affect. Thought content normal.         ED Course   Procedures  Labs Reviewed   CBC W/ AUTO DIFFERENTIAL - Abnormal; Notable for the following:        Result Value    WBC 2.35 (*)     RBC 3.09 (*)     Hemoglobin 7.6 (*)     Hematocrit 24.6 (*)     MCV 80 (*)     MCH 24.6 (*)     MCHC 30.9 (*)     RDW 16.3 (*)     Platelets 46 (*)     Lymph # 0.3 (*)     Mono # 0.2 (*)     Gran% 76.1 (*)     Lymph% 11.1 (*)     All other components within  normal limits   COMPREHENSIVE METABOLIC PANEL - Abnormal; Notable for the following:     Potassium 3.4 (*)     Glucose 144 (*)     Calcium 8.0 (*)     Total Protein 5.2 (*)     Albumin 2.2 (*)     Total Bilirubin 1.3 (*)     Anion Gap 6 (*)     All other components within normal limits   PROTIME-INR - Abnormal; Notable for the following:     Prothrombin Time 14.0 (*)     INR 1.4 (*)     All other components within normal limits   AMMONIA - Abnormal; Notable for the following:     Ammonia 58 (*)     All other components within normal limits   TROPONIN I   B-TYPE NATRIURETIC PEPTIDE   LACTIC ACID, PLASMA     EKG Readings: (Independently Interpreted)   84 BPM. Normal sinus rhythm        Imaging Results          X-Ray Chest PA And Lateral (Final result)  Result time 07/27/18 13:49:23    Final result by Vernon Snider MD (07/27/18 13:49:23)                 Impression:      Abnormal chest radiograph, demonstrating a large volume of pleural fluid on the right side with possible associated compressive basilar atelectasis.  The volume of pleural fluid on the right is actually less on this examination than was the case on 05/22/2018.      Electronically signed by: Vernon Snider MD  Date:    07/27/2018  Time:    13:49             Narrative:    EXAMINATION:  XR CHEST PA AND LATERAL    TECHNIQUE:  Two views of the chest were obtained, with PA and lateral projections submitted.    COMPARISON:  Comparison is made to the most recent prior chest radiograph, dated 05/22/2018.    FINDINGS:  Opacification of the inferior half of the right hemothorax is identified.  Its appearance strongly suggests that it is predominantly due to a large volume of pleural fluid on the right side, though certainly some basilar compressive atelectasis may coexist.  The volume of pleural fluid on the right, although substantial, is actually significantly less on the current examination than was the case on 05/22/2018.  Heart size is within normal limits, and  the appearance of the cardiomediastinal silhouette is unchanged since the examination referenced above.  As visualized, the pulmonary vascularity remains normal.  The left lung and the right upper lung zone appear clear, and free of significant airspace consolidation or volume loss.  No pleural fluid on the left.  No pneumothorax.  TIPS shunt in the right upper abdominal quadrant is incidentally observed.                                 Medical Decision Making:   History:   Old Medical Records: I decided to obtain old medical records.  Independently Interpreted Test(s):   I have ordered and independently interpreted EKG Reading(s) - see prior notes  Clinical Tests:   Lab Tests: Ordered and Reviewed  Radiological Study: Ordered and Reviewed  Medical Tests: Ordered and Reviewed       APC / Resident Notes:   Patient is a 58 year old female with PMHx of chronic hepatitis C, hepatic hydrothorax, cirrhosis, HTN, chronic anticoagulation on Eliquis 5 mg, hypoalbuminemia, thrombocytopenia, anxiety, depression, and hx of encephalopathy. She presents to the ED for SOB.    Will order labs and imaging. Will continue to monitor.     Differential diagnoses include, but are not limited to: hepatic hydrothorax, pneumonia, encephalopathy, or electrolyte imbalance. I considered but do not suspect ACS.     Leukopenia 2.35 with granulocytosis 76.1. H/H 7.6/24.6 decreased from 8.6/27.4 from one month ago. Thrombocytopenia. Hypokalemia 3.4. Elevated glucose 144. Elevated total bili 1.3. Elevated ammonia 58. Initial troponin WNL. BNP WNL. PT-INR 14.0-1.4. Initial lactate 1.2. CXR found to have large volume of pleural fluid on the right side with possible associated compressive basilar atelectasis.     Patient reassessed, patient resting comfortably in NAD. Oxygen sat of 98% on RA with HR 86 bpm. Patient denies melena or BRBPR. Reports hx of 5 blood transfusions.     Will admit to medicine.     I have discussed and reviewed with my  supervising physician.       Scribe Attestation:   Scribe #1: I performed the above scribed service and the documentation accurately describes the services I performed. I attest to the accuracy of the note.    Attending Attestation:     Physician Attestation Statement for NP/PA:   I discussed this assessment and plan of this patient with the NP/PA, but I did not personally examine the patient. The face to face encounter was performed by the NP/PA.            Clinical Impression:   The primary encounter diagnosis was Hydrothorax. Diagnoses of Shortness of breath, Leukopenia, unspecified type, and Thrombocytopenia were also pertinent to this visit.      Disposition:   Disposition: Admitted  Condition: Serious                        Becky Mendes PA-C  07/27/18 2020       Librado Pantoja MD  07/28/18 9312

## 2018-07-27 NOTE — ED NOTES
Assumed care of pt; pt reports continued SOB and abd discomfort; denies CP, N/V; pt awaiting assigned room to be cleaned; Pt A&Ox4; requesting something to eat; will monitor and provide care

## 2018-07-27 NOTE — ED NOTES
"Patient identifiers verified and correct for Ms Pappas  C/C: SOB with h/o pleural effusion  APPEARANCE: awake and alert in NAD.  SKIN: warm, dry and intact. No breakdown or bruising.  MUSCULOSKELETAL: Patient moving all extremities spontaneously, no obvious swelling or deformities noted. Ambulates independently.  RESPIRATORY: Positive SOB, worse with min exertionshortness of breath.Respirations unlabored. Positive cough, dry cough, Denies fever  CARDIAC: Denies CP, 2+ distal pulses; no peripheral edema  ABDOMEN:Abdomen large, round, states "normal" size, denies nausea,   : voids spontaneously, denies difficulty  Neurologic: AAO x 4; follows commands equal strength in all extremities; denies numbness/tingling. Denies dizziness Positive gen weakness    "

## 2018-07-27 NOTE — ED TRIAGE NOTES
"Patient states SOB x 4 days, had TIPS procedure in May, shunt in liver. Has had lung "drained" 6 times this year with h/o pleural effusions. States she is at [facility] while her Mother is in hospice, concerned about anxiety and SOB. Denies fever.   "

## 2018-07-27 NOTE — ED NOTES
Assigned room 923-b listed as clean; Second attempt to call report unsuccessful; floor refusing report; charge nurse made aware; pt placed on tele box [phone]; will continue to monitor and provide care

## 2018-07-28 PROBLEM — E83.39 HYPOPHOSPHATEMIA: Status: ACTIVE | Noted: 2018-01-01

## 2018-07-28 PROBLEM — E83.42 HYPOMAGNESEMIA: Status: ACTIVE | Noted: 2018-01-01

## 2018-07-28 PROBLEM — E43 SEVERE MALNUTRITION: Status: ACTIVE | Noted: 2018-01-01

## 2018-07-28 NOTE — CONSULTS
[facility] [address]  Hepatology  Consult Note    Patient Name: Michelle Pappas  MRN: [id number]  Admission Date: 7/27/2018  Hospital Length of Stay: 1 days  Attending Provider: Phill Thurston MD   Primary Care Physician: Taj Luna DO  Principal Problem:Hydrothorax    Inpatient consult to Hepatology  Consult performed by: CHAS ROSARIO  Consult ordered by: PHILL THURSTON        Subjective:     Transplant status: No    HPI:  Ms Pappas is a 58 year old female with history of ESLD, with prior decompensations including hepatic hydrothorax, ascites s/p TIPS (5/14/18), PVR on eliquid, HE, HTN who is presenting due to progressive dyspnea x4 days.    She notes that she has [place] [facility] [place] her mother who has been ill with lung cancer and passed away 1 day ago. She notes that over the preceding 4 days she has been experiencing progressive dyspnea. Denies any cough, fever/chill/sweats. Denies abdominal pain, distension, n/v/d or other complaints. Due to dyspnea she presented for evaluation.    In the ED imaging was remarkable for large right pleural effusion (has known history of right hepatic hydrothorax). Labs remarkable for Tb 1.3, Crt 0.8, Na 138, INR 1.4, Hgb 7.6, Plt 80. She was admitted for further management.      Review of Systems   Constitutional: Negative for activity change, chills, fatigue and fever.   HENT: Negative for trouble swallowing.    Eyes: Negative for visual disturbance.   Respiratory: Positive for shortness of breath (dyspnea with exertion predominantly). Negative for cough.    Cardiovascular: Negative for chest pain.   Gastrointestinal: Negative for abdominal distention, abdominal pain, diarrhea, nausea and vomiting.   Genitourinary: Negative for dysuria.   Musculoskeletal: Negative for arthralgias and myalgias.   Skin: Negative for rash.   Neurological: Negative for dizziness and light-headedness.   Psychiatric/Behavioral: Negative for confusion.       Past Medical  History:   Diagnosis Date    Anemia     Anxiety 2018    CAH (chronic active hepatitis)     Depression 2018    Encephalopathy     Essential hypertension 2018    Hepatic Hydrothorax 2018    Hypoalbuminemia 2018    Other cirrhosis of liver 2018    Pleural effusion     Thrombocytopenia 2018       Past Surgical History:   Procedure Laterality Date    breast reduction       SECTION      HYSTERECTOMY         Family history of liver disease: No    Review of patient's allergies indicates:  No Known Allergies    Social History Main Topics    Smoking status: Former Smoker     Types: Cigarettes     Quit date: 3/6/2018    Smokeless tobacco: Never Used      Comment: quit 2018    Alcohol use No    Drug use: No    Sexual activity: Not on file       Prescriptions Prior to Admission   Medication Sig Dispense Refill Last Dose    apixaban 5 mg Tab Take 1 tablet (5 mg total) by mouth 2 (two) times daily. 60 tablet 2 2018    clonazePAM (KLONOPIN) 1 MG tablet Take 1 tablet by mouth twice daily as needed for anxiety   Past Week    furosemide (LASIX) 40 MG tablet Take 1 tablet (40 mg total) by mouth once daily. 30 tablet 11 2018    lactulose (CHRONULAC) 10 gram/15 mL solution Take 45 mLs (30 g total) by mouth 2 (two) times daily. Adjust to 3-4 bowel movements daily. (Patient taking differently: Take 30 g by mouth 3 (three) times daily. Adjust to 3-4 bowel movements daily. Takes 30 ml TID)  0 2018    spironolactone (ALDACTONE) 100 MG tablet Take 1 tablet (100 mg total) by mouth once daily. 30 tablet 11 2018    traZODone (DESYREL) 100 MG tablet Take 100 mg by mouth every evening.   2018    ciprofloxacin HCl (CIPRO) 500 MG tablet Take 1 tablet (500 mg total) by mouth once daily. 30 tablet 11 Taking    glimepiride (AMARYL) 2 MG tablet Take 2 mg by mouth as needed.   Unknown    hydrOXYzine HCl (ATARAX) 25 MG tablet Take 1 tablet (25 mg total) by mouth  daily as needed for Itching. 30 tablet 1 Not Taking    hyoscyamine (LEVSIN/SL) 0.125 mg Subl Take 1 tablet by mouth up to four times daily   Unknown    promethazine (PHENERGAN) 25 MG tablet Take 25 mg by mouth as needed for Nausea.   Unknown    rifAXIMin (XIFAXAN) 550 mg Tab Take 1 tablet (550 mg total) by mouth 2 (two) times daily. 60 tablet 11        Objective:     Vital Signs (Most Recent):  Temp: 98 °F (36.7 °C) (07/28/18 1224)  Pulse: 74 (07/28/18 1224)  Resp: 18 (07/28/18 1224)  BP: 120/68 (07/28/18 1224)  SpO2: 96 % (07/28/18 1224) Vital Signs (24h Range):  Temp:  [97.6 °F (36.4 °C)-98.9 °F (37.2 °C)] 98 °F (36.7 °C)  Pulse:  [71-91] 74  Resp:  [15-18] 18  SpO2:  [92 %-98 %] 96 %  BP: ()/(38-68) 120/68     Weight: 75.6 kg (166 lb 10.7 oz) (07/27/18 1810)  Body mass index is 29.52 kg/m².    Physical Exam   Constitutional: She is oriented to person, place, and time. She appears well-developed and well-nourished.   Wearing home clothes, appears comfortable, speaking full sentences, no tachypnea or increased work of breathing.   HENT:   Head: Normocephalic and atraumatic.   Eyes: No scleral icterus.   Cardiovascular: Normal rate and regular rhythm.    Pulmonary/Chest: Effort normal. She has no wheezes. Rales: right sided rales upper lung field.   Decreased air entry right 2/3   Abdominal: Soft. Bowel sounds are normal. She exhibits distension. There is no tenderness. There is no rebound and no guarding.   Musculoskeletal: She exhibits no tenderness.   Lymphadenopathy:     She has no cervical adenopathy.   Neurological: She is alert and oriented to person, place, and time.   No asterixis   Skin: Skin is warm.   Psychiatric: She has a normal mood and affect. Her behavior is normal. Judgment and thought content normal.   Nursing note and vitals reviewed.      MELD-Na score: 10 at 7/28/2018  3:41 AM  MELD score: 10 at 7/28/2018  3:41 AM  Calculated from:  Serum Creatinine: 0.9 mg/dL (Rounded to 1) at  7/28/2018  3:41 AM  Serum Sodium: 140 mmol/L (Rounded to 137) at 7/28/2018  3:41 AM  Total Bilirubin: 0.8 mg/dL (Rounded to 1) at 7/28/2018  3:41 AM  INR(ratio): 1.4 at 7/28/2018  3:41 AM  Age: 58 years    Significant Labs:  CBC:   Recent Labs  Lab 07/28/18  0341   WBC 1.66*   RBC 2.86*   HGB 6.9*   HCT 22.9*   PLT 44*     CMP:   Recent Labs  Lab 07/28/18  0341   *   CALCIUM 8.0*   ALBUMIN 1.8*   PROT 4.5*      K 3.8   CO2 27      BUN 11   CREATININE 0.9   ALKPHOS 57   ALT 13   AST 20   BILITOT 0.8     Coagulation:   Recent Labs  Lab 07/28/18  0341   INR 1.4*       Significant Imaging:  Labs: Reviewed  X-Ray: Reviewed    Assessment/Plan:     Decompensated HCV cirrhosis    58F HCV ESLD with decomensations including hepatic hydrothorax, ascites s/p TIPS (5/2018), PVT on eliquis presenting due to 4 days of progressive dyspnea with imaging remarkable for large right pleural effusion likely hepatic hydrothorax.    RUQ doppler:  - Patent TIPS in place with normal velocities.  However, continued forward flow within the left portal vein (normally reversed post TIPS), may represent some degree of TIPS dysfunction.  - Partially occlusive thrombus within the SMV.  Previously seen partially occlusive thrombus in the main portal vein and portal venous confluence no longer visualized.    Plan  - TIPS is patent, unclear significance of the radiology noted ?degre of TIPS dysfunction  - currently not hypoxic and on therapeutic anticoagulation for PVT and notes that she feels well enough to be discharged home so will hold on thoracentesis currently (especially as will likely rapidly re-accumulate). As patient closer to discharge will trending pulse ox to ensure does not require outpatient oxygen  - continue diuresis  - mental status appropriate, continue lactulose and rifaximin            Thank you for your consult. I will follow-up with patient. Please contact us if you have any additional questions.    Anshul CAMPBELL  MD Abhishek  Hepatology  [facility]

## 2018-07-28 NOTE — HPI
Ms Pappas is a 58 year old female with history of ESLD, with prior decompensations including hepatic hydrothorax, ascites s/p TIPS (5/14/18), PVR on eliquid, HE, HTN who is presenting due to progressive dyspnea x4 days.    She notes that she has recently been at [facility] due to her mother who has been ill with lung cancer and passed away 1 day ago. She notes that over the preceding 4 days she has been experiencing progressive dyspnea. Denies any cough, fever/chill/sweats. Denies abdominal pain, distension, n/v/d or other complaints. Due to dyspnea she presented for evaluation.    In the ED imaging was remarkable for large right pleural effusion (has known history of right hepatic hydrothorax). Labs remarkable for Tb 1.3, Crt 0.8, Na 138, INR 1.4, Hgb 7.6, Plt 80. She was admitted for further management.

## 2018-07-28 NOTE — H&P
History and Physical  Hospital Medicine       Patient Name: Michelle Pappas  MRN:  [id number]  Hospital Medicine Team: [facility] [place] [facility] MED  Phill Thurston MD  Date of Admission:  7/27/2018     Principal Problem:  Hydrothorax   Primary Care Physician: Taj Luna DO      History of Present Illness:     Patient is a 58 year old female with PMHx of chronic hepatitis C, hepatic hydrothorax, cirrhosis, HTN, chronic anticoagulation on Eliquis 5 mg, hypoalbuminemia, thrombocytopenia, anxiety, depression, and hx of encephalopathy. She presents to the ED for SOB. Patient reports having increased SOB for approximately four days. Reports associated dry cough, chest heaviness, and diarrhea. Reports being prescribed PRN home oxygen, denies recent use. She denies fever,chills, nausea, vomiting, chest pain, abd pain, abdominal swelling, dysuria, or constipation. She is a former smoker and denies alcohol use. Patient is followed by Dr. Walton in hepatology. According to our records, patient last underwent TIPS procedure on 05/14/18. Patient last hospitalized 05/25/18.    Review of Systems   Constitutional: Negative for chills, fatigue, fever.   HENT: Negative for sore throat, trouble swallowing.    Eyes: Negative for photophobia, visual disturbance.   Respiratory: Negative for cough, positive shortness of breath.    Cardiovascular: Negative for chest pain, palpitations, leg swelling.   Gastrointestinal: Negative for abdominal pain, constipation, diarrhea, nausea, vomiting.   Endocrine: Negative for cold intolerance, heat intolerance.   Genitourinary: Negative for dysuria, frequency.   Musculoskeletal: Negative for arthralgias, myalgias.   Skin: Negative for rash, wound, erythema   Neurological: Negative for dizziness, syncope, weakness, light-headedness.   Psychiatric/Behavioral: Negative for confusion, hallucinations, anxiety  All other systems reviewed and are negative.      Past Medical History: Patient has a past medical history  of Anemia; Anxiety (2018); CAH (chronic active hepatitis); Depression (2018); Encephalopathy; Essential hypertension (2018); Hepatic Hydrothorax (2018); Hypoalbuminemia (2018); Other cirrhosis of liver (2018); Pleural effusion; and Thrombocytopenia (2018).    Past Surgical History: Patient has a past surgical history that includes breast reduction;  section; and Hysterectomy.    Social History: Patient reports that she quit smoking about 4 months ago. Her smoking use included Cigarettes. She has never used smokeless tobacco. She reports that she does not drink alcohol or use drugs.    Family History: family history is not on file.    Medications: Scheduled Meds:   apixaban  5 mg Oral BID    [START ON 2018] ciprofloxacin HCl  500 mg Oral Daily    furosemide  40 mg Intravenous TID    lactulose  30 g Oral TID    midodrine  10 mg Oral TID    rifAXIMin  550 mg Oral BID    spironolactone  100 mg Oral BID    traZODone  100 mg Oral QHS     Continuous Infusions:  PRN Meds:.acetaminophen, clonazePAM, dextrose 50%, dextrose 50%, glucagon (human recombinant), glucose, glucose, ondansetron, sodium chloride 0.9%    Allergies: Patient has No Known Allergies.    Physical Exam:     Vital Signs (Most Recent):  Temp: 98 °F (36.7 °C) (18)  Pulse: 78 (18)  Resp: 16 (18)  BP: (!) 109/58 (18)  SpO2: (!) 93 % (18) Vital Signs Range (Last 24H):  Temp:  [97.3 °F (36.3 °C)-98.9 °F (37.2 °C)]   Pulse:  []   Resp:  [15-22]   BP: ()/(48-62)   SpO2:  [93 %-99 %]    Body mass index is 29.52 kg/m².     Physical Exam:  Constitutional: Appears well-developed and well-nourished.   Head: Normocephalic and atraumatic.   Mouth/Throat: Oropharynx is clear and moist.   Eyes: EOM are normal. Pupils are equal, round, and reactive to light. No scleral icterus.   Neck: Normal range of motion. Neck supple.   Cardiovascular: Normal rate and regular  rhythm.  No murmur heard.  Pulmonary/Chest: decreased BS on right side; not labored;   Abdominal: Soft. Bowel sounds are normal.  No distension or tenderness  Musculoskeletal: Normal range of motion. No edema.   Neurological: Alert and oriented to person, place, and time.   Skin: Skin is warm and dry.   Psychiatric: Normal mood and affect. Behavior is normal.   Vitals reviewed.      Recent Labs  Lab 07/27/18  1242   WBC 2.35*   HGB 7.6*   HCT 24.6*   PLT 46*         Recent Labs  Lab 07/27/18  1242      K 3.4*      CO2 25   BUN 10   CREATININE 0.8   *   CALCIUM 8.0*       Recent Labs  Lab 07/27/18  1242   ALKPHOS 65   ALT 16   AST 25   ALBUMIN 2.2*   PROT 5.2*   BILITOT 1.3*   INR 1.4*      No results for input(s): POCTGLUCOSE in the last 168 hours.      Assessment and Plan:     Ms. Michelle Pappas is a 58 y.o. female who presented to [facility] on 7/27/2018 with     Active Hospital Problems    Diagnosis  POA    *Hepatic Hydrothorax [J94.8]  Yes    Portal vein thrombosis [I81]  Yes    CKD stage 3 due to type 2 diabetes mellitus [E11.22, N18.3]  Yes    Pancytopenia [D61.818]  Yes    Hypotension [I95.9]  Yes    Decompensated HCV cirrhosis [B19.20, K74.69]  Yes     Chronic    Essential hypertension [I10]  Yes    Thrombocytopenia [D69.6]  Yes      Resolved Hospital Problems    Diagnosis Date Resolved POA   No resolved problems to display.       # Hepatic Hydrothorax   # S/P TIPS  - fluid seems to have re-accumulated; TIPS on 5/23/18  - on RA saturating 94%  - will start lasix 40 mg IV TID and aldactone 100 mg PO BID  - will check abdomen U/S with liver doppler to see if TIPS is patent    # Decompensated HCV Cirrhosis   MELD-Na score: 11 at 7/27/2018 12:42 PM  MELD score: 11 at 7/27/2018 12:42 PM  Calculated from:  Serum Creatinine: 0.8 mg/dL (Rounded to 1) at 7/27/2018 12:42 PM  Serum Sodium: 138 mmol/L (Rounded to 137) at 7/27/2018 12:42 PM  Total Bilirubin: 1.3 mg/dL at 7/27/2018 12:42  PM  INR(ratio): 1.4 at 7/27/2018 12:42 PM  Age: 58 years  - s/p harvoni in 2015 with SVR  - follows outpatient with hepatology  - MELD has been too low to start liver evaluation  - hepatology consulted    # Hepatic Encephalopathy  - cont lactulose 30g PO TID to have 3 to 4 BMs daily    # Portal Vein Thrombosis  - cont eliquis; U/S with doppler     # Pancytopenia  -DIC labs;     # CKD stage 3 due to DM2  - SSI; Hba1c    # Hypotension  - midodrine 10 mg PO TID started      Diet:  Low sodium  GI PPx:    DVT PPx:    Goals of Care:  full          Disposition:  Possibly monday    Phill Thurston MD  Medical Director [facility] Medicine  Spectra:  [phone]  Pager: [phone]

## 2018-07-28 NOTE — PROGRESS NOTES
Progress Note   Hospital Medicine         Patient Name: Michelle Pappas  MRN:  [id number]  [place] [facility] [place] [facility] [place] [facility] [place] Phill Thurston MD  Date of Admission:  7/27/2018     Length of Stay:  LOS: 1 day   Expected Discharge Date: 7/30/2018  Principal Problem:  Hydrothorax       Subjective:     Interval History/Overnight Events:  Patient had a liver doppler which shows tips dysfunction, leading to her re-accumulation of fluid in her lungs; continue diuresis; blood counts dropped, checking DIC labs; no overt GI bleed;     Review of Systems   Constitutional: Negative for chills, fatigue, fever.   HENT: Negative for sore throat, trouble swallowing.    Eyes: Negative for photophobia, visual disturbance.   Respiratory: Negative for cough, shortness of breath.    Cardiovascular: Negative for chest pain, palpitations, leg swelling.   Gastrointestinal: Negative for abdominal pain, constipation, diarrhea, nausea, vomiting.   Endocrine: Negative for cold intolerance, heat intolerance.   Genitourinary: Negative for dysuria, frequency.   Musculoskeletal: Negative for arthralgias, myalgias.   Skin: Negative for rash, wound, erythema   Neurological: Negative for dizziness, syncope, weakness, light-headedness.   Psychiatric/Behavioral: Negative for confusion, hallucinations, anxiety  All other systems reviewed and are negative.    Objective:     Temp:  [97.6 °F (36.4 °C)-98.9 °F (37.2 °C)]   Pulse:  [71-91]   Resp:  [15-18]   BP: ()/(38-68)   SpO2:  [92 %-98 %]       Physical Exam:  Constitutional: Appears well-developed and well-nourished.   Head: Normocephalic and atraumatic.   Mouth/Throat: Oropharynx is clear and moist.   Eyes: EOM are normal. Pupils are equal, round, and reactive to light. No scleral icterus.   Neck: Normal range of motion. Neck supple.   Cardiovascular: Normal rate and regular rhythm.  No murmur heard.  Pulmonary/Chest: Effort normal and breath sounds normal. No respiratory distress. No wheezes,  rales, or rhonchi  Abdominal: Soft. Bowel sounds are normal.  No distension or tenderness  Musculoskeletal: Normal range of motion. No edema.   Neurological: Alert and oriented to person, place, and time.   Skin: Skin is warm and dry.   Psychiatric: Normal mood and affect. Behavior is normal.       Recent Labs  Lab 07/27/18  1242 07/28/18  0341   WBC 2.35* 1.66*   HGB 7.6* 6.9*   HCT 24.6* 22.9*   PLT 46* 44*       Recent Labs  Lab 07/27/18  1242 07/28/18  0341    140   K 3.4* 3.8    110   CO2 25 27   BUN 10 11   CREATININE 0.8 0.9   * 129*   CALCIUM 8.0* 8.0*   MG  --  1.3*   PHOS  --  2.5*       Recent Labs  Lab 07/27/18  1242 07/28/18  0341   ALKPHOS 65 57   ALT 16 13   AST 25 20   ALBUMIN 2.2* 1.8*   PROT 5.2* 4.5*   BILITOT 1.3* 0.8   INR 1.4* 1.4*       Recent Labs  Lab 07/27/18  2146 07/28/18  1338   POCTGLUCOSE 349* 225*        apixaban  5 mg Oral BID    ciprofloxacin HCl  500 mg Oral Daily    furosemide  40 mg Intravenous TID    lactulose  30 g Oral TID    midodrine  10 mg Oral TID    potassium, sodium phosphates  1 packet Oral QID (AC & HS)    rifAXIMin  550 mg Oral BID    spironolactone  100 mg Oral BID    traZODone  100 mg Oral QHS       Assessment and Plan     Ms. Michelle Pappas is a 58 y.o. female who presented to [facility] on 7/27/2018 with     Hospital Course:    Ms. Michelle Pappas was admitted to Hospital Medicine for management of     Active Hospital Problems    Diagnosis  POA    *Hepatic Hydrothorax [J94.8]  Yes    Severe malnutrition [E43]  Yes    Hypomagnesemia [E83.42]  Yes    Hypophosphatemia [E83.39]  Yes    Portal vein thrombosis [I81]  Yes    CKD stage 3 due to type 2 diabetes mellitus [E11.22, N18.3]  Yes    Pancytopenia [D61.818]  Yes    Hypotension [I95.9]  Yes    Decompensated HCV cirrhosis [B19.20, K74.69]  Yes     Chronic    Essential hypertension [I10]  Yes    Thrombocytopenia [D69.6]  Yes      Resolved Hospital Problems    Diagnosis Date  Resolved POA   No resolved problems to display.         # Hepatic Hydrothorax   # S/P TIPS with TIPS dysfunction   - fluid seems to have re-accumulated; TIPS on 5/23/18  - on RA saturating 94%  -  lasix 40 mg IV TID and aldactone 100 mg PO BID, strict I/O, CXR in the AM  - U/S with liver doppler shows TIPS dysfunction;     # Decompensated HCV Cirrhosis with ascites  MELD-Na score: 10 at 7/28/2018  3:41 AM  MELD score: 10 at 7/28/2018  3:41 AM  Calculated from:  Serum Creatinine: 0.9 mg/dL (Rounded to 1) at 7/28/2018  3:41 AM  Serum Sodium: 140 mmol/L (Rounded to 137) at 7/28/2018  3:41 AM  Total Bilirubin: 0.8 mg/dL (Rounded to 1) at 7/28/2018  3:41 AM  INR(ratio): 1.4 at 7/28/2018  3:41 AM  Age: 58 years    - s/p harvoni in 2015 with SVR  - follows outpatient with hepatology  - MELD has been too low to start liver evaluation  - hepatology consulted     # Hepatic Encephalopathy  - cont lactulose 30g PO TID to have 3 to 4 BMs daily     # Portal Vein Thrombosis  # SMV thrombosis   - cont eliquis; U/S shows resolution of PVT, but shows partially occlusive SMV thrombosis       # Pancytopenia  -DIC labs negative  - transfusing 1 unit of PRBC on 7/28     # CKD stage 3 due to DM2  - SSI; Hba1c     # Hypotension  - midodrine 10 mg PO TID started     # Hypomagnesemia/Hypophosphatemia/Hypokaelmia  - replace    # Severe malnutrition  - PAB 4, boost     Diet:  Low sodium  GI PPx:    DVT PPx:    Goals of Care:  full              Disposition:  Possibly monday       Phill Thurston MD  Medical Director [facility] Medicine  Spectra:  [phone]  Pager: [phone]

## 2018-07-28 NOTE — SUBJECTIVE & OBJECTIVE
Review of Systems   Constitutional: Negative for activity change, chills, fatigue and fever.   HENT: Negative for trouble swallowing.    Eyes: Negative for visual disturbance.   Respiratory: Positive for shortness of breath (dyspnea with exertion predominantly). Negative for cough.    Cardiovascular: Negative for chest pain.   Gastrointestinal: Negative for abdominal distention, abdominal pain, diarrhea, nausea and vomiting.   Genitourinary: Negative for dysuria.   Musculoskeletal: Negative for arthralgias and myalgias.   Skin: Negative for rash.   Neurological: Negative for dizziness and light-headedness.   Psychiatric/Behavioral: Negative for confusion.       Past Medical History:   Diagnosis Date    Anemia     Anxiety 2018    CAH (chronic active hepatitis)     Depression 2018    Encephalopathy     Essential hypertension 2018    Hepatic Hydrothorax 2018    Hypoalbuminemia 2018    Other cirrhosis of liver 2018    Pleural effusion     Thrombocytopenia 2018       Past Surgical History:   Procedure Laterality Date    breast reduction       SECTION      HYSTERECTOMY         Family history of liver disease: No    Review of patient's allergies indicates:  No Known Allergies    Social History Main Topics    Smoking status: Former Smoker     Types: Cigarettes     Quit date: 3/6/2018    Smokeless tobacco: Never Used      Comment: quit 2018    Alcohol use No    Drug use: No    Sexual activity: Not on file       Prescriptions Prior to Admission   Medication Sig Dispense Refill Last Dose    apixaban 5 mg Tab Take 1 tablet (5 mg total) by mouth 2 (two) times daily. 60 tablet 2 2018    clonazePAM (KLONOPIN) 1 MG tablet Take 1 tablet by mouth twice daily as needed for anxiety   Past Week    furosemide (LASIX) 40 MG tablet Take 1 tablet (40 mg total) by mouth once daily. 30 tablet 11 2018    lactulose (CHRONULAC) 10 gram/15 mL solution Take 45 mLs (30 g  total) by mouth 2 (two) times daily. Adjust to 3-4 bowel movements daily. (Patient taking differently: Take 30 g by mouth 3 (three) times daily. Adjust to 3-4 bowel movements daily. Takes 30 ml TID)  0 7/26/2018    spironolactone (ALDACTONE) 100 MG tablet Take 1 tablet (100 mg total) by mouth once daily. 30 tablet 11 7/26/2018    traZODone (DESYREL) 100 MG tablet Take 100 mg by mouth every evening.   7/26/2018    ciprofloxacin HCl (CIPRO) 500 MG tablet Take 1 tablet (500 mg total) by mouth once daily. 30 tablet 11 Taking    glimepiride (AMARYL) 2 MG tablet Take 2 mg by mouth as needed.   Unknown    hydrOXYzine HCl (ATARAX) 25 MG tablet Take 1 tablet (25 mg total) by mouth daily as needed for Itching. 30 tablet 1 Not Taking    hyoscyamine (LEVSIN/SL) 0.125 mg Subl Take 1 tablet by mouth up to four times daily   Unknown    promethazine (PHENERGAN) 25 MG tablet Take 25 mg by mouth as needed for Nausea.   Unknown    rifAXIMin (XIFAXAN) 550 mg Tab Take 1 tablet (550 mg total) by mouth 2 (two) times daily. 60 tablet 11        Objective:     Vital Signs (Most Recent):  Temp: 98 °F (36.7 °C) (07/28/18 1224)  Pulse: 74 (07/28/18 1224)  Resp: 18 (07/28/18 1224)  BP: 120/68 (07/28/18 1224)  SpO2: 96 % (07/28/18 1224) Vital Signs (24h Range):  Temp:  [97.6 °F (36.4 °C)-98.9 °F (37.2 °C)] 98 °F (36.7 °C)  Pulse:  [71-91] 74  Resp:  [15-18] 18  SpO2:  [92 %-98 %] 96 %  BP: ()/(38-68) 120/68     Weight: 75.6 kg (166 lb 10.7 oz) (07/27/18 1810)  Body mass index is 29.52 kg/m².    Physical Exam   Constitutional: She is oriented to person, place, and time. She appears well-developed and well-nourished.   Wearing home clothes, appears comfortable, speaking full sentences, no tachypnea or increased work of breathing.   HENT:   Head: Normocephalic and atraumatic.   Eyes: No scleral icterus.   Cardiovascular: Normal rate and regular rhythm.    Pulmonary/Chest: Effort normal. She has no wheezes. Rales: right sided rales  upper lung field.   Decreased air entry right 2/3   Abdominal: Soft. Bowel sounds are normal. She exhibits distension. There is no tenderness. There is no rebound and no guarding.   Musculoskeletal: She exhibits no tenderness.   Lymphadenopathy:     She has no cervical adenopathy.   Neurological: She is alert and oriented to person, place, and time.   No asterixis   Skin: Skin is warm.   Psychiatric: She has a normal mood and affect. Her behavior is normal. Judgment and thought content normal.   Nursing note and vitals reviewed.      MELD-Na score: 10 at 7/28/2018  3:41 AM  MELD score: 10 at 7/28/2018  3:41 AM  Calculated from:  Serum Creatinine: 0.9 mg/dL (Rounded to 1) at 7/28/2018  3:41 AM  Serum Sodium: 140 mmol/L (Rounded to 137) at 7/28/2018  3:41 AM  Total Bilirubin: 0.8 mg/dL (Rounded to 1) at 7/28/2018  3:41 AM  INR(ratio): 1.4 at 7/28/2018  3:41 AM  Age: 58 years    Significant Labs:  CBC:   Recent Labs  Lab 07/28/18  0341   WBC 1.66*   RBC 2.86*   HGB 6.9*   HCT 22.9*   PLT 44*     CMP:   Recent Labs  Lab 07/28/18  0341   *   CALCIUM 8.0*   ALBUMIN 1.8*   PROT 4.5*      K 3.8   CO2 27      BUN 11   CREATININE 0.9   ALKPHOS 57   ALT 13   AST 20   BILITOT 0.8     Coagulation:   Recent Labs  Lab 07/28/18  0341   INR 1.4*       Significant Imaging:  Labs: Reviewed  X-Ray: Reviewed

## 2018-07-28 NOTE — NURSING
Notified IM-L patient BP at this time 110/57  K-Value Results for MARTHA STEVEN (MRN [id number]) as of 7/27/2018 21:30   Ref. Range 7/27/2018 12:42   Potassium   Latest Ref Range: 3.5 - 5.1 mmol/L 3.4 (L)     Gave order for K replacement and ok to give lasix.

## 2018-07-28 NOTE — ASSESSMENT & PLAN NOTE
58F HCV ESLD with decomensations including hepatic hydrothorax, ascites s/p TIPS (5/2018), PVT on eliquis presenting due to 4 days of progressive dyspnea with imaging remarkable for large right pleural effusion likely hepatic hydrothorax.    RUQ doppler:  - Patent TIPS in place with normal velocities.  However, continued forward flow within the left portal vein (normally reversed post TIPS), may represent some degree of TIPS dysfunction.  - Partially occlusive thrombus within the SMV.  Previously seen partially occlusive thrombus in the main portal vein and portal venous confluence no longer visualized.    Plan  - TIPS is patent, unclear significance of the radiology noted ?degre of TIPS dysfunction  - currently not hypoxic and on therapeutic anticoagulation for PVT and notes that she feels well enough to be discharged home so will hold on thoracentesis currently (especially as will likely rapidly re-accumulate). As patient closer to discharge will trending pulse ox to ensure does not require outpatient oxygen  - continue diuresis  - mental status appropriate, continue lactulose and rifaximin

## 2018-07-29 NOTE — PROGRESS NOTES
Progress Note   Hospital Medicine         Patient Name: Michelle Pappas  MRN:  [id number]  [facility] Medicine Team: [facility] HOSP MED L Phill Thurston MD  Date of Admission:  7/27/2018     Length of Stay:  LOS: 2 days   Expected Discharge Date: 7/31/2018  Principal Problem:  Hydrothorax       Subjective:     Interval History/Overnight Events:  Patient having good diuresis; CXR shows improved right sided pleural effusion; will continue IV diuresis today and convert to lasix 80 mg PO TID tomorrow and monitor response and CXR with possible d/c on Tuesday; patient's case will be discussed at IR conference as to what to do with patient TIPS dysfunction and SMV thrombosis     Review of Systems   Constitutional: Negative for chills, fatigue, fever.   HENT: Negative for sore throat, trouble swallowing.    Eyes: Negative for photophobia, visual disturbance.   Respiratory: Negative for cough, shortness of breath.    Cardiovascular: Negative for chest pain, palpitations, leg swelling.   Gastrointestinal: Negative for abdominal pain, constipation, diarrhea, nausea, vomiting.   Endocrine: Negative for cold intolerance, heat intolerance.   Genitourinary: Negative for dysuria, frequency.   Musculoskeletal: Negative for arthralgias, myalgias.   Skin: Negative for rash, wound, erythema   Neurological: Negative for dizziness, syncope, weakness, light-headedness.   Psychiatric/Behavioral: Negative for confusion, hallucinations, anxiety  All other systems reviewed and are negative.    Objective:     Temp:  [98.2 °F (36.8 °C)-98.8 °F (37.1 °C)]   Pulse:  [69-84]   Resp:  [18-20]   BP: ()/(51-55)   SpO2:  [88 %-97 %]       Physical Exam:  Constitutional: Appears well-developed and well-nourished.   Head: Normocephalic and atraumatic.   Mouth/Throat: Oropharynx is clear and moist.   Eyes: EOM are normal. Pupils are equal, round, and reactive to light. No scleral icterus.   Neck: Normal range of motion. Neck supple.   Cardiovascular: Normal  rate and regular rhythm.  No murmur heard.  Pulmonary/Chest: Effort normal and breath sounds normal. No respiratory distress. No wheezes, rales, or rhonchi  Abdominal: Soft. Bowel sounds are normal.  No distension or tenderness  Musculoskeletal: Normal range of motion. No edema.   Neurological: Alert and oriented to person, place, and time.   Skin: Skin is warm and dry.   Psychiatric: Normal mood and affect. Behavior is normal.       Recent Labs  Lab 07/27/18  1242 07/28/18  0341 07/29/18  0429   WBC 2.35* 1.66* 1.89*   HGB 7.6* 6.9* 7.3*   HCT 24.6* 22.9* 24.0*   PLT 46* 44* 44*       Recent Labs  Lab 07/27/18  1242 07/28/18  0341 07/29/18  0429    140 139   K 3.4* 3.8 3.4*    110 106   CO2 25 27 27   BUN 10 11 10   CREATININE 0.8 0.9 0.9   * 129* 98   CALCIUM 8.0* 8.0* 8.1*   MG  --  1.3* 1.5*   PHOS  --  2.5* 3.8       Recent Labs  Lab 07/27/18  1242 07/28/18  0341 07/29/18  0429   ALKPHOS 65 57 58   ALT 16 13 16   AST 25 20 20   ALBUMIN 2.2* 1.8* 1.9*   PROT 5.2* 4.5* 4.6*   BILITOT 1.3* 0.8 1.9*   INR 1.4* 1.4* 1.5*       Recent Labs  Lab 07/27/18  2146 07/28/18  1338 07/28/18  1735 07/28/18  2021 07/29/18  0850 07/29/18  1246   POCTGLUCOSE 349* 225* 109 105 107 158*        apixaban  5 mg Oral BID    ciprofloxacin HCl  500 mg Oral Daily    furosemide  40 mg Intravenous Once    [START ON 7/30/2018] furosemide  80 mg Oral TID    lactulose  30 g Oral TID    magnesium oxide  800 mg Oral Daily    midodrine  10 mg Oral TID    potassium, sodium phosphates  1 packet Oral QID (AC & HS)    rifAXIMin  550 mg Oral BID    [START ON 7/30/2018] spironolactone  200 mg Oral Daily    traZODone  100 mg Oral QHS       Assessment and Plan     Ms. Michelle Pappas is a 58 y.o. female who presented to [facility] on 7/27/2018 with     Hospital Course:    Ms. Michelle Pappas was admitted to Hospital Medicine for management of     Active Hospital Problems    Diagnosis  POA    *Hepatic Hydrothorax [J94.8]   Yes    Severe malnutrition [E43]  Yes    Hypomagnesemia [E83.42]  Yes    Hypophosphatemia [E83.39]  Yes    Portal vein thrombosis [I81]  Yes    CKD stage 3 due to type 2 diabetes mellitus [E11.22, N18.3]  Yes    Pancytopenia [D61.818]  Yes    Hypotension [I95.9]  Yes    Decompensated HCV cirrhosis [B19.20, K74.69]  Yes     Chronic    Essential hypertension [I10]  Yes    Thrombocytopenia [D69.6]  Yes      Resolved Hospital Problems    Diagnosis Date Resolved POA   No resolved problems to display.         # Hepatic Hydrothorax   # S/P TIPS with TIPS dysfunction   - fluid seems to have re-accumulated; TIPS on 5/23/18  - on RA saturating 94%  -  lasix 40 mg IV TID and aldactone 200 mg daily, strict I/O, CXR today showed improvement of right pleural effusion, will convert to lasix 80 mg PO TID tomorrow and monitor response with daily CXR  - U/S with liver doppler shows TIPS dysfunction; case will need to be discussed at IR conference     # Decompensated HCV Cirrhosis with ascites  MELD-Na score: 13 at 7/29/2018  4:29 AM  MELD score: 13 at 7/29/2018  4:29 AM  Calculated from:  Serum Creatinine: 0.9 mg/dL (Rounded to 1) at 7/29/2018  4:29 AM  Serum Sodium: 139 mmol/L (Rounded to 137) at 7/29/2018  4:29 AM  Total Bilirubin: 1.9 mg/dL at 7/29/2018  4:29 AM  INR(ratio): 1.5 at 7/29/2018  4:29 AM  Age: 58 years    - s/p andraeoni in 2015 with SVR  - follows outpatient with hepatology  - MELD has been too low to start liver evaluation  - hepatology consulted     # Hepatic Encephalopathy  - cont lactulose 30g PO TID to have 3 to 4 BMs daily     # Portal Vein Thrombosis  # SMV thrombosis   - cont eliquis; U/S shows resolution of PVT, but shows partially occlusive SMV thrombosis       # Pancytopenia  -DIC labs negative  - transfusing 1 unit of PRBC on 7/28     # CKD stage 3 due to DM2  - SSI; Hba1c     # Hypotension  - midodrine 10 mg PO TID started, will need to go home on this     #  Hypomagnesemia/Hypophosphatemia/Hypokaelmia  - replace    # Severe malnutrition  - PAB 4, boost     Diet:  Low sodium  GI PPx:    DVT PPx:    Goals of Care:  full              Disposition:  Possibly Tuesday        Phill Thurston MD  Medical Director [facility] Medicine  Spectra:  [phone]  Pager: [phone]

## 2018-07-30 NOTE — PROGRESS NOTES
Progress Note   Hospital Medicine         Patient Name: Michelle Pappas  MRN:  [id number]  [facility] Medicine Team: [facility]OSP MED L Phill Thurston MD  Date of Admission:  7/27/2018     Length of Stay:  LOS: 3 days   Expected Discharge Date: 7/30/2018  Principal Problem:  Hydrothorax       Subjective:     Interval History/Overnight Events:  Patient having good diuresis; CXR shows marked improvement of her pleural effusion; plan on sending home today with outpatient follow up and venogram    Review of Systems   Constitutional: Negative for chills, fatigue, fever.   HENT: Negative for sore throat, trouble swallowing.    Eyes: Negative for photophobia, visual disturbance.   Respiratory: Negative for cough, shortness of breath.    Cardiovascular: Negative for chest pain, palpitations, leg swelling.   Gastrointestinal: Negative for abdominal pain, constipation, diarrhea, nausea, vomiting.   Endocrine: Negative for cold intolerance, heat intolerance.   Genitourinary: Negative for dysuria, frequency.   Musculoskeletal: Negative for arthralgias, myalgias.   Skin: Negative for rash, wound, erythema   Neurological: Negative for dizziness, syncope, weakness, light-headedness.   Psychiatric/Behavioral: Negative for confusion, hallucinations, anxiety  All other systems reviewed and are negative.    Objective:     Temp:  [97.8 °F (36.6 °C)-98.2 °F (36.8 °C)]   Pulse:  [72-80]   Resp:  [18]   BP: ()/(50-58)   SpO2:  [94 %-98 %]       Physical Exam:  Constitutional: Appears well-developed and well-nourished.   Head: Normocephalic and atraumatic.   Mouth/Throat: Oropharynx is clear and moist.   Eyes: EOM are normal. Pupils are equal, round, and reactive to light. No scleral icterus.   Neck: Normal range of motion. Neck supple.   Cardiovascular: Normal rate and regular rhythm.  No murmur heard.  Pulmonary/Chest: Effort normal and breath sounds normal. No respiratory distress. No wheezes, rales, or rhonchi  Abdominal: Soft. Bowel  sounds are normal.  No distension or tenderness  Musculoskeletal: Normal range of motion. No edema.   Neurological: Alert and oriented to person, place, and time.   Skin: Skin is warm and dry.   Psychiatric: Normal mood and affect. Behavior is normal.       Recent Labs  Lab 07/27/18  1242 07/28/18  0341 07/29/18  0429 07/30/18  0341   WBC 2.35* 1.66* 1.89* 2.34*   HGB 7.6* 6.9* 7.3* 7.8*   HCT 24.6* 22.9* 24.0* 25.2*   PLT 46* 44* 44* 44*       Recent Labs  Lab 07/28/18  0341 07/29/18  0429 07/30/18  0341    139 135*   K 3.8 3.4* 3.6    106 101   CO2 27 27 29   BUN 11 10 11   CREATININE 0.9 0.9 1.1   * 98 206*   CALCIUM 8.0* 8.1* 8.4*   MG 1.3* 1.5* 1.6   PHOS 2.5* 3.8 4.3       Recent Labs  Lab 07/28/18  0341 07/29/18  0429 07/30/18  0341   ALKPHOS 57 58 72   ALT 13 16 16   AST 20 20 21   ALBUMIN 1.8* 1.9* 2.0*   PROT 4.5* 4.6* 4.9*   BILITOT 0.8 1.9* 1.0   INR 1.4* 1.5* 1.4*       Recent Labs  Lab 07/28/18  2021 07/29/18  0850 07/29/18  1246 07/29/18  1754 07/29/18  2035 07/30/18  0826   POCTGLUCOSE 105 107 158* 127* 243* 131*        apixaban  5 mg Oral BID    ciprofloxacin HCl  500 mg Oral Daily    furosemide  80 mg Oral TID    lactulose  30 g Oral TID    magnesium oxide  800 mg Oral Daily    midodrine  10 mg Oral TID    rifAXIMin  550 mg Oral BID    spironolactone  200 mg Oral Daily    traZODone  100 mg Oral QHS       Assessment and Plan     Ms. Michelle Pappas is a 58 y.o. female who presented to [facility] on 7/27/2018 with     Hospital Course:    Ms. Michelle Pappas was admitted to Hospital Medicine for management of     Active Hospital Problems    Diagnosis  POA    *Hepatic Hydrothorax [J94.8]  Yes    Severe malnutrition [E43]  Yes    Hypomagnesemia [E83.42]  Yes    Hypophosphatemia [E83.39]  Yes    Portal vein thrombosis [I81]  Yes    CKD stage 3 due to type 2 diabetes mellitus [E11.22, N18.3]  Yes    Pancytopenia [D61.818]  Yes    Hypotension [I95.9]  Yes     Decompensated HCV cirrhosis [B19.20, K74.69]  Yes     Chronic    Essential hypertension [I10]  Yes    Thrombocytopenia [D69.6]  Yes      Resolved Hospital Problems    Diagnosis Date Resolved POA   No resolved problems to display.         # Hepatic Hydrothorax   # S/P TIPS with TIPS dysfunction   - fluid seems to have re-accumulated; TIPS on 5/23/18  - on RA saturating 94%  -  lasix 40 mg IV TID and aldactone 200 mg daily, strict I/O, CXR today showed improvement of right pleural effusion, will convert today lasix 80 mg PO TID   - U/S with liver doppler shows TIPS dysfunction; case will need to be discussed at IR conference  - plan to send home on lasix 80 mg PO BID and aldactone 200 mg daily      # Decompensated HCV Cirrhosis with ascites  MELD-Na score: 11 at 7/30/2018  3:41 AM  MELD score: 11 at 7/30/2018  3:41 AM  Calculated from:  Serum Creatinine: 1.1 mg/dL at 7/30/2018  3:41 AM  Serum Sodium: 135 mmol/L at 7/30/2018  3:41 AM  Total Bilirubin: 1 mg/dL at 7/30/2018  3:41 AM  INR(ratio): 1.4 at 7/30/2018  3:41 AM  Age: 58 years    - s/p harvoni in 2015 with SVR  - follows outpatient with hepatology  - MELD has been too low to start liver evaluation  - hepatology consulted     # Hepatic Encephalopathy  - cont lactulose 30g PO TID to have 3 to 4 BMs daily     # Portal Vein Thrombosis  # SMV thrombosis   - cont eliquis; U/S shows resolution of PVT, but shows partially occlusive SMV thrombosis    - planning for outpatient venogram  - heme/onc follow up     # Pancytopenia  -DIC labs negative  - transfusing 1 unit of PRBC on 7/28     # CKD stage 3 due to DM2  - SSI; Hba1c     # Hypotension  - midodrine 10 mg PO TID started, will need to go home on this     # Hypomagnesemia/Hypophosphatemia/Hypokaelmia  - replace    # Severe malnutrition  - PAB 4, boost     Diet:  Low sodium  GI PPx:    DVT PPx:    Goals of Care:  full              Disposition:  today with hepatology and heme/onc follow up and outpatient  venogram       Phill Thurston MD  Medical Director [facility] Medicine  Spectra:  [phone]  Pager: [phone]

## 2018-07-30 NOTE — PLAN OF CARE
Taj Luna, DO  [address] [facility] / [place] [address]      [facility] [place] - [address] [facility] [address]  [address]  [place] [address]  Phone: [phone] Fax: [phone]    [facility] [place]  [address]  [place] [address]  Phone: [phone] Fax: [phone]      Payor: MEDICARE / Plan: MEDICARE PART A & B / Product Type: Government /        07/30/18 1000   Discharge Assessment   Assessment Type Discharge Planning Assessment   Confirmed/corrected address and phone number on facesheet? Yes   Assessment information obtained from? Patient   Expected Length of Stay (days) 3   Communicated expected length of stay with patient/caregiver yes   Prior to hospitilization cognitive status: Alert/Oriented   Prior to hospitalization functional status: Independent   Current cognitive status: Alert/Oriented   Current Functional Status: Independent   Lives With alone   Able to Return to Prior Arrangements yes   Is patient able to care for self after discharge? Yes   Who are your caregiver(s) and their phone number(s)? (Be Irby  brother [phone])   Patient's perception of discharge disposition home or selfcare   Patient currently receives any other outside agency services? No   Equipment Currently Used at Home cane, straight;glucometer   Do you have any problems affording any of your prescribed medications? TBD   Does the patient have transportation home? Yes   Transportation Available family or friend will provide   Discharge Plan A Home   Discharge Plan B Home with family   Patient/Family In Agreement With Plan yes

## 2018-07-30 NOTE — DISCHARGE SUMMARY
Discharge Summary  Hospital Medicine    Patient Name: Michelle Pappas  MRN:  [id number]  Hospital Medicine Team: [facility] HOSP MED L Phill Thurston MD  Date of Admission:  7/27/2018     Date of Discharge:  07/30/2018  Length of Stay:  LOS: 3 days   Principal Problem:  Hydrothorax     Active Hospital Problems    Diagnosis  POA    *Hepatic Hydrothorax [J94.8]  Yes    Severe malnutrition [E43]  Yes    Hypomagnesemia [E83.42]  Yes    Hypophosphatemia [E83.39]  Yes    Portal vein thrombosis [I81]  Yes    CKD stage 3 due to type 2 diabetes mellitus [E11.22, N18.3]  Yes    Pancytopenia [D61.818]  Yes    Hypotension [I95.9]  Yes    Decompensated HCV cirrhosis [B19.20, K74.69]  Yes     Chronic    Essential hypertension [I10]  Yes    Thrombocytopenia [D69.6]  Yes      Resolved Hospital Problems    Diagnosis Date Resolved POA   No resolved problems to display.       History of Present Illness:        Patient is a 58 year old female with PMHx of chronic hepatitis C, hepatic hydrothorax, cirrhosis, HTN, chronic anticoagulation on Eliquis 5 mg, hypoalbuminemia, thrombocytopenia, anxiety, depression, and hx of encephalopathy. She presents to the ED for SOB. Patient reports having increased SOB for approximately four days. Reports associated dry cough, chest heaviness, and diarrhea. Reports being prescribed PRN home oxygen, denies recent use. She denies fever,chills, nausea, vomiting, chest pain, abd pain, abdominal swelling, dysuria, or constipation. She is a former smoker and denies alcohol use. Patient is followed by Dr. Walton in hepatology. According to our records, patient last underwent TIPS procedure on 05/14/18. Patient last hospitalized 05/25/18.    Hospital Course of Principle Problem Addressed:       # Hepatic Hydrothorax   # S/P TIPS with TIPS dysfunction   - fluid seems to have re-accumulated; TIPS on 5/23/18  - on RA saturating 94%  -  lasix 40 mg IV TID and aldactone 200 mg daily, strict I/O, CXR today showed  improvement of right pleural effusion, will convert today lasix 80 mg PO TID   - U/S with liver doppler shows TIPS dysfunction; case will need to be discussed at IR conference  - plan to send home on lasix 80 mg PO BID and aldactone 200 mg daily       # Decompensated HCV Cirrhosis with ascites  MELD-Na score: 11 at 7/30/2018  3:41 AM  MELD score: 11 at 7/30/2018  3:41 AM  Calculated from:  Serum Creatinine: 1.1 mg/dL at 7/30/2018  3:41 AM  Serum Sodium: 135 mmol/L at 7/30/2018  3:41 AM  Total Bilirubin: 1 mg/dL at 7/30/2018  3:41 AM  INR(ratio): 1.4 at 7/30/2018  3:41 AM  Age: 58 years     - s/p royal in 2015 with SVR  - follows outpatient with hepatology  - MELD has been too low to start liver evaluation  - hepatology consulted     # Hepatic Encephalopathy  - cont lactulose 30g PO TID to have 3 to 4 BMs daily     # Portal Vein Thrombosis  # SMV thrombosis   - cont eliquis; U/S shows resolution of PVT, but shows partially occlusive SMV thrombosis    - planning for outpatient venogram  - heme/onc follow up     # Pancytopenia  -DIC labs negative  - transfusing 1 unit of PRBC on 7/28     # CKD stage 3 due to DM2  - SSI; Hba1c     # Hypotension  - midodrine 10 mg PO TID started, will need to go home on this      # Hypomagnesemia/Hypophosphatemia/Hypokaelmia  - replace     # Severe malnutrition  - PAB 4, boost     Diet:  Low sodium  GI PPx:    DVT PPx:    Goals of Care:  full              Disposition:  today with hepatology and heme/onc follow up and outpatient venogram      Other Medical Problems Addressed in the Hospital:         Significant Diagnostic Tests/Imaging:       Recent Labs  Lab 07/27/18  1242 07/28/18  0341 07/29/18 0429 07/30/18  0341   WBC 2.35* 1.66* 1.89* 2.34*   HGB 7.6* 6.9* 7.3* 7.8*   HCT 24.6* 22.9* 24.0* 25.2*   PLT 46* 44* 44* 44*       Recent Labs  Lab 07/27/18  1242 07/28/18  0341 07/29/18 0429 07/30/18  0341    140 139 135*   K 3.4* 3.8 3.4* 3.6    110 106 101   CO2 25 27 27 29    BUN 10 11 10 11   CREATININE 0.8 0.9 0.9 1.1   CALCIUM 8.0* 8.0* 8.1* 8.4*   MG  --  1.3* 1.5* 1.6   PHOS  --  2.5* 3.8 4.3   PROT 5.2* 4.5* 4.6* 4.9*   BILITOT 1.3* 0.8 1.9* 1.0   ALKPHOS 65 57 58 72   ALT 16 13 16 16   AST 25 20 20 21         Special Treatments/Procedures:         Discharge Medications:      Current Discharge Medication List      CONTINUE these medications which have CHANGED    Details   apixaban 5 mg Tab Take 1 tablet (5 mg total) by mouth 2 (two) times daily.  Qty: 60 tablet, Refills: 5      furosemide (LASIX) 80 MG tablet Take 1 tablet (80 mg total) by mouth 2 (two) times daily.  Qty: 60 tablet, Refills: 2      lactulose (CHRONULAC) 10 gram/15 mL solution Take 45 mLs (30 g total) by mouth 3 (three) times daily. Adjust to 3-4 bowel movements daily. Takes 30 ml TID      midodrine (PROAMATINE) 10 MG tablet Take 1 tablet (10 mg total) by mouth 3 (three) times daily.  Qty: 90 tablet, Refills: 5      spironolactone (ALDACTONE) 100 MG tablet Take 2 tablets (200 mg total) by mouth once daily.  Qty: 60 tablet, Refills: 2         CONTINUE these medications which have NOT CHANGED    Details   clonazePAM (KLONOPIN) 1 MG tablet Take 1 tablet by mouth twice daily as needed for anxiety      traZODone (DESYREL) 100 MG tablet Take 100 mg by mouth every evening.      ciprofloxacin HCl (CIPRO) 500 MG tablet Take 1 tablet (500 mg total) by mouth once daily.  Qty: 30 tablet, Refills: 11    Associated Diagnoses: Other ascites      glimepiride (AMARYL) 2 MG tablet Take 2 mg by mouth as needed.      hydrOXYzine HCl (ATARAX) 25 MG tablet Take 1 tablet (25 mg total) by mouth daily as needed for Itching.  Qty: 30 tablet, Refills: 1    Associated Diagnoses: Other cirrhosis of liver      hyoscyamine (LEVSIN/SL) 0.125 mg Subl Take 1 tablet by mouth up to four times daily      promethazine (PHENERGAN) 25 MG tablet Take 25 mg by mouth as needed for Nausea.      rifAXIMin (XIFAXAN) 550 mg Tab Take 1 tablet (550 mg total) by  mouth 2 (two) times daily.  Qty: 60 tablet, Refills: 11    Associated Diagnoses: Encephalopathy             Discharge Diet:2 gram sodium diet    Activity: activity as tolerated    Discharge Condition: Good    Disposition: Home or Self Care    Time spent on the discharge of the patient including review of hospital course with the patient. reviewing discharge medications and arranging follow-up care 35 minutes.  Patient was seen and examined on the date of discharge and determined to be suitable for discharge.    No future appointments.    Phill Thurston MD  Medical Director [facility] Medicine  Spectra:  [phone]  Pager: [phone]

## 2018-07-30 NOTE — NURSING
Discharge instructions given to patient.  Patient verbalized understanding and had no further questions.  Patient's IV, tele removed and vital signs within normal limits.  Patient now awaiting ride from family for discharge.  Will continue to follow up.

## 2018-07-31 NOTE — PLAN OF CARE
07/31/18 1420   Final Note   Assessment Type Final Discharge Note   Discharge Disposition Home     Follow-up With  Details  Why  Contact Info   Andreea Walton MD    Clinic will notify patient of hospital discharge follow up  [address] [place] [address]  [phone]   Taj Luna DO     to fax discharge summary to primary care per patient request/ fax #[phone] [address] [facility] [address] [phone]

## 2018-08-01 NOTE — PROGRESS NOTES
C3 nurse attempted to contact patient. No answer. The following message was left for the patient to return the call:  Good afternoon I am a nurse calling on behalf of [facility] from the Care Coordination [facility].  This is a Transitional Care Call for Michelle Pappas. When you have a moment please contact us at [phone] or [phone] Monday through Friday, between the hours of 8 am to 4 pm. We look forward to speaking with you. On behalf of [facility] have a nice day.    The patient does not have a scheduled HOSFU appointment within 7-14 days post hospital discharge date 7/30/18.     Message sent to Physician staff to assist with HOSFU appointment scheduling.

## 2018-08-13 NOTE — PROGRESS NOTES
Venogram of TIPS completed, pt tolerated well. No apparent distress noted. Dressing applied CDI. Pt to be transferred to [facility], report to be given at bedside.

## 2018-08-13 NOTE — PROGRESS NOTES
Pt arrived to [facility] bay 1 s/p venogram.  NAD noted.  Report received from JOSE ARMANDO Graff.  DSG to right neck CDI.  Will continue to monitor.

## 2018-08-13 NOTE — PROGRESS NOTES
Pt arrived to IR room 189 for venogram of TIPS and possible intervention, no acute distress noted. Orders, consent and labs reviewed on chart.

## 2018-08-13 NOTE — DISCHARGE INSTRUCTIONS
For scheduling: Call [place] at [phone]    For questions or concerns call: [facility] 8 AM- 5PM [phone]. Radiology resident on call [phone].    For immediate concerns that are not emergent, you may call our radiology clinic at: [phone]

## 2018-08-13 NOTE — H&P
Radiology History & Physical      SUBJECTIVE:     Chief Complaint: possible TIPS dysfunction    History of Present Illness:  Michelle Pappas is a 58 y.o. female who presents for venogram of TIPS and possible intervention. Normal velocities on recent US, however, with hepatopetal flow in left portal.     Past Medical History:   Diagnosis Date    Anemia     Anxiety 2018    CAH (chronic active hepatitis)     Depression 2018    Encephalopathy     Essential hypertension 2018    Hepatic Hydrothorax 2018    Hypoalbuminemia 2018    Other cirrhosis of liver 2018    Pleural effusion     Thrombocytopenia 2018       Past Surgical History:   Procedure Laterality Date    breast reduction       SECTION      HYSTERECTOMY         Home Meds:   Prior to Admission medications    Medication Sig Start Date End Date Taking? Authorizing Provider   apixaban 5 mg Tab Take 1 tablet (5 mg total) by mouth 2 (two) times daily. 18   Phill Thurston MD   ciprofloxacin HCl (CIPRO) 500 MG tablet Take 1 tablet (500 mg total) by mouth once daily. 18   Andreea Walton MD   clonazePAM (KLONOPIN) 1 MG tablet Take 1 tablet by mouth twice daily as needed for anxiety 7/13/15   Historical Provider, MD   furosemide (LASIX) 80 MG tablet Take 1 tablet (80 mg total) by mouth 2 (two) times daily. 18  Phill Thurston MD   glimepiride (AMARYL) 2 MG tablet Take 2 mg by mouth as needed.    Historical Provider, MD   hydrOXYzine HCl (ATARAX) 25 MG tablet Take 1 tablet (25 mg total) by mouth daily as needed for Itching. 18   Andreea Walton MD   hyoscyamine (LEVSIN/SL) 0.125 mg Subl Take 1 tablet by mouth up to four times daily 8/31/15   Historical Provider, MD   lactulose (CHRONULAC) 10 gram/15 mL solution Take 45 mLs (30 g total) by mouth 3 (three) times daily. Adjust to 3-4 bowel movements daily. Takes 30 ml TID 18   Phill Thurston MD   midodrine (PROAMATINE) 10 MG tablet Take 1  tablet (10 mg total) by mouth 3 (three) times daily. 7/30/18 7/30/19  Phill Thurston MD   promethazine (PHENERGAN) 25 MG tablet Take 25 mg by mouth as needed for Nausea.    Historical Provider, MD   rifAXIMin (XIFAXAN) 550 mg Tab Take 1 tablet (550 mg total) by mouth 2 (two) times daily. 6/8/18   Andreea Walton MD   spironolactone (ALDACTONE) 100 MG tablet Take 2 tablets (200 mg total) by mouth once daily. 7/31/18 7/31/19  Phill Thurston MD   traZODone (DESYREL) 100 MG tablet Take 100 mg by mouth every evening.    Historical Provider, MD       Anticoagulants/Antiplatelets: no anticoagulation    Allergies: Review of patient's allergies indicates:  No Known Allergies    Sedation History:  no adverse reactions    Review of Systems:   As documented in primary provider H&P.      OBJECTIVE:     Vital Signs (Most Recent)       Physical Exam:  ASA: 3  Mallampati: 1      Laboratory  Lab Results   Component Value Date    INR 1.4 (H) 07/30/2018       Lab Results   Component Value Date    WBC 2.66 (L) 08/13/2018    HGB 8.7 (L) 08/13/2018    HCT 28.8 (L) 08/13/2018    MCV 80 (L) 08/13/2018    PLT 55 (L) 08/13/2018      Lab Results   Component Value Date     (H) 07/30/2018     (L) 07/30/2018    K 3.6 07/30/2018     07/30/2018    CO2 29 07/30/2018    BUN 11 07/30/2018    CREATININE 1.1 07/30/2018    CALCIUM 8.4 (L) 07/30/2018    MG 1.6 07/30/2018    ALT 16 07/30/2018    AST 21 07/30/2018    ALBUMIN 2.0 (L) 07/30/2018    BILITOT 1.0 07/30/2018       ASSESSMENT/PLAN:     Sedation Plan: moderate  Patient will undergo venogram of TIPS and possible intervention.    Song Armstrong MD  Department of Radiology  Pager: [phone]

## 2018-08-13 NOTE — PROCEDURES
"Radiology Post-Procedure Note    Pre Op Diagnosis: TIPS malfunction  Post Op Diagnosis: Normal function of TIPS    Procedure: Venogram with pressured    Procedure performed by: Damian    Written Informed Consent Obtained: Yes  Specimen Removed: NO  Estimated Blood Loss: Minimal    Findings:   The Right IJ was accessed using US after sterile prep and administration of lidocaine. A sheath was placed into the IVC over a wire. THe patients TIPS was accessed and venogram was obtained demonstrating normal blood flow. Pressures were obtained as below:    Main portal vein: 19 mmHg  Right atrium: 9 mmHg    Patient tolerated procedure well. No evidence of TIPS malfunction at this time.    Sebas De Leon MD ([facility])  Radiology PGY-5  [phone]      "

## 2018-08-13 NOTE — TELEPHONE ENCOUNTER
LVM for patient to call the office back to reschedule the appointment that was missed due to patient still having the procedure completed during [facility]. Left office phone number on VM for patient to call back the office to reschedule.    ---Claribel Jack

## 2018-08-13 NOTE — PROGRESS NOTES
Pt given discharge instructions and handout, verbalized understanding.  Brother at bedside.  Dsg to right neck CDI.  IV d/c'd with cath tip intact.  NAD noted.  Pt refusing wheelchair, gait steady.

## 2018-08-14 NOTE — PROGRESS NOTES
Cc: portal vein thrombosis    HPI:  is a 57y/o lady here for hematology consultation. She is here for management of portal vein thrombosis. She has h/o pleural effusion, hepatic hydrothorax, hepatic cirrhosis likely to be secondary to hepatitis C, h/o hepatitis C treated with Harvoni, splenomegaly, thrombocytopenia. She had CT abdomen done on 5/5/18. It showed a partially calcified portal vein thrombosis extending from the portal confluence to the level of the right left portal vein bifurcation. She was asymptomatic. No other known thrombotic events. She was started on Eliquis and is completing a 3 month course. She has easy bruising, but no bleeding.     Review of Systems   Constitutional: Positive for malaise/fatigue and weight loss. Negative for chills and fever.   HENT: Negative for congestion, ear discharge, nosebleeds and sinus pain.    Eyes: Negative for blurred vision, double vision, photophobia and pain.   Respiratory: Positive for shortness of breath. Negative for cough, hemoptysis, sputum production and wheezing.    Cardiovascular: Negative for chest pain, palpitations, orthopnea and leg swelling.   Gastrointestinal: Negative for abdominal pain, blood in stool, constipation, diarrhea, heartburn, nausea and vomiting.   Genitourinary: Negative for dysuria, frequency and urgency.   Musculoskeletal: Negative for back pain, myalgias and neck pain.   Skin: Negative for itching and rash.   Neurological: Negative for dizziness, tingling, tremors, sensory change, weakness and headaches.   Endo/Heme/Allergies: Does not bruise/bleed easily.   Psychiatric/Behavioral: Negative for depression.       Past Medical History:   Diagnosis Date    Anemia     Anxiety 5/1/2018    CAH (chronic active hepatitis)     Depression 5/1/2018    Encephalopathy     Essential hypertension 5/1/2018    Hepatic Hydrothorax 5/1/2018    Hypoalbuminemia 5/1/2018    Other cirrhosis of liver 5/1/2018    Pleural effusion      Thrombocytopenia 2018         Past Surgical History:   Procedure Laterality Date    breast reduction       SECTION      HYSTERECTOMY           Social History     Socioeconomic History    Marital status:      Spouse name: Not on file    Number of children: Not on file    Years of education: Not on file    Highest education level: Not on file   Social Needs    Financial resource strain: Not on file    Food insecurity - worry: Not on file    Food insecurity - inability: Not on file    Transportation needs - medical: Not on file    Transportation needs - non-medical: Not on file   Occupational History    Not on file   Tobacco Use    Smoking status: Former Smoker     Types: Cigarettes     Last attempt to quit: 3/6/2018     Years since quittin.4    Smokeless tobacco: Never Used    Tobacco comment: quit 2018   Substance and Sexual Activity    Alcohol use: No    Drug use: No    Sexual activity: Not on file   Other Topics Concern    Not on file   Social History Narrative    Not on file         History reviewed. No pertinent family history.        Review of patient's allergies indicates: No Known Allergies      Current Outpatient Medications   Medication Sig    apixaban 5 mg Tab Take 1 tablet (5 mg total) by mouth 2 (two) times daily.    ciprofloxacin HCl (CIPRO) 500 MG tablet Take 1 tablet (500 mg total) by mouth once daily.    clonazePAM (KLONOPIN) 1 MG tablet Take 1 tablet by mouth twice daily as needed for anxiety    furosemide (LASIX) 80 MG tablet Take 1 tablet (80 mg total) by mouth 2 (two) times daily.    hyoscyamine (LEVSIN/SL) 0.125 mg Subl Take 1 tablet by mouth up to four times daily    lactulose (CHRONULAC) 10 gram/15 mL solution Take 45 mLs (30 g total) by mouth 3 (three) times daily. Adjust to 3-4 bowel movements daily. Takes 30 ml TID    midodrine (PROAMATINE) 10 MG tablet Take 1 tablet (10 mg total) by mouth 3 (three) times daily.    spironolactone  (ALDACTONE) 100 MG tablet Take 2 tablets (200 mg total) by mouth once daily.    traZODone (DESYREL) 100 MG tablet Take 100 mg by mouth every evening.    glimepiride (AMARYL) 2 MG tablet Take 2 mg by mouth as needed.     No current facility-administered medications for this visit.      Facility-Administered Medications Ordered in Other Visits   Medication    fentaNYL injection 50 mcg    midazolam (VERSED) 1 mg/mL injection 1 mg           Vitals:    08/14/18 1533   BP: (!) 107/53   Pulse: 76   Resp: 18   Temp: 98 °F (36.7 °C)     Physical Exam   Constitutional: She is oriented to person, place, and time. No distress.   HENT:   Head: Normocephalic and atraumatic.   Mouth/Throat: No oropharyngeal exudate.   Eyes: Pupils are equal, round, and reactive to light. No scleral icterus.   Neck: Normal range of motion.   Cardiovascular: Normal rate and regular rhythm.   Murmur heard.  Pulmonary/Chest: Effort normal. No respiratory distress. She has rales.   Abdominal: Bowel sounds are normal. She exhibits distension and mass. There is no tenderness. There is no rebound.   Musculoskeletal: She exhibits edema.   Lymphadenopathy:     She has no cervical adenopathy.   Neurological: She is alert and oriented to person, place, and time. No cranial nerve deficit.   Skin: Skin is warm.   Psychiatric: She has a normal mood and affect.       Component      Latest Ref Rng & Units 7/30/2018   WBC      3.90 - 12.70 K/uL 2.34 (L)   RBC      4.00 - 5.40 M/uL 3.17 (L)   Hemoglobin      12.0 - 16.0 g/dL 7.8 (L)   Hematocrit      37.0 - 48.5 % 25.2 (L)   MCV      82 - 98 fL 80 (L)   MCH      27.0 - 31.0 pg 24.6 (L)   MCHC      32.0 - 36.0 g/dL 31.0 (L)   RDW      11.5 - 14.5 % 16.0 (H)   Platelets      150 - 350 K/uL 44 (L)   MPV      9.2 - 12.9 fL SEE COMMENT   Immature Granulocytes      0.0 - 0.5 % 0.9 (H)   Gran # (ANC)      1.8 - 7.7 K/uL 1.4 (L)   Immature Grans (Abs)      0.00 - 0.04 K/uL 0.02   Lymph #      1.0 - 4.8 K/uL 0.4 (L)    Mono #      0.3 - 1.0 K/uL 0.4   Eos #      0.0 - 0.5 K/uL 0.1   Baso #      0.00 - 0.20 K/uL 0.01   nRBC      0 /100 WBC 0   Gran%      38.0 - 73.0 % 61.1   Lymph%      18.0 - 48.0 % 17.1 (L)   Mono%      4.0 - 15.0 % 16.7 (H)   Eosinophil%      0.0 - 8.0 % 3.8   Basophil%      0.0 - 1.9 % 0.4   Differential Method       Automated   Sodium      136 - 145 mmol/L 135 (L)   Potassium      3.5 - 5.1 mmol/L 3.6   Chloride      95 - 110 mmol/L 101   CO2      23 - 29 mmol/L 29   Glucose      70 - 110 mg/dL 206 (H)   BUN, Bld      6 - 20 mg/dL 11   Creatinine      0.5 - 1.4 mg/dL 1.1   Calcium      8.7 - 10.5 mg/dL 8.4 (L)   Total Protein      6.0 - 8.4 g/dL 4.9 (L)   Albumin      3.5 - 5.2 g/dL 2.0 (L)   Total Bilirubin      0.1 - 1.0 mg/dL 1.0   Alkaline Phosphatase      55 - 135 U/L 72   AST      10 - 40 U/L 21   ALT      10 - 44 U/L 16   Anion Gap      8 - 16 mmol/L 5 (L)   eGFR if African American      >60 mL/min/1.73 m:2 >60.0   eGFR if non African American      >60 mL/min/1.73 m:2 55.9 (A)   Protime      9.0 - 12.5 sec 14.1 (H)   Coumadin Monitoring INR      0.8 - 1.2 1.4 (H)   Magnesium      1.6 - 2.6 mg/dL 1.6   Phosphorus      2.7 - 4.5 mg/dL 4.3   POCT Glucose      70 - 110 mg/dL        7/28/18 US abdomen      CLINICAL HISTORY:  checking to see if TIPs is patent as patient has developed hepato-hydrothorax;    TECHNIQUE:  Duplex scan of the liver was performed using B-mode/gray scale imaging and Doppler spectral analysis and color flow.    COMPARISON:  Ultrasound abdomen 05/23/2018 with Doppler.    FINDINGS:  Visualized portions of the pancreas show no significant abnormalities.    Liver is small in size measuring 11.7 cm.  Nodular contour and increased echogenicity consistent with liver cirrhosis.  Small left hepatic lobe cyst noted measuring 0.7 x 0.8 x 0.8 cm (previously 1.0 x 1.1 x 1.1 cm).  No focal mass lesion is seen.    Tips noted in place.  Tips velocity at the portal venous end 106 centimeter/second,  at the midportion 114 centimeter/second, and at the hepatic venous end 134 centimeter/second.  Left portal vein demonstrates forward flow.  Right and left hepatic veins are patent.  Main hepatic artery is patent with resistive index 0.81.  Continued partially occlusive thrombus visualized within the SMV.  Main portal vein is patent.    Multiple gallstones noted within the gallbladder.  Gallbladder wall thickening likely related to liver dysfunction.  Sonographic Arevalo is negative.  No pericholecystic fluid.  Common bile duct is normal in size measuring 3 mm.  No intrahepatic biliary ductal dilatation.    Spleen is enlarged measuring 20.9 x 5.3 cm.  Complex cystic area again noted within the splenic parenchyma measuring 2.2 x 2.7 x 1.9 cm.  This may represent a splenic pseudocyst, complex cysts, epidermoid cyst, or splenic hemangioma.    Aorta tapers normally.  Small ascites noted in the abdomen.  There are bilateral pleural effusions.    Right kidney is normal in size measuring 10.8 cm with simple cyst measuring 3.1 x 3.2 x 4.0 cm.  Left kidney is normal in size measuring 11.6 cm.      Impression       Patent TIPS in place with normal velocities.  However, continued forward flow within the left portal vein (normally reversed post TIPS), may represent some degree of TIPS dysfunction.    Partially occlusive thrombus within the SMV.  Previously seen partially occlusive thrombus in the main portal vein and portal venous confluence no longer visualized.    Liver cirrhosis.  No masses identified.    Cholelithiasis.    Mild ascites.    Bilateral pleural effusions.    Stable complex cystic splenic lesion.       Assessment:  1. Portal vein thrombosis  2. Cirrhosis of liver  3. Thrombocytopenia  4. Microcytic, hypochromic anemia  5. Leukopenia  6. Neutropenia  7. Splenomegaly  8. H/o hepatitis C    Plan:    1. Incidentally noted on CT abdomen in May 2018. She had no symptoms from the thrombus. It appears to have decreased in  size/extent on US done in July 2018, after about 2.5 months of being on Apixaban.  Suggested discontinuation of Apixaban as it is not indicated in incidentally noted, asymptomatic portal vein thrombosis. Also, she has anemia and thrombocytopenia, as well as coagulopathy from her liver disease, all of which increase the risk of serious bleeding.  No further work up indicated at this time.    3,5,6: Secondary to hypersplenism    7. Secondary to #2 and consequent portal hypertension    8. She is s/p treatment with Harvoni       Answers for HPI/ROS submitted by the patient on 8/14/2018   appetite change : Yes  unexpected weight change: No  visual disturbance: No  cough: No  shortness of breath: No  chest pain: No  abdominal pain: No  diarrhea: Yes  frequency: Yes  back pain: Yes  rash: No  headaches: No  adenopathy: No  nervous/ anxious: Yes

## 2018-08-14 NOTE — DISCHARGE SUMMARY
"Radiology Discharge Summary      Hospital Course: No complications    Admit Date: 8/13/2018  Discharge Date: 08/13/2018     Instructions Given to Patient: Yes  Diet: Resume prior diet  Activity: activity as tolerated    Description of Condition on Discharge: Stable  Vital Signs (Most Recent): Temp: 97.7 °F (36.5 °C) (08/13/18 1645)  Pulse: 67 (08/13/18 1730)  Resp: 20 (08/13/18 1730)  BP: (!) 102/52 (08/13/18 1700)  SpO2: 100 % (08/13/18 1730)    Discharge Disposition: Home    Discharge Diagnosis: Normal TIPS function     Follow-up: with hepatology    Sebas De Leon MD ([facility])  Radiology PGY-5  [phone]    "

## 2018-08-14 NOTE — LETTER
August 14, 2018      Phill Thurston MD  [address]  [place] [id number]           [facility]  [address]  [place] [address]  Phone: [phone]          Patient: Michelle Pappas   MR Number: [id number]   YOB: 1960   Date of Visit: 8/14/2018       Dear Dr. Phill Thurston:    Thank you for referring Michelle Pappas to me for evaluation. Attached you will find relevant portions of my assessment and plan of care.    If you have questions, please do not hesitate to call me. I look forward to following Michelle Pappas along with you.    Sincerely,    Brianna Garcia MD    Enclosure  CC:  No Recipients    If you would like to receive this communication electronically, please contact [email or web address] or [phone] to request more information on [employer] access.    For providers and/or their staff who would like to refer a patient to [facility], please contact us through our one-stop-shop provider referral line, [facility], at [phone].    If you feel you have received this communication in error or would no longer like to receive these types of communications, please e-mail [email or web address]

## 2018-08-14 NOTE — TELEPHONE ENCOUNTER
----- Message from Barbie Sinha sent at 8/14/2018  9:27 AM CDT -----  Contact: pt brother   Pt brother called to schedule appt for pt states that pt will only be in town for Today and Wednesday and Thursday Morning before 1pm  Missed appt 8/13 was in the clinic yesterday that is why she missed appt   Callback#[phone]  Thank You   LILO Sinha

## 2018-08-16 NOTE — PATIENT INSTRUCTIONS
1. Blood tests today  2. When move to [place]- blood tests monthly and find PCP/liver doctor  3. US with doppler mid Nov 2018  Pt moving - no return

## 2018-08-16 NOTE — PROGRESS NOTES
HEPATOLOGY FOLLOW UP    Referring Physician: Taj Luna DO  Current Corresponding Physician: Taj Luna DO    Michelle Pappas is here for follow up of decompensated cirrhosis with hepatic hydrothorax and Cirrhosis      HPI   Michelle Pappas underwent a TIPS procedure in June 2018. TIPS placement with reduction of portosystemic gradient from 15 mmHg to 8 mmHg. At the time of the TIPS she had a mechanical thrombectomy and balloon angioplasty of a portal vein thrombosis with restoration of flow. There was residual clot noted. She was placed on a blood thinner. On 8/13/18 she had a venogram: demonstrates brisk flow through the portal vein with no evidence of residual portal vein thrombosis as well as brisk flow through the superior mesenteric vein with no evidence of residual mesenteric venous thrombosis; the tip is widely patent.  The portosystemic gradient is 9. Routine tips ultrasound is recommended in 3 months. She is off blood thinners.    Her fluid retention and HE are well controlled. She was rehospitalized 7/27-7/30 due to SOB from recurrent hydrothorax which was treated with an increase in diuretics.    MELD-Na score: 11 at 7/30/2018  3:41 AM  MELD score: 11 at 7/30/2018  3:41 AM  Calculated from:  Serum Creatinine: 1.1 mg/dL at 7/30/2018  3:41 AM  Serum Sodium: 135 mmol/L at 7/30/2018  3:41 AM  Total Bilirubin: 1 mg/dL at 7/30/2018  3:41 AM  INR(ratio): 1.4 at 7/30/2018  3:41 AM  Age: 58 years    Outpatient Encounter Medications as of 8/16/2018   Medication Sig Dispense Refill    ciprofloxacin HCl (CIPRO) 500 MG tablet Take 1 tablet (500 mg total) by mouth once daily. 30 tablet 11    clonazePAM (KLONOPIN) 1 MG tablet Take 1 tablet by mouth twice daily as needed for anxiety      furosemide (LASIX) 80 MG tablet Take 1 tablet (80 mg total) by mouth 2 (two) times daily. 60 tablet 2    glimepiride (AMARYL) 2 MG tablet Take 2 mg by mouth as needed.      hyoscyamine (LEVSIN/SL) 0.125 mg Subl Take 1  tablet by mouth up to four times daily      lactulose (CHRONULAC) 10 gram/15 mL solution Take 45 mLs (30 g total) by mouth 3 (three) times daily. Adjust to 3-4 bowel movements daily. Takes 30 ml TID      midodrine (PROAMATINE) 10 MG tablet Take 1 tablet (10 mg total) by mouth 3 (three) times daily. 90 tablet 5    spironolactone (ALDACTONE) 100 MG tablet Take 2 tablets (200 mg total) by mouth once daily. 60 tablet 2    traZODone (DESYREL) 100 MG tablet Take 100 mg by mouth every evening.      apixaban 5 mg Tab Take 1 tablet (5 mg total) by mouth 2 (two) times daily. 60 tablet 5     Facility-Administered Encounter Medications as of 8/16/2018   Medication Dose Route Frequency Provider Last Rate Last Dose    fentaNYL injection 50 mcg  50 mcg Intravenous Q3 Min PRN Martha Ayon, NP   50 mcg at 08/13/18 1620    midazolam (VERSED) 1 mg/mL injection 1 mg  1 mg Intravenous Q3 Min PRN Martha Ayon, NP   1 mg at 08/13/18 1620     Review of patient's allergies indicates:  No Known Allergies  Past Medical History:   Diagnosis Date    Anemia     Anxiety 5/1/2018    CAH (chronic active hepatitis)     Depression 5/1/2018    Encephalopathy     Essential hypertension 5/1/2018    Hepatic Hydrothorax 5/1/2018    Hypoalbuminemia 5/1/2018    Other cirrhosis of liver 5/1/2018    Pleural effusion     Thrombocytopenia 5/1/2018       Review of Systems   Constitutional: Negative.    HENT: Negative.    Eyes: Negative.    Respiratory: Negative.    Cardiovascular: Negative.    Gastrointestinal: Negative.    Genitourinary: Negative.    Musculoskeletal: Negative.    Skin: Negative.    Neurological: Negative.    Psychiatric/Behavioral: Positive for confusion and decreased concentration.     Vitals:    08/16/18 1529   BP: 104/60   Pulse: 76   Resp: 17   Temp: 98.4 °F (36.9 °C)       Physical Exam   Constitutional: She is oriented to person, place, and time. She appears well-developed and well-nourished.   HENT:    Head: Normocephalic and atraumatic.   Eyes: Conjunctivae and EOM are normal. Pupils are equal, round, and reactive to light. No scleral icterus.   Neck: Normal range of motion. Neck supple. No thyromegaly present.   Cardiovascular: Normal rate, regular rhythm and normal heart sounds.   Pulmonary/Chest: Effort normal and breath sounds normal. She has no rales.   Abdominal: Soft. Bowel sounds are normal. She exhibits distension. She exhibits no mass. There is no tenderness.   Musculoskeletal: Normal range of motion. She exhibits no edema.   Neurological: She is alert and oriented to person, place, and time.   Skin: Skin is warm and dry. No rash noted.   Psychiatric: She has a normal mood and affect.   Vitals reviewed.      MELD-Na score: 11 at 7/30/2018  3:41 AM  MELD score: 11 at 7/30/2018  3:41 AM  Calculated from:  Serum Creatinine: 1.1 mg/dL at 7/30/2018  3:41 AM  Serum Sodium: 135 mmol/L at 7/30/2018  3:41 AM  Total Bilirubin: 1 mg/dL at 7/30/2018  3:41 AM  INR(ratio): 1.4 at 7/30/2018  3:41 AM  Age: 58 years    Lab Results   Component Value Date     (H) 07/30/2018    BUN 11 07/30/2018    CREATININE 1.1 07/30/2018    CALCIUM 8.4 (L) 07/30/2018     (L) 07/30/2018    K 3.6 07/30/2018     07/30/2018    PROT 4.9 (L) 07/30/2018    CO2 29 07/30/2018    ANIONGAP 5 (L) 07/30/2018    WBC 2.66 (L) 08/13/2018    RBC 3.58 (L) 08/13/2018    HGB 8.7 (L) 08/13/2018    HCT 28.8 (L) 08/13/2018    MCV 80 (L) 08/13/2018    MCH 24.3 (L) 08/13/2018    MCHC 30.2 (L) 08/13/2018     Lab Results   Component Value Date    RDW 16.9 (H) 08/13/2018    PLT 55 (L) 08/13/2018    MPV 11.1 08/13/2018    GRAN 1.8 08/13/2018    GRAN 69.1 08/13/2018    LYMPH 0.4 (L) 08/13/2018    LYMPH 16.2 (L) 08/13/2018    MONO 0.3 08/13/2018    MONO 9.8 08/13/2018    EOSINOPHIL 4.1 08/13/2018    BASOPHIL 0.0 08/13/2018    EOS 0.1 08/13/2018    BASO 0.00 08/13/2018    APTT 26.5 05/22/2018    GROUPTRH A POS 07/28/2018    BNP 52 07/27/2018     ALBUMIN 2.0 (L) 07/30/2018    AST 21 07/30/2018    ALT 16 07/30/2018    ALKPHOS 72 07/30/2018    MG 1.6 07/30/2018    LABPROT 14.1 (H) 07/30/2018    INR 1.4 (H) 07/30/2018       Assessment and Plan:    Michelle Pappas is a 58 y.o. female with decompensated cirrhosis, hepatic hydrothorax and Cirrhosis  She is now s/p TIPS. Current recommendations:  1. Decompensated cirrhosis, MELD <15: remains medically early for liver transplant: monitor  2. Ascites and hydrothorax: ongoing but seems to be better now that is s/p TIPS; repeat US with doppler in 3 months  3. HE ongoing: increase lactulose and rifaximin    Return prn since is moving to [place].

## 2018-11-26 NOTE — TELEPHONE ENCOUNTER
Left message for pt to return my call.  Need to schedule follow up US and IR clinic visit.  Please forward call to [phone].  Thanks

## 2018-12-04 NOTE — TELEPHONE ENCOUNTER
Left message for pt to return my call..  Need to schedule US follow up and IR clinic appt.  Please forward call to [phone].  Thanks

## 2019-01-01 ENCOUNTER — PATIENT MESSAGE (OUTPATIENT)
Dept: HEPATOLOGY | Facility: CLINIC | Age: 59
End: 2019-01-01

## 2019-01-01 ENCOUNTER — ANESTHESIA EVENT (OUTPATIENT)
Dept: ENDOSCOPY | Facility: HOSPITAL | Age: 59
DRG: 441 | End: 2019-01-01
Payer: MEDICARE

## 2019-01-01 ENCOUNTER — ANESTHESIA (OUTPATIENT)
Dept: ENDOSCOPY | Facility: HOSPITAL | Age: 59
DRG: 441 | End: 2019-01-01
Payer: MEDICARE

## 2019-01-01 ENCOUNTER — HOSPITAL ENCOUNTER (INPATIENT)
Facility: HOSPITAL | Age: 59
LOS: 2 days | DRG: 441 | End: 2019-03-14
Attending: EMERGENCY MEDICINE | Admitting: HOSPITALIST
Payer: MEDICARE

## 2019-01-01 ENCOUNTER — TELEPHONE (OUTPATIENT)
Dept: HEPATOLOGY | Facility: HOSPITAL | Age: 59
End: 2019-01-01

## 2019-01-01 VITALS
WEIGHT: 162.06 LBS | BODY MASS INDEX: 28.71 KG/M2 | DIASTOLIC BLOOD PRESSURE: 59 MMHG | SYSTOLIC BLOOD PRESSURE: 99 MMHG | OXYGEN SATURATION: 74 % | HEART RATE: 104 BPM | RESPIRATION RATE: 21 BRPM | TEMPERATURE: 98 F | HEIGHT: 63 IN

## 2019-01-01 DIAGNOSIS — J94.8 HYDROTHORAX: ICD-10-CM

## 2019-01-01 DIAGNOSIS — R06.02 SHORTNESS OF BREATH: ICD-10-CM

## 2019-01-01 DIAGNOSIS — R57.9 SHOCK, UNSPECIFIED: ICD-10-CM

## 2019-01-01 DIAGNOSIS — J90 RECURRENT RIGHT PLEURAL EFFUSION: Primary | ICD-10-CM

## 2019-01-01 DIAGNOSIS — D69.6 THROMBOCYTOPENIA: ICD-10-CM

## 2019-01-01 DIAGNOSIS — K74.60 HEPATIC CIRRHOSIS, UNSPECIFIED HEPATIC CIRRHOSIS TYPE, UNSPECIFIED WHETHER ASCITES PRESENT: Primary | ICD-10-CM

## 2019-01-01 DIAGNOSIS — K74.69 OTHER CIRRHOSIS OF LIVER: ICD-10-CM

## 2019-01-01 LAB
ABO + RH BLD: NORMAL
ABO + RH BLD: NORMAL
ABO GROUP BLD: NORMAL
AFP SERPL-MCNC: 4.5 NG/ML
ALBUMIN FLD-MCNC: 0.3 G/DL
ALBUMIN FLD-MCNC: 0.4 G/DL
ALBUMIN SERPL BCP-MCNC: 1.7 G/DL
ALBUMIN SERPL BCP-MCNC: 1.8 G/DL
ALBUMIN SERPL BCP-MCNC: 1.9 G/DL
ALBUMIN SERPL BCP-MCNC: 1.9 G/DL
ALBUMIN SERPL BCP-MCNC: 2 G/DL
ALBUMIN SERPL BCP-MCNC: 2 G/DL
ALBUMIN SERPL BCP-MCNC: 2.1 G/DL
ALBUMIN SERPL BCP-MCNC: 2.1 G/DL
ALBUMIN SERPL BCP-MCNC: 2.3 G/DL
ALBUMIN SERPL BCP-MCNC: 2.3 G/DL
ALP SERPL-CCNC: 51 U/L
ALP SERPL-CCNC: 51 U/L
ALP SERPL-CCNC: 54 U/L
ALP SERPL-CCNC: 56 U/L
ALP SERPL-CCNC: 61 U/L
ALP SERPL-CCNC: 64 U/L
ALP SERPL-CCNC: 68 U/L
ALP SERPL-CCNC: 69 U/L
ALP SERPL-CCNC: 78 U/L
ALP SERPL-CCNC: 83 U/L
ALT SERPL W/O P-5'-P-CCNC: 11 U/L
ALT SERPL W/O P-5'-P-CCNC: 13 U/L
ALT SERPL W/O P-5'-P-CCNC: 14 U/L
ALT SERPL W/O P-5'-P-CCNC: 16 U/L
ALT SERPL W/O P-5'-P-CCNC: 16 U/L
ALT SERPL W/O P-5'-P-CCNC: 21 U/L
ALT SERPL W/O P-5'-P-CCNC: 28 U/L
ALT SERPL W/O P-5'-P-CCNC: 29 U/L
ALT SERPL W/O P-5'-P-CCNC: 70 U/L
ALT SERPL W/O P-5'-P-CCNC: 92 U/L
AMYLASE, BODY FLUID: 9 U/L
ANION GAP SERPL CALC-SCNC: 10 MMOL/L
ANION GAP SERPL CALC-SCNC: 12 MMOL/L
ANION GAP SERPL CALC-SCNC: 7 MMOL/L
ANION GAP SERPL CALC-SCNC: 8 MMOL/L
ANION GAP SERPL CALC-SCNC: 9 MMOL/L
ANISOCYTOSIS BLD QL SMEAR: ABNORMAL
ANISOCYTOSIS BLD QL SMEAR: ABNORMAL
ANISOCYTOSIS BLD QL SMEAR: SLIGHT
APPEARANCE FLD: NORMAL
APPEARANCE FLD: NORMAL
APTT BLDCRRT: 39.2 SEC
APTT BLDCRRT: 50.2 SEC
APTT BLDCRRT: 56.6 SEC
APTT BLDCRRT: 64.1 SEC
AST SERPL-CCNC: 132 U/L
AST SERPL-CCNC: 14 U/L
AST SERPL-CCNC: 159 U/L
AST SERPL-CCNC: 16 U/L
AST SERPL-CCNC: 18 U/L
AST SERPL-CCNC: 19 U/L
AST SERPL-CCNC: 22 U/L
AST SERPL-CCNC: 47 U/L
AST SERPL-CCNC: 47 U/L
AST SERPL-CCNC: 95 U/L
BACTERIA FLD CULT: NORMAL
BASOPHILS # BLD AUTO: 0 K/UL
BASOPHILS # BLD AUTO: 0.01 K/UL
BASOPHILS # BLD AUTO: 0.02 K/UL
BASOPHILS # BLD AUTO: 0.02 K/UL
BASOPHILS # BLD AUTO: 0.03 K/UL
BASOPHILS # BLD AUTO: 0.03 K/UL
BASOPHILS # BLD AUTO: 0.09 K/UL
BASOPHILS # BLD AUTO: 0.11 K/UL
BASOPHILS NFR BLD: 0 %
BASOPHILS NFR BLD: 0.1 %
BASOPHILS NFR BLD: 0.3 %
BASOPHILS NFR BLD: 0.5 %
BASOPHILS NFR BLD: 1 %
BILIRUB SERPL-MCNC: 0.5 MG/DL
BILIRUB SERPL-MCNC: 0.6 MG/DL
BILIRUB SERPL-MCNC: 0.6 MG/DL
BILIRUB SERPL-MCNC: 0.8 MG/DL
BILIRUB SERPL-MCNC: 0.8 MG/DL
BILIRUB SERPL-MCNC: 0.9 MG/DL
BILIRUB SERPL-MCNC: 1.3 MG/DL
BILIRUB SERPL-MCNC: 3.3 MG/DL
BILIRUB SERPL-MCNC: 3.6 MG/DL
BILIRUB SERPL-MCNC: 3.9 MG/DL
BILIRUB UR QL STRIP: NEGATIVE
BLD GP AB SCN CELLS X3 SERPL QL: NORMAL
BLD PROD TYP BPU: NORMAL
BLOOD UNIT EXPIRATION DATE: NORMAL
BLOOD UNIT TYPE CODE: 5100
BLOOD UNIT TYPE CODE: 5100
BLOOD UNIT TYPE CODE: 6200
BLOOD UNIT TYPE: NORMAL
BNP SERPL-MCNC: 19 PG/ML
BODY FLD TYPE: NORMAL
BODY FLD TYPE: NORMAL
BODY FLUID SOURCE AMYLASE: NORMAL
BODY FLUID SOURCE, LDH: NORMAL
BUN SERPL-MCNC: 15 MG/DL
BUN SERPL-MCNC: 20 MG/DL
BUN SERPL-MCNC: 21 MG/DL
BUN SERPL-MCNC: 22 MG/DL
BUN SERPL-MCNC: 26 MG/DL
BUN SERPL-MCNC: 30 MG/DL
BUN SERPL-MCNC: 41 MG/DL
BUN SERPL-MCNC: 43 MG/DL
BURR CELLS BLD QL SMEAR: ABNORMAL
CALCIUM SERPL-MCNC: 7 MG/DL
CALCIUM SERPL-MCNC: 8.2 MG/DL
CALCIUM SERPL-MCNC: 8.2 MG/DL
CALCIUM SERPL-MCNC: 8.4 MG/DL
CALCIUM SERPL-MCNC: 8.5 MG/DL
CALCIUM SERPL-MCNC: 8.6 MG/DL
CALCIUM SERPL-MCNC: 8.7 MG/DL
CALCIUM SERPL-MCNC: 9 MG/DL
CHLORIDE SERPL-SCNC: 100 MMOL/L
CHLORIDE SERPL-SCNC: 100 MMOL/L
CHLORIDE SERPL-SCNC: 101 MMOL/L
CHLORIDE SERPL-SCNC: 105 MMOL/L
CHLORIDE SERPL-SCNC: 93 MMOL/L
CHLORIDE SERPL-SCNC: 95 MMOL/L
CHLORIDE SERPL-SCNC: 97 MMOL/L
CHLORIDE SERPL-SCNC: 98 MMOL/L
CHLORIDE SERPL-SCNC: 98 MMOL/L
CHLORIDE SERPL-SCNC: 99 MMOL/L
CLARITY UR REFRACT.AUTO: ABNORMAL
CO2 SERPL-SCNC: 13 MMOL/L
CO2 SERPL-SCNC: 18 MMOL/L
CO2 SERPL-SCNC: 20 MMOL/L
CO2 SERPL-SCNC: 24 MMOL/L
CO2 SERPL-SCNC: 25 MMOL/L
CO2 SERPL-SCNC: 25 MMOL/L
CO2 SERPL-SCNC: 26 MMOL/L
CO2 SERPL-SCNC: 27 MMOL/L
CO2 SERPL-SCNC: 27 MMOL/L
CO2 SERPL-SCNC: 28 MMOL/L
CODING SYSTEM: NORMAL
COLOR FLD: NORMAL
COLOR FLD: NORMAL
COLOR UR AUTO: YELLOW
CREAT SERPL-MCNC: 1.2 MG/DL
CREAT SERPL-MCNC: 1.3 MG/DL
CREAT SERPL-MCNC: 1.4 MG/DL
CREAT SERPL-MCNC: 1.7 MG/DL
CREAT SERPL-MCNC: 2.3 MG/DL
CREAT SERPL-MCNC: 3.4 MG/DL
CREAT SERPL-MCNC: 3.8 MG/DL
DACRYOCYTES BLD QL SMEAR: ABNORMAL
DIFFERENTIAL METHOD: ABNORMAL
DISPENSE STATUS: NORMAL
EOSINOPHIL # BLD AUTO: 0 K/UL
EOSINOPHIL # BLD AUTO: 0.1 K/UL
EOSINOPHIL NFR BLD: 0 %
EOSINOPHIL NFR BLD: 0.1 %
EOSINOPHIL NFR BLD: 0.2 %
EOSINOPHIL NFR BLD: 0.4 %
EOSINOPHIL NFR BLD: 0.6 %
EOSINOPHIL NFR BLD: 1 %
EOSINOPHIL NFR BLD: 1.2 %
EOSINOPHIL NFR BLD: 2 %
EOSINOPHIL NFR BLD: 2.6 %
EOSINOPHIL NFR BLD: 2.7 %
EOSINOPHIL NFR BLD: 2.8 %
EOSINOPHIL NFR BLD: 4.5 %
ERYTHROCYTE [DISTWIDTH] IN BLOOD BY AUTOMATED COUNT: 18 %
ERYTHROCYTE [DISTWIDTH] IN BLOOD BY AUTOMATED COUNT: 18.2 %
ERYTHROCYTE [DISTWIDTH] IN BLOOD BY AUTOMATED COUNT: 18.3 %
ERYTHROCYTE [DISTWIDTH] IN BLOOD BY AUTOMATED COUNT: 18.4 %
ERYTHROCYTE [DISTWIDTH] IN BLOOD BY AUTOMATED COUNT: 18.5 %
ERYTHROCYTE [DISTWIDTH] IN BLOOD BY AUTOMATED COUNT: 18.9 %
ERYTHROCYTE [DISTWIDTH] IN BLOOD BY AUTOMATED COUNT: 18.9 %
ERYTHROCYTE [DISTWIDTH] IN BLOOD BY AUTOMATED COUNT: 19.2 %
ERYTHROCYTE [DISTWIDTH] IN BLOOD BY AUTOMATED COUNT: 19.3 %
ERYTHROCYTE [DISTWIDTH] IN BLOOD BY AUTOMATED COUNT: 19.5 %
ERYTHROCYTE [DISTWIDTH] IN BLOOD BY AUTOMATED COUNT: 19.6 %
ERYTHROCYTE [DISTWIDTH] IN BLOOD BY AUTOMATED COUNT: 20.2 %
EST. GFR  (AFRICAN AMERICAN): 14.3 ML/MIN/1.73 M^2
EST. GFR  (AFRICAN AMERICAN): 16.3 ML/MIN/1.73 M^2
EST. GFR  (AFRICAN AMERICAN): 26.2 ML/MIN/1.73 M^2
EST. GFR  (AFRICAN AMERICAN): 37.8 ML/MIN/1.73 M^2
EST. GFR  (AFRICAN AMERICAN): 47.8 ML/MIN/1.73 M^2
EST. GFR  (AFRICAN AMERICAN): 52.3 ML/MIN/1.73 M^2
EST. GFR  (AFRICAN AMERICAN): 57.6 ML/MIN/1.73 M^2
EST. GFR  (NON AFRICAN AMERICAN): 12.4 ML/MIN/1.73 M^2
EST. GFR  (NON AFRICAN AMERICAN): 14.2 ML/MIN/1.73 M^2
EST. GFR  (NON AFRICAN AMERICAN): 22.7 ML/MIN/1.73 M^2
EST. GFR  (NON AFRICAN AMERICAN): 32.8 ML/MIN/1.73 M^2
EST. GFR  (NON AFRICAN AMERICAN): 41.4 ML/MIN/1.73 M^2
EST. GFR  (NON AFRICAN AMERICAN): 45.3 ML/MIN/1.73 M^2
EST. GFR  (NON AFRICAN AMERICAN): 49.9 ML/MIN/1.73 M^2
FIBRINOGEN PPP-MCNC: 226 MG/DL
GLUCOSE FLD-MCNC: 201 MG/DL
GLUCOSE SERPL-MCNC: 116 MG/DL
GLUCOSE SERPL-MCNC: 122 MG/DL
GLUCOSE SERPL-MCNC: 145 MG/DL
GLUCOSE SERPL-MCNC: 161 MG/DL
GLUCOSE SERPL-MCNC: 190 MG/DL
GLUCOSE SERPL-MCNC: 195 MG/DL
GLUCOSE SERPL-MCNC: 198 MG/DL
GLUCOSE SERPL-MCNC: 203 MG/DL (ref 70–110)
GLUCOSE SERPL-MCNC: 285 MG/DL
GLUCOSE SERPL-MCNC: 312 MG/DL
GLUCOSE SERPL-MCNC: 96 MG/DL
GLUCOSE UR QL STRIP: NEGATIVE
GRAM STN SPEC: NORMAL
HCO3 UR-SCNC: 25.5 MMOL/L (ref 24–28)
HCT VFR BLD AUTO: 23.4 %
HCT VFR BLD AUTO: 23.7 %
HCT VFR BLD AUTO: 24.9 %
HCT VFR BLD AUTO: 25.4 %
HCT VFR BLD AUTO: 26.1 %
HCT VFR BLD AUTO: 30.5 %
HCT VFR BLD AUTO: 30.6 %
HCT VFR BLD AUTO: 30.7 %
HCT VFR BLD AUTO: 31.9 %
HCT VFR BLD AUTO: 34 %
HCT VFR BLD AUTO: 35.8 %
HCT VFR BLD AUTO: 37.3 %
HCT VFR BLD AUTO: 40 %
HCT VFR BLD AUTO: 42.7 %
HCT VFR BLD CALC: 20 %PCV (ref 36–54)
HGB BLD-MCNC: 10.5 G/DL
HGB BLD-MCNC: 11.5 G/DL
HGB BLD-MCNC: 11.7 G/DL
HGB BLD-MCNC: 12.8 G/DL
HGB BLD-MCNC: 13.5 G/DL
HGB BLD-MCNC: 6.7 G/DL
HGB BLD-MCNC: 6.9 G/DL
HGB BLD-MCNC: 7.4 G/DL
HGB BLD-MCNC: 7.5 G/DL
HGB BLD-MCNC: 7.7 G/DL
HGB BLD-MCNC: 8.8 G/DL
HGB BLD-MCNC: 9.1 G/DL
HGB BLD-MCNC: 9.3 G/DL
HGB BLD-MCNC: 9.5 G/DL
HGB UR QL STRIP: ABNORMAL
HYPOCHROMIA BLD QL SMEAR: ABNORMAL
IMM GRANULOCYTES # BLD AUTO: 0.01 K/UL
IMM GRANULOCYTES # BLD AUTO: 0.01 K/UL
IMM GRANULOCYTES # BLD AUTO: 0.02 K/UL
IMM GRANULOCYTES # BLD AUTO: 0.06 K/UL
IMM GRANULOCYTES # BLD AUTO: 0.08 K/UL
IMM GRANULOCYTES # BLD AUTO: 0.12 K/UL
IMM GRANULOCYTES # BLD AUTO: 0.14 K/UL
IMM GRANULOCYTES # BLD AUTO: 0.23 K/UL
IMM GRANULOCYTES # BLD AUTO: 0.25 K/UL
IMM GRANULOCYTES # BLD AUTO: 1.45 K/UL
IMM GRANULOCYTES # BLD AUTO: 1.58 K/UL
IMM GRANULOCYTES # BLD AUTO: ABNORMAL K/UL
IMM GRANULOCYTES NFR BLD AUTO: 0.4 %
IMM GRANULOCYTES NFR BLD AUTO: 0.4 %
IMM GRANULOCYTES NFR BLD AUTO: 0.5 %
IMM GRANULOCYTES NFR BLD AUTO: 0.6 %
IMM GRANULOCYTES NFR BLD AUTO: 0.9 %
IMM GRANULOCYTES NFR BLD AUTO: 1 %
IMM GRANULOCYTES NFR BLD AUTO: 1.1 %
IMM GRANULOCYTES NFR BLD AUTO: 1.3 %
IMM GRANULOCYTES NFR BLD AUTO: 1.4 %
IMM GRANULOCYTES NFR BLD AUTO: 4.2 %
IMM GRANULOCYTES NFR BLD AUTO: 4.5 %
IMM GRANULOCYTES NFR BLD AUTO: ABNORMAL %
INR PPP: 1.1
INR PPP: 1.1
INR PPP: 1.2
INR PPP: 1.3
INR PPP: 1.5
INR PPP: 1.7
INR PPP: 1.9
INR PPP: 2
INR PPP: 2.3
KETONES UR QL STRIP: NEGATIVE
LACTATE SERPL-SCNC: 2.5 MMOL/L
LACTATE SERPL-SCNC: 4.6 MMOL/L
LDH FLD L TO P-CCNC: 25 U/L
LDH FLD L TO P-CCNC: 37 U/L
LDH FLD L TO P-CCNC: 88 U/L
LDH SERPL L TO P-CCNC: 197 U/L
LEUKOCYTE ESTERASE UR QL STRIP: NEGATIVE
LYMPHOCYTES # BLD AUTO: 0.2 K/UL
LYMPHOCYTES # BLD AUTO: 0.2 K/UL
LYMPHOCYTES # BLD AUTO: 0.3 K/UL
LYMPHOCYTES # BLD AUTO: 0.4 K/UL
LYMPHOCYTES # BLD AUTO: 0.8 K/UL
LYMPHOCYTES # BLD AUTO: 1 K/UL
LYMPHOCYTES NFR BLD: 1.8 %
LYMPHOCYTES NFR BLD: 10.8 %
LYMPHOCYTES NFR BLD: 12.7 %
LYMPHOCYTES NFR BLD: 12.8 %
LYMPHOCYTES NFR BLD: 14.3 %
LYMPHOCYTES NFR BLD: 2.2 %
LYMPHOCYTES NFR BLD: 2.7 %
LYMPHOCYTES NFR BLD: 2.8 %
LYMPHOCYTES NFR BLD: 3.6 %
LYMPHOCYTES NFR BLD: 3.8 %
LYMPHOCYTES NFR BLD: 3.9 %
LYMPHOCYTES NFR BLD: 5 %
LYMPHOCYTES NFR FLD MANUAL: 23 %
LYMPHOCYTES NFR FLD MANUAL: 62 %
MAGNESIUM SERPL-MCNC: 1.5 MG/DL
MAGNESIUM SERPL-MCNC: 1.5 MG/DL
MAGNESIUM SERPL-MCNC: 1.6 MG/DL
MAGNESIUM SERPL-MCNC: 2.1 MG/DL
MAGNESIUM SERPL-MCNC: 2.1 MG/DL
MCH RBC QN AUTO: 22.6 PG
MCH RBC QN AUTO: 22.7 PG
MCH RBC QN AUTO: 22.9 PG
MCH RBC QN AUTO: 23.1 PG
MCH RBC QN AUTO: 23.4 PG
MCH RBC QN AUTO: 23.6 PG
MCH RBC QN AUTO: 25.1 PG
MCH RBC QN AUTO: 25.6 PG
MCH RBC QN AUTO: 25.9 PG
MCH RBC QN AUTO: 25.9 PG
MCH RBC QN AUTO: 26.1 PG
MCH RBC QN AUTO: 26.2 PG
MCH RBC QN AUTO: 26.2 PG
MCH RBC QN AUTO: 26.5 PG
MCHC RBC AUTO-ENTMCNC: 28.6 G/DL
MCHC RBC AUTO-ENTMCNC: 28.7 G/DL
MCHC RBC AUTO-ENTMCNC: 29.1 G/DL
MCHC RBC AUTO-ENTMCNC: 29.5 G/DL
MCHC RBC AUTO-ENTMCNC: 29.5 G/DL
MCHC RBC AUTO-ENTMCNC: 29.7 G/DL
MCHC RBC AUTO-ENTMCNC: 29.7 G/DL
MCHC RBC AUTO-ENTMCNC: 29.8 G/DL
MCHC RBC AUTO-ENTMCNC: 30.5 G/DL
MCHC RBC AUTO-ENTMCNC: 30.9 G/DL
MCHC RBC AUTO-ENTMCNC: 31.4 G/DL
MCHC RBC AUTO-ENTMCNC: 31.6 G/DL
MCHC RBC AUTO-ENTMCNC: 32 G/DL
MCHC RBC AUTO-ENTMCNC: 32.1 G/DL
MCV RBC AUTO: 77 FL
MCV RBC AUTO: 77 FL
MCV RBC AUTO: 78 FL
MCV RBC AUTO: 79 FL
MCV RBC AUTO: 80 FL
MCV RBC AUTO: 81 FL
MCV RBC AUTO: 82 FL
MCV RBC AUTO: 83 FL
MCV RBC AUTO: 83 FL
MCV RBC AUTO: 85 FL
MCV RBC AUTO: 88 FL
MCV RBC AUTO: 89 FL
MICROSCOPIC COMMENT: ABNORMAL
MONOCYTES # BLD AUTO: 0.3 K/UL
MONOCYTES # BLD AUTO: 0.4 K/UL
MONOCYTES # BLD AUTO: 0.4 K/UL
MONOCYTES # BLD AUTO: 0.6 K/UL
MONOCYTES # BLD AUTO: 0.8 K/UL
MONOCYTES # BLD AUTO: 0.9 K/UL
MONOCYTES # BLD AUTO: 0.9 K/UL
MONOCYTES # BLD AUTO: 1.1 K/UL
MONOCYTES # BLD AUTO: 1.5 K/UL
MONOCYTES # BLD AUTO: 2.5 K/UL
MONOCYTES # BLD AUTO: 2.5 K/UL
MONOCYTES NFR BLD: 11.1 %
MONOCYTES NFR BLD: 12 %
MONOCYTES NFR BLD: 13.5 %
MONOCYTES NFR BLD: 15.4 %
MONOCYTES NFR BLD: 6.1 %
MONOCYTES NFR BLD: 6.4 %
MONOCYTES NFR BLD: 6.8 %
MONOCYTES NFR BLD: 7.2 %
MONOCYTES NFR BLD: 7.2 %
MONOCYTES NFR BLD: 7.9 %
MONOCYTES NFR BLD: 8.5 %
MONOCYTES NFR BLD: 8.5 %
MONOCYTES NFR BLD: 9 %
MONOCYTES NFR BLD: 9.2 %
MONOS+MACROS NFR FLD MANUAL: 15 %
MONOS+MACROS NFR FLD MANUAL: 24 %
NEUTROPHILS # BLD AUTO: 1.5 K/UL
NEUTROPHILS # BLD AUTO: 1.7 K/UL
NEUTROPHILS # BLD AUTO: 1.7 K/UL
NEUTROPHILS # BLD AUTO: 12.7 K/UL
NEUTROPHILS # BLD AUTO: 14.3 K/UL
NEUTROPHILS # BLD AUTO: 2.6 K/UL
NEUTROPHILS # BLD AUTO: 21.2 K/UL
NEUTROPHILS # BLD AUTO: 29.9 K/UL
NEUTROPHILS # BLD AUTO: 30.1 K/UL
NEUTROPHILS # BLD AUTO: 4.5 K/UL
NEUTROPHILS # BLD AUTO: 6 K/UL
NEUTROPHILS # BLD AUTO: 8 K/UL
NEUTROPHILS # BLD AUTO: 8.2 K/UL
NEUTROPHILS NFR BLD: 67.3 %
NEUTROPHILS NFR BLD: 68.2 %
NEUTROPHILS NFR BLD: 71.7 %
NEUTROPHILS NFR BLD: 74.4 %
NEUTROPHILS NFR BLD: 83 %
NEUTROPHILS NFR BLD: 84.9 %
NEUTROPHILS NFR BLD: 85.2 %
NEUTROPHILS NFR BLD: 86.1 %
NEUTROPHILS NFR BLD: 86.3 %
NEUTROPHILS NFR BLD: 87.4 %
NEUTROPHILS NFR BLD: 87.4 %
NEUTROPHILS NFR BLD: 89.1 %
NEUTROPHILS NFR BLD: 89.4 %
NEUTROPHILS NFR BLD: 90.6 %
NEUTROPHILS NFR FLD MANUAL: 23 %
NEUTROPHILS NFR FLD MANUAL: 53 %
NITRITE UR QL STRIP: NEGATIVE
NRBC BLD-RTO: 0 /100 WBC
NRBC BLD-RTO: 1 /100 WBC
NRBC BLD-RTO: 2 /100 WBC
NUM UNITS TRANS WBC-POOR PLATPHERESIS: NORMAL
OVALOCYTES BLD QL SMEAR: ABNORMAL
PCO2 BLDA: 55.2 MMHG (ref 35–45)
PH SMN: 7.27 [PH] (ref 7.35–7.45)
PH UR STRIP: 5 [PH] (ref 5–8)
PHOSPHATE SERPL-MCNC: 3.1 MG/DL
PHOSPHATE SERPL-MCNC: 3.8 MG/DL
PHOSPHATE SERPL-MCNC: 4 MG/DL
PHOSPHATE SERPL-MCNC: 4.1 MG/DL
PHOSPHATE SERPL-MCNC: 4.9 MG/DL
PHOSPHATE SERPL-MCNC: 5.4 MG/DL
PHOSPHATE SERPL-MCNC: 6.3 MG/DL
PHOSPHATE SERPL-MCNC: 8 MG/DL
PLATELET # BLD AUTO: 24 K/UL
PLATELET # BLD AUTO: 28 K/UL
PLATELET # BLD AUTO: 28 K/UL
PLATELET # BLD AUTO: 30 K/UL
PLATELET # BLD AUTO: 32 K/UL
PLATELET # BLD AUTO: 34 K/UL
PLATELET # BLD AUTO: 36 K/UL
PLATELET # BLD AUTO: 37 K/UL
PLATELET # BLD AUTO: 40 K/UL
PLATELET # BLD AUTO: 41 K/UL
PLATELET # BLD AUTO: 43 K/UL
PLATELET # BLD AUTO: 45 K/UL
PLATELET # BLD AUTO: 46 K/UL
PLATELET # BLD AUTO: 49 K/UL
PLATELET BLD QL SMEAR: ABNORMAL
PMV BLD AUTO: 10.8 FL
PMV BLD AUTO: 11.2 FL
PMV BLD AUTO: 12.7 FL
PMV BLD AUTO: ABNORMAL FL
PO2 BLDA: 70 MMHG (ref 40–60)
POC BE: -1 MMOL/L
POC IONIZED CALCIUM: 1.15 MMOL/L (ref 1.06–1.42)
POC SATURATED O2: 91 % (ref 95–100)
POC TCO2: 27 MMOL/L (ref 24–29)
POCT GLUCOSE: 116 MG/DL (ref 70–110)
POCT GLUCOSE: 125 MG/DL (ref 70–110)
POCT GLUCOSE: 127 MG/DL (ref 70–110)
POCT GLUCOSE: 129 MG/DL (ref 70–110)
POCT GLUCOSE: 131 MG/DL (ref 70–110)
POCT GLUCOSE: 147 MG/DL (ref 70–110)
POCT GLUCOSE: 150 MG/DL (ref 70–110)
POCT GLUCOSE: 154 MG/DL (ref 70–110)
POCT GLUCOSE: 160 MG/DL (ref 70–110)
POCT GLUCOSE: 172 MG/DL (ref 70–110)
POCT GLUCOSE: 194 MG/DL (ref 70–110)
POCT GLUCOSE: 200 MG/DL (ref 70–110)
POCT GLUCOSE: 204 MG/DL (ref 70–110)
POCT GLUCOSE: 211 MG/DL (ref 70–110)
POCT GLUCOSE: 217 MG/DL (ref 70–110)
POCT GLUCOSE: 222 MG/DL (ref 70–110)
POCT GLUCOSE: 224 MG/DL (ref 70–110)
POCT GLUCOSE: 225 MG/DL (ref 70–110)
POCT GLUCOSE: 227 MG/DL (ref 70–110)
POCT GLUCOSE: 232 MG/DL (ref 70–110)
POCT GLUCOSE: 234 MG/DL (ref 70–110)
POCT GLUCOSE: 236 MG/DL (ref 70–110)
POCT GLUCOSE: 325 MG/DL (ref 70–110)
POIKILOCYTOSIS BLD QL SMEAR: ABNORMAL
POIKILOCYTOSIS BLD QL SMEAR: SLIGHT
POLYCHROMASIA BLD QL SMEAR: ABNORMAL
POTASSIUM BLD-SCNC: 4.8 MMOL/L (ref 3.5–5.1)
POTASSIUM SERPL-SCNC: 3.7 MMOL/L
POTASSIUM SERPL-SCNC: 3.9 MMOL/L
POTASSIUM SERPL-SCNC: 4 MMOL/L
POTASSIUM SERPL-SCNC: 4.1 MMOL/L
POTASSIUM SERPL-SCNC: 4.1 MMOL/L
POTASSIUM SERPL-SCNC: 4.4 MMOL/L
POTASSIUM SERPL-SCNC: 4.5 MMOL/L
POTASSIUM SERPL-SCNC: 4.8 MMOL/L
POTASSIUM SERPL-SCNC: 5 MMOL/L
POTASSIUM SERPL-SCNC: 5.1 MMOL/L
PROT FLD-MCNC: <1 G/DL
PROT SERPL-MCNC: 4.1 G/DL
PROT SERPL-MCNC: 4.4 G/DL
PROT SERPL-MCNC: 4.6 G/DL
PROT SERPL-MCNC: 4.7 G/DL
PROT SERPL-MCNC: 4.8 G/DL
PROT SERPL-MCNC: 5 G/DL
PROT SERPL-MCNC: 5.1 G/DL
PROT SERPL-MCNC: 5.1 G/DL
PROT SERPL-MCNC: 5.5 G/DL
PROT SERPL-MCNC: 6 G/DL
PROT UR QL STRIP: NEGATIVE
PROTHROMBIN TIME: 11 SEC
PROTHROMBIN TIME: 11.5 SEC
PROTHROMBIN TIME: 11.8 SEC
PROTHROMBIN TIME: 11.9 SEC
PROTHROMBIN TIME: 12 SEC
PROTHROMBIN TIME: 13.1 SEC
PROTHROMBIN TIME: 14.5 SEC
PROTHROMBIN TIME: 16.6 SEC
PROTHROMBIN TIME: 18.4 SEC
PROTHROMBIN TIME: 19.4 SEC
PROTHROMBIN TIME: 22.4 SEC
RBC # BLD AUTO: 2.66 M/UL
RBC # BLD AUTO: 2.93 M/UL
RBC # BLD AUTO: 3.2 M/UL
RBC # BLD AUTO: 3.31 M/UL
RBC # BLD AUTO: 3.4 M/UL
RBC # BLD AUTO: 3.49 M/UL
RBC # BLD AUTO: 3.7 M/UL
RBC # BLD AUTO: 3.73 M/UL
RBC # BLD AUTO: 4.01 M/UL
RBC # BLD AUTO: 4.06 M/UL
RBC # BLD AUTO: 4.34 M/UL
RBC # BLD AUTO: 4.51 M/UL
RBC # BLD AUTO: 4.88 M/UL
RBC # BLD AUTO: 5.27 M/UL
RBC #/AREA URNS AUTO: 27 /HPF (ref 0–4)
RH BLD: NORMAL
SAMPLE: ABNORMAL
SODIUM BLD-SCNC: 133 MMOL/L (ref 136–145)
SODIUM SERPL-SCNC: 123 MMOL/L
SODIUM SERPL-SCNC: 125 MMOL/L
SODIUM SERPL-SCNC: 128 MMOL/L
SODIUM SERPL-SCNC: 132 MMOL/L
SODIUM SERPL-SCNC: 133 MMOL/L
SODIUM SERPL-SCNC: 133 MMOL/L
SODIUM SERPL-SCNC: 134 MMOL/L
SODIUM SERPL-SCNC: 134 MMOL/L
SODIUM UR-SCNC: 46 MMOL/L
SP GR UR STRIP: >=1.03 (ref 1–1.03)
SPECIMEN SOURCE: NORMAL
SQUAMOUS #/AREA URNS AUTO: 3 /HPF
TRANS ERYTHROCYTES VOL PATIENT: NORMAL ML
TRANS PLATPHERESIS VOL PATIENT: NORMAL ML
URN SPEC COLLECT METH UR: ABNORMAL
WBC # BLD AUTO: 1.94 K/UL
WBC # BLD AUTO: 14.27 K/UL
WBC # BLD AUTO: 16.03 K/UL
WBC # BLD AUTO: 2.21 K/UL
WBC # BLD AUTO: 2.34 K/UL
WBC # BLD AUTO: 2.45 K/UL
WBC # BLD AUTO: 23.36 K/UL
WBC # BLD AUTO: 3.52 K/UL
WBC # BLD AUTO: 34.64 K/UL
WBC # BLD AUTO: 35.28 K/UL
WBC # BLD AUTO: 5.19 K/UL
WBC # BLD AUTO: 6.97 K/UL
WBC # BLD AUTO: 9.36 K/UL
WBC # BLD AUTO: 9.43 K/UL
WBC # FLD: 108 /CU MM
WBC # FLD: 48 /CU MM
WBC #/AREA URNS AUTO: 1 /HPF (ref 0–5)

## 2019-01-01 PROCEDURE — 85730 THROMBOPLASTIN TIME PARTIAL: CPT

## 2019-01-01 PROCEDURE — 25000003 PHARM REV CODE 250: Performed by: NURSE ANESTHETIST, CERTIFIED REGISTERED

## 2019-01-01 PROCEDURE — G0378 HOSPITAL OBSERVATION PER HR: HCPCS

## 2019-01-01 PROCEDURE — 63600175 PHARM REV CODE 636 W HCPCS: Performed by: NURSE ANESTHETIST, CERTIFIED REGISTERED

## 2019-01-01 PROCEDURE — 82042 OTHER SOURCE ALBUMIN QUAN EA: CPT

## 2019-01-01 PROCEDURE — 99233 PR SUBSEQUENT HOSPITAL CARE,LEVL III: ICD-10-PCS | Mod: GC,,, | Performed by: INTERNAL MEDICINE

## 2019-01-01 PROCEDURE — 87205 SMEAR GRAM STAIN: CPT

## 2019-01-01 PROCEDURE — 86901 BLOOD TYPING SEROLOGIC RH(D): CPT

## 2019-01-01 PROCEDURE — 99285 EMERGENCY DEPT VISIT HI MDM: CPT | Mod: 25

## 2019-01-01 PROCEDURE — 25000003 PHARM REV CODE 250: Performed by: HOSPITALIST

## 2019-01-01 PROCEDURE — 97165 OT EVAL LOW COMPLEX 30 MIN: CPT

## 2019-01-01 PROCEDURE — 20000000 HC ICU ROOM

## 2019-01-01 PROCEDURE — 85025 COMPLETE CBC W/AUTO DIFF WBC: CPT

## 2019-01-01 PROCEDURE — 63600175 PHARM REV CODE 636 W HCPCS: Mod: JG | Performed by: INTERNAL MEDICINE

## 2019-01-01 PROCEDURE — 25000003 PHARM REV CODE 250: Performed by: INTERNAL MEDICINE

## 2019-01-01 PROCEDURE — 80053 COMPREHEN METABOLIC PANEL: CPT

## 2019-01-01 PROCEDURE — 85610 PROTHROMBIN TIME: CPT | Mod: 91

## 2019-01-01 PROCEDURE — 63600175 PHARM REV CODE 636 W HCPCS: Performed by: STUDENT IN AN ORGANIZED HEALTH CARE EDUCATION/TRAINING PROGRAM

## 2019-01-01 PROCEDURE — 87116 MYCOBACTERIA CULTURE: CPT

## 2019-01-01 PROCEDURE — D9220A PRA ANESTHESIA: Mod: ANES,,, | Performed by: ANESTHESIOLOGY

## 2019-01-01 PROCEDURE — D9220A PRA ANESTHESIA: Mod: CRNA,,, | Performed by: NURSE ANESTHETIST, CERTIFIED REGISTERED

## 2019-01-01 PROCEDURE — P9047 ALBUMIN (HUMAN), 25%, 50ML: HCPCS | Mod: JG | Performed by: INTERNAL MEDICINE

## 2019-01-01 PROCEDURE — 86850 RBC ANTIBODY SCREEN: CPT

## 2019-01-01 PROCEDURE — 84311 SPECTROPHOTOMETRY: CPT

## 2019-01-01 PROCEDURE — C1751 CATH, INF, PER/CENT/MIDLINE: HCPCS

## 2019-01-01 PROCEDURE — D9220A PRA ANESTHESIA: ICD-10-PCS | Mod: CRNA,,, | Performed by: NURSE ANESTHETIST, CERTIFIED REGISTERED

## 2019-01-01 PROCEDURE — 99291 CRITICAL CARE FIRST HOUR: CPT | Mod: GC,,, | Performed by: INTERNAL MEDICINE

## 2019-01-01 PROCEDURE — P9021 RED BLOOD CELLS UNIT: HCPCS

## 2019-01-01 PROCEDURE — 99223 1ST HOSP IP/OBS HIGH 75: CPT | Mod: GC,,, | Performed by: INTERNAL MEDICINE

## 2019-01-01 PROCEDURE — 84100 ASSAY OF PHOSPHORUS: CPT

## 2019-01-01 PROCEDURE — 87102 FUNGUS ISOLATION CULTURE: CPT

## 2019-01-01 PROCEDURE — 83615 LACTATE (LD) (LDH) ENZYME: CPT

## 2019-01-01 PROCEDURE — 63600175 PHARM REV CODE 636 W HCPCS: Performed by: HOSPITALIST

## 2019-01-01 PROCEDURE — 85730 THROMBOPLASTIN TIME PARTIAL: CPT | Mod: 91

## 2019-01-01 PROCEDURE — 25500020 PHARM REV CODE 255: Performed by: HOSPITALIST

## 2019-01-01 PROCEDURE — 99285 EMERGENCY DEPT VISIT HI MDM: CPT | Mod: ,,, | Performed by: EMERGENCY MEDICINE

## 2019-01-01 PROCEDURE — 71000039 HC RECOVERY, EACH ADD'L HOUR

## 2019-01-01 PROCEDURE — 63600175 PHARM REV CODE 636 W HCPCS: Performed by: ANESTHESIOLOGY

## 2019-01-01 PROCEDURE — 37000009 HC ANESTHESIA EA ADD 15 MINS

## 2019-01-01 PROCEDURE — 82962 GLUCOSE BLOOD TEST: CPT

## 2019-01-01 PROCEDURE — 99214 PR OFFICE/OUTPT VISIT, EST, LEVL IV, 30-39 MIN: ICD-10-PCS | Mod: GC,,, | Performed by: INTERNAL MEDICINE

## 2019-01-01 PROCEDURE — 25000003 PHARM REV CODE 250: Performed by: STUDENT IN AN ORGANIZED HEALTH CARE EDUCATION/TRAINING PROGRAM

## 2019-01-01 PROCEDURE — 94761 N-INVAS EAR/PLS OXIMETRY MLT: CPT

## 2019-01-01 PROCEDURE — 85610 PROTHROMBIN TIME: CPT

## 2019-01-01 PROCEDURE — 88112 CYTOLOGY SPECIMEN- PULMONARY MEDICAL CYTOLOGY: ICD-10-PCS | Mod: 26,,, | Performed by: PATHOLOGY

## 2019-01-01 PROCEDURE — 36430 TRANSFUSION BLD/BLD COMPNT: CPT

## 2019-01-01 PROCEDURE — 87070 CULTURE OTHR SPECIMN AEROBIC: CPT

## 2019-01-01 PROCEDURE — 89051 BODY FLUID CELL COUNT: CPT

## 2019-01-01 PROCEDURE — 86920 COMPATIBILITY TEST SPIN: CPT

## 2019-01-01 PROCEDURE — 36415 COLL VENOUS BLD VENIPUNCTURE: CPT

## 2019-01-01 PROCEDURE — 99220 PR INITIAL OBSERVATION CARE,LEVL III: ICD-10-PCS | Mod: ,,, | Performed by: HOSPITALIST

## 2019-01-01 PROCEDURE — 27201037 HC PRESSURE MONITORING SET UP

## 2019-01-01 PROCEDURE — 99232 SBSQ HOSP IP/OBS MODERATE 35: CPT | Mod: ,,, | Performed by: HOSPITALIST

## 2019-01-01 PROCEDURE — 83735 ASSAY OF MAGNESIUM: CPT

## 2019-01-01 PROCEDURE — 84157 ASSAY OF PROTEIN OTHER: CPT

## 2019-01-01 PROCEDURE — 97161 PT EVAL LOW COMPLEX 20 MIN: CPT

## 2019-01-01 PROCEDURE — 99225 PR SUBSEQUENT OBSERVATION CARE,LEVEL II: ICD-10-PCS | Mod: ,,, | Performed by: HOSPITALIST

## 2019-01-01 PROCEDURE — 88305 TISSUE EXAM BY PATHOLOGIST: CPT | Mod: 26,,, | Performed by: PATHOLOGY

## 2019-01-01 PROCEDURE — 25000003 PHARM REV CODE 250

## 2019-01-01 PROCEDURE — 80053 COMPREHEN METABOLIC PANEL: CPT | Mod: 91

## 2019-01-01 PROCEDURE — 99220 PR INITIAL OBSERVATION CARE,LEVL III: CPT | Mod: ,,, | Performed by: HOSPITALIST

## 2019-01-01 PROCEDURE — P9037 PLATE PHERES LEUKOREDU IRRAD: HCPCS

## 2019-01-01 PROCEDURE — P9035 PLATELET PHERES LEUKOREDUCED: HCPCS

## 2019-01-01 PROCEDURE — 81001 URINALYSIS AUTO W/SCOPE: CPT

## 2019-01-01 PROCEDURE — 27100092 HC HIGH FLOW DELIVERY CANNULA

## 2019-01-01 PROCEDURE — 25000003 PHARM REV CODE 250: Performed by: PHYSICIAN ASSISTANT

## 2019-01-01 PROCEDURE — 99232 PR SUBSEQUENT HOSPITAL CARE,LEVL II: ICD-10-PCS | Mod: ,,, | Performed by: HOSPITALIST

## 2019-01-01 PROCEDURE — 37000008 HC ANESTHESIA 1ST 15 MINUTES

## 2019-01-01 PROCEDURE — 87077 CULTURE AEROBIC IDENTIFY: CPT

## 2019-01-01 PROCEDURE — 27100171 HC OXYGEN HIGH FLOW UP TO 24 HOURS

## 2019-01-01 PROCEDURE — 84100 ASSAY OF PHOSPHORUS: CPT | Mod: 91

## 2019-01-01 PROCEDURE — 12000002 HC ACUTE/MED SURGE SEMI-PRIVATE ROOM

## 2019-01-01 PROCEDURE — 71000033 HC RECOVERY, INTIAL HOUR

## 2019-01-01 PROCEDURE — 99233 SBSQ HOSP IP/OBS HIGH 50: CPT | Mod: GC,,, | Performed by: INTERNAL MEDICINE

## 2019-01-01 PROCEDURE — 99214 OFFICE O/P EST MOD 30 MIN: CPT | Mod: GC,,, | Performed by: INTERNAL MEDICINE

## 2019-01-01 PROCEDURE — 85027 COMPLETE CBC AUTOMATED: CPT

## 2019-01-01 PROCEDURE — 87040 BLOOD CULTURE FOR BACTERIA: CPT

## 2019-01-01 PROCEDURE — C1729 CATH, DRAINAGE: HCPCS

## 2019-01-01 PROCEDURE — 82150 ASSAY OF AMYLASE: CPT

## 2019-01-01 PROCEDURE — 94660 CPAP INITIATION&MGMT: CPT

## 2019-01-01 PROCEDURE — 99225 PR SUBSEQUENT OBSERVATION CARE,LEVEL II: CPT | Mod: ,,, | Performed by: HOSPITALIST

## 2019-01-01 PROCEDURE — D9220A PRA ANESTHESIA: ICD-10-PCS | Mod: ANES,,, | Performed by: ANESTHESIOLOGY

## 2019-01-01 PROCEDURE — 85384 FIBRINOGEN ACTIVITY: CPT

## 2019-01-01 PROCEDURE — 99223 PR INITIAL HOSPITAL CARE,LEVL III: ICD-10-PCS | Mod: GC,,, | Performed by: INTERNAL MEDICINE

## 2019-01-01 PROCEDURE — 83880 ASSAY OF NATRIURETIC PEPTIDE: CPT

## 2019-01-01 PROCEDURE — 36556 INSERT NON-TUNNEL CV CATH: CPT

## 2019-01-01 PROCEDURE — 27000190 HC CPAP FULL FACE MASK W/VALVE

## 2019-01-01 PROCEDURE — 88305 TISSUE EXAM BY PATHOLOGIST: CPT | Performed by: PATHOLOGY

## 2019-01-01 PROCEDURE — 87206 SMEAR FLUORESCENT/ACID STAI: CPT

## 2019-01-01 PROCEDURE — 83605 ASSAY OF LACTIC ACID: CPT

## 2019-01-01 PROCEDURE — 99285 PR EMERGENCY DEPT VISIT,LEVEL V: ICD-10-PCS | Mod: ,,, | Performed by: EMERGENCY MEDICINE

## 2019-01-01 PROCEDURE — 27000221 HC OXYGEN, UP TO 24 HOURS

## 2019-01-01 PROCEDURE — 88305 CYTOLOGY SPECIMEN- PULMONARY MEDICAL CYTOLOGY: ICD-10-PCS | Mod: 26,,, | Performed by: PATHOLOGY

## 2019-01-01 PROCEDURE — 83605 ASSAY OF LACTIC ACID: CPT | Mod: 91

## 2019-01-01 PROCEDURE — 84300 ASSAY OF URINE SODIUM: CPT

## 2019-01-01 PROCEDURE — 83735 ASSAY OF MAGNESIUM: CPT | Mod: 91

## 2019-01-01 PROCEDURE — 87186 SC STD MICRODIL/AGAR DIL: CPT

## 2019-01-01 PROCEDURE — 99900035 HC TECH TIME PER 15 MIN (STAT)

## 2019-01-01 PROCEDURE — 63600175 PHARM REV CODE 636 W HCPCS

## 2019-01-01 PROCEDURE — 25500020 PHARM REV CODE 255: Performed by: STUDENT IN AN ORGANIZED HEALTH CARE EDUCATION/TRAINING PROGRAM

## 2019-01-01 PROCEDURE — 88112 CYTOPATH CELL ENHANCE TECH: CPT | Mod: 26,,, | Performed by: PATHOLOGY

## 2019-01-01 PROCEDURE — 85007 BL SMEAR W/DIFF WBC COUNT: CPT | Mod: NCS

## 2019-01-01 PROCEDURE — 32555 ASPIRATE PLEURA W/ IMAGING: CPT

## 2019-01-01 PROCEDURE — 99291 PR CRITICAL CARE, E/M 30-74 MINUTES: ICD-10-PCS | Mod: GC,,, | Performed by: INTERNAL MEDICINE

## 2019-01-01 PROCEDURE — 82945 GLUCOSE OTHER FLUID: CPT

## 2019-01-01 PROCEDURE — 82105 ALPHA-FETOPROTEIN SERUM: CPT

## 2019-01-01 RX ORDER — EPHEDRINE SULFATE 50 MG/ML
INJECTION, SOLUTION INTRAVENOUS
Status: DISCONTINUED | OUTPATIENT
Start: 2019-01-01 | End: 2019-01-01

## 2019-01-01 RX ORDER — LIDOCAINE HCL/PF 100 MG/5ML
SYRINGE (ML) INTRAVENOUS
Status: DISCONTINUED | OUTPATIENT
Start: 2019-01-01 | End: 2019-01-01

## 2019-01-01 RX ORDER — LANOLIN ALCOHOL/MO/W.PET/CERES
400 CREAM (GRAM) TOPICAL 2 TIMES DAILY
Status: COMPLETED | OUTPATIENT
Start: 2019-01-01 | End: 2019-01-01

## 2019-01-01 RX ORDER — HYDROCODONE BITARTRATE AND ACETAMINOPHEN 5; 325 MG/1; MG/1
1 TABLET ORAL EVERY 6 HOURS PRN
Status: DISCONTINUED | OUTPATIENT
Start: 2019-01-01 | End: 2019-01-01

## 2019-01-01 RX ORDER — ACETAMINOPHEN 325 MG/1
325 TABLET ORAL EVERY 4 HOURS PRN
Status: DISCONTINUED | OUTPATIENT
Start: 2019-01-01 | End: 2019-01-01

## 2019-01-01 RX ORDER — ONDANSETRON 2 MG/ML
INJECTION INTRAMUSCULAR; INTRAVENOUS
Status: DISCONTINUED | OUTPATIENT
Start: 2019-01-01 | End: 2019-01-01

## 2019-01-01 RX ORDER — FUROSEMIDE 10 MG/ML
80 INJECTION INTRAMUSCULAR; INTRAVENOUS 2 TIMES DAILY
Status: DISCONTINUED | OUTPATIENT
Start: 2019-01-01 | End: 2019-01-01

## 2019-01-01 RX ORDER — FLUCONAZOLE 2 MG/ML
100 INJECTION, SOLUTION INTRAVENOUS
Status: DISCONTINUED | OUTPATIENT
Start: 2019-01-01 | End: 2019-01-01

## 2019-01-01 RX ORDER — VASOPRESSIN 20 [USP'U]/ML
INJECTION, SOLUTION INTRAMUSCULAR; SUBCUTANEOUS
Status: COMPLETED
Start: 2019-01-01 | End: 2019-01-01

## 2019-01-01 RX ORDER — HYDROCODONE BITARTRATE AND ACETAMINOPHEN 500; 5 MG/1; MG/1
TABLET ORAL
Status: DISCONTINUED | OUTPATIENT
Start: 2019-01-01 | End: 2019-01-01

## 2019-01-01 RX ORDER — HYDROMORPHONE HYDROCHLORIDE 1 MG/ML
0.2 INJECTION, SOLUTION INTRAMUSCULAR; INTRAVENOUS; SUBCUTANEOUS EVERY 5 MIN PRN
Status: COMPLETED | OUTPATIENT
Start: 2019-01-01 | End: 2019-01-01

## 2019-01-01 RX ORDER — HEPARIN SODIUM 5000 [USP'U]/ML
5000 INJECTION, SOLUTION INTRAVENOUS; SUBCUTANEOUS EVERY 8 HOURS
Status: DISCONTINUED | OUTPATIENT
Start: 2019-01-01 | End: 2019-01-01

## 2019-01-01 RX ORDER — FUROSEMIDE 20 MG/1
80 TABLET ORAL DAILY
Status: DISCONTINUED | OUTPATIENT
Start: 2019-01-01 | End: 2019-01-01

## 2019-01-01 RX ORDER — FLUCONAZOLE 2 MG/ML
200 INJECTION, SOLUTION INTRAVENOUS
Status: DISCONTINUED | OUTPATIENT
Start: 2019-03-15 | End: 2019-01-01

## 2019-01-01 RX ORDER — GLUCAGON 1 MG
1 KIT INJECTION
Status: DISCONTINUED | OUTPATIENT
Start: 2019-01-01 | End: 2019-01-01

## 2019-01-01 RX ORDER — ALBUMIN HUMAN 250 G/1000ML
25 SOLUTION INTRAVENOUS
Status: DISCONTINUED | OUTPATIENT
Start: 2019-01-01 | End: 2019-01-01

## 2019-01-01 RX ORDER — HYDROCODONE BITARTRATE AND ACETAMINOPHEN 5; 325 MG/1; MG/1
1 TABLET ORAL EVERY 4 HOURS PRN
Status: DISCONTINUED | OUTPATIENT
Start: 2019-01-01 | End: 2019-01-01

## 2019-01-01 RX ORDER — TRAZODONE HYDROCHLORIDE 100 MG/1
100 TABLET ORAL NIGHTLY
Status: DISCONTINUED | OUTPATIENT
Start: 2019-01-01 | End: 2019-01-01

## 2019-01-01 RX ORDER — MORPHINE SULFATE 2 MG/ML
3 INJECTION, SOLUTION INTRAMUSCULAR; INTRAVENOUS
Status: DISCONTINUED | OUTPATIENT
Start: 2019-01-01 | End: 2019-01-01

## 2019-01-01 RX ORDER — NEOSTIGMINE METHYLSULFATE 1 MG/ML
INJECTION, SOLUTION INTRAVENOUS
Status: DISCONTINUED | OUTPATIENT
Start: 2019-01-01 | End: 2019-01-01

## 2019-01-01 RX ORDER — EPHEDRINE SULFATE 50 MG/ML
INJECTION, SOLUTION INTRAVENOUS
Status: COMPLETED
Start: 2019-01-01 | End: 2019-01-01

## 2019-01-01 RX ORDER — INSULIN ASPART 100 [IU]/ML
0-5 INJECTION, SOLUTION INTRAVENOUS; SUBCUTANEOUS
Status: DISCONTINUED | OUTPATIENT
Start: 2019-01-01 | End: 2019-01-01

## 2019-01-01 RX ORDER — FENTANYL CITRATE 50 UG/ML
25 INJECTION, SOLUTION INTRAMUSCULAR; INTRAVENOUS EVERY 5 MIN PRN
Status: COMPLETED | OUTPATIENT
Start: 2019-01-01 | End: 2019-01-01

## 2019-01-01 RX ORDER — ROCURONIUM BROMIDE 10 MG/ML
INJECTION, SOLUTION INTRAVENOUS
Status: DISCONTINUED | OUTPATIENT
Start: 2019-01-01 | End: 2019-01-01

## 2019-01-01 RX ORDER — CLONAZEPAM 0.5 MG/1
0.5 TABLET ORAL 2 TIMES DAILY PRN
Status: DISCONTINUED | OUTPATIENT
Start: 2019-01-01 | End: 2019-01-01

## 2019-01-01 RX ORDER — LORAZEPAM/0.9% SODIUM CHLORIDE 100MG/0.1L
2 PLASTIC BAG, INJECTION (ML) INTRAVENOUS ONCE
Status: COMPLETED | OUTPATIENT
Start: 2019-01-01 | End: 2019-01-01

## 2019-01-01 RX ORDER — LACTULOSE 10 G/15ML
15 SOLUTION ORAL 3 TIMES DAILY
Status: DISCONTINUED | OUTPATIENT
Start: 2019-01-01 | End: 2019-01-01

## 2019-01-01 RX ORDER — HEPARIN SODIUM 200 [USP'U]/100ML
5 INJECTION, SOLUTION INTRAVENOUS CONTINUOUS
Status: DISCONTINUED | OUTPATIENT
Start: 2019-01-01 | End: 2019-01-01

## 2019-01-01 RX ORDER — MIDAZOLAM HYDROCHLORIDE 1 MG/ML
INJECTION, SOLUTION INTRAMUSCULAR; INTRAVENOUS
Status: DISCONTINUED | OUTPATIENT
Start: 2019-01-01 | End: 2019-01-01

## 2019-01-01 RX ORDER — LIDOCAINE HYDROCHLORIDE 10 MG/ML
10 INJECTION INFILTRATION; PERINEURAL ONCE
Status: COMPLETED | OUTPATIENT
Start: 2019-01-01 | End: 2019-01-01

## 2019-01-01 RX ORDER — MORPHINE SULFATE 1 MG/ML
1 INJECTION, SOLUTION INTRAVENOUS CONTINUOUS
Status: DISCONTINUED | OUTPATIENT
Start: 2019-01-01 | End: 2019-01-01 | Stop reason: HOSPADM

## 2019-01-01 RX ORDER — FENTANYL CITRATE 50 UG/ML
25 INJECTION, SOLUTION INTRAMUSCULAR; INTRAVENOUS EVERY 30 MIN PRN
Status: DISCONTINUED | OUTPATIENT
Start: 2019-01-01 | End: 2019-01-01 | Stop reason: HOSPADM

## 2019-01-01 RX ORDER — ACETAMINOPHEN 325 MG/1
650 TABLET ORAL EVERY 4 HOURS PRN
Status: DISCONTINUED | OUTPATIENT
Start: 2019-01-01 | End: 2019-01-01

## 2019-01-01 RX ORDER — GLYCOPYRROLATE 0.2 MG/ML
INJECTION INTRAMUSCULAR; INTRAVENOUS
Status: DISCONTINUED | OUTPATIENT
Start: 2019-01-01 | End: 2019-01-01

## 2019-01-01 RX ORDER — ACETAMINOPHEN 325 MG/1
325 TABLET ORAL EVERY 6 HOURS PRN
Status: DISCONTINUED | OUTPATIENT
Start: 2019-01-01 | End: 2019-01-01

## 2019-01-01 RX ORDER — ONDANSETRON 2 MG/ML
4 INJECTION INTRAMUSCULAR; INTRAVENOUS ONCE AS NEEDED
Status: DISCONTINUED | OUTPATIENT
Start: 2019-01-01 | End: 2019-01-01

## 2019-01-01 RX ORDER — IPRATROPIUM BROMIDE AND ALBUTEROL SULFATE 2.5; .5 MG/3ML; MG/3ML
3 SOLUTION RESPIRATORY (INHALATION) EVERY 4 HOURS PRN
Status: DISCONTINUED | OUTPATIENT
Start: 2019-01-01 | End: 2019-01-01

## 2019-01-01 RX ORDER — MIDODRINE HYDROCHLORIDE 5 MG/1
10 TABLET ORAL 3 TIMES DAILY
Status: DISCONTINUED | OUTPATIENT
Start: 2019-01-01 | End: 2019-01-01

## 2019-01-01 RX ORDER — ONDANSETRON 8 MG/1
8 TABLET, ORALLY DISINTEGRATING ORAL EVERY 8 HOURS PRN
Status: DISCONTINUED | OUTPATIENT
Start: 2019-01-01 | End: 2019-01-01

## 2019-01-01 RX ORDER — ALBUMIN HUMAN 50 G/1000ML
SOLUTION INTRAVENOUS
Status: DISPENSED
Start: 2019-01-01 | End: 2019-01-01

## 2019-01-01 RX ORDER — HEPARIN SODIUM 200 [USP'U]/100ML
25 INJECTION, SOLUTION INTRAVENOUS CONTINUOUS
Status: DISCONTINUED | OUTPATIENT
Start: 2019-01-01 | End: 2019-01-01

## 2019-01-01 RX ORDER — CIPROFLOXACIN 2 MG/ML
INJECTION, SOLUTION INTRAVENOUS
Status: DISCONTINUED | OUTPATIENT
Start: 2019-01-01 | End: 2019-01-01

## 2019-01-01 RX ORDER — IBUPROFEN 200 MG
16 TABLET ORAL
Status: DISCONTINUED | OUTPATIENT
Start: 2019-01-01 | End: 2019-01-01

## 2019-01-01 RX ORDER — ONDANSETRON 2 MG/ML
4 INJECTION INTRAMUSCULAR; INTRAVENOUS EVERY 6 HOURS PRN
Status: DISCONTINUED | OUTPATIENT
Start: 2019-01-01 | End: 2019-01-01

## 2019-01-01 RX ORDER — PHENYLEPHRINE HYDROCHLORIDE 10 MG/ML
INJECTION INTRAVENOUS
Status: DISCONTINUED | OUTPATIENT
Start: 2019-01-01 | End: 2019-01-01

## 2019-01-01 RX ORDER — FENTANYL CITRATE 50 UG/ML
25 INJECTION, SOLUTION INTRAMUSCULAR; INTRAVENOUS EVERY 30 MIN PRN
Status: DISCONTINUED | OUTPATIENT
Start: 2019-01-01 | End: 2019-01-01

## 2019-01-01 RX ORDER — SPIRONOLACTONE 100 MG/1
200 TABLET, FILM COATED ORAL DAILY
Status: DISCONTINUED | OUTPATIENT
Start: 2019-01-01 | End: 2019-01-01

## 2019-01-01 RX ORDER — FENTANYL CITRATE 50 UG/ML
INJECTION, SOLUTION INTRAMUSCULAR; INTRAVENOUS
Status: DISCONTINUED | OUTPATIENT
Start: 2019-01-01 | End: 2019-01-01

## 2019-01-01 RX ORDER — PROPOFOL 10 MG/ML
VIAL (ML) INTRAVENOUS
Status: DISCONTINUED | OUTPATIENT
Start: 2019-01-01 | End: 2019-01-01

## 2019-01-01 RX ORDER — LORAZEPAM 2 MG/ML
1 INJECTION INTRAMUSCULAR EVERY 30 MIN PRN
Status: DISCONTINUED | OUTPATIENT
Start: 2019-01-01 | End: 2019-01-01 | Stop reason: HOSPADM

## 2019-01-01 RX ORDER — MIDODRINE HYDROCHLORIDE 5 MG/1
15 TABLET ORAL 3 TIMES DAILY
Status: DISCONTINUED | OUTPATIENT
Start: 2019-01-01 | End: 2019-01-01

## 2019-01-01 RX ORDER — SODIUM CHLORIDE 9 MG/ML
INJECTION, SOLUTION INTRAVENOUS CONTINUOUS
Status: DISCONTINUED | OUTPATIENT
Start: 2019-01-01 | End: 2019-01-01

## 2019-01-01 RX ORDER — FENTANYL CITRATE 50 UG/ML
INJECTION, SOLUTION INTRAMUSCULAR; INTRAVENOUS
Status: COMPLETED
Start: 2019-01-01 | End: 2019-01-01

## 2019-01-01 RX ORDER — CIPROFLOXACIN 250 MG/5ML
500 KIT ORAL DAILY
Status: DISCONTINUED | OUTPATIENT
Start: 2019-01-01 | End: 2019-01-01

## 2019-01-01 RX ORDER — LORAZEPAM 2 MG/ML
1 INJECTION INTRAMUSCULAR
Status: DISCONTINUED | OUTPATIENT
Start: 2019-01-01 | End: 2019-01-01

## 2019-01-01 RX ORDER — RAMELTEON 8 MG/1
8 TABLET ORAL NIGHTLY PRN
Status: DISCONTINUED | OUTPATIENT
Start: 2019-01-01 | End: 2019-01-01

## 2019-01-01 RX ORDER — MORPHINE SULFATE 2 MG/ML
2 INJECTION, SOLUTION INTRAMUSCULAR; INTRAVENOUS
Status: DISCONTINUED | OUTPATIENT
Start: 2019-01-01 | End: 2019-01-01 | Stop reason: HOSPADM

## 2019-01-01 RX ORDER — SODIUM CHLORIDE 0.9 % (FLUSH) 0.9 %
3 SYRINGE (ML) INJECTION
Status: DISCONTINUED | OUTPATIENT
Start: 2019-01-01 | End: 2019-01-01

## 2019-01-01 RX ORDER — LORAZEPAM 1 MG/1
1 TABLET ORAL EVERY 30 MIN PRN
Status: DISCONTINUED | OUTPATIENT
Start: 2019-01-01 | End: 2019-01-01

## 2019-01-01 RX ORDER — VANCOMYCIN HCL IN 5 % DEXTROSE 1.25 G/25
1250 PLASTIC BAG, INJECTION (ML) INTRAVENOUS ONCE
Status: COMPLETED | OUTPATIENT
Start: 2019-01-01 | End: 2019-01-01

## 2019-01-01 RX ORDER — ALBUMIN HUMAN 250 G/1000ML
12.5 SOLUTION INTRAVENOUS
Status: COMPLETED | OUTPATIENT
Start: 2019-01-01 | End: 2019-01-01

## 2019-01-01 RX ORDER — LIDOCAINE HCL/EPINEPHRINE/PF 2%-1:200K
VIAL (ML) INJECTION
Status: COMPLETED
Start: 2019-01-01 | End: 2019-01-01

## 2019-01-01 RX ORDER — DIPHENHYDRAMINE HYDROCHLORIDE 50 MG/ML
25 INJECTION INTRAMUSCULAR; INTRAVENOUS EVERY 6 HOURS PRN
Status: DISCONTINUED | OUTPATIENT
Start: 2019-01-01 | End: 2019-01-01 | Stop reason: HOSPADM

## 2019-01-01 RX ORDER — NOREPINEPHRINE BITARTRATE/D5W 4MG/250ML
0.02 PLASTIC BAG, INJECTION (ML) INTRAVENOUS CONTINUOUS
Status: DISCONTINUED | OUTPATIENT
Start: 2019-01-01 | End: 2019-01-01

## 2019-01-01 RX ORDER — NOREPINEPHRINE BITARTRATE/D5W 4MG/250ML
PLASTIC BAG, INJECTION (ML) INTRAVENOUS
Status: COMPLETED
Start: 2019-01-01 | End: 2019-01-01

## 2019-01-01 RX ORDER — FLUCONAZOLE 2 MG/ML
400 INJECTION, SOLUTION INTRAVENOUS
Status: DISCONTINUED | OUTPATIENT
Start: 2019-01-01 | End: 2019-01-01

## 2019-01-01 RX ORDER — SODIUM CHLORIDE 0.9 % (FLUSH) 0.9 %
5 SYRINGE (ML) INJECTION
Status: DISCONTINUED | OUTPATIENT
Start: 2019-01-01 | End: 2019-01-01

## 2019-01-01 RX ORDER — HEPARIN SODIUM 1000 [USP'U]/ML
INJECTION, SOLUTION INTRAVENOUS; SUBCUTANEOUS
Status: DISCONTINUED | OUTPATIENT
Start: 2019-01-01 | End: 2019-01-01

## 2019-01-01 RX ORDER — BISACODYL 10 MG
10 SUPPOSITORY, RECTAL RECTAL DAILY PRN
Status: DISCONTINUED | OUTPATIENT
Start: 2019-01-01 | End: 2019-01-01

## 2019-01-01 RX ORDER — LANOLIN ALCOHOL/MO/W.PET/CERES
800 CREAM (GRAM) TOPICAL ONCE
Status: DISCONTINUED | OUTPATIENT
Start: 2019-01-01 | End: 2019-01-01

## 2019-01-01 RX ORDER — POLYETHYLENE GLYCOL 3350 17 G/17G
17 POWDER, FOR SOLUTION ORAL DAILY
Status: DISCONTINUED | OUTPATIENT
Start: 2019-01-01 | End: 2019-01-01

## 2019-01-01 RX ORDER — SUCCINYLCHOLINE CHLORIDE 20 MG/ML
INJECTION INTRAMUSCULAR; INTRAVENOUS
Status: DISCONTINUED | OUTPATIENT
Start: 2019-01-01 | End: 2019-01-01

## 2019-01-01 RX ORDER — IBUPROFEN 200 MG
24 TABLET ORAL
Status: DISCONTINUED | OUTPATIENT
Start: 2019-01-01 | End: 2019-01-01

## 2019-01-01 RX ORDER — ATROPINE SULFATE 10 MG/ML
2 SOLUTION/ DROPS OPHTHALMIC EVERY 4 HOURS PRN
Status: DISCONTINUED | OUTPATIENT
Start: 2019-01-01 | End: 2019-01-01 | Stop reason: HOSPADM

## 2019-01-01 RX ORDER — HEPARIN SODIUM,PORCINE/D5W 25000/250
12 INTRAVENOUS SOLUTION INTRAVENOUS CONTINUOUS
Status: DISCONTINUED | OUTPATIENT
Start: 2019-01-01 | End: 2019-01-01

## 2019-01-01 RX ORDER — INSULIN ASPART 100 [IU]/ML
2 INJECTION, SOLUTION INTRAVENOUS; SUBCUTANEOUS
Status: DISCONTINUED | OUTPATIENT
Start: 2019-01-01 | End: 2019-01-01

## 2019-01-01 RX ORDER — MEPERIDINE HYDROCHLORIDE 50 MG/ML
12.5 INJECTION INTRAMUSCULAR; INTRAVENOUS; SUBCUTANEOUS ONCE AS NEEDED
Status: ACTIVE | OUTPATIENT
Start: 2019-01-01 | End: 2019-01-01

## 2019-01-01 RX ADMIN — MIDODRINE HYDROCHLORIDE 10 MG: 5 TABLET ORAL at 09:03

## 2019-01-01 RX ADMIN — INSULIN ASPART 1 UNITS: 100 INJECTION, SOLUTION INTRAVENOUS; SUBCUTANEOUS at 09:03

## 2019-01-01 RX ADMIN — PHENYLEPHRINE HYDROCHLORIDE 100 MCG: 10 INJECTION INTRAVENOUS at 04:03

## 2019-01-01 RX ADMIN — HYDROCODONE BITARTRATE AND ACETAMINOPHEN 1 TABLET: 5; 325 TABLET ORAL at 11:03

## 2019-01-01 RX ADMIN — TRAZODONE HYDROCHLORIDE 100 MG: 100 TABLET ORAL at 09:03

## 2019-01-01 RX ADMIN — PROPOFOL 50 MG: 10 INJECTION, EMULSION INTRAVENOUS at 06:03

## 2019-01-01 RX ADMIN — PHENYLEPHRINE HYDROCHLORIDE 200 MCG: 10 INJECTION INTRAVENOUS at 01:03

## 2019-01-01 RX ADMIN — INSULIN ASPART 2 UNITS: 100 INJECTION, SOLUTION INTRAVENOUS; SUBCUTANEOUS at 09:03

## 2019-01-01 RX ADMIN — LACTULOSE 15 G: 20 SOLUTION ORAL at 09:03

## 2019-01-01 RX ADMIN — SODIUM CHLORIDE, SODIUM GLUCONATE, SODIUM ACETATE, POTASSIUM CHLORIDE, MAGNESIUM CHLORIDE, SODIUM PHOSPHATE, DIBASIC, AND POTASSIUM PHOSPHATE: .53; .5; .37; .037; .03; .012; .00082 INJECTION, SOLUTION INTRAVENOUS at 12:03

## 2019-01-01 RX ADMIN — FUROSEMIDE 80 MG: 40 TABLET ORAL at 09:03

## 2019-01-01 RX ADMIN — FENTANYL CITRATE 25 MCG: 50 INJECTION, SOLUTION INTRAMUSCULAR; INTRAVENOUS at 10:03

## 2019-01-01 RX ADMIN — HYDROMORPHONE HYDROCHLORIDE 0.2 MG: 1 INJECTION, SOLUTION INTRAMUSCULAR; INTRAVENOUS; SUBCUTANEOUS at 11:03

## 2019-01-01 RX ADMIN — LIDOCAINE HYDROCHLORIDE 10 ML: 10 INJECTION, SOLUTION INFILTRATION; PERINEURAL at 11:03

## 2019-01-01 RX ADMIN — MIDAZOLAM HYDROCHLORIDE 2 MG: 1 INJECTION, SOLUTION INTRAMUSCULAR; INTRAVENOUS at 05:03

## 2019-01-01 RX ADMIN — HYDROMORPHONE HYDROCHLORIDE 0.2 MG: 1 INJECTION, SOLUTION INTRAMUSCULAR; INTRAVENOUS; SUBCUTANEOUS at 04:03

## 2019-01-01 RX ADMIN — SPIRONOLACTONE 200 MG: 100 TABLET, FILM COATED ORAL at 09:03

## 2019-01-01 RX ADMIN — ALBUMIN HUMAN 12.5 G: 0.25 SOLUTION INTRAVENOUS at 08:03

## 2019-01-01 RX ADMIN — HYDROCODONE BITARTRATE AND ACETAMINOPHEN 1 TABLET: 5; 325 TABLET ORAL at 07:03

## 2019-01-01 RX ADMIN — Medication 0.04 MCG/KG/MIN: at 11:03

## 2019-01-01 RX ADMIN — SODIUM CHLORIDE, SODIUM GLUCONATE, SODIUM ACETATE, POTASSIUM CHLORIDE, MAGNESIUM CHLORIDE, SODIUM PHOSPHATE, DIBASIC, AND POTASSIUM PHOSPHATE: .53; .5; .37; .037; .03; .012; .00082 INJECTION, SOLUTION INTRAVENOUS at 01:03

## 2019-01-01 RX ADMIN — CLONAZEPAM 0.5 MG: 0.5 TABLET ORAL at 10:03

## 2019-01-01 RX ADMIN — PHENYLEPHRINE HYDROCHLORIDE 200 MCG: 10 INJECTION INTRAVENOUS at 05:03

## 2019-01-01 RX ADMIN — MAGNESIUM SULFATE IN WATER 2 G: 40 INJECTION, SOLUTION INTRAVENOUS at 03:03

## 2019-01-01 RX ADMIN — GLYCOPYRROLATE 0.4 MG: 0.2 INJECTION, SOLUTION INTRAMUSCULAR; INTRAVENOUS at 09:03

## 2019-01-01 RX ADMIN — MIDODRINE HYDROCHLORIDE 10 MG: 5 TABLET ORAL at 11:03

## 2019-01-01 RX ADMIN — MIDODRINE HYDROCHLORIDE 10 MG: 5 TABLET ORAL at 04:03

## 2019-01-01 RX ADMIN — HYDROCODONE BITARTRATE AND ACETAMINOPHEN 1 TABLET: 5; 325 TABLET ORAL at 02:03

## 2019-01-01 RX ADMIN — LORAZEPAM 1 MG: 2 INJECTION INTRAMUSCULAR; INTRAVENOUS at 03:03

## 2019-01-01 RX ADMIN — HEPARIN SODIUM 5000 UNITS: 5000 INJECTION, SOLUTION INTRAVENOUS; SUBCUTANEOUS at 03:03

## 2019-01-01 RX ADMIN — FENTANYL CITRATE 25 MCG: 50 INJECTION, SOLUTION INTRAMUSCULAR; INTRAVENOUS at 09:03

## 2019-01-01 RX ADMIN — CIPROFLOXACIN 400 MG: 2 INJECTION, SOLUTION INTRAVENOUS at 06:03

## 2019-01-01 RX ADMIN — MORPHINE SULFATE 6 MG/HR: 1 INJECTION, SOLUTION INTRAVENOUS at 04:03

## 2019-01-01 RX ADMIN — HYDROCODONE BITARTRATE AND ACETAMINOPHEN 1 TABLET: 5; 325 TABLET ORAL at 05:03

## 2019-01-01 RX ADMIN — INSULIN ASPART 2 UNITS: 100 INJECTION, SOLUTION INTRAVENOUS; SUBCUTANEOUS at 12:03

## 2019-01-01 RX ADMIN — ROCURONIUM BROMIDE 5 MG: 10 INJECTION, SOLUTION INTRAVENOUS at 12:03

## 2019-01-01 RX ADMIN — IOHEXOL 15 ML: 350 INJECTION, SOLUTION INTRAVENOUS at 10:03

## 2019-01-01 RX ADMIN — CLONAZEPAM 0.5 MG: 0.5 TABLET ORAL at 09:03

## 2019-01-01 RX ADMIN — IOHEXOL 170 ML: 300 INJECTION, SOLUTION INTRAVENOUS at 06:03

## 2019-01-01 RX ADMIN — HEPARIN SODIUM AND DEXTROSE 12 UNITS/KG/HR: 10000; 5 INJECTION INTRAVENOUS at 11:03

## 2019-01-01 RX ADMIN — LIDOCAINE HYDROCHLORIDE 70 MG: 20 INJECTION, SOLUTION INTRAVENOUS at 05:03

## 2019-01-01 RX ADMIN — IOHEXOL 300 ML: 300 INJECTION, SOLUTION INTRAVENOUS at 09:03

## 2019-01-01 RX ADMIN — MIDODRINE HYDROCHLORIDE 10 MG: 5 TABLET ORAL at 08:03

## 2019-01-01 RX ADMIN — FENTANYL CITRATE 50 MCG: 50 INJECTION, SOLUTION INTRAMUSCULAR; INTRAVENOUS at 05:03

## 2019-01-01 RX ADMIN — THERA TABS 1 TABLET: TAB at 12:03

## 2019-01-01 RX ADMIN — MAGNESIUM OXIDE TAB 400 MG (241.3 MG ELEMENTAL MG) 400 MG: 400 (241.3 MG) TAB at 09:03

## 2019-01-01 RX ADMIN — HEPARIN SODIUM 1000 UNITS: 1000 INJECTION, SOLUTION INTRAVENOUS; SUBCUTANEOUS at 03:03

## 2019-01-01 RX ADMIN — FENTANYL CITRATE 50 MCG: 50 INJECTION, SOLUTION INTRAMUSCULAR; INTRAVENOUS at 04:03

## 2019-01-01 RX ADMIN — HYDROCODONE BITARTRATE AND ACETAMINOPHEN 1 TABLET: 5; 325 TABLET ORAL at 12:03

## 2019-01-01 RX ADMIN — MIDODRINE HYDROCHLORIDE 10 MG: 5 TABLET ORAL at 01:03

## 2019-01-01 RX ADMIN — PROPOFOL 80 MG: 10 INJECTION, EMULSION INTRAVENOUS at 05:03

## 2019-01-01 RX ADMIN — NEOSTIGMINE METHYLSULFATE 3 MG: 1 INJECTION INTRAVENOUS at 09:03

## 2019-01-01 RX ADMIN — FLUCONAZOLE 400 MG: 2 INJECTION, SOLUTION INTRAVENOUS at 12:03

## 2019-01-01 RX ADMIN — LACTULOSE 15 G: 20 SOLUTION ORAL at 01:03

## 2019-01-01 RX ADMIN — TRAZODONE HYDROCHLORIDE 100 MG: 100 TABLET ORAL at 10:03

## 2019-01-01 RX ADMIN — HYDROMORPHONE HYDROCHLORIDE 0.2 MG: 1 INJECTION, SOLUTION INTRAMUSCULAR; INTRAVENOUS; SUBCUTANEOUS at 08:03

## 2019-01-01 RX ADMIN — CIPROFLOXACIN 400 MG: 2 INJECTION, SOLUTION INTRAVENOUS at 02:03

## 2019-01-01 RX ADMIN — SODIUM CHLORIDE: 0.9 INJECTION, SOLUTION INTRAVENOUS at 07:03

## 2019-01-01 RX ADMIN — EPHEDRINE SULFATE 25 MG: 50 INJECTION, SOLUTION INTRAMUSCULAR; INTRAVENOUS; SUBCUTANEOUS at 01:03

## 2019-01-01 RX ADMIN — TRAZODONE HYDROCHLORIDE 100 MG: 100 TABLET ORAL at 08:03

## 2019-01-01 RX ADMIN — HYDROCODONE BITARTRATE AND ACETAMINOPHEN 1 TABLET: 5; 325 TABLET ORAL at 10:03

## 2019-01-01 RX ADMIN — SUCCINYLCHOLINE CHLORIDE 100 MG: 20 INJECTION, SOLUTION INTRAMUSCULAR; INTRAVENOUS at 12:03

## 2019-01-01 RX ADMIN — FENTANYL CITRATE 25 MCG: 50 INJECTION, SOLUTION INTRAMUSCULAR; INTRAVENOUS at 06:03

## 2019-01-01 RX ADMIN — IOHEXOL 75 ML: 350 INJECTION, SOLUTION INTRAVENOUS at 12:03

## 2019-01-01 RX ADMIN — MIDODRINE HYDROCHLORIDE 10 MG: 5 TABLET ORAL at 03:03

## 2019-01-01 RX ADMIN — HEPARIN SODIUM 5000 UNITS: 5000 INJECTION, SOLUTION INTRAVENOUS; SUBCUTANEOUS at 04:03

## 2019-01-01 RX ADMIN — RAMELTEON 8 MG: 8 TABLET, FILM COATED ORAL at 09:03

## 2019-01-01 RX ADMIN — INSULIN ASPART 2 UNITS: 100 INJECTION, SOLUTION INTRAVENOUS; SUBCUTANEOUS at 06:03

## 2019-01-01 RX ADMIN — MAGNESIUM OXIDE TAB 400 MG (241.3 MG ELEMENTAL MG) 400 MG: 400 (241.3 MG) TAB at 08:03

## 2019-01-01 RX ADMIN — HEPARIN SODIUM 1000 UNITS: 1000 INJECTION, SOLUTION INTRAVENOUS; SUBCUTANEOUS at 04:03

## 2019-01-01 RX ADMIN — FENTANYL CITRATE 25 MCG: 50 INJECTION INTRAMUSCULAR; INTRAVENOUS at 11:03

## 2019-01-01 RX ADMIN — GLYCOPYRROLATE 0.6 MG: 0.2 INJECTION, SOLUTION INTRAMUSCULAR; INTRAVENOUS at 06:03

## 2019-01-01 RX ADMIN — FENTANYL CITRATE 50 MCG: 50 INJECTION, SOLUTION INTRAMUSCULAR; INTRAVENOUS at 02:03

## 2019-01-01 RX ADMIN — LORAZEPAM 1 MG: 2 INJECTION INTRAMUSCULAR; INTRAVENOUS at 12:03

## 2019-01-01 RX ADMIN — CLONAZEPAM 0.5 MG: 0.5 TABLET ORAL at 11:03

## 2019-01-01 RX ADMIN — RIFAXIMIN 550 MG: 550 TABLET ORAL at 09:03

## 2019-01-01 RX ADMIN — LACTULOSE 15 G: 20 SOLUTION ORAL at 08:03

## 2019-01-01 RX ADMIN — FENTANYL CITRATE 25 MCG: 50 INJECTION INTRAMUSCULAR; INTRAVENOUS at 02:03

## 2019-01-01 RX ADMIN — HYDROCODONE BITARTRATE AND ACETAMINOPHEN 1 TABLET: 5; 325 TABLET ORAL at 04:03

## 2019-01-01 RX ADMIN — FUROSEMIDE 80 MG: 40 TABLET ORAL at 08:03

## 2019-01-01 RX ADMIN — HYDROCODONE BITARTRATE AND ACETAMINOPHEN 1 TABLET: 5; 325 TABLET ORAL at 06:03

## 2019-01-01 RX ADMIN — HEPARIN SODIUM 5000 UNITS: 5000 INJECTION, SOLUTION INTRAVENOUS; SUBCUTANEOUS at 06:03

## 2019-01-01 RX ADMIN — NEOSTIGMINE METHYLSULFATE 4.25 MG: 1 INJECTION INTRAVENOUS at 06:03

## 2019-01-01 RX ADMIN — PHENYLEPHRINE HYDROCHLORIDE 100 MCG: 10 INJECTION INTRAVENOUS at 05:03

## 2019-01-01 RX ADMIN — ALBUMIN HUMAN 25 G: 0.25 SOLUTION INTRAVENOUS at 12:03

## 2019-01-01 RX ADMIN — CLONAZEPAM 0.5 MG: 0.5 TABLET ORAL at 02:03

## 2019-01-01 RX ADMIN — HEPARIN SODIUM 3000 UNITS: 1000 INJECTION, SOLUTION INTRAVENOUS; SUBCUTANEOUS at 02:03

## 2019-01-01 RX ADMIN — ONDANSETRON 4 MG: 2 INJECTION INTRAMUSCULAR; INTRAVENOUS at 08:03

## 2019-01-01 RX ADMIN — SODIUM CHLORIDE, SODIUM GLUCONATE, SODIUM ACETATE, POTASSIUM CHLORIDE, MAGNESIUM CHLORIDE, SODIUM PHOSPHATE, DIBASIC, AND POTASSIUM PHOSPHATE: .53; .5; .37; .037; .03; .012; .00082 INJECTION, SOLUTION INTRAVENOUS at 05:03

## 2019-01-01 RX ADMIN — HEPARIN SODIUM 5000 UNITS: 5000 INJECTION, SOLUTION INTRAVENOUS; SUBCUTANEOUS at 09:03

## 2019-01-01 RX ADMIN — ALBUMIN HUMAN 12.5 G: 0.25 SOLUTION INTRAVENOUS at 10:03

## 2019-01-01 RX ADMIN — HYDROMORPHONE HYDROCHLORIDE 0.2 MG: 1 INJECTION, SOLUTION INTRAMUSCULAR; INTRAVENOUS; SUBCUTANEOUS at 06:03

## 2019-01-01 RX ADMIN — ROCURONIUM BROMIDE 30 MG: 10 INJECTION, SOLUTION INTRAVENOUS at 05:03

## 2019-01-01 RX ADMIN — HYDROCODONE BITARTRATE AND ACETAMINOPHEN 1 TABLET: 5; 325 TABLET ORAL at 09:03

## 2019-01-01 RX ADMIN — FENTANYL CITRATE 25 MCG: 50 INJECTION INTRAMUSCULAR; INTRAVENOUS at 08:03

## 2019-01-01 RX ADMIN — Medication 0.4 MCG/KG/MIN: at 06:03

## 2019-01-01 RX ADMIN — HYDROCODONE BITARTRATE AND ACETAMINOPHEN 1 TABLET: 5; 325 TABLET ORAL at 08:03

## 2019-01-01 RX ADMIN — THERA TABS 1 TABLET: TAB at 10:03

## 2019-01-01 RX ADMIN — FENTANYL CITRATE 100 MCG: 50 INJECTION, SOLUTION INTRAMUSCULAR; INTRAVENOUS at 12:03

## 2019-01-01 RX ADMIN — CALCIUM CHLORIDE 500 MG: 100 INJECTION, SOLUTION INTRAVENOUS at 01:03

## 2019-01-01 RX ADMIN — FENTANYL CITRATE 25 MCG: 50 INJECTION INTRAMUSCULAR; INTRAVENOUS at 07:03

## 2019-01-01 RX ADMIN — TRAZODONE HYDROCHLORIDE 100 MG: 100 TABLET ORAL at 02:03

## 2019-01-01 RX ADMIN — HYDROMORPHONE HYDROCHLORIDE 0.2 MG: 1 INJECTION, SOLUTION INTRAMUSCULAR; INTRAVENOUS; SUBCUTANEOUS at 01:03

## 2019-01-01 RX ADMIN — HEPARIN SODIUM 25 ML/HR: 200 INJECTION, SOLUTION INTRAVENOUS at 12:03

## 2019-01-01 RX ADMIN — ROCURONIUM BROMIDE 20 MG: 10 INJECTION, SOLUTION INTRAVENOUS at 06:03

## 2019-01-01 RX ADMIN — MORPHINE SULFATE 2 MG: 2 INJECTION, SOLUTION INTRAMUSCULAR; INTRAVENOUS at 03:03

## 2019-01-01 RX ADMIN — SPIRONOLACTONE 200 MG: 100 TABLET, FILM COATED ORAL at 08:03

## 2019-01-01 RX ADMIN — ACETAMINOPHEN 650 MG: 325 TABLET ORAL at 09:03

## 2019-01-01 RX ADMIN — CLONAZEPAM 0.5 MG: 0.5 TABLET ORAL at 08:03

## 2019-01-01 RX ADMIN — FENTANYL CITRATE 25 MCG: 50 INJECTION, SOLUTION INTRAMUSCULAR; INTRAVENOUS at 08:03

## 2019-01-01 RX ADMIN — HEPARIN SODIUM 5 ML/HR: 200 INJECTION, SOLUTION INTRAVENOUS at 12:03

## 2019-01-01 RX ADMIN — LIDOCAINE HYDROCHLORIDE 10 ML: 10 INJECTION, SOLUTION INFILTRATION; PERINEURAL at 03:03

## 2019-01-01 RX ADMIN — MIDODRINE HYDROCHLORIDE 15 MG: 5 TABLET ORAL at 10:03

## 2019-01-01 RX ADMIN — MORPHINE SULFATE 2 MG: 2 INJECTION, SOLUTION INTRAMUSCULAR; INTRAVENOUS at 04:03

## 2019-01-01 RX ADMIN — HYDROMORPHONE HYDROCHLORIDE 0.2 MG: 1 INJECTION, SOLUTION INTRAMUSCULAR; INTRAVENOUS; SUBCUTANEOUS at 10:03

## 2019-01-01 RX ADMIN — HYDROMORPHONE HYDROCHLORIDE 0.2 MG: 1 INJECTION, SOLUTION INTRAMUSCULAR; INTRAVENOUS; SUBCUTANEOUS at 02:03

## 2019-01-01 RX ADMIN — LIDOCAINE HYDROCHLORIDE 75 MG: 20 INJECTION, SOLUTION INTRAVENOUS at 12:03

## 2019-01-01 RX ADMIN — LACTULOSE 15 G: 20 SOLUTION ORAL at 04:03

## 2019-01-01 RX ADMIN — NOREPINEPHRINE BITARTRATE 1.5 MCG/KG/MIN: 1 INJECTION, SOLUTION, CONCENTRATE INTRAVENOUS at 02:03

## 2019-01-01 RX ADMIN — NOREPINEPHRINE BITARTRATE 0.5 MCG/KG/MIN: 1 INJECTION, SOLUTION, CONCENTRATE INTRAVENOUS at 07:03

## 2019-01-01 RX ADMIN — POLYETHYLENE GLYCOL 3350 17 G: 17 POWDER, FOR SOLUTION ORAL at 10:03

## 2019-01-01 RX ADMIN — LIDOCAINE HYDROCHLORIDE 10 ML: 10 INJECTION, SOLUTION INFILTRATION; PERINEURAL at 08:03

## 2019-01-01 RX ADMIN — FENTANYL CITRATE 100 MCG: 50 INJECTION INTRAMUSCULAR; INTRAVENOUS at 12:03

## 2019-01-01 RX ADMIN — PROPOFOL 120 MG: 10 INJECTION, EMULSION INTRAVENOUS at 12:03

## 2019-01-01 RX ADMIN — FENTANYL CITRATE 25 MCG: 50 INJECTION INTRAMUSCULAR; INTRAVENOUS at 03:03

## 2019-01-01 RX ADMIN — LORAZEPAM 1 MG: 2 INJECTION INTRAMUSCULAR; INTRAVENOUS at 02:03

## 2019-01-01 RX ADMIN — MIDODRINE HYDROCHLORIDE 10 MG: 5 TABLET ORAL at 02:03

## 2019-01-01 RX ADMIN — FENTANYL CITRATE 25 MCG: 50 INJECTION INTRAMUSCULAR; INTRAVENOUS at 04:03

## 2019-01-01 RX ADMIN — CIPROFLOXACIN 500 MG: KIT at 11:03

## 2019-01-01 RX ADMIN — CALCIUM CHLORIDE 500 MG: 100 INJECTION, SOLUTION INTRAVENOUS at 05:03

## 2019-01-01 RX ADMIN — INSULIN ASPART 1 UNITS: 100 INJECTION, SOLUTION INTRAVENOUS; SUBCUTANEOUS at 08:03

## 2019-01-01 RX ADMIN — PIPERACILLIN AND TAZOBACTAM 4.5 G: 4; .5 INJECTION, POWDER, LYOPHILIZED, FOR SOLUTION INTRAVENOUS; PARENTERAL at 08:03

## 2019-01-01 RX ADMIN — Medication 1250 MG: at 08:03

## 2019-01-01 RX ADMIN — VASOPRESSIN 20 UNITS: 20 INJECTION INTRAVENOUS at 10:03

## 2019-01-01 RX ADMIN — ALBUMIN HUMAN 12.5 G: 0.25 SOLUTION INTRAVENOUS at 05:03

## 2019-01-01 RX ADMIN — MIDODRINE HYDROCHLORIDE 15 MG: 5 TABLET ORAL at 07:03

## 2019-01-01 RX ADMIN — LACTULOSE 15 G: 20 SOLUTION ORAL at 03:03

## 2019-03-07 NOTE — CONSULTS
[facility]  Hepatology  Consult Note    Patient Name: Michelle Pappas  MRN: [id number]  Admission Date: 3/7/2019  Hospital Length of Stay: 0 days  Attending Provider: Sam Hare MD   Primary Care Physician: Taj Luna DO  Principal Problem:Hydrothorax    Inpatient consult to Hepatology  Consult performed by: Kena uGzmán MD  Consult ordered by: Bibiana Pena MD        Subjective:     HPI:  59 year old female with decompensated Hep C cirrhosis c/b PVT s/p thrombectomy with balloon angioplasty as well as TIPS on who Hepatology is consulted for decompensated cirrhosis.    Patient followed in Hepatology clinic until August-September of 2018 when she moved to [place]. She reports that starting in December she has had recurrent cough with shortness of breath which have required 4 thoracentesis (12/3/18, 2/22/19, 3/1/19, and 3/5/19). She now presents to [facility] for the same complaint and is status post a thoracentesis with removal of 1.2L in the ED. She is unsure if during her last admissions her TIPS was evaluated but does report being compliant with Na+/liquid restriction as well as with her diuretics. She denies any fever, chills, chest pain, nausea, emesis, abdominal pain, or changes in her urinary/bowel habits.    Review of Systems   Constitutional: Negative for activity change, appetite change, chills, diaphoresis, fatigue and fever.   HENT: Negative for trouble swallowing and voice change.    Eyes: Negative for photophobia, pain, discharge, redness, itching and visual disturbance.   Respiratory: Positive for cough and shortness of breath.    Cardiovascular: Negative for chest pain, palpitations and leg swelling.   Gastrointestinal: Positive for abdominal distention. Negative for abdominal pain, anal bleeding, blood in stool, constipation, diarrhea, nausea, rectal pain and vomiting.   Endocrine: Negative for cold intolerance, heat intolerance, polydipsia, polyphagia and polyuria.    Genitourinary: Negative for dysuria, frequency and urgency.   Musculoskeletal: Negative for back pain and neck pain.   Neurological: Negative for dizziness, syncope, weakness and light-headedness.       Past Medical History:   Diagnosis Date    Anemia     Anxiety 2018    CAH (chronic active hepatitis)     Depression 2018    Encephalopathy     Essential hypertension 2018    Hepatic Hydrothorax 2018    Hypoalbuminemia 2018    Other cirrhosis of liver 2018    Pleural effusion     Thrombocytopenia 2018       Past Surgical History:   Procedure Laterality Date    breast reduction       SECTION      HYSTERECTOMY      TIPS N/A 2018    Performed by [facility]    TIPS N/A 2018    Performed by [facility]    Venogram N/A 2018    Performed by [facility] Diagnostic Provider at [facility] OR [address] [place] [address]       Family history of liver disease: No    Review of patient's allergies indicates:   Allergen Reactions    Promethazine hcl Itching       Tobacco Use    Smoking status: Former Smoker     Types: Cigarettes     Last attempt to quit: 3/6/2018     Years since quittin.0    Smokeless tobacco: Never Used    Tobacco comment: quit 2018   Substance and Sexual Activity    Alcohol use: No    Drug use: No    Sexual activity: Not on file       Medications Prior to Admission   Medication Sig Dispense Refill Last Dose    clonazePAM (KLONOPIN) 1 MG tablet daily prn   3/6/2019    furosemide (LASIX) 80 MG tablet Take 1 tablet (80 mg total) by mouth 2 (two) times daily. 60 tablet 2 3/6/2019    glimepiride (AMARYL) 2 MG tablet Take 2 mg by mouth as needed.   3/6/2019    lactulose (CHRONULAC) 10 gram/15 mL solution Take 45 mLs (30 g total) by mouth 3 (three) times daily. Adjust to 3-4 bowel movements daily. Takes 30 ml TID   3/6/2019    midodrine (PROAMATINE) 10 MG tablet Take 1 tablet (10 mg total) by mouth 3 (three) times daily. 90 tablet 5  3/6/2019    POTASSIUM BROMIDE, BULK, MISC by Misc.(Non-Drug; Combo Route) route.   3/6/2019    spironolactone (ALDACTONE) 100 MG tablet Take 2 tablets (200 mg total) by mouth once daily. 60 tablet 2 3/6/2019    traZODone (DESYREL) 100 MG tablet Take 100 mg by mouth every evening.   3/6/2019    ciprofloxacin HCl (CIPRO) 500 MG tablet Take 1 tablet (500 mg total) by mouth once daily. 30 tablet 11 Taking    hyoscyamine (LEVSIN/SL) 0.125 mg Subl Take 1 tablet by mouth up to four times daily   Taking       Objective:     Vital Signs (Most Recent):  Temp: 97.9 °F (36.6 °C) (03/07/19 1705)  Pulse: 77 (03/07/19 1705)  Resp: 14 (03/07/19 1705)  BP: (!) 92/49 (03/07/19 1705)  SpO2: (!) 93 % (03/07/19 1705) Vital Signs (24h Range):  Temp:  [97.9 °F (36.6 °C)-98.2 °F (36.8 °C)] 97.9 °F (36.6 °C)  Pulse:  [75-97] 77  Resp:  [14-20] 14  SpO2:  [93 %-100 %] 93 %  BP: ()/(47-62) 92/49     Weight: 70.3 kg (154 lb 15.7 oz) (03/07/19 1419)  Body mass index is 27.45 kg/m².    Physical Exam   Constitutional: She is oriented to person, place, and time. She appears well-developed and well-nourished.   HENT:   Head: Normocephalic and atraumatic.   Eyes: No scleral icterus.   Cardiovascular: Normal rate and regular rhythm.   Pulmonary/Chest: Effort normal and breath sounds normal.   Abdominal: Soft. Bowel sounds are normal. She exhibits distension. There is no tenderness.   Musculoskeletal: She exhibits no edema.   Neurological: She is alert and oriented to person, place, and time.       MELD-Na score: 10 at 3/7/2019  8:55 AM  MELD score: 10 at 3/7/2019  8:55 AM  Calculated from:  Serum Creatinine: 1.3 mg/dL at 3/7/2019  8:55 AM  Serum Sodium: 132 mmol/L at 3/7/2019  8:55 AM  Total Bilirubin: 0.8 mg/dL (Rounded to 1 mg/dL) at 3/7/2019  8:55 AM  INR(ratio): 1.1 at 3/7/2019  8:55 AM  Age: 59 years    Significant Labs:  CBC:   Recent Labs   Lab 03/07/19  0855   WBC 3.52*   RBC 4.06   HGB 9.5*   HCT 31.9*   PLT 46*     CMP:   Recent  Labs   Lab 03/07/19  0855   GLU 96   CALCIUM 8.6*   ALBUMIN 2.3*   PROT 6.0   *   K 3.7   CO2 28   CL 97   BUN 15   CREATININE 1.3   ALKPHOS 68   ALT 16   AST 22   BILITOT 0.8     Coagulation:   Recent Labs   Lab 03/07/19  0855   INR 1.1       Significant Imaging:  X-Ray: Reviewed    Assessment/Plan:     Decompensated HCV cirrhosis    59 year old female with decompensated Hep C cirrhosis c/b PVT s/p thrombectomy with balloon angioplasty as well as TIPS on who Hepatology is consulted for decompensated cirrhosis. Patient did well with TIPS placement and diuretics however due to ~ 5 thoracentesis in the past 3 months with reported compliance with diuretics/diet we wonder if the TIPS is patent. Recommend continuing diuretics as well as assessing TIPS patency with US with doppler.    Recommendations:  --NPO after MN in case that US shows occluded TIPS and intervention is needed  --Daily CMP, CBC, and INR  --Strict I/O  --Continue diuretics  --Obtain AFP (order placed)  --Obtain US with doppler to assess TIPS patency            Thank you for your consult. I will follow-up with patient. Please contact us if you have any additional questions.    Kena Guzmán M.D.  Gastroenterology Fellow, PGY-V  Pager: [phone]  [facility]

## 2019-03-07 NOTE — ASSESSMENT & PLAN NOTE
Lab Results   Component Value Date    CREATININE 1.3 03/07/2019     Sr Cr stable. Baseline 1.0-1.4  Cont to monitor with daily labs   Avoid nephrotoxins.   Renally dose all medications   Monitor events that may lead to decreased renal perfusion (hypovolemia, hypotension, sepsis).   Monitor urine output

## 2019-03-07 NOTE — SUBJECTIVE & OBJECTIVE
Past Medical History:   Diagnosis Date    Anemia     Anxiety 2018    CAH (chronic active hepatitis)     Depression 2018    Encephalopathy     Essential hypertension 2018    Hepatic Hydrothorax 2018    Hypoalbuminemia 2018    Other cirrhosis of liver 2018    Pleural effusion     Thrombocytopenia 2018       Past Surgical History:   Procedure Laterality Date    breast reduction       SECTION      HYSTERECTOMY      TIPS N/A 2018    Performed by [facility]    TIPS N/A 2018    Performed by [facility] at [facility]    Venogram N/A 2018    Performed by [facility] Diagnostic Provider at [facility] OR [address] [place] [address]       Review of patient's allergies indicates:  No Known Allergies    Current Facility-Administered Medications on File Prior to Encounter   Medication    fentaNYL injection 50 mcg    midazolam (VERSED) 1 mg/mL injection 1 mg     Current Outpatient Medications on File Prior to Encounter   Medication Sig    clonazePAM (KLONOPIN) 1 MG tablet daily prn    furosemide (LASIX) 80 MG tablet Take 1 tablet (80 mg total) by mouth 2 (two) times daily.    glimepiride (AMARYL) 2 MG tablet Take 2 mg by mouth as needed.    lactulose (CHRONULAC) 10 gram/15 mL solution Take 45 mLs (30 g total) by mouth 3 (three) times daily. Adjust to 3-4 bowel movements daily. Takes 30 ml TID    midodrine (PROAMATINE) 10 MG tablet Take 1 tablet (10 mg total) by mouth 3 (three) times daily.    POTASSIUM BROMIDE, BULK, MISC by Misc.(Non-Drug; Combo Route) route.    spironolactone (ALDACTONE) 100 MG tablet Take 2 tablets (200 mg total) by mouth once daily.    traZODone (DESYREL) 100 MG tablet Take 100 mg by mouth every evening.    ciprofloxacin HCl (CIPRO) 500 MG tablet Take 1 tablet (500 mg total) by mouth once daily.    hyoscyamine (LEVSIN/SL) 0.125 mg Subl Take 1 tablet by mouth up to four times daily     Family History     None        Tobacco Use    Smoking status:  Former Smoker     Types: Cigarettes     Last attempt to quit: 3/6/2018     Years since quittin.0    Smokeless tobacco: Never Used    Tobacco comment: quit 2018   Substance and Sexual Activity    Alcohol use: No    Drug use: No    Sexual activity: Not on file     Review of Systems   Constitutional: Positive for activity change and fatigue. Negative for chills and fever.   HENT: Negative for congestion, facial swelling, hearing loss and trouble swallowing.    Eyes: Negative for photophobia and visual disturbance.   Respiratory: Positive for shortness of breath. Negative for chest tightness and wheezing.    Cardiovascular: Negative for chest pain, palpitations and leg swelling.   Gastrointestinal: Negative for abdominal pain, blood in stool, constipation, diarrhea, nausea and vomiting.   Endocrine: Negative.    Genitourinary: Negative.    Musculoskeletal: Negative for back pain, joint swelling and myalgias.   Skin: Negative.    Allergic/Immunologic: Negative.    Neurological: Negative for dizziness, facial asymmetry, speech difficulty, weakness and numbness.   Hematological: Negative.    Psychiatric/Behavioral: Negative for agitation, confusion and dysphoric mood. The patient is not nervous/anxious.      Objective:     Vital Signs (Most Recent):  Temp: 98.1 °F (36.7 °C) (19 0836)  Pulse: 75 (19 1217)  Resp: 20 (19 0930)  BP: (!) 90/55 (19 1217)  SpO2: 100 % (19 1217) Vital Signs (24h Range):  Temp:  [98.1 °F (36.7 °C)] 98.1 °F (36.7 °C)  Pulse:  [75-97] 75  Resp:  [20] 20  SpO2:  [94 %-100 %] 100 %  BP: ()/(47-62) 90/55        There is no height or weight on file to calculate BMI.    Physical Exam   Constitutional: She is oriented to person, place, and time. Vital signs are normal. She appears well-developed and well-nourished.  Non-toxic appearance. She does not appear ill. No distress.   HENT:   Head: Normocephalic and atraumatic.   Eyes: Conjunctivae and EOM are  normal. Pupils are equal, round, and reactive to light. No scleral icterus.   Neck: Normal range of motion and full passive range of motion without pain. Neck supple. No JVD present. Carotid bruit is not present. No thyromegaly present.   Cardiovascular: Normal rate, regular rhythm, S1 normal, S2 normal, normal heart sounds and normal pulses. Exam reveals no gallop, no S3, no S4 and no friction rub.   No murmur heard.  Pulmonary/Chest: Effort normal. No accessory muscle usage. No tachypnea. No respiratory distress. She has decreased breath sounds in the right middle field and the right lower field. She has no wheezes. She has no rhonchi. She has no rales.   Abdominal: Soft. Normal appearance and bowel sounds are normal. She exhibits no shifting dullness, no distension, no abdominal bruit and no ascites. There is no hepatosplenomegaly. There is no tenderness. There is no rigidity and no guarding.   Musculoskeletal: Normal range of motion. She exhibits no edema or tenderness.   Neurological: She is alert and oriented to person, place, and time. She has normal strength. She is not disoriented. No cranial nerve deficit or sensory deficit. GCS eye subscore is 4. GCS verbal subscore is 5. GCS motor subscore is 6.   Skin: Skin is warm, dry and intact. No abrasion and no lesion noted.   Psychiatric: She has a normal mood and affect. Her behavior is normal. Judgment and thought content normal. Her mood appears not anxious. Her speech is not slurred. She is not actively hallucinating. Cognition and memory are normal. She does not exhibit a depressed mood. She is attentive.         CRANIAL NERVES     CN III, IV, VI   Pupils are equal, round, and reactive to light.  Extraocular motions are normal.        Significant Labs: All pertinent labs within the past 24 hours have been reviewed.    Significant Imaging: I have reviewed all pertinent imaging results/findings within the past 24 hours.

## 2019-03-07 NOTE — ED TRIAGE NOTES
Pt presents to ed complaining of sob. Hx of liver disease. Pt reports had a thoracentesis 3/1and 2 L were removed. Pt had a thoracentesis on 3/5 and 2 L were removed. PT complaining of abdominal distension.

## 2019-03-07 NOTE — H&P
"[facility] Medicine  History & Physical    Patient Name: Michelle Pappas  MRN: [id number]  Admission Date: 3/7/2019  Attending Physician: Sam Hare MD   Primary Care Provider: Taj Lnua DO    [facility] Medicine Team: [facility] MED  Sam Tenorio MD     Patient information was obtained from patient and ER records.     Subjective:     Principal Problem:Hydrothorax    Chief Complaint:   Chief Complaint   Patient presents with    Shortness of Breath     d/c from hospital x two days ago. thoracentesis on 03/05. pt reports, "the fluid is coming back quicker now".        HPI: History of Present Illness:  Patient is a 59 y.o. female who has a past medical history of cirrhosis of liver likely to be secondary to hepatitis C, hepatitis C treated with Harvoni, Hepatic Hydrothorax, Pleural effusion, s/p TIPS procedure in June 2018 with reduction of portosystemic gradient, Encephalopathy, Hypoalbuminemia, Thrombocytopenia, Anemia, Anxiety, Depression, Essential hypertension presented with shortness of breath at rest, and with minimal acitivity. Symptoms include constant cough, dyspnea on exertion, dyspnea when laying down and edema to bilateral LE. Symptoms began yesterday evening gradually worsening since that time. Patient denies sputum production, tightness in chest and wheezing. Patient states that she has had a thoracentesis on 3/1 and 2 L were removed and also had a thoracentesis on 3/5 and 2 L were removed. Brother at bedside and states that she was recently admitted to the hospital in [place] due to fluid buildup. Only other complaint is that her appetite has been decreased. Of note per hepatology, at the time of the TIPS she had a mechanical thrombectomy and balloon angioplasty of a portal vein thrombosis with restoration of flow. There was residual clot noted. She was placed on a blood thinner. On 8/13/18 she had a venogram: demonstrates brisk flow through the portal vein with no " evidence of residual portal vein thrombosis as well as brisk flow through the superior mesenteric vein with no evidence of residual mesenteric venous thrombosis; the tip is widely patent.  The portosystemic gradient is 9. Routine tips ultrasound is recommended in 3 months. She is off blood thinners. Patient currently denies any fever/chills, chest pain, dyspnea, weakness, numbness, abdominal pain, nausea/vomiting, dysuria/hematuria, or weight loss.     Past Medical History:   Diagnosis Date    Anemia     Anxiety 2018    CAH (chronic active hepatitis)     Depression 2018    Encephalopathy     Essential hypertension 2018    Hepatic Hydrothorax 2018    Hypoalbuminemia 2018    Other cirrhosis of liver 2018    Pleural effusion     Thrombocytopenia 2018       Past Surgical History:   Procedure Laterality Date    breast reduction       SECTION      HYSTERECTOMY      TIPS N/A 2018    [facility]formed by [facility]    TIPS N/A 2018    Performed by [facility]    Venogram N/A 2018    Performed by [facility] Diagnostic Provider at [facility] OR [address] [place] [address]       Review of patient's allergies indicates:  No Known Allergies    Current Facility-Administered Medications on File Prior to Encounter   Medication    fentaNYL injection 50 mcg    midazolam (VERSED) 1 mg/mL injection 1 mg     Current Outpatient Medications on File Prior to Encounter   Medication Sig    clonazePAM (KLONOPIN) 1 MG tablet daily prn    furosemide (LASIX) 80 MG tablet Take 1 tablet (80 mg total) by mouth 2 (two) times daily.    glimepiride (AMARYL) 2 MG tablet Take 2 mg by mouth as needed.    lactulose (CHRONULAC) 10 gram/15 mL solution Take 45 mLs (30 g total) by mouth 3 (three) times daily. Adjust to 3-4 bowel movements daily. Takes 30 ml TID    midodrine (PROAMATINE) 10 MG tablet Take 1 tablet (10 mg total) by mouth 3 (three) times daily.    POTASSIUM BROMIDE, BULK, MISC by  Misc.(Non-Drug; Combo Route) route.    spironolactone (ALDACTONE) 100 MG tablet Take 2 tablets (200 mg total) by mouth once daily.    traZODone (DESYREL) 100 MG tablet Take 100 mg by mouth every evening.    ciprofloxacin HCl (CIPRO) 500 MG tablet Take 1 tablet (500 mg total) by mouth once daily.    hyoscyamine (LEVSIN/SL) 0.125 mg Subl Take 1 tablet by mouth up to four times daily     Family History     None        Tobacco Use    Smoking status: Former Smoker     Types: Cigarettes     Last attempt to quit: 3/6/2018     Years since quittin.0    Smokeless tobacco: Never Used    Tobacco comment: quit 2018   Substance and Sexual Activity    Alcohol use: No    Drug use: No    Sexual activity: Not on file     Review of Systems   Constitutional: Positive for activity change and fatigue. Negative for chills and fever.   HENT: Negative for congestion, facial swelling, hearing loss and trouble swallowing.    Eyes: Negative for photophobia and visual disturbance.   Respiratory: Positive for shortness of breath. Negative for chest tightness and wheezing.    Cardiovascular: Negative for chest pain, palpitations and leg swelling.   Gastrointestinal: Negative for abdominal pain, blood in stool, constipation, diarrhea, nausea and vomiting.   Endocrine: Negative.    Genitourinary: Negative.    Musculoskeletal: Negative for back pain, joint swelling and myalgias.   Skin: Negative.    Allergic/Immunologic: Negative.    Neurological: Negative for dizziness, facial asymmetry, speech difficulty, weakness and numbness.   Hematological: Negative.    Psychiatric/Behavioral: Negative for agitation, confusion and dysphoric mood. The patient is not nervous/anxious.      Objective:     Vital Signs (Most Recent):  Temp: 98.1 °F (36.7 °C) (19 0836)  Pulse: 75 (19 1217)  Resp: 20 (19 0930)  BP: (!) 90/55 (19 1217)  SpO2: 100 % (19 121) Vital Signs (24h Range):  Temp:  [98.1 °F (36.7 °C)] 98.1 °F  (36.7 °C)  Pulse:  [75-97] 75  Resp:  [20] 20  SpO2:  [94 %-100 %] 100 %  BP: ()/(47-62) 90/55        There is no height or weight on file to calculate BMI.    Physical Exam   Constitutional: She is oriented to person, place, and time. Vital signs are normal. She appears well-developed and well-nourished.  Non-toxic appearance. She does not appear ill. No distress.   HENT:   Head: Normocephalic and atraumatic.   Eyes: Conjunctivae and EOM are normal. Pupils are equal, round, and reactive to light. No scleral icterus.   Neck: Normal range of motion and full passive range of motion without pain. Neck supple. No JVD present. Carotid bruit is not present. No thyromegaly present.   Cardiovascular: Normal rate, regular rhythm, S1 normal, S2 normal, normal heart sounds and normal pulses. Exam reveals no gallop, no S3, no S4 and no friction rub.   No murmur heard.  Pulmonary/Chest: Effort normal. No accessory muscle usage. No tachypnea. No respiratory distress. She has decreased breath sounds in the right middle field and the right lower field. She has no wheezes. She has no rhonchi. She has no rales.   Abdominal: Soft. Normal appearance and bowel sounds are normal. She exhibits no shifting dullness, no distension, no abdominal bruit and no ascites. There is no hepatosplenomegaly. There is no tenderness. There is no rigidity and no guarding.   Musculoskeletal: Normal range of motion. She exhibits no edema or tenderness.   Neurological: She is alert and oriented to person, place, and time. She has normal strength. She is not disoriented. No cranial nerve deficit or sensory deficit. GCS eye subscore is 4. GCS verbal subscore is 5. GCS motor subscore is 6.   Skin: Skin is warm, dry and intact. No abrasion and no lesion noted.   Psychiatric: She has a normal mood and affect. Her behavior is normal. Judgment and thought content normal. Her mood appears not anxious. Her speech is not slurred. She is not actively  hallucinating. Cognition and memory are normal. She does not exhibit a depressed mood. She is attentive.         CRANIAL NERVES     CN III, IV, VI   Pupils are equal, round, and reactive to light.  Extraocular motions are normal.        Significant Labs: All pertinent labs within the past 24 hours have been reviewed.    Significant Imaging: I have reviewed all pertinent imaging results/findings within the past 24 hours.    Assessment/Plan:     * Hepatic Hydrothorax    CXR: Large right pleural effusion with prominent compression atelectasis of right lung, remaining portions of right upper lobe and right middle lobe retained air.  satting 98% on RA  Cont diuretics with lasix and aldactone  Duo nebs as needed  continuous pulse ox  S/p TIPS   Check live sonogram to check tips patency  Consult pulmonary for therapeutic thoracentesis     Recurrent right pleural effusion    Plan as above.        Decompensated HCV cirrhosis    MELD-Na score: 10 at 3/7/2019  8:55 AM  MELD score: 10 at 3/7/2019  8:55 AM  Calculated from:  Serum Creatinine: 1.3 mg/dL at 3/7/2019  8:55 AM  Serum Sodium: 132 mmol/L at 3/7/2019  8:55 AM  Total Bilirubin: 0.8 mg/dL (Rounded to 1 mg/dL) at 3/7/2019  8:55 AM  INR(ratio): 1.1 at 3/7/2019  8:55 AM  Age: 59 years       Continue Lactulose and Rifaximin to prevent encephalopathy   Ascities associated with slightly decreased protein.   Continue Lasix and Aldactone at present doses.   Cont fluid and sodium restriction.   Consult hepatology     Severe malnutrition    Nutrition consult       CKD stage 3 due to type 2 diabetes mellitus    Lab Results   Component Value Date    CREATININE 1.3 03/07/2019     Sr Cr stable. Baseline 1.0-1.4  Cont to monitor with daily labs   Avoid nephrotoxins.   Renally dose all medications   Monitor events that may lead to decreased renal perfusion (hypovolemia, hypotension, sepsis).   Monitor urine output      Pancytopenia    Lab Results   Component Value Date    WBC 3.52 (L)  03/07/2019    HGB 9.5 (L) 03/07/2019    HCT 31.9 (L) 03/07/2019    MCV 79 (L) 03/07/2019    PLT 46 (L) 03/07/2019     Secondary to underlying liver disease  All values at baseline   Cont to monitor.       Hypotension    BP Readings from Last 3 Encounters:   03/07/19 (!) 97/52   08/16/18 104/60   08/14/18 (!) 107/53     Cont midodrine to treat  Cont to monitor.        Insomnia    Cont Trazodone QHS       Diabetes    Lab Results   Component Value Date    HGBA1C 5.7 (H) 07/28/2018     Hold home oral agents- insulin is the preferred treatment for hyperglycemia  Low dose correction scale   Cont blood glucose monitoring   ADA diet       VTE Risk Mitigation (From admission, onward)        Ordered     heparin (porcine) injection 5,000 Units  Every 8 hours      03/07/19 1035     IP VTE LOW RISK PATIENT  Once      03/07/19 1035     Place ABIMAEL hose  Until discontinued      03/07/19 1035             Sam Tenorio MD  Department of Hospital Medicine   [facility]

## 2019-03-07 NOTE — ASSESSMENT & PLAN NOTE
Lab Results   Component Value Date    WBC 3.52 (L) 03/07/2019    HGB 9.5 (L) 03/07/2019    HCT 31.9 (L) 03/07/2019    MCV 79 (L) 03/07/2019    PLT 46 (L) 03/07/2019     Secondary to underlying liver disease  All values at baseline   Cont to monitor.

## 2019-03-07 NOTE — ED NOTES
LOC: The patient is awake, alert, and aware of environment. The patient is oriented x 3 and speaking appropriately.   APPEARANCE: No acute distress noted.   PSYCHOSOCIAL: Patient is calm and cooperative.   SKIN: The skin is warm, dry.   RESPIRATORY: Airway is open and patent. Bilateral chest rise and fall. Respirations are spontaneous, even and unlabored. Normal effort and rate noted. No accessory muscle use noted.  Pt reports sob worsens when laying flat and minimal exertion.    CARDIAC: Patient has a normal rate and rhythm.   ABDOMEN: Abdominal distension. Abdomen is hard and non-tender to palpation.  URINARY:  Voids independently.   EXTREMITIES: no swelling noted to bilateral legs.  NEUROLOGIC: Eyes open spontaneously. Speech clear. Tolerating saliva secretions well. Able to follow commands, demonstrating ability to actively and appropriately communicate within context of current conversation. Symmetrical facial muscles. Moving all extremities well. Movement is purposeful.   MUSCULOSKELETAL: No obvious deformities noted.

## 2019-03-07 NOTE — ED PROVIDER NOTES
"Encounter Date: 3/7/2019       History     Chief Complaint   Patient presents with    Shortness of Breath     d/c from hospital x two days ago. thoracentesis on . pt reports, "the fluid is coming back quicker now".     The patient is a 59 y.o. female with co-morbidities including: hypertension, thrombocytopenia, encephalopathy, hypoalbuminemia and pleural effusion, who presents to the ED with a complaint of shortness of breath. Onset of symptoms was last night. Family member states she was recently admitted to the hospital in [place] due to fluid buildup and had last o Complains of a dry acting cough. racentesis tuesday at 3 o'clock for an amount of 2 liters. Not currently listed for a transplant. States that belly does not feel full. No nausea, vomiting, fever, nose bleeds or leg swelling.       The history is provided by the patient.     Review of patient's allergies indicates:  No Known Allergies  Past Medical History:   Diagnosis Date    Anemia     Anxiety 2018    CAH (chronic active hepatitis)     Depression 2018    Encephalopathy     Essential hypertension 2018    Hepatic Hydrothorax 2018    Hypoalbuminemia 2018    Other cirrhosis of liver 2018    Pleural effusion     Thrombocytopenia 2018     Past Surgical History:   Procedure Laterality Date    breast reduction       SECTION      HYSTERECTOMY      TIPS N/A 2018    Performed by [facility]EL    TIPS N/A 2018    Performed by [facility] at [facility]EL    Venogram N/A 2018    Performed by [facility] Diagnostic Provider at [facility] OR [address] [place] [address]     History reviewed. No pertinent family history.  Social History     Tobacco Use    Smoking status: Former Smoker     Types: Cigarettes     Last attempt to quit: 3/6/2018     Years since quittin.0    Smokeless tobacco: Never Used    Tobacco comment: quit 2018   Substance Use Topics    Alcohol use: No    Drug use: No     Review of " Systems   Constitutional: Negative for chills and fever.   HENT: Negative for mouth sores and sneezing.    Eyes: Negative for discharge and redness.   Respiratory: Positive for cough and shortness of breath.    Gastrointestinal: Negative for nausea and vomiting.   Genitourinary: Negative for dysuria and hematuria.   Musculoskeletal: Negative for back pain and neck pain.   Neurological: Negative for light-headedness and headaches.   Psychiatric/Behavioral: Negative for behavioral problems and confusion.       Physical Exam     Initial Vitals [03/07/19 0836]   BP Pulse Resp Temp SpO2   (!) 83/53 97 20 98.1 °F (36.7 °C) (!) 94 %      MAP       --         Physical Exam    Constitutional: Vital signs are normal. She appears ill.   HENT:   Head: Normocephalic and atraumatic.   Mouth/Throat: Oropharynx is clear and moist.   Eyes: Conjunctivae and EOM are normal. Pupils are equal, round, and reactive to light.   Neck: Trachea normal and normal range of motion. Neck supple.   Cardiovascular: Normal rate, regular rhythm, normal heart sounds and normal pulses.   1+ edema bilateral lower extremity.    Pulmonary/Chest: She has decreased breath sounds in the right lower field.   Dullness in right lung wall.    Abdominal: Soft. Normal appearance and bowel sounds are normal. She exhibits distension. There is no tenderness.   Musculoskeletal: Normal range of motion.   1+ edema bilateral lower extremity.   Neurological: She is alert and oriented to person, place, and time.   Skin: Skin is warm and dry.         ED Course   Procedures  Labs Reviewed   CBC W/ AUTO DIFFERENTIAL - Abnormal; Notable for the following components:       Result Value    WBC 3.52 (*)     Hemoglobin 9.5 (*)     Hematocrit 31.9 (*)     MCV 79 (*)     MCH 23.4 (*)     MCHC 29.8 (*)     RDW 19.5 (*)     Platelets 46 (*)     Immature Granulocytes 0.6 (*)     Lymph # 0.4 (*)     Gran% 74.4 (*)     Lymph% 10.8 (*)     All other components within normal limits    COMPREHENSIVE METABOLIC PANEL - Abnormal; Notable for the following components:    Sodium 132 (*)     Calcium 8.6 (*)     Albumin 2.3 (*)     Anion Gap 7 (*)     eGFR if  52.3 (*)     eGFR if non  45.3 (*)     All other components within normal limits   CULTURE, FUNGUS   B-TYPE NATRIURETIC PEPTIDE   PROTIME-INR   POCT GLUCOSE, HAND-HELD DEVICE   TYPE & SCREEN          Imaging Results          US Liver with Doppler (Final result)     Abnormal  Result time 03/08/19 08:48:32    Final result by Jung Herrera MD (03/08/19 08:48:32)                 Impression:      1. 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  Recommend further evaluation with triple phase CT and consult to interventional radiology.  2. Cirrhosis and sequela of portal hypertension including splenomegaly, small volume ascites, collateral vessel formation.  3. Cholelithiasis.  4. Right pleural effusion.  5. Stable complex cystic splenic lesion.  This report was flagged in Epic as abnormal.    Electronically signed by resident: Rivera Pang  Date:    03/08/2019  Time:    08:28    Electronically signed by: Jung Herrera MD  Date:    03/08/2019  Time:    08:48             Narrative:    EXAMINATION:  US LIVER WITH DOPPLER    CLINICAL HISTORY:  evaluate tips;    TECHNIQUE:  Complete abdominal ultrasound (including pancreas, liver, gallbladder, common bile duct, spleen, aorta, IVC, and kidneys) was performed.    Complete abdominal doppler exam was also performed.    COMPARISON:  Ultrasound 07/20/2018, 05/23/2018.    FINDINGS:  Liver: Normal in size, measuring 14.4 cm. Heterogeneous echotexture with nodular contour compatible with cirrhosis.  Stable cyst within the left hepatic lobe measuring 0.7 x 0.7 x 0.7 cm.    Gallbladder: Several non-mobile stones within the gallbladder  measuring up to 0.7 cm.  Mild wall thickening to 0.4 cm, similar to prior studies and likely due to hepatic dysfunction.  No pericholecystic fluid.  No sonographic Arevalo's sign.    Biliary system: The common duct is not dilated, measuring 2 mm.  No intrahepatic ductal dilatation.    Spleen: Enlarged with a homogeneous echotexture, measuring 20.9 x 4.2 cm.  Contains a complex cystic structure measuring 1.8 x 2.0 x 1.8 cm, similar to prior studies.    Pancreas: The visualized portions of pancreas appear normal.    Inferior vena cava: Normal in appearance.    Miscellaneous: Small volume ascites.  Collateral vessel formation throughout the upper abdomen.    Main hepatic artery: Patent.    Main portal vein: Partially thrombosed with sluggish hepatopetal flow.    Left portal vein:  Patent with hepatopetal flow.    Right portal vein:  Anterior branch is patent with hepatopetal flow.    SMV:  Persistent partial thrombosis.    IVC: Patent    Left, middle, and right hepatic veins: Patent.    Umbilical vein: Not patent.    TIPS stent again identified and is now thrombosed along its length, new since the most recent prior study.    Right-sided pleural effusion is present.                               X-Ray Chest PA And Lateral (Final result)  Result time 03/07/19 09:25:47    Final result by Frank Erickson MD (03/07/19 09:25:47)                 Impression:      Enlarging right pleural effusion now of large size with compression atelectasis right lung.      Electronically signed by: Frank Erickson MD  Date:    03/07/2019  Time:    09:25             Narrative:    EXAMINATION:  XR CHEST PA AND LATERAL    CLINICAL HISTORY:  Shortness of breath    TECHNIQUE:  PA and lateral views of the chest were performed.    COMPARISON:  07/30/2018    FINDINGS:  Large right pleural effusion with prominent compression atelectasis of right lung, remaining portions of right upper lobe and right middle lobe retained air.  Left lung  clear.  Heart normal size.  Pulmonary vessels intact.  Single surgical clip right epigastric region stable.                                 Medical Decision Making:   History:   Old Medical Records: I decided to obtain old medical records.  Clinical Tests:   Lab Tests: Ordered and Reviewed  Radiological Study: Reviewed and Ordered            Scribe Attestation:   Scribe #1: I performed the above scribed service and the documentation accurately describes the services I performed. I attest to the accuracy of the note.    Attending Attestation:             Attending ED Notes:   Patient with worsening shortness of breath and history of right sided hydrothorax. Exam today concerning for recurrence of this. Xray demonstrates large fluid collection. Hemodynamically stable. No evidence of tension physiology. Will admit to IM             Clinical Impression:       ICD-10-CM ICD-9-CM   1. Recurrent right pleural effusion J90 511.9   2. Shortness of breath R06.02 786.05   3. Thrombocytopenia D69.6 287.5   4. Other cirrhosis of liver K74.69 571.5   5. Hydrothorax J94.8 511.89                                Bibiana Pena MD  03/12/19 0035

## 2019-03-07 NOTE — SUBJECTIVE & OBJECTIVE
Review of Systems   Constitutional: Negative for activity change, appetite change, chills, diaphoresis, fatigue and fever.   HENT: Negative for trouble swallowing and voice change.    Eyes: Negative for photophobia, pain, discharge, redness, itching and visual disturbance.   Respiratory: Positive for cough and shortness of breath.    Cardiovascular: Negative for chest pain, palpitations and leg swelling.   Gastrointestinal: Positive for abdominal distention. Negative for abdominal pain, anal bleeding, blood in stool, constipation, diarrhea, nausea, rectal pain and vomiting.   Endocrine: Negative for cold intolerance, heat intolerance, polydipsia, polyphagia and polyuria.   Genitourinary: Negative for dysuria, frequency and urgency.   Musculoskeletal: Negative for back pain and neck pain.   Neurological: Negative for dizziness, syncope, weakness and light-headedness.       Past Medical History:   Diagnosis Date    Anemia     Anxiety 2018    CAH (chronic active hepatitis)     Depression 2018    Encephalopathy     Essential hypertension 2018    Hepatic Hydrothorax 2018    Hypoalbuminemia 2018    Other cirrhosis of liver 2018    Pleural effusion     Thrombocytopenia 2018       Past Surgical History:   Procedure Laterality Date    breast reduction       SECTION      HYSTERECTOMY      TIPS N/A 2018    Performed by [facility]    TIPS N/A 2018    [facility]    Venogram N/A 2018    Performed by [facility] Diagnostic Provider at [facility] OR [address] [place] [address]       Family history of liver disease: No    Review of patient's allergies indicates:   Allergen Reactions    Promethazine hcl Itching       Tobacco Use    Smoking status: Former Smoker     Types: Cigarettes     Last attempt to quit: 3/6/2018     Years since quittin.0    Smokeless tobacco: Never Used    Tobacco comment: quit 2018   Substance and Sexual Activity     Alcohol use: No    Drug use: No    Sexual activity: Not on file       Medications Prior to Admission   Medication Sig Dispense Refill Last Dose    clonazePAM (KLONOPIN) 1 MG tablet daily prn   3/6/2019    furosemide (LASIX) 80 MG tablet Take 1 tablet (80 mg total) by mouth 2 (two) times daily. 60 tablet 2 3/6/2019    glimepiride (AMARYL) 2 MG tablet Take 2 mg by mouth as needed.   3/6/2019    lactulose (CHRONULAC) 10 gram/15 mL solution Take 45 mLs (30 g total) by mouth 3 (three) times daily. Adjust to 3-4 bowel movements daily. Takes 30 ml TID   3/6/2019    midodrine (PROAMATINE) 10 MG tablet Take 1 tablet (10 mg total) by mouth 3 (three) times daily. 90 tablet 5 3/6/2019    POTASSIUM BROMIDE, BULK, MISC by Misc.(Non-Drug; Combo Route) route.   3/6/2019    spironolactone (ALDACTONE) 100 MG tablet Take 2 tablets (200 mg total) by mouth once daily. 60 tablet 2 3/6/2019    traZODone (DESYREL) 100 MG tablet Take 100 mg by mouth every evening.   3/6/2019    ciprofloxacin HCl (CIPRO) 500 MG tablet Take 1 tablet (500 mg total) by mouth once daily. 30 tablet 11 Taking    hyoscyamine (LEVSIN/SL) 0.125 mg Subl Take 1 tablet by mouth up to four times daily   Taking       Objective:     Vital Signs (Most Recent):  Temp: 97.9 °F (36.6 °C) (03/07/19 1705)  Pulse: 77 (03/07/19 1705)  Resp: 14 (03/07/19 1705)  BP: (!) 92/49 (03/07/19 1705)  SpO2: (!) 93 % (03/07/19 1705) Vital Signs (24h Range):  Temp:  [97.9 °F (36.6 °C)-98.2 °F (36.8 °C)] 97.9 °F (36.6 °C)  Pulse:  [75-97] 77  Resp:  [14-20] 14  SpO2:  [93 %-100 %] 93 %  BP: ()/(47-62) 92/49     Weight: 70.3 kg (154 lb 15.7 oz) (03/07/19 1419)  Body mass index is 27.45 kg/m².    Physical Exam   Constitutional: She is oriented to person, place, and time. She appears well-developed and well-nourished.   HENT:   Head: Normocephalic and atraumatic.   Eyes: No scleral icterus.   Cardiovascular: Normal rate and regular rhythm.   Pulmonary/Chest: Effort normal and  breath sounds normal.   Abdominal: Soft. Bowel sounds are normal. She exhibits distension. There is no tenderness.   Musculoskeletal: She exhibits no edema.   Neurological: She is alert and oriented to person, place, and time.       MELD-Na score: 10 at 3/7/2019  8:55 AM  MELD score: 10 at 3/7/2019  8:55 AM  Calculated from:  Serum Creatinine: 1.3 mg/dL at 3/7/2019  8:55 AM  Serum Sodium: 132 mmol/L at 3/7/2019  8:55 AM  Total Bilirubin: 0.8 mg/dL (Rounded to 1 mg/dL) at 3/7/2019  8:55 AM  INR(ratio): 1.1 at 3/7/2019  8:55 AM  Age: 59 years    Significant Labs:  CBC:   Recent Labs   Lab 03/07/19  0855   WBC 3.52*   RBC 4.06   HGB 9.5*   HCT 31.9*   PLT 46*     CMP:   Recent Labs   Lab 03/07/19  0855   GLU 96   CALCIUM 8.6*   ALBUMIN 2.3*   PROT 6.0   *   K 3.7   CO2 28   CL 97   BUN 15   CREATININE 1.3   ALKPHOS 68   ALT 16   AST 22   BILITOT 0.8     Coagulation:   Recent Labs   Lab 03/07/19  0855   INR 1.1       Significant Imaging:  X-Ray: Reviewed

## 2019-03-07 NOTE — PROCEDURES
"Michelle Pappas is a 59 y.o. female patient.    Temp: 98.1 °F (36.7 °C) (19 0836)  Pulse: 88 (19 1102)  Resp: 20 (19 0930)  BP: (!) 95/51 (19 1102)  SpO2: 99 % (19 1102)       Thoracentesis  Date/Time: 3/7/2019 11:53 AM  Performed by: Yunior Bui MD  Authorized by: Yunior Bui MD   Consent Done: Yes  Consent: Verbal consent obtained. Written consent obtained.  Risks and benefits: risks, benefits and alternatives were discussed  Consent given by: patient  Patient understanding: patient states understanding of the procedure being performed  Patient consent: the patient's understanding of the procedure matches consent given  Procedure consent: procedure consent matches procedure scheduled  Relevant documents: relevant documents present and verified  Test results: test results available and properly labeled  Site marked: the operative site was marked  Imaging studies: imaging studies available  Required items: required blood products, implants, devices, and special equipment available  Patient identity confirmed: , MRN, name and verbally with patient  Time out: Immediately prior to procedure a "time out" was called to verify the correct patient, procedure, equipment, support staff and site/side marked as required.  Procedure purpose: diagnostic and therapeutic  Indications: pleural effusion  Preparation: Patient was prepped and draped in the usual sterile fashion.  Local anesthesia used: yes    Anesthesia:  Local anesthesia used: yes  Local Anesthetic: lidocaine 1% without epinephrine  Anesthetic total: 8 mL  Patient sedated: no  Preparation: skin prepped with ChloraPrep  Patient position: supported by bedside stand  Ultrasound guidance: yes  Location: right posterior  Puncture method: over-the-needle catheter  Number of attempts: 1  Drainage amount: 1200 ml  Drainage characteristics: serosanguinous  Patient tolerance: Patient tolerated the procedure well with no immediate " complications  Complications: No  Estimated blood loss (mL): 5  Comments: Procedure was well tolerated by Pt, who had no discomfort, SOB, chest pain, or significant changes in vital signs during or immediately after procedure. Metabolic, infectious, and cytologic studies ordered. CXR ordered to confirm no PTX.        Yunior Bui MD  Fellow, Pulmonary & Critical Care Medicine

## 2019-03-07 NOTE — ASSESSMENT & PLAN NOTE
CXR: Large right pleural effusion with prominent compression atelectasis of right lung, remaining portions of right upper lobe and right middle lobe retained air.  satting 98% on RA  Cont diuretics with lasix and aldactone  Duo nebs as needed  continuous pulse ox  S/p TIPS   Check live sonogram to check tips patency  Consult pulmonary for therapeutic thoracentesis

## 2019-03-07 NOTE — ED NOTES
Dr. Bui with pulmonary at bedside preparing for thoracentesis. Thoracentesis kit brought into room and lidocaine. Consents signed and at bedside.

## 2019-03-07 NOTE — CONSULTS
Consult Note  Pulmonary Medicine    SUBJECTIVE     Reason for consult: pleural effusion, right    History of Present Illness  PCC is consulted for pleural effusion in this 59-yo female admitted today to hospital medicine after presenting to ED with SOB that had increased overnight. Pt has cirrhosis s/p TIPS last year, but has had recurrent pleural effusions over recent month. She last had thoracentesis two days ago. About 2000 cc was removed. She reports that the fluid is starting to reaccumulate much faster. She denies nausea, vomiting, fever, nose bleeds or leg swelling.       Past Medical History:   Diagnosis Date    Anemia     Anxiety 2018    CAH (chronic active hepatitis)     Depression 2018    Encephalopathy     Essential hypertension 2018    Hepatic Hydrothorax 2018    Hypoalbuminemia 2018    Other cirrhosis of liver 2018    Pleural effusion     Thrombocytopenia 2018       Past Surgical History:   Procedure Laterality Date    breast reduction       SECTION      HYSTERECTOMY      TIPS N/A 2018    Performed by [facility]    TIPS N/A 2018    [facility] by [facility]    Venogram N/A 2018    Performed by [facility] Diagnostic Provider at [facility] OR [address] [place] [address]       No family history on file.    Social History     Socioeconomic History    Marital status:      Spouse name: None    Number of children: None    Years of education: None    Highest education level: None   Social Needs    Financial resource strain: None    Food insecurity - worry: None    Food insecurity - inability: None    Transportation needs - medical: None    Transportation needs - non-medical: None   Occupational History    None   Tobacco Use    Smoking status: Former Smoker     Types: Cigarettes     Last attempt to quit: 3/6/2018     Years since quittin.0    Smokeless tobacco: Never Used    Tobacco comment: quit 2018   Substance and  Sexual Activity    Alcohol use: No    Drug use: No    Sexual activity: None   Other Topics Concern    None   Social History Narrative    None       Current Facility-Administered Medications   Medication Dose Route Frequency Provider Last Rate Last Dose    albuterol-ipratropium 2.5 mg-0.5 mg/3 mL nebulizer solution 3 mL  3 mL Nebulization Q4H PRN Sam Hare MD        dextrose 50% injection 12.5 g  12.5 g Intravenous PRN Sam Hare MD        dextrose 50% injection 25 g  25 g Intravenous PRN Sam Hare MD        furosemide injection 80 mg  80 mg Intravenous BID Sam Hare MD        glucagon (human recombinant) injection 1 mg  1 mg Intramuscular PRN Sam Hare MD        glucose chewable tablet 16 g  16 g Oral PRN Sam Hare MD        glucose chewable tablet 24 g  24 g Oral PRN Sam Hare MD        heparin (porcine) injection 5,000 Units  5,000 Units Subcutaneous Q8H Sam Hare MD        insulin aspart U-100 pen 0-5 Units  0-5 Units Subcutaneous QID (AC + HS) PRN Sam Hare MD        lactulose 20 gram/30 mL solution Soln 15 g  15 g Oral TID Sam Hare MD        midodrine tablet 10 mg  10 mg Oral TID Sam Hare MD        ondansetron disintegrating tablet 8 mg  8 mg Oral Q8H PRN Sam Hare MD        rifAXIMin tablet 550 mg  550 mg Oral BID Sam Hare MD        sodium chloride 0.9% flush 5 mL  5 mL Intravenous PRN Bibiana Pena MD        spironolactone tablet 200 mg  200 mg Oral Daily Sam Hare MD        traZODone tablet 100 mg  100 mg Oral QHS Sam Hare MD         Current Outpatient Medications   Medication Sig Dispense Refill    clonazePAM (KLONOPIN) 1 MG tablet daily prn      furosemide (LASIX) 80 MG tablet Take 1 tablet (80 mg total) by mouth 2 (two) times daily. 60 tablet 2    glimepiride (AMARYL) 2 MG tablet Take 2 mg by mouth as needed.      lactulose (CHRONULAC) 10 gram/15 mL solution Take 45 mLs (30 g total) by mouth 3 (three) times  daily. Adjust to 3-4 bowel movements daily. Takes 30 ml TID      midodrine (PROAMATINE) 10 MG tablet Take 1 tablet (10 mg total) by mouth 3 (three) times daily. 90 tablet 5    POTASSIUM BROMIDE, BULK, MISC by Misc.(Non-Drug; Combo Route) route.      spironolactone (ALDACTONE) 100 MG tablet Take 2 tablets (200 mg total) by mouth once daily. 60 tablet 2    traZODone (DESYREL) 100 MG tablet Take 100 mg by mouth every evening.      ciprofloxacin HCl (CIPRO) 500 MG tablet Take 1 tablet (500 mg total) by mouth once daily. 30 tablet 11    hyoscyamine (LEVSIN/SL) 0.125 mg Subl Take 1 tablet by mouth up to four times daily       Facility-Administered Medications Ordered in Other Encounters   Medication Dose Route Frequency Provider Last Rate Last Dose    fentaNYL injection 50 mcg  50 mcg Intravenous Q3 Min PRN Martha Ayon, NP   50 mcg at 08/13/18 1620    midazolam (VERSED) 1 mg/mL injection 1 mg  1 mg Intravenous Q3 Min PRN Martha Ayon, NP   1 mg at 08/13/18 1620       Review of patient's allergies indicates:  No Known Allergies    Review of Systems  As above. Additionally:  Constitutional: Negative for chills and fever.   HENT: Negative for mouth sores and sneezing.    Eyes: Negative for discharge and redness.   Gastrointestinal: Negative for nausea and vomiting.   Genitourinary: Negative for dysuria and hematuria.   Musculoskeletal: Negative for back pain and neck pain.   Neurological: Negative for light-headedness and headaches.   Psychiatric/Behavioral: Negative for behavioral problems and confusion.         OBJECTIVE     Vitals:    03/07/19 1102   BP: (!) 95/51   Pulse: 88   Resp:    Temp:        Physical Exam  Constitutional: Vital signs are normal. She appears ill.   HENT: Head: Normocephalic and atraumatic.   Mouth/Throat: Oropharynx is clear and moist.   Eyes: Conjunctivae and EOM are normal. Pupils are equal, round, and reactive to light.   Neck: Trachea normal and normal range of motion.  Neck supple.   Cardiovascular: Normal rate, regular rhythm, normal heart sounds and normal pulses.   1+ edema bilateral lower extremity.    Pulmonary/Chest: She has decreased breath sounds in the right lower field.   Dullness in right lung wall.    Abdominal: Soft. Normal appearance and bowel sounds are normal. There is no tenderness.   Musculoskeletal: Normal range of motion.   1+ edema bilateral lower extremity.   Neurological: She is alert and oriented to person, place, and time.   Skin: Skin is warm and dry.         Laboratory  Recent Labs   Lab 03/07/19  0855   INR 1.1       Recent Labs   Lab 03/07/19  0855   WBC 3.52*   HGB 9.5*   HCT 31.9*   PLT 46*       Recent Labs   Lab 03/07/19  0855   *   K 3.7   CL 97   CO2 28   BUN 15   CREATININE 1.3   CALCIUM 8.6*   PROT 6.0   BILITOT 0.8   ALKPHOS 68   ALT 16   AST 22     No results for input(s): PH, PCO2, PO2, HCO3, POCSATURATED, BE in the last 24 hours.      Diagnostic Results: Reviewed        Assessment / Recommendations     Pleural effusion, right     - very likely another hepatic hydrothorax; this is a portal HTN-related problem that has been recurring with greater frequency  - no alarming signs of infection or decompensation  - given her mild respiratory difficulty, it is reasonable to offer therapeutic thoracentesis now  - Hepatology following for long-term solution to her recurrent effusions, input appreciated       Thank you for this consult.     Yunior Bui MD  Fellow, Pulmonary & Critical Care Medicine

## 2019-03-07 NOTE — HPI
59 year old female with decompensated Hep C cirrhosis c/b PVT s/p thrombectomy with balloon angioplasty as well as TIPS on who Hepatology is consulted for decompensated cirrhosis.    Patient followed in Hepatology clinic until August-September of 2018 when she moved to [place]. She reports that starting in December she has had recurrent cough with shortness of breath which have required 4 thoracentesis (12/3/18, 2/22/19, 3/1/19, and 3/5/19). She now presents to [facility] for the same complaint and is status post a thoracentesis with removal of 1.2L in the ED. She is unsure if during her last admissions her TIPS was evaluated but does report being compliant with Na+/liquid restriction as well as with her diuretics. She denies any fever, chills, chest pain, nausea, emesis, abdominal pain, or changes in her urinary/bowel habits.

## 2019-03-07 NOTE — ASSESSMENT & PLAN NOTE
59 year old female with decompensated Hep C cirrhosis c/b PVT s/p thrombectomy with balloon angioplasty as well as TIPS on who Hepatology is consulted for decompensated cirrhosis. Patient did well with TIPS placement and diuretics however due to ~ 5 thoracentesis in the past 3 months with reported compliance with diuretics/diet we wonder if the TIPS is patent. Recommend continuing diuretics as well as assessing TIPS patency with US with doppler.    Recommendations:  --NPO after MN in case that US shows occluded TIPS and intervention is needed  --Daily CMP, CBC, and INR  --Strict I/O  --Continue diuretics  --Obtain AFP (order placed)  --Obtain US with doppler to assess TIPS patency

## 2019-03-07 NOTE — HPI
History of Present Illness:  Patient is a 59 y.o. female who has a past medical history of cirrhosis of liver likely to be secondary to hepatitis C, hepatitis C treated with Harvoni, Hepatic Hydrothorax, Pleural effusion, s/p TIPS procedure in June 2018 with reduction of portosystemic gradient, Encephalopathy, Hypoalbuminemia, Thrombocytopenia, Anemia, Anxiety, Depression, Essential hypertension presented with shortness of breath at rest, and with minimal acitivity. Symptoms include constant cough, dyspnea on exertion, dyspnea when laying down and edema to bilateral LE. Symptoms began yesterday evening gradually worsening since that time. Patient denies sputum production, tightness in chest and wheezing. Patient states that she has had a thoracentesis on 3/1 and 2 L were removed and also had a thoracentesis on 3/5 and 2 L were removed. Brother at bedside and states that she was recently admitted to the hospital in [place] due to fluid buildup. Only other complaint is that her appetite has been decreased. Of note per hepatology, at the time of the TIPS she had a mechanical thrombectomy and balloon angioplasty of a portal vein thrombosis with restoration of flow. There was residual clot noted. She was placed on a blood thinner. On 8/13/18 she had a venogram: demonstrates brisk flow through the portal vein with no evidence of residual portal vein thrombosis as well as brisk flow through the superior mesenteric vein with no evidence of residual mesenteric venous thrombosis; the tip is widely patent.  The portosystemic gradient is 9. Routine tips ultrasound is recommended in 3 months. She is off blood thinners. Patient currently denies any fever/chills, chest pain, dyspnea, weakness, numbness, abdominal pain, nausea/vomiting, dysuria/hematuria, or weight loss.

## 2019-03-07 NOTE — ED NOTES
Bed: [facility]  Expected date:   Expected time:   Means of arrival:   Comments:

## 2019-03-07 NOTE — ASSESSMENT & PLAN NOTE
BP Readings from Last 3 Encounters:   03/07/19 (!) 97/52   08/16/18 104/60   08/14/18 (!) 107/53     Cont midodrine to treat  Cont to monitor.

## 2019-03-07 NOTE — ASSESSMENT & PLAN NOTE
Lab Results   Component Value Date    HGBA1C 5.7 (H) 07/28/2018     Hold home oral agents- insulin is the preferred treatment for hyperglycemia  Low dose correction scale   Cont blood glucose monitoring   ADA diet

## 2019-03-07 NOTE — ASSESSMENT & PLAN NOTE
MELD-Na score: 10 at 3/7/2019  8:55 AM  MELD score: 10 at 3/7/2019  8:55 AM  Calculated from:  Serum Creatinine: 1.3 mg/dL at 3/7/2019  8:55 AM  Serum Sodium: 132 mmol/L at 3/7/2019  8:55 AM  Total Bilirubin: 0.8 mg/dL (Rounded to 1 mg/dL) at 3/7/2019  8:55 AM  INR(ratio): 1.1 at 3/7/2019  8:55 AM  Age: 59 years       Continue Lactulose and Rifaximin to prevent encephalopathy   Ascities associated with slightly decreased protein.   Continue Lasix and Aldactone at present doses.   Cont fluid and sodium restriction.   Consult hepatology

## 2019-03-08 NOTE — ASSESSMENT & PLAN NOTE
Lab Results   Component Value Date    CREATININE 1.3 03/08/2019     Sr Cr stable. Baseline 1.0-1.4  Cont to monitor with daily labs   Avoid nephrotoxins.   Renally dose all medications   Monitor events that may lead to decreased renal perfusion (hypovolemia, hypotension, sepsis).   Monitor urine output

## 2019-03-08 NOTE — SUBJECTIVE & OBJECTIVE
Interval History: No acute events overnight. S/p thoracentesis yesterday by pulmonary with removal of approximately 1.2L of pleural fluid. Now patient feels SOB again. Will repeat CXR.    Review of Systems   Constitutional: Positive for activity change and fatigue. Negative for chills and fever.   HENT: Negative for congestion, facial swelling, hearing loss and trouble swallowing.    Eyes: Negative for photophobia and visual disturbance.   Respiratory: Positive for shortness of breath. Negative for chest tightness and wheezing.    Cardiovascular: Negative for chest pain, palpitations and leg swelling.   Gastrointestinal: Negative for abdominal pain, blood in stool, constipation, diarrhea, nausea and vomiting.   Endocrine: Negative.    Genitourinary: Negative.    Musculoskeletal: Negative for back pain, joint swelling and myalgias.   Skin: Negative.    Allergic/Immunologic: Negative.    Neurological: Negative for dizziness, facial asymmetry, speech difficulty, weakness and numbness.   Hematological: Negative.    Psychiatric/Behavioral: Negative for agitation, confusion and dysphoric mood. The patient is not nervous/anxious.      Objective:     Vital Signs (Most Recent):  Temp: 97.9 °F (36.6 °C) (03/08/19 1001)  Pulse: 91 (03/08/19 1138)  Resp: 18 (03/08/19 1001)  BP: (!) 102/59 (03/08/19 1001)  SpO2: 95 % (03/08/19 1001) Vital Signs (24h Range):  Temp:  [97 °F (36.1 °C)-98.5 °F (36.9 °C)] 97.9 °F (36.6 °C)  Pulse:  [75-99] 91  Resp:  [14-18] 18  SpO2:  [88 %-100 %] 95 %  BP: ()/(47-60) 102/59     Weight: 70.3 kg (154 lb 15.7 oz)  Body mass index is 27.45 kg/m².  No intake or output data in the 24 hours ending 03/08/19 1138   Physical Exam   Constitutional: She is oriented to person, place, and time. Vital signs are normal. She appears well-developed and well-nourished.  Non-toxic appearance. She does not appear ill. No distress.   HENT:   Head: Normocephalic and atraumatic.   Eyes: Conjunctivae and EOM are  normal. Pupils are equal, round, and reactive to light. No scleral icterus.   Neck: Normal range of motion and full passive range of motion without pain. Neck supple. No JVD present. Carotid bruit is not present. No thyromegaly present.   Cardiovascular: Normal rate, regular rhythm, S1 normal, S2 normal, normal heart sounds and normal pulses. Exam reveals no gallop, no S3, no S4 and no friction rub.   No murmur heard.  Pulmonary/Chest: Effort normal. No accessory muscle usage. No tachypnea. No respiratory distress. She has decreased breath sounds in the right middle field and the right lower field. She has no wheezes. She has no rhonchi. She has no rales.   Abdominal: Soft. Normal appearance and bowel sounds are normal. She exhibits distension. She exhibits no shifting dullness, no abdominal bruit and no ascites. There is no hepatosplenomegaly. There is no tenderness. There is no rigidity and no guarding.   Musculoskeletal: Normal range of motion. She exhibits no edema or tenderness.   Neurological: She is alert and oriented to person, place, and time. She has normal strength. She is not disoriented. No cranial nerve deficit or sensory deficit. GCS eye subscore is 4. GCS verbal subscore is 5. GCS motor subscore is 6.   Skin: Skin is warm, dry and intact. No abrasion and no lesion noted.   Psychiatric: She has a normal mood and affect. Her behavior is normal. Judgment and thought content normal. Her mood appears not anxious. Her speech is not slurred. She is not actively hallucinating. Cognition and memory are normal. She does not exhibit a depressed mood. She is attentive.     Significant Labs: All pertinent labs within the past 24 hours have been reviewed.    Significant Imaging: I have reviewed all pertinent imaging results/findings within the past 24 hours.     US Liver with Doppler  Narrative: EXAMINATION:  US LIVER WITH DOPPLER    CLINICAL HISTORY:  evaluate tips;    TECHNIQUE:  Complete abdominal ultrasound  (including pancreas, liver, gallbladder, common bile duct, spleen, aorta, IVC, and kidneys) was performed.    Complete abdominal doppler exam was also performed.    COMPARISON:  Ultrasound 07/20/2018, 05/23/2018.    FINDINGS:  Liver: Normal in size, measuring 14.4 cm. Heterogeneous echotexture with nodular contour compatible with cirrhosis.  Stable cyst within the left hepatic lobe measuring 0.7 x 0.7 x 0.7 cm.    Gallbladder: Several non-mobile stones within the gallbladder measuring up to 0.7 cm.  Mild wall thickening to 0.4 cm, similar to prior studies and likely due to hepatic dysfunction.  No pericholecystic fluid.  No sonographic Arevalo's sign.    Biliary system: The common duct is not dilated, measuring 2 mm.  No intrahepatic ductal dilatation.    Spleen: Enlarged with a homogeneous echotexture, measuring 20.9 x 4.2 cm.  Contains a complex cystic structure measuring 1.8 x 2.0 x 1.8 cm, similar to prior studies.    Pancreas: The visualized portions of pancreas appear normal.    Inferior vena cava: Normal in appearance.    Miscellaneous: Small volume ascites.  Collateral vessel formation throughout the upper abdomen.    Main hepatic artery: Patent.    Main portal vein: Partially thrombosed with sluggish hepatopetal flow.    Left portal vein:  Patent with hepatopetal flow.    Right portal vein:  Anterior branch is patent with hepatopetal flow.    SMV:  Persistent partial thrombosis.    IVC: Patent    Left, middle, and right hepatic veins: Patent.    Umbilical vein: Not patent.    TIPS stent again identified and is now thrombosed along its length, new since the most recent prior study.    Right-sided pleural effusion is present.  Impression: 1. 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  Recommend further evaluation with triple phase CT and consult to interventional  radiology.  2. Cirrhosis and sequela of portal hypertension including splenomegaly, small volume ascites, collateral vessel formation.  3. Cholelithiasis.  4. Right pleural effusion.  5. Stable complex cystic splenic lesion.  This report was flagged in Epic as abnormal.    Electronically signed by resident: Rivera Pang  Date:    03/08/2019  Time:    08:28    Electronically signed by: Jung Herrera MD  Date:    03/08/2019  Time:    08:48

## 2019-03-08 NOTE — PROGRESS NOTES
"[facility]  Adult Nutrition  Progress Note    SUMMARY       Recommendations    Recommendation/Intervention: 1. encouraged protein consumption 1st  Since c/o early satiety  2. Add mv & m tablet   Goals: 1. consume > 75% of tray 2. reduce edema ,   Nutrition Goal Status: new  Communication of RD Recs: other (comment)    Reason for Assessment    Reason For Assessment: consult  Diagnosis: other (hydrothorax, SOB)  Relevant Medical History:  S/p thoracentesis 3/05/19, liver cirrhosis  2/2 Hep C  Dm 2, CKD, Anemia,   pl. effusion, depression, htn, TIPS -2018  Interdisciplinary Rounds: did not attend  General Information Comments: (general eats 4-5 small meals/day.Not a bkfst eater. checks glucose 2 x/day- usually low so does not take the amaryl which is prescribed as needed)  Yesterday  Ate  chicken sand at 3pm and for supper a pack of cheese crackers  Nutrition Discharge Plannin. Tolerate and consume >75% EEN/EPN    Nutrition Risk Screen    Nutrition Risk Screen: no indicators present    Nutrition/Diet History    Patient Reported Diet/Restrictions/Preferences: Diabetic diet,  1L fluid rest  Typical Food/Fluid Intake: dt tea or dt juices(on 1 L fl restr. at home)  Spiritual, Cultural Beliefs, Gnosticism Practices, Values that Affect Care: no  Food Allergies: NKFA, does not drink milk  Factors Affecting Nutritional Intake: abdominal distension, early satiety    Anthropometrics    Temp: 97 °F (36.1 °C)  Height Method: Stated  Height: 5' 3" (160 cm)  Height (inches): 63 in  Weight Method: Bed Scale  Weight: 70.3 kg (154 lb 15.7 oz)  Weight (lb): 154.98 lb  Ideal Body Weight (IBW), Female: 115 lb  % Ideal Body Weight, Female (lb): 134.77 lb  BMI (Calculated): 27.5  Usual Body Weight (UBW), k kg(on 2018)  % Usual Body Weight: 100.64  % Weight Change From Usual Weight: 0.43 %       Lab/Procedures/Meds    Pertinent Labs Reviewed: reviewed  Pertinent Labs Comments: (alb 1.9- at 2.3 -3/07), GFR 45, " BUN 21 , creat 1.3 Na 132, H/H 7.7/26, gluc 145  wbc 2.45 )  Pertinent Medications Reviewed: reviewed  Pertinent Medications Comments: lactulose 15 gr  tid, lasix 80 mg /d, midodrine, rifaximin, aldactone 200 mg /d    Physical Findings/Assessment   wt is  Stable sincee  5/2018 per chart review       Estimated/Assessed Needs    Weight Used For Calorie Calculations: 70.3 kg (154 lb 15.7 oz)  Energy Calorie Requirements (kcal): 1757  Energy Need Method: Kcal/kg 25  Protein Requirements: 84-98 1.2-1.4   Weight Used For Protein Calculations: 70.3 kg (154 lb 15.7 oz)        RDA Method (mL): 1757  CHO Requirement: (50%)      Nutrition Prescription Ordered    Current Diet Order: 2000 blake ADA , CARDAIAC 1500 ML FR  Oral Nutrition Supplement: none    Evaluation of Received Nutrient/Fluid Intake    Oral Fluid (mL): 0 recorded on I/O  Energy Calories Required: not meeting needs  Protein Required: not meeting needs  Fluid Required: not meeting needs  % Intake of Estimated Energy Needs: 50 - 75 %  % Meal Intake: 50 - 75 %    Nutrition Risk      moderate    Assessment and Plan       Nutrition Problem  Increased Na+ intakes    Related to (etiology):    food choices    Signs and Symptoms (as evidenced by):   SOB, fluid retention, edema    Interventions/Recommendations (treatment strategy):  Encourage Low Na_ food choices     Nutrition Diagnosis Status:   New    Monitor and Evaluation    Food and Nutrient Intake: energy intake, food and beverage intake  Food and Nutrient Adminstration: diet order  Knowledge/Beliefs/Attitudes: food and nutrition knowledge/skill, beliefs and attitudes  Physical Activity and Function: nutrition-related ADLs and IADLs  Anthropometric Measurements: weight, weight change, body mass index  Biochemical Data, Medical Tests and Procedures: electrolyte and renal panel, gastrointestinal profile, glucose/endocrine profile  Nutrition-Focused Physical Findings: overall appearance, extremities, muscles and bones,  head and eyes     Malnutrition Assessment           pt does not meet criteria for malnutriton  at present            Overall Physical Appearance: edematous, nourished  Oral/Mouth Cavity: WDL  Skin: intact                    Nutrition Follow-Up    RD Follow-up?: Yes

## 2019-03-08 NOTE — PROGRESS NOTES
[facility] Hospital Medicine  Progress Note    Patient Name: Michelle Pappas  MRN: [id number]  Patient Class: OP- Observation   Admission Date: 3/7/2019  Length of Stay: 0 days  Attending Physician: Sam Hare MD  Primary Care Provider: Taj Luna DO    [facility] Medicine Team: [facility] MED  Sam Tenorio MD    Subjective:     Principal Problem:Hydrothorax    HPI:  History of Present Illness:  Patient is a 59 y.o. female who has a past medical history of cirrhosis of liver likely to be secondary to hepatitis C, hepatitis C treated with Harvoni, Hepatic Hydrothorax, Pleural effusion, s/p TIPS procedure in June 2018 with reduction of portosystemic gradient, Encephalopathy, Hypoalbuminemia, Thrombocytopenia, Anemia, Anxiety, Depression, Essential hypertension presented with shortness of breath at rest, and with minimal acitivity. Symptoms include constant cough, dyspnea on exertion, dyspnea when laying down and edema to bilateral LE. Symptoms began yesterday evening gradually worsening since that time. Patient denies sputum production, tightness in chest and wheezing. Patient states that she has had a thoracentesis on 3/1 and 2 L were removed and also had a thoracentesis on 3/5 and 2 L were removed. Brother at bedside and states that she was recently admitted to the hospital in [place] due to fluid buildup. Only other complaint is that her appetite has been decreased. Of note per hepatology, at the time of the TIPS she had a mechanical thrombectomy and balloon angioplasty of a portal vein thrombosis with restoration of flow. There was residual clot noted. She was placed on a blood thinner. On 8/13/18 she had a venogram: demonstrates brisk flow through the portal vein with no evidence of residual portal vein thrombosis as well as brisk flow through the superior mesenteric vein with no evidence of residual mesenteric venous thrombosis; the tip is widely patent.  The portosystemic gradient is  9. Routine tips ultrasound is recommended in 3 months. She is off blood thinners. Patient currently denies any fever/chills, chest pain, dyspnea, weakness, numbness, abdominal pain, nausea/vomiting, dysuria/hematuria, or weight loss.     Hospital Course:  No notes on file    Interval History: No acute events overnight. S/p thoracentesis yesterday by pulmonary with removal of approximately 1.2L of pleural fluid. Now patient feels SOB again. Will repeat CXR.    Review of Systems   Constitutional: Positive for activity change and fatigue. Negative for chills and fever.   HENT: Negative for congestion, facial swelling, hearing loss and trouble swallowing.    Eyes: Negative for photophobia and visual disturbance.   Respiratory: Positive for shortness of breath. Negative for chest tightness and wheezing.    Cardiovascular: Negative for chest pain, palpitations and leg swelling.   Gastrointestinal: Negative for abdominal pain, blood in stool, constipation, diarrhea, nausea and vomiting.   Endocrine: Negative.    Genitourinary: Negative.    Musculoskeletal: Negative for back pain, joint swelling and myalgias.   Skin: Negative.    Allergic/Immunologic: Negative.    Neurological: Negative for dizziness, facial asymmetry, speech difficulty, weakness and numbness.   Hematological: Negative.    Psychiatric/Behavioral: Negative for agitation, confusion and dysphoric mood. The patient is not nervous/anxious.      Objective:     Vital Signs (Most Recent):  Temp: 97.9 °F (36.6 °C) (03/08/19 1001)  Pulse: 91 (03/08/19 1138)  Resp: 18 (03/08/19 1001)  BP: (!) 102/59 (03/08/19 1001)  SpO2: 95 % (03/08/19 1001) Vital Signs (24h Range):  Temp:  [97 °F (36.1 °C)-98.5 °F (36.9 °C)] 97.9 °F (36.6 °C)  Pulse:  [75-99] 91  Resp:  [14-18] 18  SpO2:  [88 %-100 %] 95 %  BP: ()/(47-60) 102/59     Weight: 70.3 kg (154 lb 15.7 oz)  Body mass index is 27.45 kg/m².  No intake or output data in the 24 hours ending 03/08/19 1138   Physical Exam    Constitutional: She is oriented to person, place, and time. Vital signs are normal. She appears well-developed and well-nourished.  Non-toxic appearance. She does not appear ill. No distress.   HENT:   Head: Normocephalic and atraumatic.   Eyes: Conjunctivae and EOM are normal. Pupils are equal, round, and reactive to light. No scleral icterus.   Neck: Normal range of motion and full passive range of motion without pain. Neck supple. No JVD present. Carotid bruit is not present. No thyromegaly present.   Cardiovascular: Normal rate, regular rhythm, S1 normal, S2 normal, normal heart sounds and normal pulses. Exam reveals no gallop, no S3, no S4 and no friction rub.   No murmur heard.  Pulmonary/Chest: Effort normal. No accessory muscle usage. No tachypnea. No respiratory distress. She has decreased breath sounds in the right middle field and the right lower field. She has no wheezes. She has no rhonchi. She has no rales.   Abdominal: Soft. Normal appearance and bowel sounds are normal. She exhibits distension. She exhibits no shifting dullness, no abdominal bruit and no ascites. There is no hepatosplenomegaly. There is no tenderness. There is no rigidity and no guarding.   Musculoskeletal: Normal range of motion. She exhibits no edema or tenderness.   Neurological: She is alert and oriented to person, place, and time. She has normal strength. She is not disoriented. No cranial nerve deficit or sensory deficit. GCS eye subscore is 4. GCS verbal subscore is 5. GCS motor subscore is 6.   Skin: Skin is warm, dry and intact. No abrasion and no lesion noted.   Psychiatric: She has a normal mood and affect. Her behavior is normal. Judgment and thought content normal. Her mood appears not anxious. Her speech is not slurred. She is not actively hallucinating. Cognition and memory are normal. She does not exhibit a depressed mood. She is attentive.     Significant Labs: All pertinent labs within the past 24 hours have been  reviewed.    Significant Imaging: I have reviewed all pertinent imaging results/findings within the past 24 hours.     US Liver with Doppler  Narrative: EXAMINATION:  US LIVER WITH DOPPLER    CLINICAL HISTORY:  evaluate tips;    TECHNIQUE:  Complete abdominal ultrasound (including pancreas, liver, gallbladder, common bile duct, spleen, aorta, IVC, and kidneys) was performed.    Complete abdominal doppler exam was also performed.    COMPARISON:  Ultrasound 07/20/2018, 05/23/2018.    FINDINGS:  Liver: Normal in size, measuring 14.4 cm. Heterogeneous echotexture with nodular contour compatible with cirrhosis.  Stable cyst within the left hepatic lobe measuring 0.7 x 0.7 x 0.7 cm.    Gallbladder: Several non-mobile stones within the gallbladder measuring up to 0.7 cm.  Mild wall thickening to 0.4 cm, similar to prior studies and likely due to hepatic dysfunction.  No pericholecystic fluid.  No sonographic Arevalo's sign.    Biliary system: The common duct is not dilated, measuring 2 mm.  No intrahepatic ductal dilatation.    Spleen: Enlarged with a homogeneous echotexture, measuring 20.9 x 4.2 cm.  Contains a complex cystic structure measuring 1.8 x 2.0 x 1.8 cm, similar to prior studies.    Pancreas: The visualized portions of pancreas appear normal.    Inferior vena cava: Normal in appearance.    Miscellaneous: Small volume ascites.  Collateral vessel formation throughout the upper abdomen.    Main hepatic artery: Patent.    Main portal vein: Partially thrombosed with sluggish hepatopetal flow.    Left portal vein:  Patent with hepatopetal flow.    Right portal vein:  Anterior branch is patent with hepatopetal flow.    SMV:  Persistent partial thrombosis.    IVC: Patent    Left, middle, and right hepatic veins: Patent.    Umbilical vein: Not patent.    TIPS stent again identified and is now thrombosed along its length, new since the most recent prior study.    Right-sided pleural effusion is present.  Impression: 1.  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  Recommend further evaluation with triple phase CT and consult to interventional radiology.  2. Cirrhosis and sequela of portal hypertension including splenomegaly, small volume ascites, collateral vessel formation.  3. Cholelithiasis.  4. Right pleural effusion.  5. Stable complex cystic splenic lesion.  This report was flagged in Epic as abnormal.    Electronically signed by resident: Rivera Pang  Date:    03/08/2019  Time:    08:28    Electronically signed by: Jung Herrera MD  Date:    03/08/2019  Time:    08:48        Assessment/Plan:      * Hepatic Hydrothorax    CXR: Large right pleural effusion with prominent compression atelectasis of right lung, remaining portions of right upper lobe and right middle lobe retained air.  satting 98% on RA  S/p thoracentesis yesterday by pulmonary with removal of approximately 1.2L of pleural fluid.   Cont diuretics with lasix and aldactone  Duo nebs as needed  continuous pulse ox  S/p TIPS   Check liver sonogram: Complete thrombosis of the TIPS stent and partial thrombosis of the main portal vein, new since the most recent prior study.  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 Recommend further evaluation with triple phase CT and consult to interventional radiology.  Discussed with hepatology. Plan for IR to revise TIPS on Monday  Will order triple phase CT     Recurrent right pleural effusion    Plan as above.        Decompensated HCV cirrhosis    MELD-Na score: 11 at 3/8/2019  4:51 AM  MELD score: 11 at 3/8/2019  4:51 AM  Calculated from:  Serum Creatinine: 1.3 mg/dL at 3/8/2019  4:51 AM  Serum Sodium: 132 mmol/L at 3/8/2019  4:51 AM  Total Bilirubin: 0.6 mg/dL (Rounded to 1 mg/dL) at 3/8/2019  4:51 AM  INR(ratio): 1.2 at 3/8/2019  4:51 AM  Age: 59 years        Continue Lactulose and Rifaximin to prevent encephalopathy   Ascities associated with slightly decreased protein.   Continue Lasix and Aldactone at present doses.   Cont fluid and sodium restriction.   Consult hepatology. Appreciate assistance.      Severe malnutrition    Nutrition consult       CKD stage 3 due to type 2 diabetes mellitus    Lab Results   Component Value Date    CREATININE 1.3 03/08/2019     Sr Cr stable. Baseline 1.0-1.4  Cont to monitor with daily labs   Avoid nephrotoxins.   Renally dose all medications   Monitor events that may lead to decreased renal perfusion (hypovolemia, hypotension, sepsis).   Monitor urine output      Pancytopenia    Lab Results   Component Value Date    WBC 3.52 (L) 03/07/2019    HGB 9.5 (L) 03/07/2019    HCT 31.9 (L) 03/07/2019    MCV 79 (L) 03/07/2019    PLT 46 (L) 03/07/2019     Secondary to underlying liver disease  All values at baseline   Cont to monitor.       Hypotension    BP Readings from Last 3 Encounters:   03/07/19 (!) 97/52   08/16/18 104/60   08/14/18 (!) 107/53     Cont midodrine to treat  Cont to monitor.        Insomnia    Cont Trazodone QHS       Diabetes    Lab Results   Component Value Date    HGBA1C 5.7 (H) 07/28/2018     Hold home oral agents- insulin is the preferred treatment for hyperglycemia  Low dose correction scale   Cont blood glucose monitoring   ADA diet       VTE Risk Mitigation (From admission, onward)        Ordered     heparin (porcine) injection 5,000 Units  Every 8 hours      03/07/19 1035     IP VTE LOW RISK PATIENT  Once      03/07/19 1035     Place ABIMAEL hose  Until discontinued      03/07/19 1035              Sam Tenorio MD  Department of Hospital Medicine   [facility]

## 2019-03-08 NOTE — PLAN OF CARE
Problem: Adult Inpatient Plan of Care  Goal: Plan of Care Review  Outcome: Ongoing (interventions implemented as appropriate)  Report from Tori SUÁREZ. Noted patient in bed awake, alert, and oriented. Denies pain and dyspnea at present. Denies questions. Bed in lowest position and call light within reach. Patient encouraged to notify staff when she needs assistance. Assessments per flow sheets. Will continue to monitor.

## 2019-03-08 NOTE — ASSESSMENT & PLAN NOTE
CXR: Large right pleural effusion with prominent compression atelectasis of right lung, remaining portions of right upper lobe and right middle lobe retained air.  satting 98% on RA  S/p thoracentesis yesterday by pulmonary with removal of approximately 1.2L of pleural fluid.   Cont diuretics with lasix and aldactone  Duo nebs as needed  continuous pulse ox  S/p TIPS   Check liver sonogram: Complete thrombosis of the TIPS stent and partial thrombosis of the main portal vein, new since the most recent prior study.  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 Recommend further evaluation with triple phase CT and consult to interventional radiology.  Discussed with hepatology. Plan for IR to revise TIPS on Monday  Will order triple phase CT

## 2019-03-08 NOTE — ASSESSMENT & PLAN NOTE
MELD-Na score: 11 at 3/8/2019  4:51 AM  MELD score: 11 at 3/8/2019  4:51 AM  Calculated from:  Serum Creatinine: 1.3 mg/dL at 3/8/2019  4:51 AM  Serum Sodium: 132 mmol/L at 3/8/2019  4:51 AM  Total Bilirubin: 0.6 mg/dL (Rounded to 1 mg/dL) at 3/8/2019  4:51 AM  INR(ratio): 1.2 at 3/8/2019  4:51 AM  Age: 59 years       Continue Lactulose and Rifaximin to prevent encephalopathy   Ascities associated with slightly decreased protein.   Continue Lasix and Aldactone at present doses.   Cont fluid and sodium restriction.   Consult hepatology. Appreciate assistance.

## 2019-03-08 NOTE — PT/OT/SLP EVAL
Physical Therapy Evaluation and Discharge Note    Patient Name:  Michelle Pappas   MRN:  [id number]    Recommendations:     Discharge Recommendations:  (Home no needs)   Discharge Equipment Recommendations: none   Barriers to discharge: None    Assessment:     Michelle aPppas is a 59 y.o. female admitted with a medical diagnosis of Hydrothorax. .  At this time, patient is functioning at their prior level of function and does not require further acute PT services.     Recent Surgery: * No surgery found *      Plan:     During this hospitalization, patient does not require further acute PT services.  Please re-consult if situation changes.      Subjective     Chief Complaint: 4/10 pain in neck/ears  Patient/Family Comments/goals: pt very pleasant and willing to work with therapy. Pt and brother both state pt is at/near baseline and will not require acute therapy or HHPT following d/c.   Pain/Comfort:  · Pain Rating 1: 4/10  · Location 1: other (see comments)(Ears/Neck )  · Pain Addressed 1: Reposition, Distraction, Cessation of Activity    Patients cultural, spiritual, Hindu conflicts given the current situation: no    Living Environment:  Pt lives with brother and roommate in [facility] with 4 STEPHANIE and bilat. Handrails.     Prior to admission, patients level of function was Ind with ADL's and amb but does not work or drive.  Equipment used at home: cane, straight.  DME owned (not currently used): none.  Upon discharge, patient will have assistance from brother.    Objective:     Communicated with RN prior to session.  Patient found sitting up in bedside chair upon PT entry to room found with:       General Precautions: Standard, fall   Orthopedic Precautions:N/A   Braces: N/A     Exams:  · Cognitive Exam:  Patient is oriented to Person, Place, Time and Situation  · RLE ROM: WNL  · RLE Strength: WNL  · LLE ROM: WNL  · LLE Strength: WNL    Functional Mobility:  · Bed Mobility:  Repots Ind.  · Transfers:     · Sit to  Stand:  independence with no AD  · Gait: pt amb 75' Ind. Without gait deviations noted   · Balance: Sitting: Normal    Standing: Good/Normal     AM-PAC 6 CLICK MOBILITY  Total Score:24       Therapeutic Activities and Exercises:   -Pt educated on:   -PT roles, expectations, and POC    -Safety with mobility   -Benefits of OOB activities to increase strength and functional mobility    -Discharge recommendations      AM-PAC 6 CLICK MOBILITY  Total Score:24     Patient left up in chair with call button in reach and brother and roomate present.    GOALS:   Multidisciplinary Problems     Physical Therapy Goals     Not on file          Multidisciplinary Problems (Resolved)        Problem: Physical Therapy Goal    Goal Priority Disciplines Outcome Goal Variances Interventions   Physical Therapy Goal   (Resolved)     PT, PT/OT Outcome(s) achieved                     History:     Past Medical History:   Diagnosis Date    Anemia     Anxiety 2018    CAH (chronic active hepatitis)     Depression 2018    Encephalopathy     Essential hypertension 2018    Hepatic Hydrothorax 2018    Hypoalbuminemia 2018    Other cirrhosis of liver 2018    Pleural effusion     Thrombocytopenia 2018       Past Surgical History:   Procedure Laterality Date    breast reduction       SECTION      HYSTERECTOMY      TIPS N/A 2018    Performed by [facility]    TIPS N/A 2018    Performed by [facility] at [facility]    Venogram N/A 2018    Performed by [facility] Diagnostic Provider at [facility] OR [address] [place] [address]       Time Tracking:     PT Received On: 19  PT Start Time: 932     PT Stop Time: 940  PT Total Time (min): 8 min     Billable Minutes: Evaluation 8      Fransisco Gillespie, PT  2019

## 2019-03-08 NOTE — PLAN OF CARE
Problem: Physical Therapy Goal  Goal: Physical Therapy Goal  Outcome: Outcome(s) achieved Date Met: 03/08/19  No goals set, pt does not require additional acute PT services at this time due to baseline functional mobility.     Pt educated on asking medical staff for PT consult if changes in functional status occurs.     - Alan Gillespie, PT, DPT  3/8/2019

## 2019-03-08 NOTE — TREATMENT PLAN
Hepatology Treatment Plan  03/08/2019  12:06 PM    Chart reviewed and case discussed with attending.  Recommend continuing diuretics at this time.  Based on the US findings recommend a CT abdomen triple phase today followed by TIPS revision on Monday by IR. We will continue to follow alongside primary team, please call with any additional questions.    Kena Guzmán M.D.  Gastroenterology Fellow, PGY-V  Pager: [phone]  [facility]

## 2019-03-08 NOTE — PLAN OF CARE
Problem: Adult Inpatient Plan of Care  Goal: Plan of Care Review  Recommendation/Intervention: 1. Encouraged protein consumption 1st  since c/o early satiety , small frequent meals  2. Add mv & m tablet   Goals: 1. consume > 75% of tray 2. reduce edema  3. Stabilize  Kidney fxn    Nutrition Goal Status: new  Communication of RD Recs: other (comment)

## 2019-03-08 NOTE — NURSING
Patient returned from CT scan asking for MD to place diet order. On call MD paged per . Awaiting call back

## 2019-03-09 NOTE — PT/OT/SLP EVAL
Occupational Therapy   Evaluation and Discharge Note    Name: iMchelle Pappas  MRN: [id number]  Admitting Diagnosis:  Hydrothorax      Recommendations:     Discharge Recommendations: (home with brother )  Discharge Equipment Recommendations:  none  Barriers to discharge:  None    Assessment:     Michelle Pappas is a 59 y.o. female with a medical diagnosis of Hydrothorax. At this time, patient is functioning at their prior level of function and does not require further acute OT services.     Plan:     During this hospitalization, patient does not require further acute OT services.  Please re-consult if situation changes.    · Plan of Care Reviewed with: patient    Subjective     Chief Complaint: no complaints   Patient/Family Comments/goals: return to home     Occupational Profile:  Living Environment: Pt lives with brother in 1 story house with 4 steps to enter and B handrails   Previous level of function: Pt was able to complete self care without assist   Equipment Used at home:  cane, straight  Assistance upon Discharge: brother able to assist     Pain/Comfort:  ·      Patients cultural, spiritual, Baptist conflicts given the current situation: no    Objective:     Communicated with: RN prior to session.  Patient found supine with   upon OT entry to room.    General Precautions: Standard, fall   Orthopedic Precautions:N/A   Braces:       Occupational Performance:    Bed Mobility:    · Pt independent with bed mobility     Functional Mobility/Transfers:  · Functional Mobility: Pt able to demonstrate safer and effective hallway mobility     Activities of Daily Living:  · Pt able to complete basic self care in room     Cognitive/Visual Perceptual:  Cognitive/Psychosocial Skills:     -       Oriented to: Person, Place, Time and Situation   -       Follows Commands/attention:Follows two-step commands  -       Communication: clear/fluent  -       Memory: No Deficits noted  -       Safety awareness/insight to  disability: intact   -       Mood/Affect/Coping skills/emotional control: Appropriate to situation    Physical Exam:  Upper Extremity Range of Motion:     -       Right Upper Extremity: WFL  -       Left Upper Extremity: WFL  Upper Extremity Strength:    -       Right Upper Extremity: WFL  -       Left Upper Extremity: WFL    AMPAC 6 Click ADL:  AMPAC Total Score:      Treatment & Education:  Evaluation complete.  Pt educated on safety, role of OT, importance of increased participation in self care for gains , expectations for participation, expectations for gains, POC, energy conservation, caregiver strain. White board updated.     Education:    Patient left supine with all lines intact    GOALS:   Multidisciplinary Problems     Occupational Therapy Goals     Not on file          Multidisciplinary Problems (Resolved)        Problem: Occupational Therapy Goal    Goal Priority Disciplines Outcome Interventions   Occupational Therapy Goal   (Resolved)     OT, PT/OT Outcome(s) achieved                    History:     Past Medical History:   Diagnosis Date    Anemia     Anxiety 2018    CAH (chronic active hepatitis)     Depression 2018    Encephalopathy     Essential hypertension 2018    Hepatic Hydrothorax 2018    Hypoalbuminemia 2018    Other cirrhosis of liver 2018    Pleural effusion     Thrombocytopenia 2018       Past Surgical History:   Procedure Laterality Date    breast reduction       SECTION      HYSTERECTOMY      TIPS N/A 2018    Performed by [facility] at [facility]    TIPS N/A 2018    Performed by [facility] Surgeon at [facility]    Venogram N/A 2018    [facility]       Time Tracking:     OT Date of Treatment: 19  OT Start Time: 0930  OT Stop Time: 0940  OT Total Time (min): 10 min    Billable Minutes:Evaluation 10    Rufina Smith, OT  3/9/2019

## 2019-03-09 NOTE — PLAN OF CARE
Problem: Occupational Therapy Goal  Goal: Occupational Therapy Goal  Outcome: Outcome(s) achieved Date Met: 03/09/19  Evaluation completed.  Pt without skilled OT need.  DC OT  Rufina Smith, OT  3/9/2019

## 2019-03-09 NOTE — NURSING
Contacted Dr. Hare.  CT contacted multiple times during shift, unable to reach technician.  Per provider, OK to order cardiac diet with telephone read back for pt.    Pt with low platelet count. OK to give subcutaneous heparin?  Provider will d/c order.

## 2019-03-09 NOTE — SUBJECTIVE & OBJECTIVE
Interval History: No acute events overnight. S/p thoracentesis yesterday by pulmonary with removal of approximately 1.2L of pleural fluid. Now patient feels SOB again. Will repeat CXR.    Repeat CXR reviewed. Patient ambulating in room without oxygen requirements. States she feels SOB. Plan for CT triple phase liver today and TIPS revision on Monday.     Review of Systems   Constitutional: Positive for activity change and fatigue. Negative for chills and fever.   HENT: Negative for congestion, facial swelling, hearing loss and trouble swallowing.    Eyes: Negative for photophobia and visual disturbance.   Respiratory: Positive for shortness of breath. Negative for chest tightness and wheezing.    Cardiovascular: Negative for chest pain, palpitations and leg swelling.   Gastrointestinal: Negative for abdominal pain, blood in stool, constipation, diarrhea, nausea and vomiting.   Endocrine: Negative.    Genitourinary: Negative.    Musculoskeletal: Negative for back pain, joint swelling and myalgias.   Skin: Negative.    Allergic/Immunologic: Negative.    Neurological: Negative for dizziness, facial asymmetry, speech difficulty, weakness and numbness.   Hematological: Negative.    Psychiatric/Behavioral: Negative for agitation, confusion and dysphoric mood. The patient is not nervous/anxious.      Objective:     Vital Signs (Most Recent):  Temp: 97.8 °F (36.6 °C) (03/09/19 0737)  Pulse: 85 (03/09/19 0742)  Resp: 18 (03/09/19 0737)  BP: (!) 116/55 (03/09/19 0737)  SpO2: (!) 91 % (03/09/19 0737) Vital Signs (24h Range):  Temp:  [97.7 °F (36.5 °C)-98 °F (36.7 °C)] 97.8 °F (36.6 °C)  Pulse:  [82-91] 85  Resp:  [18-20] 18  SpO2:  [91 %-97 %] 91 %  BP: ()/(53-61) 116/55     Weight: 70.9 kg (156 lb 4.9 oz)  Body mass index is 27.69 kg/m².  No intake or output data in the 24 hours ending 03/09/19 1053   Physical Exam   Constitutional: She is oriented to person, place, and time. Vital signs are normal. She appears  well-developed and well-nourished.  Non-toxic appearance. She does not appear ill. No distress.   HENT:   Head: Normocephalic and atraumatic.   Eyes: Conjunctivae and EOM are normal. Pupils are equal, round, and reactive to light. No scleral icterus.   Neck: Normal range of motion and full passive range of motion without pain. Neck supple. No JVD present. Carotid bruit is not present. No thyromegaly present.   Cardiovascular: Normal rate, regular rhythm, S1 normal, S2 normal, normal heart sounds and normal pulses. Exam reveals no gallop, no S3, no S4 and no friction rub.   No murmur heard.  Pulmonary/Chest: Effort normal. No accessory muscle usage. No tachypnea. No respiratory distress. She has decreased breath sounds in the right middle field and the right lower field. She has no wheezes. She has no rhonchi. She has no rales.   Abdominal: Soft. Normal appearance and bowel sounds are normal. She exhibits distension. She exhibits no shifting dullness, no abdominal bruit and no ascites. There is no hepatosplenomegaly. There is no tenderness. There is no rigidity and no guarding.   Musculoskeletal: Normal range of motion. She exhibits no edema or tenderness.   Neurological: She is alert and oriented to person, place, and time. She has normal strength. She is not disoriented. No cranial nerve deficit or sensory deficit. GCS eye subscore is 4. GCS verbal subscore is 5. GCS motor subscore is 6.   Skin: Skin is warm, dry and intact. No abrasion and no lesion noted.   Psychiatric: She has a normal mood and affect. Her behavior is normal. Judgment and thought content normal. Her mood appears not anxious. Her speech is not slurred. She is not actively hallucinating. Cognition and memory are normal. She does not exhibit a depressed mood. She is attentive.     Significant Labs: All pertinent labs within the past 24 hours have been reviewed.    Significant Imaging: I have reviewed all pertinent imaging results/findings within  the past 24 hours.     X-Ray Chest PA And Lateral  Narrative: EXAMINATION:  XR CHEST PA AND LATERAL    CLINICAL HISTORY:  SOB;    TECHNIQUE:  PA and lateral views of the chest were performed.    COMPARISON:  03/07/2019    FINDINGS:  Trachea is patent.  Cardiac silhouette appears unchanged.  There is atherosclerosis.  There is a large right pleural effusion, slightly more pronounced than yesterday.  No new consolidation.  There is no pneumothorax or free air below the diaphragm.  TIPS shunt visualized in the upper abdomen.  No acute osseous abnormality.  Impression: Worsening large right pleural effusion.    Electronically signed by: Jung Herrera MD  Date:    03/08/2019  Time:    15:09  US Liver with Doppler  Narrative: EXAMINATION:  US LIVER WITH DOPPLER    CLINICAL HISTORY:  evaluate tips;    TECHNIQUE:  Complete abdominal ultrasound (including pancreas, liver, gallbladder, common bile duct, spleen, aorta, IVC, and kidneys) was performed.    Complete abdominal doppler exam was also performed.    COMPARISON:  Ultrasound 07/20/2018, 05/23/2018.    FINDINGS:  Liver: Normal in size, measuring 14.4 cm. Heterogeneous echotexture with nodular contour compatible with cirrhosis.  Stable cyst within the left hepatic lobe measuring 0.7 x 0.7 x 0.7 cm.    Gallbladder: Several non-mobile stones within the gallbladder measuring up to 0.7 cm.  Mild wall thickening to 0.4 cm, similar to prior studies and likely due to hepatic dysfunction.  No pericholecystic fluid.  No sonographic Arevalo's sign.    Biliary system: The common duct is not dilated, measuring 2 mm.  No intrahepatic ductal dilatation.    Spleen: Enlarged with a homogeneous echotexture, measuring 20.9 x 4.2 cm.  Contains a complex cystic structure measuring 1.8 x 2.0 x 1.8 cm, similar to prior studies.    Pancreas: The visualized portions of pancreas appear normal.    Inferior vena cava: Normal in appearance.    Miscellaneous: Small volume ascites.  Collateral vessel  formation throughout the upper abdomen.    Main hepatic artery: Patent.    Main portal vein: Partially thrombosed with sluggish hepatopetal flow.    Left portal vein:  Patent with hepatopetal flow.    Right portal vein:  Anterior branch is patent with hepatopetal flow.    SMV:  Persistent partial thrombosis.    IVC: Patent    Left, middle, and right hepatic veins: Patent.    Umbilical vein: Not patent.    TIPS stent again identified and is now thrombosed along its length, new since the most recent prior study.    Right-sided pleural effusion is present.  Impression: 1. 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  Recommend further evaluation with triple phase CT and consult to interventional radiology.  2. Cirrhosis and sequela of portal hypertension including splenomegaly, small volume ascites, collateral vessel formation.  3. Cholelithiasis.  4. Right pleural effusion.  5. Stable complex cystic splenic lesion.  This report was flagged in Epic as abnormal.    Electronically signed by resident: Rivera Pang  Date:    03/08/2019  Time:    08:28    Electronically signed by: Jung Herrera MD  Date:    03/08/2019  Time:    08:48

## 2019-03-09 NOTE — PROGRESS NOTES
[facility] Hospital Medicine  Progress Note    Patient Name: Michelle Pappas  MRN: [id number]  Patient Class: OP- Observation   Admission Date: 3/7/2019  Length of Stay: 0 days  Attending Physician: Sam Hare MD  Primary Care Provider: Taj Luna DO    [facility] Medicine Team: [facility] MED  Sam Tenorio MD    Subjective:     Principal Problem:Hydrothorax    HPI:  History of Present Illness:  Patient is a 59 y.o. female who has a past medical history of cirrhosis of liver likely to be secondary to hepatitis C, hepatitis C treated with Harvoni, Hepatic Hydrothorax, Pleural effusion, s/p TIPS procedure in June 2018 with reduction of portosystemic gradient, Encephalopathy, Hypoalbuminemia, Thrombocytopenia, Anemia, Anxiety, Depression, Essential hypertension presented with shortness of breath at rest, and with minimal acitivity. Symptoms include constant cough, dyspnea on exertion, dyspnea when laying down and edema to bilateral LE. Symptoms began yesterday evening gradually worsening since that time. Patient denies sputum production, tightness in chest and wheezing. Patient states that she has had a thoracentesis on 3/1 and 2 L were removed and also had a thoracentesis on 3/5 and 2 L were removed. Brother at bedside and states that she was recently admitted to the hospital in [place] due to fluid buildup. Only other complaint is that her appetite has been decreased. Of note per hepatology, at the time of the TIPS she had a mechanical thrombectomy and balloon angioplasty of a portal vein thrombosis with restoration of flow. There was residual clot noted. She was placed on a blood thinner. On 8/13/18 she had a venogram: demonstrates brisk flow through the portal vein with no evidence of residual portal vein thrombosis as well as brisk flow through the superior mesenteric vein with no evidence of residual mesenteric venous thrombosis; the tip is widely patent.  The portosystemic gradient is  9. Routine tips ultrasound is recommended in 3 months. She is off blood thinners. Patient currently denies any fever/chills, chest pain, dyspnea, weakness, numbness, abdominal pain, nausea/vomiting, dysuria/hematuria, or weight loss.     Hospital Course:  No notes on file    Interval History: No acute events overnight. S/p thoracentesis yesterday by pulmonary with removal of approximately 1.2L of pleural fluid. Now patient feels SOB again. Will repeat CXR.    Repeat CXR reviewed. Patient ambulating in room without oxygen requirements. States she feels SOB. Plan for CT triple phase liver today and TIPS revision on Monday.     Review of Systems   Constitutional: Positive for activity change and fatigue. Negative for chills and fever.   HENT: Negative for congestion, facial swelling, hearing loss and trouble swallowing.    Eyes: Negative for photophobia and visual disturbance.   Respiratory: Positive for shortness of breath. Negative for chest tightness and wheezing.    Cardiovascular: Negative for chest pain, palpitations and leg swelling.   Gastrointestinal: Negative for abdominal pain, blood in stool, constipation, diarrhea, nausea and vomiting.   Endocrine: Negative.    Genitourinary: Negative.    Musculoskeletal: Negative for back pain, joint swelling and myalgias.   Skin: Negative.    Allergic/Immunologic: Negative.    Neurological: Negative for dizziness, facial asymmetry, speech difficulty, weakness and numbness.   Hematological: Negative.    Psychiatric/Behavioral: Negative for agitation, confusion and dysphoric mood. The patient is not nervous/anxious.      Objective:     Vital Signs (Most Recent):  Temp: 97.8 °F (36.6 °C) (03/09/19 0737)  Pulse: 85 (03/09/19 0742)  Resp: 18 (03/09/19 0737)  BP: (!) 116/55 (03/09/19 0737)  SpO2: (!) 91 % (03/09/19 0737) Vital Signs (24h Range):  Temp:  [97.7 °F (36.5 °C)-98 °F (36.7 °C)] 97.8 °F (36.6 °C)  Pulse:  [82-91] 85  Resp:  [18-20] 18  SpO2:  [91 %-97 %] 91 %  BP:  ()/(53-61) 116/55     Weight: 70.9 kg (156 lb 4.9 oz)  Body mass index is 27.69 kg/m².  No intake or output data in the 24 hours ending 03/09/19 1053   Physical Exam   Constitutional: She is oriented to person, place, and time. Vital signs are normal. She appears well-developed and well-nourished.  Non-toxic appearance. She does not appear ill. No distress.   HENT:   Head: Normocephalic and atraumatic.   Eyes: Conjunctivae and EOM are normal. Pupils are equal, round, and reactive to light. No scleral icterus.   Neck: Normal range of motion and full passive range of motion without pain. Neck supple. No JVD present. Carotid bruit is not present. No thyromegaly present.   Cardiovascular: Normal rate, regular rhythm, S1 normal, S2 normal, normal heart sounds and normal pulses. Exam reveals no gallop, no S3, no S4 and no friction rub.   No murmur heard.  Pulmonary/Chest: Effort normal. No accessory muscle usage. No tachypnea. No respiratory distress. She has decreased breath sounds in the right middle field and the right lower field. She has no wheezes. She has no rhonchi. She has no rales.   Abdominal: Soft. Normal appearance and bowel sounds are normal. She exhibits distension. She exhibits no shifting dullness, no abdominal bruit and no ascites. There is no hepatosplenomegaly. There is no tenderness. There is no rigidity and no guarding.   Musculoskeletal: Normal range of motion. She exhibits no edema or tenderness.   Neurological: She is alert and oriented to person, place, and time. She has normal strength. She is not disoriented. No cranial nerve deficit or sensory deficit. GCS eye subscore is 4. GCS verbal subscore is 5. GCS motor subscore is 6.   Skin: Skin is warm, dry and intact. No abrasion and no lesion noted.   Psychiatric: She has a normal mood and affect. Her behavior is normal. Judgment and thought content normal. Her mood appears not anxious. Her speech is not slurred. She is not actively  hallucinating. Cognition and memory are normal. She does not exhibit a depressed mood. She is attentive.     Significant Labs: All pertinent labs within the past 24 hours have been reviewed.    Significant Imaging: I have reviewed all pertinent imaging results/findings within the past 24 hours.     X-Ray Chest PA And Lateral  Narrative: EXAMINATION:  XR CHEST PA AND LATERAL    CLINICAL HISTORY:  SOB;    TECHNIQUE:  PA and lateral views of the chest were performed.    COMPARISON:  03/07/2019    FINDINGS:  Trachea is patent.  Cardiac silhouette appears unchanged.  There is atherosclerosis.  There is a large right pleural effusion, slightly more pronounced than yesterday.  No new consolidation.  There is no pneumothorax or free air below the diaphragm.  TIPS shunt visualized in the upper abdomen.  No acute osseous abnormality.  Impression: Worsening large right pleural effusion.    Electronically signed by: Jung Herrera MD  Date:    03/08/2019  Time:    15:09  US Liver with Doppler  Narrative: EXAMINATION:  US LIVER WITH DOPPLER    CLINICAL HISTORY:  evaluate tips;    TECHNIQUE:  Complete abdominal ultrasound (including pancreas, liver, gallbladder, common bile duct, spleen, aorta, IVC, and kidneys) was performed.    Complete abdominal doppler exam was also performed.    COMPARISON:  Ultrasound 07/20/2018, 05/23/2018.    FINDINGS:  Liver: Normal in size, measuring 14.4 cm. Heterogeneous echotexture with nodular contour compatible with cirrhosis.  Stable cyst within the left hepatic lobe measuring 0.7 x 0.7 x 0.7 cm.    Gallbladder: Several non-mobile stones within the gallbladder measuring up to 0.7 cm.  Mild wall thickening to 0.4 cm, similar to prior studies and likely due to hepatic dysfunction.  No pericholecystic fluid.  No sonographic Arevalo's sign.    Biliary system: The common duct is not dilated, measuring 2 mm.  No intrahepatic ductal dilatation.    Spleen: Enlarged with a homogeneous echotexture, measuring  20.9 x 4.2 cm.  Contains a complex cystic structure measuring 1.8 x 2.0 x 1.8 cm, similar to prior studies.    Pancreas: The visualized portions of pancreas appear normal.    Inferior vena cava: Normal in appearance.    Miscellaneous: Small volume ascites.  Collateral vessel formation throughout the upper abdomen.    Main hepatic artery: Patent.    Main portal vein: Partially thrombosed with sluggish hepatopetal flow.    Left portal vein:  Patent with hepatopetal flow.    Right portal vein:  Anterior branch is patent with hepatopetal flow.    SMV:  Persistent partial thrombosis.    IVC: Patent    Left, middle, and right hepatic veins: Patent.    Umbilical vein: Not patent.    TIPS stent again identified and is now thrombosed along its length, new since the most recent prior study.    Right-sided pleural effusion is present.  Impression: 1. 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  Recommend further evaluation with triple phase CT and consult to interventional radiology.  2. Cirrhosis and sequela of portal hypertension including splenomegaly, small volume ascites, collateral vessel formation.  3. Cholelithiasis.  4. Right pleural effusion.  5. Stable complex cystic splenic lesion.  This report was flagged in Epic as abnormal.    Electronically signed by resident: Rivera Pang  Date:    03/08/2019  Time:    08:28    Electronically signed by: Jung Herrera MD  Date:    03/08/2019  Time:    08:48        Assessment/Plan:      * Hepatic Hydrothorax    CXR: Large right pleural effusion with prominent compression atelectasis of right lung, remaining portions of right upper lobe and right middle lobe retained air.  satting 98% on RA  S/p thoracentesis yesterday by pulmonary with removal of approximately 1.2L of pleural fluid.   Cont diuretics with lasix and aldactone  Duo nebs  as needed  continuous pulse ox  S/p TIPS   Check liver sonogram: Complete thrombosis of the TIPS stent and partial thrombosis of the main portal vein, new since the most recent prior study.  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 Recommend further evaluation with triple phase CT and consult to interventional radiology.  Discussed with hepatology. Plan for IR to revise TIPS on Monday  Will order triple phase CT     Recurrent right pleural effusion    Plan as above.        Decompensated HCV cirrhosis    MELD-Na score: 11 at 3/9/2019  5:33 AM  MELD score: 11 at 3/9/2019  5:33 AM  Calculated from:  Serum Creatinine: 1.3 mg/dL at 3/9/2019  5:33 AM  Serum Sodium: 134 mmol/L at 3/9/2019  5:33 AM  Total Bilirubin: 0.8 mg/dL (Rounded to 1 mg/dL) at 3/9/2019  5:33 AM  INR(ratio): 1.2 at 3/9/2019  5:33 AM  Age: 59 years       Continue Lactulose and Rifaximin to prevent encephalopathy   Ascities associated with slightly decreased protein.   Continue Lasix and Aldactone at present doses.   Cont fluid and sodium restriction.   Consult hepatology. Appreciate assistance.      Severe malnutrition    Nutrition consult       CKD stage 3 due to type 2 diabetes mellitus    Lab Results   Component Value Date    CREATININE 1.3 03/08/2019     Sr Cr stable. Baseline 1.0-1.4  Cont to monitor with daily labs   Avoid nephrotoxins.   Renally dose all medications   Monitor events that may lead to decreased renal perfusion (hypovolemia, hypotension, sepsis).   Monitor urine output      Pancytopenia    Lab Results   Component Value Date    WBC 3.52 (L) 03/07/2019    HGB 9.5 (L) 03/07/2019    HCT 31.9 (L) 03/07/2019    MCV 79 (L) 03/07/2019    PLT 46 (L) 03/07/2019     Secondary to underlying liver disease  All values at baseline   Cont to monitor.       Hypotension    BP Readings from Last 3 Encounters:   03/07/19 (!) 97/52   08/16/18 104/60   08/14/18 (!) 107/53     Cont midodrine to treat  Cont to monitor.        Insomnia     Cont Trazodone QHS       Diabetes    Lab Results   Component Value Date    HGBA1C 5.7 (H) 07/28/2018     Hold home oral agents- insulin is the preferred treatment for hyperglycemia  Low dose correction scale   Cont blood glucose monitoring   ADA diet       VTE Risk Mitigation (From admission, onward)        Ordered     heparin (porcine) injection 5,000 Units  Every 8 hours      03/07/19 1035     IP VTE LOW RISK PATIENT  Once      03/07/19 1035     Place ABIMAEL hose  Until discontinued      03/07/19 1035              Sam Tenorio MD  Department of Hospital Medicine   [facility]

## 2019-03-09 NOTE — ASSESSMENT & PLAN NOTE
MELD-Na score: 11 at 3/9/2019  5:33 AM  MELD score: 11 at 3/9/2019  5:33 AM  Calculated from:  Serum Creatinine: 1.3 mg/dL at 3/9/2019  5:33 AM  Serum Sodium: 134 mmol/L at 3/9/2019  5:33 AM  Total Bilirubin: 0.8 mg/dL (Rounded to 1 mg/dL) at 3/9/2019  5:33 AM  INR(ratio): 1.2 at 3/9/2019  5:33 AM  Age: 59 years       Continue Lactulose and Rifaximin to prevent encephalopathy   Ascities associated with slightly decreased protein.   Continue Lasix and Aldactone at present doses.   Cont fluid and sodium restriction.   Consult hepatology. Appreciate assistance.

## 2019-03-10 NOTE — SUBJECTIVE & OBJECTIVE
Interval History: No acute events overnight.     Review of Systems   Constitutional: Positive for fatigue. Negative for chills and fever.   HENT: Negative for congestion, facial swelling, hearing loss and trouble swallowing.    Eyes: Negative for photophobia and visual disturbance.   Respiratory: Positive for shortness of breath (controlled). Negative for chest tightness and wheezing.    Cardiovascular: Negative for chest pain, palpitations and leg swelling.   Gastrointestinal: Negative for abdominal pain, blood in stool, constipation, diarrhea, nausea and vomiting.   Endocrine: Negative.    Genitourinary: Negative.    Musculoskeletal: Negative for back pain, joint swelling and myalgias.   Skin: Negative.    Allergic/Immunologic: Negative.    Neurological: Negative for dizziness, facial asymmetry, speech difficulty, weakness and numbness.   Hematological: Negative.    Psychiatric/Behavioral: Negative for agitation, confusion and dysphoric mood. The patient is not nervous/anxious.      Objective:     Vital Signs (Most Recent):  Temp: 97.6 °F (36.4 °C) (03/10/19 1227)  Pulse: 88 (03/10/19 1500)  Resp: 16 (03/10/19 1227)  BP: (!) 127/58 (03/10/19 1227)  SpO2: (!) 92 % (03/10/19 1227) Vital Signs (24h Range):  Temp:  [96.6 °F (35.9 °C)-98.1 °F (36.7 °C)] 97.6 °F (36.4 °C)  Pulse:  [72-95] 88  Resp:  [16-20] 16  SpO2:  [90 %-95 %] 92 %  BP: ()/(51-58) 127/58     Weight: 70.9 kg (156 lb 4.9 oz)  Body mass index is 27.69 kg/m².    Intake/Output Summary (Last 24 hours) at 3/10/2019 1547  Last data filed at 3/10/2019 0845  Gross per 24 hour   Intake 740 ml   Output 400 ml   Net 340 ml      Physical Exam   Constitutional: She is oriented to person, place, and time. Vital signs are normal. She appears well-developed and well-nourished.  Non-toxic appearance. She does not appear ill. No distress.   HENT:   Head: Normocephalic and atraumatic.   Eyes: Conjunctivae and EOM are normal. Pupils are equal, round, and reactive to  light. No scleral icterus.   Neck: Normal range of motion and full passive range of motion without pain. Neck supple. No JVD present. Carotid bruit is not present. No thyromegaly present.   Cardiovascular: Normal rate, regular rhythm, S1 normal, S2 normal, normal heart sounds and normal pulses. Exam reveals no gallop, no S3, no S4 and no friction rub.   No murmur heard.  Pulmonary/Chest: Effort normal. No accessory muscle usage. No tachypnea. No respiratory distress. She has decreased breath sounds in the right middle field and the right lower field. She has no wheezes. She has no rhonchi. She has no rales.   Abdominal: Soft. Normal appearance and bowel sounds are normal. She exhibits distension. She exhibits no shifting dullness, no abdominal bruit and no ascites. There is no hepatosplenomegaly. There is no tenderness. There is no rigidity and no guarding.   Musculoskeletal: Normal range of motion. She exhibits no edema or tenderness.   Neurological: She is alert and oriented to person, place, and time. She has normal strength. She is not disoriented. No cranial nerve deficit or sensory deficit. GCS eye subscore is 4. GCS verbal subscore is 5. GCS motor subscore is 6.   Skin: Skin is warm, dry and intact. No abrasion and no lesion noted.   Psychiatric: She has a normal mood and affect. Her behavior is normal. Judgment and thought content normal. Her mood appears not anxious. Her speech is not slurred. She is not actively hallucinating. Cognition and memory are normal. She does not exhibit a depressed mood. She is attentive.     Significant Labs: All pertinent labs within the past 24 hours have been reviewed.    Significant Imaging: I have reviewed all pertinent imaging results/findings within the past 24 hours.     X-Ray Chest PA And Lateral  Narrative: EXAMINATION:  XR CHEST PA AND LATERAL    CLINICAL HISTORY:  SOB;    TECHNIQUE:  PA and lateral views of the chest were  performed.    COMPARISON:  03/07/2019    FINDINGS:  Trachea is patent.  Cardiac silhouette appears unchanged.  There is atherosclerosis.  There is a large right pleural effusion, slightly more pronounced than yesterday.  No new consolidation.  There is no pneumothorax or free air below the diaphragm.  TIPS shunt visualized in the upper abdomen.  No acute osseous abnormality.  Impression: Worsening large right pleural effusion.    Electronically signed by: Jung Herrera MD  Date:    03/08/2019  Time:    15:09  US Liver with Doppler  Narrative: EXAMINATION:  US LIVER WITH DOPPLER    CLINICAL HISTORY:  evaluate tips;    TECHNIQUE:  Complete abdominal ultrasound (including pancreas, liver, gallbladder, common bile duct, spleen, aorta, IVC, and kidneys) was performed.    Complete abdominal doppler exam was also performed.    COMPARISON:  Ultrasound 07/20/2018, 05/23/2018.    FINDINGS:  Liver: Normal in size, measuring 14.4 cm. Heterogeneous echotexture with nodular contour compatible with cirrhosis.  Stable cyst within the left hepatic lobe measuring 0.7 x 0.7 x 0.7 cm.    Gallbladder: Several non-mobile stones within the gallbladder measuring up to 0.7 cm.  Mild wall thickening to 0.4 cm, similar to prior studies and likely due to hepatic dysfunction.  No pericholecystic fluid.  No sonographic Arevalo's sign.    Biliary system: The common duct is not dilated, measuring 2 mm.  No intrahepatic ductal dilatation.    Spleen: Enlarged with a homogeneous echotexture, measuring 20.9 x 4.2 cm.  Contains a complex cystic structure measuring 1.8 x 2.0 x 1.8 cm, similar to prior studies.    Pancreas: The visualized portions of pancreas appear normal.    Inferior vena cava: Normal in appearance.    Miscellaneous: Small volume ascites.  Collateral vessel formation throughout the upper abdomen.    Main hepatic artery: Patent.    Main portal vein: Partially thrombosed with sluggish hepatopetal flow.    Left portal vein:  Patent with  hepatopetal flow.    Right portal vein:  Anterior branch is patent with hepatopetal flow.    SMV:  Persistent partial thrombosis.    IVC: Patent    Left, middle, and right hepatic veins: Patent.    Umbilical vein: Not patent.    TIPS stent again identified and is now thrombosed along its length, new since the most recent prior study.    Right-sided pleural effusion is present.  Impression: 1. 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  Recommend further evaluation with triple phase CT and consult to interventional radiology.  2. Cirrhosis and sequela of portal hypertension including splenomegaly, small volume ascites, collateral vessel formation.  3. Cholelithiasis.  4. Right pleural effusion.  5. Stable complex cystic splenic lesion.  This report was flagged in Epic as abnormal.    Electronically signed by resident: Rivera Pang  Date:    03/08/2019  Time:    08:28    Electronically signed by: Jung Herrera MD  Date:    03/08/2019  Time:    08:48

## 2019-03-10 NOTE — ASSESSMENT & PLAN NOTE
MELD-Na score: 11 at 3/10/2019  4:27 AM  MELD score: 11 at 3/10/2019  4:27 AM  Calculated from:  Serum Creatinine: 1.4 mg/dL at 3/10/2019  4:27 AM  Serum Sodium: 134 mmol/L at 3/10/2019  4:27 AM  Total Bilirubin: 0.6 mg/dL (Rounded to 1 mg/dL) at 3/10/2019  4:27 AM  INR(ratio): 1.1 at 3/10/2019  4:27 AM  Age: 59 years       Continue Lactulose and Rifaximin to prevent encephalopathy   Ascities associated with slightly decreased protein.   Continue Lasix and Aldactone at present doses.   Cont fluid and sodium restriction.   Consult hepatology. Appreciate assistance.

## 2019-03-10 NOTE — PLAN OF CARE
Problem: Adult Inpatient Plan of Care  Goal: Plan of Care Review  Outcome: Ongoing (interventions implemented as appropriate)  Pt A&O x 4.  Pt reports SOB on exertion.  SpO2 92% on RA.  VSS.  Pt c/o pain to back posterior of posterior shoulders.  Given PRN hydrocodone 5 mg every 6 hours as needed for pain.  Mild relief obtained.  Pending TIPS procedure tomorrow.

## 2019-03-10 NOTE — ASSESSMENT & PLAN NOTE
Lab Results   Component Value Date    CREATININE 1.4 03/10/2019     Sr Cr stable. Baseline 1.0-1.4  Cont to monitor with daily labs   Avoid nephrotoxins.   Renally dose all medications   Monitor events that may lead to decreased renal perfusion (hypovolemia, hypotension, sepsis).   Monitor urine output

## 2019-03-10 NOTE — PROGRESS NOTES
[facility] Hospital Medicine  Progress Note    Patient Name: Michelle Pappas  MRN: [id number]  Patient Class: OP- Observation   Admission Date: 3/7/2019  Length of Stay: 0 days  Attending Physician: Lawson Hare MD  Primary Care Provider: Taj Luna DO    [facility] Medicine Team: [facility] HOSP MED  Lawson Hare MD    Subjective:     Principal Problem:Hydrothorax    HPI:  History of Present Illness:  Patient is a 59 y.o. female who has a past medical history of cirrhosis of liver likely to be secondary to hepatitis C, hepatitis C treated with Harvoni, Hepatic Hydrothorax, Pleural effusion, s/p TIPS procedure in June 2018 with reduction of portosystemic gradient, Encephalopathy, Hypoalbuminemia, Thrombocytopenia, Anemia, Anxiety, Depression, Essential hypertension presented with shortness of breath at rest, and with minimal acitivity. Symptoms include constant cough, dyspnea on exertion, dyspnea when laying down and edema to bilateral LE. Symptoms began yesterday evening gradually worsening since that time. Patient denies sputum production, tightness in chest and wheezing. Patient states that she has had a thoracentesis on 3/1 and 2 L were removed and also had a thoracentesis on 3/5 and 2 L were removed. Brother at bedside and states that she was recently admitted to the hospital in [place] due to fluid buildup. Only other complaint is that her appetite has been decreased. Of note per hepatology, at the time of the TIPS she had a mechanical thrombectomy and balloon angioplasty of a portal vein thrombosis with restoration of flow. There was residual clot noted. She was placed on a blood thinner. On 8/13/18 she had a venogram: demonstrates brisk flow through the portal vein with no evidence of residual portal vein thrombosis as well as brisk flow through the superior mesenteric vein with no evidence of residual mesenteric venous thrombosis; the tip is widely patent.  The portosystemic  gradient is 9. Routine tips ultrasound is recommended in 3 months. She is off blood thinners. Patient currently denies any fever/chills, chest pain, dyspnea, weakness, numbness, abdominal pain, nausea/vomiting, dysuria/hematuria, or weight loss.     Hospital Course:  No notes on file    Interval History: No acute events overnight.     Review of Systems   Constitutional: Positive for fatigue. Negative for chills and fever.   HENT: Negative for congestion, facial swelling, hearing loss and trouble swallowing.    Eyes: Negative for photophobia and visual disturbance.   Respiratory: Positive for shortness of breath (controlled). Negative for chest tightness and wheezing.    Cardiovascular: Negative for chest pain, palpitations and leg swelling.   Gastrointestinal: Negative for abdominal pain, blood in stool, constipation, diarrhea, nausea and vomiting.   Endocrine: Negative.    Genitourinary: Negative.    Musculoskeletal: Negative for back pain, joint swelling and myalgias.   Skin: Negative.    Allergic/Immunologic: Negative.    Neurological: Negative for dizziness, facial asymmetry, speech difficulty, weakness and numbness.   Hematological: Negative.    Psychiatric/Behavioral: Negative for agitation, confusion and dysphoric mood. The patient is not nervous/anxious.      Objective:     Vital Signs (Most Recent):  Temp: 97.6 °F (36.4 °C) (03/10/19 1227)  Pulse: 88 (03/10/19 1500)  Resp: 16 (03/10/19 1227)  BP: (!) 127/58 (03/10/19 1227)  SpO2: (!) 92 % (03/10/19 1227) Vital Signs (24h Range):  Temp:  [96.6 °F (35.9 °C)-98.1 °F (36.7 °C)] 97.6 °F (36.4 °C)  Pulse:  [72-95] 88  Resp:  [16-20] 16  SpO2:  [90 %-95 %] 92 %  BP: ()/(51-58) 127/58     Weight: 70.9 kg (156 lb 4.9 oz)  Body mass index is 27.69 kg/m².    Intake/Output Summary (Last 24 hours) at 3/10/2019 1547  Last data filed at 3/10/2019 0845  Gross per 24 hour   Intake 740 ml   Output 400 ml   Net 340 ml      Physical Exam   Constitutional: She is oriented  to person, place, and time. Vital signs are normal. She appears well-developed and well-nourished.  Non-toxic appearance. She does not appear ill. No distress.   HENT:   Head: Normocephalic and atraumatic.   Eyes: Conjunctivae and EOM are normal. Pupils are equal, round, and reactive to light. No scleral icterus.   Neck: Normal range of motion and full passive range of motion without pain. Neck supple. No JVD present. Carotid bruit is not present. No thyromegaly present.   Cardiovascular: Normal rate, regular rhythm, S1 normal, S2 normal, normal heart sounds and normal pulses. Exam reveals no gallop, no S3, no S4 and no friction rub.   No murmur heard.  Pulmonary/Chest: Effort normal. No accessory muscle usage. No tachypnea. No respiratory distress. She has decreased breath sounds in the right middle field and the right lower field. She has no wheezes. She has no rhonchi. She has no rales.   Abdominal: Soft. Normal appearance and bowel sounds are normal. She exhibits distension. She exhibits no shifting dullness, no abdominal bruit and no ascites. There is no hepatosplenomegaly. There is no tenderness. There is no rigidity and no guarding.   Musculoskeletal: Normal range of motion. She exhibits no edema or tenderness.   Neurological: She is alert and oriented to person, place, and time. She has normal strength. She is not disoriented. No cranial nerve deficit or sensory deficit. GCS eye subscore is 4. GCS verbal subscore is 5. GCS motor subscore is 6.   Skin: Skin is warm, dry and intact. No abrasion and no lesion noted.   Psychiatric: She has a normal mood and affect. Her behavior is normal. Judgment and thought content normal. Her mood appears not anxious. Her speech is not slurred. She is not actively hallucinating. Cognition and memory are normal. She does not exhibit a depressed mood. She is attentive.     Significant Labs: All pertinent labs within the past 24 hours have been reviewed.    Significant Imaging:  I have reviewed all pertinent imaging results/findings within the past 24 hours.     X-Ray Chest PA And Lateral  Narrative: EXAMINATION:  XR CHEST PA AND LATERAL    CLINICAL HISTORY:  SOB;    TECHNIQUE:  PA and lateral views of the chest were performed.    COMPARISON:  03/07/2019    FINDINGS:  Trachea is patent.  Cardiac silhouette appears unchanged.  There is atherosclerosis.  There is a large right pleural effusion, slightly more pronounced than yesterday.  No new consolidation.  There is no pneumothorax or free air below the diaphragm.  TIPS shunt visualized in the upper abdomen.  No acute osseous abnormality.  Impression: Worsening large right pleural effusion.    Electronically signed by: Jung Herrera MD  Date:    03/08/2019  Time:    15:09  US Liver with Doppler  Narrative: EXAMINATION:  US LIVER WITH DOPPLER    CLINICAL HISTORY:  evaluate tips;    TECHNIQUE:  Complete abdominal ultrasound (including pancreas, liver, gallbladder, common bile duct, spleen, aorta, IVC, and kidneys) was performed.    Complete abdominal doppler exam was also performed.    COMPARISON:  Ultrasound 07/20/2018, 05/23/2018.    FINDINGS:  Liver: Normal in size, measuring 14.4 cm. Heterogeneous echotexture with nodular contour compatible with cirrhosis.  Stable cyst within the left hepatic lobe measuring 0.7 x 0.7 x 0.7 cm.    Gallbladder: Several non-mobile stones within the gallbladder measuring up to 0.7 cm.  Mild wall thickening to 0.4 cm, similar to prior studies and likely due to hepatic dysfunction.  No pericholecystic fluid.  No sonographic Arevalo's sign.    Biliary system: The common duct is not dilated, measuring 2 mm.  No intrahepatic ductal dilatation.    Spleen: Enlarged with a homogeneous echotexture, measuring 20.9 x 4.2 cm.  Contains a complex cystic structure measuring 1.8 x 2.0 x 1.8 cm, similar to prior studies.    Pancreas: The visualized portions of pancreas appear normal.    Inferior vena cava: Normal in  appearance.    Miscellaneous: Small volume ascites.  Collateral vessel formation throughout the upper abdomen.    Main hepatic artery: Patent.    Main portal vein: Partially thrombosed with sluggish hepatopetal flow.    Left portal vein:  Patent with hepatopetal flow.    Right portal vein:  Anterior branch is patent with hepatopetal flow.    SMV:  Persistent partial thrombosis.    IVC: Patent    Left, middle, and right hepatic veins: Patent.    Umbilical vein: Not patent.    TIPS stent again identified and is now thrombosed along its length, new since the most recent prior study.    Right-sided pleural effusion is present.  Impression: 1. 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  Recommend further evaluation with triple phase CT and consult to interventional radiology.  2. Cirrhosis and sequela of portal hypertension including splenomegaly, small volume ascites, collateral vessel formation.  3. Cholelithiasis.  4. Right pleural effusion.  5. Stable complex cystic splenic lesion.  This report was flagged in Epic as abnormal.    Electronically signed by resident: Rivera Pang  Date:    03/08/2019  Time:    08:28    Electronically signed by: Jung Herrera MD  Date:    03/08/2019  Time:    08:48        Assessment/Plan:      * Hepatic Hydrothorax    CXR: Large right pleural effusion with prominent compression atelectasis of right lung, remaining portions of right upper lobe and right middle lobe retained air.  satting 98% on RA  S/p thoracentesis on 3/7 by pulmonary with removal of approximately 1.2L of pleural fluid.   Cont diuretics with lasix and aldactone: BP tolerating   Duo nebs as needed  continuous pulse ox  S/p TIPS   Liver sonogram: Complete thrombosis of the TIPS stent and partial thrombosis of the main portal vein, new since the most recent prior study.   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 Recommend further evaluation with triple phase CT and consult to interventional radiology.  Discussed with hepatology. Plan for IR to revise TIPS on 3/11  Triple phase CT pending      Severe malnutrition    Nutrition consult  Added MVI daily  Small frequent meals due to early satiety        Recurrent right pleural effusion    Plan as above.        CKD stage 3 due to type 2 diabetes mellitus    Lab Results   Component Value Date    CREATININE 1.4 03/10/2019     Sr Cr stable. Baseline 1.0-1.4  Cont to monitor with daily labs   Avoid nephrotoxins.   Renally dose all medications   Monitor events that may lead to decreased renal perfusion (hypovolemia, hypotension, sepsis).   Monitor urine output      Pancytopenia    Lab Results   Component Value Date    WBC 1.94 (LL) 03/10/2019    HGB 7.4 (L) 03/10/2019    HCT 24.9 (L) 03/10/2019    MCV 78 (L) 03/10/2019    PLT 30 (LL) 03/10/2019     Secondary to underlying liver disease  WBCs slightly lower than baseline; ANC > 1600  Cont to monitor.       Hypotension    BP Readings from Last 3 Encounters:   03/10/19 (!) 110/57   08/16/18 104/60   08/14/18 (!) 107/53     Cont midodrine to treat  Cont to monitor.        Decompensated HCV cirrhosis    MELD-Na score: 11 at 3/10/2019  4:27 AM  MELD score: 11 at 3/10/2019  4:27 AM  Calculated from:  Serum Creatinine: 1.4 mg/dL at 3/10/2019  4:27 AM  Serum Sodium: 134 mmol/L at 3/10/2019  4:27 AM  Total Bilirubin: 0.6 mg/dL (Rounded to 1 mg/dL) at 3/10/2019  4:27 AM  INR(ratio): 1.1 at 3/10/2019  4:27 AM  Age: 59 years       Continue Lactulose and Rifaximin to prevent encephalopathy   Ascities associated with slightly decreased protein.   Continue Lasix and Aldactone at present doses.   Cont fluid and sodium restriction.   Consult hepatology. Appreciate assistance.      Insomnia    Cont Trazodone QHS       Diabetes    Lab Results   Component Value Date    HGBA1C 5.7 (H)  07/28/2018     Hold home oral agents- insulin is the preferred treatment for hyperglycemia  Low dose correction scale   Cont blood glucose monitoring   ADA diet       VTE Risk Mitigation (From admission, onward)        Ordered     IP VTE LOW RISK PATIENT  Once      03/07/19 1035     Place ABIMAEL hose  Until discontinued      03/07/19 1035              Lawson Hare MD  Department of Hospital Medicine   [facility]

## 2019-03-10 NOTE — ASSESSMENT & PLAN NOTE
BP Readings from Last 3 Encounters:   03/10/19 (!) 110/57   08/16/18 104/60   08/14/18 (!) 107/53     Cont midodrine to treat  Cont to monitor.

## 2019-03-10 NOTE — NURSING
Contacted Dr. Hare.  Pt c/o 7/10 pain between posterior shoulders.  Next PRN hydrocodone available after 1800.  Requested lidocaine patch order.

## 2019-03-10 NOTE — ASSESSMENT & PLAN NOTE
Lab Results   Component Value Date    WBC 1.94 (LL) 03/10/2019    HGB 7.4 (L) 03/10/2019    HCT 24.9 (L) 03/10/2019    MCV 78 (L) 03/10/2019    PLT 30 (LL) 03/10/2019     Secondary to underlying liver disease  WBCs slightly lower than baseline; ANC > 1600  Cont to monitor.

## 2019-03-10 NOTE — ASSESSMENT & PLAN NOTE
CXR: Large right pleural effusion with prominent compression atelectasis of right lung, remaining portions of right upper lobe and right middle lobe retained air.  satting 98% on RA  S/p thoracentesis on 3/7 by pulmonary with removal of approximately 1.2L of pleural fluid.   Cont diuretics with lasix and aldactone: BP tolerating   Duo nebs as needed  continuous pulse ox  S/p TIPS   Liver sonogram: Complete thrombosis of the TIPS stent and partial thrombosis of the main portal vein, new since the most recent prior study.  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 Recommend further evaluation with triple phase CT and consult to interventional radiology.  Discussed with hepatology. Plan for IR to revise TIPS on 3/11  Triple phase CT pending

## 2019-03-10 NOTE — TREATMENT PLAN
Hepatology Treatment Plan  03/10/2019  10:39 AM    Chart reviewed, patient seen, and case discussed with attending.  Recommend continuing current dose of diuretics with plans for TIPS revision tomorrow.  We will continue to follow alongside primary team, please call us with any additional questions.    Kena Guzmán M.D.  Gastroenterology Fellow, PGY-V  Pager: [phone]  [facility]

## 2019-03-10 NOTE — PLAN OF CARE
Problem: Fall Injury Risk  Goal: Absence of Fall and Fall-Related Injury  Outcome: Ongoing (interventions implemented as appropriate)  Meds given as ordered tolerated well. PRN pain meds given as needed for stomach pain. No signs or symptoms of distress/discomfort noted. Independent with  ADLS. Went down for CT scan transported by transport via wheelchair and returned safely. . Will continue to monitor.

## 2019-03-11 NOTE — PROCEDURES
"Michelle Pappas is a 59 y.o. female patient.    Temp: 97.8 °F (36.6 °C) (03/10/19 1953)  Pulse: 90 (03/10/19 1953)  Resp: 18 (03/10/19 1953)  BP: 112/60 (03/10/19 1953)  SpO2: 95 % (03/10/19 1953)  Weight: 70.9 kg (156 lb 4.9 oz) (19 0400)  Height: 5' 3" (160 cm) (19 1419)       Thoracentesis  Date/Time: 3/10/2019 9:00 PM  Performed by: Demetrio Bradford MD  Authorized by: Darcie Zavala MD   Assisting provider: Carmencita Darby MD  Consent Done: Yes  Consent: Verbal consent obtained. Written consent obtained.  Risks and benefits: risks, benefits and alternatives were discussed  Consent given by: patient  Patient understanding: patient states understanding of the procedure being performed  Patient identity confirmed: , name and verbally with patient  Time out: Immediately prior to procedure a "time out" was called to verify the correct patient, procedure, equipment, support staff and site/side marked as required.  Procedure purpose: diagnostic and therapeutic  Indications: pleural effusion  Preparation: Patient was prepped and draped in the usual sterile fashion.  Local anesthesia used: yes  Anesthesia: local infiltration    Anesthesia:  Local anesthesia used: yes  Local Anesthetic: lidocaine 1% without epinephrine  Preparation: skin prepped with ChloraPrep and sterile field established  Patient position: supported by bedside stand  Ultrasound guidance: yes  Location: right posterior  Puncture method: over-the-needle catheter  Number of attempts: 1  Drainage amount: 1400 ml  Drainage characteristics: serosanguinous  Patient tolerance: Patient tolerated the procedure well with no immediate complications  Chest x-ray performed: yes  Chest x-ray interpreted by me.  Chest x-ray findings: pleural effusion  Specimens: Yes          Demetrio Bradford  3/10/2019    "

## 2019-03-11 NOTE — H&P
Inpatient Radiology Pre-procedure Note    History of Present Illness:  Michelle Pappas is a 59 y.o. female who presents for TIPS malfunction with portal vein clot.    Admission H&P reviewed.  Past Medical History:   Diagnosis Date    Anemia     Anxiety 2018    CAH (chronic active hepatitis)     Depression 2018    Encephalopathy     Essential hypertension 2018    Hepatic Hydrothorax 2018    Hypoalbuminemia 2018    Other cirrhosis of liver 2018    Pleural effusion     Thrombocytopenia 2018     Past Surgical History:   Procedure Laterality Date    breast reduction       SECTION      HYSTERECTOMY      TIPS N/A 2018    Performed by [facility]    TIPS N/A 2018    Performed by [facility]    Venogram N/A 2018    Performed by [facility] OR [address] [place] [address]       Review of Systems:   As documented in primary team H&P    Home Meds:   Prior to Admission medications    Medication Sig Start Date End Date Taking? Authorizing Provider   clonazePAM (KLONOPIN) 1 MG tablet daily prn 7/13/15  Yes Historical Provider, MD   furosemide (LASIX) 80 MG tablet Take 1 tablet (80 mg total) by mouth 2 (two) times daily. 18 Yes Phill Thurston MD   glimepiride (AMARYL) 2 MG tablet Take 2 mg by mouth as needed.   Yes Historical Provider, MD   lactulose (CHRONULAC) 10 gram/15 mL solution Take 45 mLs (30 g total) by mouth 3 (three) times daily. Adjust to 3-4 bowel movements daily. Takes 30 ml TID 18  Yes Phill Thurston MD   midodrine (PROAMATINE) 10 MG tablet Take 1 tablet (10 mg total) by mouth 3 (three) times daily. 18 Yes Phill Thurston MD   POTASSIUM BROMIDE, BULK, MISC by Misc.(Non-Drug; Combo Route) route.   Yes Historical Provider, MD   spironolactone (ALDACTONE) 100 MG tablet Take 2 tablets (200 mg total) by mouth once daily. 18 Yes Phill Thurston MD   traZODone (DESYREL) 100 MG tablet Take  100 mg by mouth every evening.   Yes Historical Provider, MD   ciprofloxacin HCl (CIPRO) 500 MG tablet Take 1 tablet (500 mg total) by mouth once daily. 5/1/18   Andreea Walton MD   hyoscyamine (LEVSIN/SL) 0.125 mg Subl Take 1 tablet by mouth up to four times daily 8/31/15   Historical Provider, MD     Scheduled Meds:    furosemide  80 mg Oral Daily    lactulose  15 g Oral TID    midodrine  10 mg Oral TID    multivitamin  1 tablet Oral Daily    polyethylene glycol  17 g Oral Daily    rifAXImin  550 mg Oral BID    spironolactone  200 mg Oral Daily    traZODone  100 mg Oral QHS     Continuous Infusions:   PRN Meds:acetaminophen, albuterol-ipratropium, bisacodyl, clonazePAM, dextrose 50%, dextrose 50%, glucagon (human recombinant), glucose, glucose, HYDROcodone-acetaminophen, insulin aspart U-100, omnipaque, ondansetron, ramelteon, sodium chloride 0.9%  Anticoagulants/Antiplatelets: heparin    Allergies:   Review of patient's allergies indicates:   Allergen Reactions    Promethazine hcl Itching     Sedation Hx: have not been any systemic reactions    Labs:  Recent Labs   Lab 03/11/19 0424   INR 1.2       Recent Labs   Lab 03/11/19 0424   WBC 2.34*   HGB 6.7*   HCT 23.4*   MCV 80*   PLT 28*      Recent Labs   Lab 03/11/19 0424   *   *   K 4.1      CO2 25   BUN 20   CREATININE 1.3   CALCIUM 8.2*   MG 1.6   ALT 13   AST 14   ALBUMIN 1.7*   BILITOT 0.5         Vitals:  Temp: 97.5 °F (36.4 °C) (03/11/19 0729)  Pulse: 86 (03/11/19 0739)  Resp: 12 (03/11/19 0729)  BP: (!) 105/54 (03/11/19 0729)  SpO2: (!) 92 % (03/11/19 0729)     Physical Exam:  ASA: 2  Mallampati: per anesthesia    General: no acute distress  Mental Status: alert and oriented to person, place and time  HEENT: normocephalic, atraumatic  Chest: unlabored breathing  Heart: regular heart rate  Abdomen: nondistended  Extremity: moves all extremities    Plan: TIPS revision with recanalization  Sedation Plan: per  "anesthesia    Sebas "Jef De Leon MD  Radiology PGY-5  [phone]      "

## 2019-03-11 NOTE — ASSESSMENT & PLAN NOTE
Lab Results   Component Value Date    CREATININE 1.3 03/11/2019     Sr Cr stable. Baseline 1.0-1.4  Cont to monitor with daily labs   Avoid nephrotoxins.   Renally dose all medications   Monitor events that may lead to decreased renal perfusion (hypovolemia, hypotension, sepsis).   Monitor urine output

## 2019-03-11 NOTE — ASSESSMENT & PLAN NOTE
BP Readings from Last 3 Encounters:   03/11/19 (!) 109/53   08/16/18 104/60   08/14/18 (!) 107/53     Cont midodrine to treat  Cont to monitor.

## 2019-03-11 NOTE — ASSESSMENT & PLAN NOTE
Lab Results   Component Value Date    WBC 2.34 (L) 03/11/2019    HGB 6.7 (L) 03/11/2019    HCT 23.4 (L) 03/11/2019    MCV 80 (L) 03/11/2019    PLT 28 (LL) 03/11/2019     Secondary to underlying liver disease  WBCs improved  Platelet transfusion for TIPS today  PRBC transfusion given drop in H&H  Cont to monitor

## 2019-03-11 NOTE — PROGRESS NOTES
Pulmonary & Critical Care Medicine Progress Note    Subjective: Ms. Pappas is a 60 yo F with hepatitis C cirrhosis also with recurrent pleural R pleural effusion secondary to her cirrhosis. She recently had a thoracentesis last week and her pleural effusion has rapidly re-accumulated. She is more short of breath today and she has a repeat TIPS procedure scheduled for tomorrow.       Past medical, surgical, family, and social history from initial consult was reviewed and verified during today's visit.     Vital Signs:   Vitals:    03/10/19 1953   BP: 112/60   Pulse: 90   Resp: 18   Temp: 97.8 °F (36.6 °C)       Fluid Balance:     Intake/Output Summary (Last 24 hours) at 3/10/2019 2232  Last data filed at 3/10/2019 1800  Gross per 24 hour   Intake 1240 ml   Output 600 ml   Net 640 ml       Review of Systems:   A comprehensive 12-point review of systems was performed, and is negative except for those items mentioned above in the HPI section of this note.     Physical Exam:   General: NAD, cooperative & interactive.  HEENT: AT/NC, PERRL, EOMI, oral and nasal mucosa moist.   Neck: Supple without JVD or palpable LAD.   Cardiac: RRR with no MRG with brisk cap refill and symmetric pulses in distal extremities.  Respiratory: Normal inspection. Symmetric chest rise. Minimal breath sounds on the R   Abdomen: Soft, NT/ND. +BS. No hepatosplenomegaly.   Extremities: No edema.   Neuro: Grossly intact to brief exam. Oriented x3 with appropriate mood/affect to situation.     Personal Review and Summary of Interval Diagnostics    Laboratory Studies:   No results for input(s): PH, PCO2, PO2, HCO3, POCSATURATED, BE in the last 24 hours.  Recent Labs   Lab 03/10/19  0427   WBC 1.94*   RBC 3.20*   HGB 7.4*   HCT 24.9*   PLT 30*   MCV 78*   MCH 23.1*   MCHC 29.7*     Recent Labs   Lab 03/10/19  0427   *   K 4.1      CO2 27   BUN 22*   CREATININE 1.4   MG 1.6       Microbiology Data:   Microbiology Results (last 7 days)      Procedure Component Value Units Date/Time    Culture, body fluid - Bactec [[id number]] Collected:  03/10/19 2055    Order Status:  Sent Specimen:  Body Fluid from Thoracentesis Fluid Updated:  03/10/19 2147    Culture, Body Fluid - Bactec [[id number]] Collected:  03/07/19 1200    Order Status:  Completed Specimen:  Body Fluid from Thoracentesis Fluid Updated:  03/10/19 1412     Body Fluid Culture, Sterile No Growth to date     Body Fluid Culture, Sterile No Growth to date     Body Fluid Culture, Sterile No Growth to date     Body Fluid Culture, Sterile No Growth to date    AFB culture (includes stain) [[id number]] Collected:  03/07/19 1200    Order Status:  Completed Specimen:  Body Fluid from Pleural Fluid Updated:  03/08/19 2127     AFB Culture & Smear Culture in progress     AFB CULTURE STAIN No acid fast bacilli seen.    Fungus culture [[id number]] Collected:  03/07/19 1200    Order Status:  Sent Specimen:  Body Fluid from Pleural Fluid Updated:  03/07/19 1216        Chest Imaging:     Infusions:        Scheduled Medications:    furosemide  80 mg Oral Daily    lactulose  15 g Oral TID    midodrine  10 mg Oral TID    multivitamin  1 tablet Oral Daily    polyethylene glycol  17 g Oral Daily    rifAXImin  550 mg Oral BID    spironolactone  200 mg Oral Daily    traZODone  100 mg Oral QHS       PRN Medications:   acetaminophen, albuterol-ipratropium, bisacodyl, clonazePAM, dextrose 50%, dextrose 50%, glucagon (human recombinant), glucose, glucose, HYDROcodone-acetaminophen, insulin aspart U-100, omnipaque, ondansetron, ramelteon, sodium chloride 0.9%    Impression & Recommendations    Recurrent R pleural effusion  - drained 1400 cc of serosanguinous fluid with no complications.  - sent basic studies but no concern for infectious causes     We will sign off - call back with any concerns or questions.         Carmencita Darby M.D.  [employer] Pulmonary/Critical Care Fellow

## 2019-03-11 NOTE — ASSESSMENT & PLAN NOTE
CXR: Large right pleural effusion with prominent compression atelectasis of right lung, remaining portions of right upper lobe and right middle lobe retained air.  satting 98% on RA  S/p thoracentesis on 3/7 and 3/10 by pulmonary with removal of approximately 1.2L and 1.4L of pleural fluid removal   Cont diuretics with lasix and aldactone: BP tolerating   Duo nebs as needed  continuous pulse ox  S/p TIPS   Liver sonogram: Complete thrombosis of the TIPS stent and partial thrombosis of the main portal vein, new since the most recent prior study.  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 Recommend further evaluation with triple phase CT and consult to interventional radiology.  Plan for IR to revise TIPS on 3/11  Triple phase CT findings noted for rapid re-accumulation with mass effect on the heart; thoracentesis done as per above

## 2019-03-11 NOTE — ANESTHESIA PREPROCEDURE EVALUATION
03/11/2019  Michelle Pappas is a 59 y.o., female.    Anesthesia Evaluation         Review of Systems  Anesthesia Hx:  No problems with previous Anesthesia   Social:  Former Smoker    Hematology/Oncology:         -- Anemia:   Cardiovascular:   Exercise tolerance: good Hypertension Denies CAD.     Denies Angina.  Functional Capacity Can you climb two flights of stairs? ==> Yes    Pulmonary:   Denies Asthma.  Denies Recent URI.  Denies Sleep Apnea.    Renal/:   Chronic Renal Disease, CRI    Hepatic/GI:   Denies PUD. Denies Hiatal Hernia.  Denies GERD. Liver Disease, Hepatitis, C    Neurological:   Denies CVA. Seizures    Endocrine:   Diabetes Denies Hypothyroidism.        Physical Exam  General:  Well nourished    Airway/Jaw/Neck:  Airway Findings: Mouth Opening: Normal Tongue: Normal  General Airway Assessment: Adult  Mallampati: I  TM Distance: Normal, at least 6 cm  Jaw/Neck Findings:  Neck ROM: Normal ROM  Neck Findings:     Eyes/Ears/Nose:  EYES/EARS/NOSE FINDINGS: Normal   Dental:  Dental Findings: Edentulous   Chest/Lungs:  Chest/Lungs Findings: Clear to auscultation     Heart/Vascular:  Heart Findings: Rate: Normal  Rhythm: Regular Rhythm  Sounds: Normal     Abdomen:  Abdomen Findings:  Ascites       Mental Status:  Mental Status Findings:  Alert and Oriented         Anesthesia Plan  Type of Anesthesia, risks & benefits discussed:  Anesthesia Type:  general  Patient's Preference: Proceed with anesthesia understanding that the risks are very small but could be serious or life threatening.  Intra-op Monitoring Plan: standard ASA monitors  Intra-op Monitoring Plan Comments:   Post Op Pain Control Plan:   Post Op Pain Control Plan Comments:   Induction:   IV  Beta Blocker:  Patient is not currently on a Beta-Blocker (No further documentation required).       Informed Consent: Patient understands risks and  agrees with Anesthesia plan.  Questions answered. Anesthesia consent signed with patient.  ASA Score: 3     Day of Surgery Review of History & Physical: I have interviewed and examined the patient. I have reviewed the patient's H&P dated:            Ready For Surgery From Anesthesia Perspective.

## 2019-03-11 NOTE — PLAN OF CARE
CM met with patient for discharge planning.  Patient states she lives in a one story house with four steps to enter with her brother.  Plan is to discharge home with family.    Pharmacy:    [facility]  [address]  [facility] [address]  Phone: [phone] Fax: [phone]    [facility] [place]    PCP:  Andreea Walton MD    Payor: MEDICARE / Plan: MEDICARE PART A & B / Product Type: [facility] /      03/11/19 1406   Discharge Assessment   Assessment Type Discharge Planning Assessment   Confirmed/corrected address and phone number on facesheet? Yes   Assessment information obtained from? Patient   Expected Length of Stay (days) 3   Communicated expected length of stay with patient/caregiver yes   Prior to hospitilization cognitive status: Alert/Oriented   Prior to hospitalization functional status: Independent   Current cognitive status: Alert/Oriented   Current Functional Status: Independent   Facility Arrived From: Emergency room   Lives With sibling(s)   Able to Return to Prior Arrangements yes   Is patient able to care for self after discharge? Unable to determine at this time (comments)   Who are your caregiver(s) and their phone number(s)? Be rivera [phone]   Patient's perception of discharge disposition home or selfcare   Readmission Within the Last 30 Days no previous admission in last 30 days   Patient currently being followed by outpatient case management? No   Patient currently receives any other outside agency services? No   Equipment Currently Used at Home none   Do you have any problems affording any of your prescribed medications? No   Is the patient taking medications as prescribed? yes   Does the patient have transportation home? Yes   Transportation Anticipated family or friend will provide   Does the patient receive services at the [facility]? No   Discharge Plan A Home with family   Discharge Plan B Home Health   DME Needed Upon  Discharge  none   Patient/Family in Agreement with Plan yes

## 2019-03-11 NOTE — PLAN OF CARE
Problem: Adult Inpatient Plan of Care  Goal: Plan of Care Review  Outcome: Ongoing (interventions implemented as appropriate)  Plan of care reviewed. Pt received 1 unit of platelets, left unit with 1 unit of PRBC infusing at 125cc/hr. Pt off unit in IR having TIPS procedure done.

## 2019-03-11 NOTE — SUBJECTIVE & OBJECTIVE
Interval History: SOB and should pain improved after thoracentesis yesterday with 1.4L fluid removal. Awaiting IR revision of TIPS.     Review of Systems   Constitutional: Positive for fatigue. Negative for chills and fever.   HENT: Negative for congestion, facial swelling, hearing loss and trouble swallowing.    Eyes: Negative for photophobia and visual disturbance.   Respiratory: Positive for shortness of breath (improved). Negative for chest tightness and wheezing.    Cardiovascular: Negative for chest pain, palpitations and leg swelling.   Gastrointestinal: Negative for abdominal pain, blood in stool, constipation, diarrhea, nausea and vomiting.   Endocrine: Negative.    Genitourinary: Negative.    Musculoskeletal: Negative for back pain, joint swelling and myalgias.   Skin: Negative.    Allergic/Immunologic: Negative.    Neurological: Negative for dizziness, facial asymmetry, speech difficulty, weakness and numbness.   Hematological: Negative.    Psychiatric/Behavioral: Negative for agitation, confusion and dysphoric mood. The patient is not nervous/anxious.      Objective:     Vital Signs (Most Recent):  Temp: 97.9 °F (36.6 °C) (03/11/19 1133)  Pulse: 78 (03/11/19 1212)  Resp: 12 (03/11/19 1133)  BP: (!) 109/53 (03/11/19 1133)  SpO2: (!) 90 % (03/11/19 1133) Vital Signs (24h Range):  Temp:  [97.2 °F (36.2 °C)-98.1 °F (36.7 °C)] 97.9 °F (36.6 °C)  Pulse:  [72-99] 78  Resp:  [12-18] 12  SpO2:  [90 %-96 %] 90 %  BP: (103-122)/(53-60) 109/53     Weight: 71.4 kg (157 lb 6.5 oz)  Body mass index is 27.88 kg/m².    Intake/Output Summary (Last 24 hours) at 3/11/2019 1251  Last data filed at 3/10/2019 2100  Gross per 24 hour   Intake 1237 ml   Output 200 ml   Net 1037 ml      Physical Exam   Constitutional: She is oriented to person, place, and time. Vital signs are normal. She appears well-developed and well-nourished.  Non-toxic appearance. She does not appear ill. No distress.   Eyes: EOM are normal. No scleral  icterus.   Neck: Normal range of motion and full passive range of motion without pain. Neck supple. No JVD present. Carotid bruit is not present.   Cardiovascular: Normal rate, S1 normal, S2 normal and normal pulses. Exam reveals no gallop, no S3, no S4 and no friction rub.   No murmur heard.  Pulmonary/Chest: Effort normal. No accessory muscle usage. No tachypnea. No respiratory distress. She has decreased breath sounds (in the right lower fields). She has no wheezes. She has no rhonchi. She has no rales.   Abdominal: Soft. Normal appearance and bowel sounds are normal. She exhibits distension. She exhibits no shifting dullness, no abdominal bruit and no ascites. There is no hepatosplenomegaly. There is no tenderness. There is no rigidity and no guarding.   Musculoskeletal: Normal range of motion. She exhibits no edema or tenderness.   Neurological: She is alert and oriented to person, place, and time. She has normal strength. She is not disoriented. No cranial nerve deficit or sensory deficit. GCS eye subscore is 4. GCS verbal subscore is 5. GCS motor subscore is 6.   Skin: Skin is warm, dry and intact. No abrasion and no lesion noted.   Psychiatric: She has a normal mood and affect. Her behavior is normal. Judgment and thought content normal. Her mood appears not anxious. Her speech is not slurred. She is not actively hallucinating. Cognition and memory are normal. She does not exhibit a depressed mood. She is attentive.     Significant Labs: All pertinent labs within the past 24 hours have been reviewed.    Significant Imaging: I have reviewed all pertinent imaging results/findings within the past 24 hours.

## 2019-03-11 NOTE — ASSESSMENT & PLAN NOTE
Lab Results   Component Value Date    HGBA1C 5.7 (H) 07/28/2018     Hold home oral agents- insulin is the preferred treatment for hyperglycemia  Low dose scheduled apart   Low dose correction scale   Cont blood glucose monitoring   ADA diet

## 2019-03-11 NOTE — PLAN OF CARE
Problem: Adult Inpatient Plan of Care  Goal: Plan of Care Review  Pt resting without complaints at present time. VS stable. No sx of distress noted. PRN pain med satisfies patient's abdominal and back pain. Will continue to monitor patient.

## 2019-03-11 NOTE — PROGRESS NOTES
[facility] Hospital Medicine  Progress Note    Patient Name: Michelle Pappas  MRN: [id number]  Patient Class: OP- Observation   Admission Date: 3/7/2019  Length of Stay: 0 days  Attending Physician: Lawson Hare MD  Primary Care Provider: Taj Luna DO    [facility] Medicine Team: [facility] MED  Lawson Hare MD    Subjective:     Principal Problem:Hydrothorax    HPI:  History of Present Illness:  Patient is a 59 y.o. female who has a past medical history of cirrhosis of liver likely to be secondary to hepatitis C, hepatitis C treated with Harvoni, Hepatic Hydrothorax, Pleural effusion, s/p TIPS procedure in June 2018 with reduction of portosystemic gradient, Encephalopathy, Hypoalbuminemia, Thrombocytopenia, Anemia, Anxiety, Depression, Essential hypertension presented with shortness of breath at rest, and with minimal acitivity. Symptoms include constant cough, dyspnea on exertion, dyspnea when laying down and edema to bilateral LE. Symptoms began yesterday evening gradually worsening since that time. Patient denies sputum production, tightness in chest and wheezing. Patient states that she has had a thoracentesis on 3/1 and 2 L were removed and also had a thoracentesis on 3/5 and 2 L were removed. Brother at bedside and states that she was recently admitted to the hospital in [place] due to fluid buildup. Only other complaint is that her appetite has been decreased. Of note per hepatology, at the time of the TIPS she had a mechanical thrombectomy and balloon angioplasty of a portal vein thrombosis with restoration of flow. There was residual clot noted. She was placed on a blood thinner. On 8/13/18 she had a venogram: demonstrates brisk flow through the portal vein with no evidence of residual portal vein thrombosis as well as brisk flow through the superior mesenteric vein with no evidence of residual mesenteric venous thrombosis; the tip is widely patent.  The portosystemic  gradient is 9. Routine tips ultrasound is recommended in 3 months. She is off blood thinners. Patient currently denies any fever/chills, chest pain, dyspnea, weakness, numbness, abdominal pain, nausea/vomiting, dysuria/hematuria, or weight loss.     Hospital Course:  No notes on file    Interval History: SOB and should pain improved after thoracentesis yesterday with 1.4L fluid removal. Awaiting IR revision of TIPS.     Review of Systems   Constitutional: Positive for fatigue. Negative for chills and fever.   HENT: Negative for congestion, facial swelling, hearing loss and trouble swallowing.    Eyes: Negative for photophobia and visual disturbance.   Respiratory: Positive for shortness of breath (improved). Negative for chest tightness and wheezing.    Cardiovascular: Negative for chest pain, palpitations and leg swelling.   Gastrointestinal: Negative for abdominal pain, blood in stool, constipation, diarrhea, nausea and vomiting.   Endocrine: Negative.    Genitourinary: Negative.    Musculoskeletal: Negative for back pain, joint swelling and myalgias.   Skin: Negative.    Allergic/Immunologic: Negative.    Neurological: Negative for dizziness, facial asymmetry, speech difficulty, weakness and numbness.   Hematological: Negative.    Psychiatric/Behavioral: Negative for agitation, confusion and dysphoric mood. The patient is not nervous/anxious.      Objective:     Vital Signs (Most Recent):  Temp: 97.9 °F (36.6 °C) (03/11/19 1133)  Pulse: 78 (03/11/19 1212)  Resp: 12 (03/11/19 1133)  BP: (!) 109/53 (03/11/19 1133)  SpO2: (!) 90 % (03/11/19 1133) Vital Signs (24h Range):  Temp:  [97.2 °F (36.2 °C)-98.1 °F (36.7 °C)] 97.9 °F (36.6 °C)  Pulse:  [72-99] 78  Resp:  [12-18] 12  SpO2:  [90 %-96 %] 90 %  BP: (103-122)/(53-60) 109/53     Weight: 71.4 kg (157 lb 6.5 oz)  Body mass index is 27.88 kg/m².    Intake/Output Summary (Last 24 hours) at 3/11/2019 1251  Last data filed at 3/10/2019 2100  Gross per 24 hour   Intake  1237 ml   Output 200 ml   Net 1037 ml      Physical Exam   Constitutional: She is oriented to person, place, and time. Vital signs are normal. She appears well-developed and well-nourished.  Non-toxic appearance. She does not appear ill. No distress.   Eyes: EOM are normal. No scleral icterus.   Neck: Normal range of motion and full passive range of motion without pain. Neck supple. No JVD present. Carotid bruit is not present.   Cardiovascular: Normal rate, S1 normal, S2 normal and normal pulses. Exam reveals no gallop, no S3, no S4 and no friction rub.   No murmur heard.  Pulmonary/Chest: Effort normal. No accessory muscle usage. No tachypnea. No respiratory distress. She has decreased breath sounds (in the right lower fields). She has no wheezes. She has no rhonchi. She has no rales.   Abdominal: Soft. Normal appearance and bowel sounds are normal. She exhibits distension. She exhibits no shifting dullness, no abdominal bruit and no ascites. There is no hepatosplenomegaly. There is no tenderness. There is no rigidity and no guarding.   Musculoskeletal: Normal range of motion. She exhibits no edema or tenderness.   Neurological: She is alert and oriented to person, place, and time. She has normal strength. She is not disoriented. No cranial nerve deficit or sensory deficit. GCS eye subscore is 4. GCS verbal subscore is 5. GCS motor subscore is 6.   Skin: Skin is warm, dry and intact. No abrasion and no lesion noted.   Psychiatric: She has a normal mood and affect. Her behavior is normal. Judgment and thought content normal. Her mood appears not anxious. Her speech is not slurred. She is not actively hallucinating. Cognition and memory are normal. She does not exhibit a depressed mood. She is attentive.     Significant Labs: All pertinent labs within the past 24 hours have been reviewed.    Significant Imaging: I have reviewed all pertinent imaging results/findings within the past 24 hours.         Assessment/Plan:       * Hepatic Hydrothorax    CXR: Large right pleural effusion with prominent compression atelectasis of right lung, remaining portions of right upper lobe and right middle lobe retained air.  satting 98% on RA  S/p thoracentesis on 3/7 and 3/10 by pulmonary with removal of approximately 1.2L and 1.4L of pleural fluid removal   Cont diuretics with lasix and aldactone: BP tolerating   Duo nebs as needed  continuous pulse ox  S/p TIPS   Liver sonogram: Complete thrombosis of the TIPS stent and partial thrombosis of the main portal vein, new since the most recent prior study.  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 Recommend further evaluation with triple phase CT and consult to interventional radiology.  Plan for IR to revise TIPS on 3/11  Triple phase CT findings noted for rapid re-accumulation with mass effect on the heart; thoracentesis done as per above      Severe malnutrition    Nutrition consult  Added MVI daily  Small frequent meals due to early satiety        Recurrent right pleural effusion    Plan as above.        CKD stage 3 due to type 2 diabetes mellitus    Lab Results   Component Value Date    CREATININE 1.3 03/11/2019     Sr Cr stable. Baseline 1.0-1.4  Cont to monitor with daily labs   Avoid nephrotoxins.   Renally dose all medications   Monitor events that may lead to decreased renal perfusion (hypovolemia, hypotension, sepsis).   Monitor urine output      Pancytopenia    Lab Results   Component Value Date    WBC 2.34 (L) 03/11/2019    HGB 6.7 (L) 03/11/2019    HCT 23.4 (L) 03/11/2019    MCV 80 (L) 03/11/2019    PLT 28 (LL) 03/11/2019     Secondary to underlying liver disease  WBCs improved  Platelet transfusion for TIPS today  PRBC transfusion given drop in H&H  Cont to monitor       Hypotension    BP Readings from Last 3 Encounters:   03/11/19 (!) 109/53   08/16/18 104/60   08/14/18 (!) 107/53     Cont midodrine to treat  Cont to monitor.        Decompensated HCV cirrhosis     MELD-Na score: 11 at 3/10/2019  4:27 AM  MELD score: 11 at 3/10/2019  4:27 AM  Calculated from:  Serum Creatinine: 1.4 mg/dL at 3/10/2019  4:27 AM  Serum Sodium: 134 mmol/L at 3/10/2019  4:27 AM  Total Bilirubin: 0.6 mg/dL (Rounded to 1 mg/dL) at 3/10/2019  4:27 AM  INR(ratio): 1.1 at 3/10/2019  4:27 AM  Age: 59 years       Continue Lactulose and Rifaximin to prevent encephalopathy   Ascities associated with slightly decreased protein.   Continue Lasix and Aldactone at present doses.   Cont fluid and sodium restriction.   Consult hepatology. Appreciate assistance.      Insomnia    Cont Trazodone QHS       Diabetes    Lab Results   Component Value Date    HGBA1C 5.7 (H) 07/28/2018     Hold home oral agents- insulin is the preferred treatment for hyperglycemia  Low dose scheduled apart   Low dose correction scale   Cont blood glucose monitoring   ADA diet       VTE Risk Mitigation (From admission, onward)        Ordered     IP VTE LOW RISK PATIENT  Once      03/07/19 1035     Place ABIMAEL hose  Until discontinued      03/07/19 1035              Lawson Hare MD  Department of Hospital Medicine   [facility]

## 2019-03-12 PROBLEM — Z95.828 S/P TIPS (TRANSJUGULAR INTRAHEPATIC PORTOSYSTEMIC SHUNT): Status: ACTIVE | Noted: 2019-01-01

## 2019-03-12 NOTE — PLAN OF CARE
Problem: Adult Inpatient Plan of Care  Goal: Plan of Care Review  Outcome: Ongoing (interventions implemented as appropriate)  Plan of care reviewed with Patient, Patient verbalized understanding. Patient remained in PACU overnight for close monitoring. Patient NPO as of 0300 am per orders, pain control with PRN medications, voiding per bedpan, VSS afebrile, NAD Wctm

## 2019-03-12 NOTE — PROCEDURES
"Radiology Post-Procedure Note    Pre Op Diagnosis: TIPS malfunction and portal vein thrombosis  Post Op Diagnosis: Same    Procedure: TIPS revision with portal vein thrombectomy.    Procedure performed by: Onur Salgado    Written Informed Consent Obtained: Yes  Specimen Removed: NO  Estimated Blood Loss: Minimal    Findings:   Access was gained in the right IJ. The TIPS was attempted to be accessed multiple times. Access was then gain from a percutaneous transhepatic approach. A wire was snared and sheath was able to be crossed through the tips. Multiple attempts at thrombectomy using an angiojet and venoplasty was used in the TIPS, Portal vein, and SMV. A second TIPS was also deployed measuring 10 x7 x2 cm. FLow improved but was not completely restored. THe decision was made to leave catheter in the portal vein and sheath in the tips to continue thrombectomy tomorrow.    Patient will be placed on heparinized saline drip within indwelling sheath and catheter until she can be taken back for further procedure.    Patient tolerated procedure well.    Sebas De Leon MD (Buck)  Radiology PGY-5  [phone]      "

## 2019-03-12 NOTE — PROGRESS NOTES
Pt arrived to Interventional Radiology [id number] via stretcher with anesthesia team for portal vein recanilzation.  Name verified using two identifiers.  Allergies verified.  Pt will remain under direct care of anesthesia.

## 2019-03-12 NOTE — HPI
Michelle Pappas is a 60 y/o F with medical history significant for HTN, HCV cirrhosis, recurrent hepatic hydrothorax, who presents to [facility] on 3/7 with one day history with symptoms of dyspnea at rest and on exertion, orthopnea, cough and bilateral LE edema and found to have hepatic hydrothorax. Of note, patient has history of recurrent hepatic hydrothorax requiring therapeutic paracentesis (4 since December - 12/3/18, 2/22/19, 3/1/19, and 3/5/19). She is s/p TIPS procedure in June 2018 with reduction of portosystemic gradient. A the time of the TIPS she had a mechanical thrombectomy and balloon angioplasty of a portal vein thrombosis with restoration of flow but residual clot was noted. She was placed on apixaban. During this admission, she again was found to have recurrent hydrothorax. She is s/p thoracentesis on 3/7 and 3/10 by pulmonary with removal of approximately 1.2L and 1.4L of pleural fluid removal. Of note, on US, complete thrombosis of the TIPS stent and partial thrombosis of the main portal vein, new since the most recent prior study with persistent partial thrombosis of the superior mesenteric vein. CT triple phase was demonstrable for rapid re-accumulation with mass effect on the heart. Patient was brought in for TIPS revision on 3/11 with recanalization per IR but was unable to achieve appreciable flow within the portal vein. Right IJ sheath was left in place and on 3/12, right IJ sheath was upsized to a 20Fr.  Multiple attempts at angioplasty, suction thrombectomy and mechanical thrombectomy resulted moderate improvement of flow within the Portal vein and TIPS. The Sheath was subsequently removed and hemostasis was achieved with a purse string suture and manual compression. During procedure, patient received 2U PRBC. She tolerated procedure without complication, including post-procedure encephalopathy, acute bleeding, or hemodynamic compromise. MICU was consulted s/p TIPS revision for monitoring  overnight, due to plan for heparinization 6 hours post procedure and concern for bleeding risks in cirrhotic patient.

## 2019-03-12 NOTE — PLAN OF CARE
Plan is to discharge home with family when medically ready.         03/12/19 1692   Discharge Reassessment   Assessment Type Discharge Planning Reassessment   Do you have any problems affording any of your prescribed medications? No   Discharge Plan A Home with family   Discharge Plan B Home Health

## 2019-03-12 NOTE — CONSULTS
[facility]  Critical Care Medicine  Consult Note    Patient Name: Michelle Pappas  MRN: [id number]  Admission Date: 3/7/2019  Hospital Length of Stay: 1 days  Code Status: Full Code  Attending Physician: Lawson Hare MD   Primary Care Provider: Andreea Walton MD   Principal Problem: Hydrothorax    Inpatient consult to Critical Care Medicine  Consult performed by: Yuval Benoit MD  Consult ordered by: Sebas De Leon MD        Subjective:     HPI:  Michelle Pappas is a 60 y/o F with medical history significant for HCV cirrhosis, hepatic hydrothorax, s/p TIPS procedure in 2018 with reduction of portosystemic gradient, HTN who is s/p TIPS revision with recanalization per IR on 3/12 for portal vein thrombosis. ICU was consulted as flow improved but was not completely restored and decision was made to leave catheter in the portal vein and sheath in the tips to continue thrombectomy tomorrow. Patient tolerated procedure without complication, including acute bleeds, acute encephalopathy, or hemodynamic compromise. IR was informed that patient could not return to hospital medicine floor with RIJ sheath in place and patient needed ICU acceptance, although per , sheath can be left in place in a stepdown unit.        Past Medical History:   Diagnosis Date    Anemia     Anxiety 2018    CAH (chronic active hepatitis)     Depression 2018    Encephalopathy     Essential hypertension 2018    Hepatic Hydrothorax 2018    Hypoalbuminemia 2018    Other cirrhosis of liver 2018    Pleural effusion     Thrombocytopenia 2018       Past Surgical History:   Procedure Laterality Date    breast reduction       SECTION      HYSTERECTOMY      TIPS N/A 2018    [facility]formed by [facility]    TIPS N/A 2018    Performed by [facility]    Venogram N/A 2018    Performed by [facility] at [facility] OR  2ND FLR       Review of patient's allergies indicates:   Allergen Reactions    Promethazine hcl Itching       Family History     None        Tobacco Use    Smoking status: Former Smoker     Types: Cigarettes     Last attempt to quit: 3/6/2018     Years since quittin.0    Smokeless tobacco: Never Used    Tobacco comment: quit 2018   Substance and Sexual Activity    Alcohol use: No    Drug use: No    Sexual activity: Not on file      Review of Systems   Constitutional: Positive for fatigue. Negative for chills and fever.   HENT: Negative for congestion, facial swelling, hearing loss and trouble swallowing.    Eyes: Negative for photophobia and visual disturbance.   Respiratory: Positive for shortness of breath (improved). Negative for chest tightness and wheezing.    Cardiovascular: Negative for chest pain, palpitations and leg swelling.   Gastrointestinal: Positive for abdominal pain. Negative for blood in stool, constipation, diarrhea, nausea and vomiting.   Endocrine: Negative.    Genitourinary: Negative.    Musculoskeletal: Negative for back pain, joint swelling and myalgias.   Skin: Negative.    Allergic/Immunologic: Negative.    Neurological: Negative for dizziness, facial asymmetry, speech difficulty, weakness and numbness.   Hematological: Negative.    Psychiatric/Behavioral: Negative for agitation, confusion and dysphoric mood. The patient is not nervous/anxious.      Objective:     Vital Signs (Most Recent):  Temp: 98 °F (36.7 °C) (19)  Pulse: 75 (19)  Resp: 17 (19)  BP: (!) 140/65 (19)  SpO2: 95 % (19) Vital Signs (24h Range):  Temp:  [97.2 °F (36.2 °C)-98.6 °F (37 °C)] 98 °F (36.7 °C)  Pulse:  [66-86] 75  Resp:  [12-20] 17  SpO2:  [89 %-100 %] 95 %  BP: (100-140)/(52-66) 140/65   Weight: 71.4 kg (157 lb 6.5 oz)  Body mass index is 27.88 kg/m².      Intake/Output Summary (Last 24 hours) at 3/12/2019 0042  Last data filed at 3/12/2019  0000  Gross per 24 hour   Intake 947 ml   Output 1575 ml   Net -628 ml       Physical Exam   Constitutional: She is oriented to person, place, and time. She appears well-developed and well-nourished. No distress.   HENT:   Head: Normocephalic and atraumatic.   Mouth/Throat: No oropharyngeal exudate.   Eyes: EOM are normal. Pupils are equal, round, and reactive to light. No scleral icterus.   Neck: Normal range of motion. Neck supple. No JVD present.   RIJ sheath in place, mild oozing from area.    Area dressed with clean and intact dressings.    Cardiovascular: Normal rate, regular rhythm and intact distal pulses.   Pulmonary/Chest: Effort normal and breath sounds normal. No respiratory distress. She has no wheezes. She has no rales.   Abdominal: Soft. Bowel sounds are normal. She exhibits no distension. There is tenderness (Mild at RUQ and RLQ).   Musculoskeletal: Normal range of motion. She exhibits no edema.   Neurological: She is alert and oriented to person, place, and time.   Skin: Skin is warm and dry. Capillary refill takes less than 2 seconds. She is not diaphoretic. No erythema.       Vents:     Lines/Drains/Airways     Drain                 Sheath 03/11/19  Right 1 day          Peripheral Intravenous Line                 Peripheral IV - Single Lumen 03/07/19 0850 Right Hand 4 days         Peripheral IV - Single Lumen 03/11/19 1755 Right Forearm less than 1 day              Significant Labs:    CBC/Anemia Profile:  Recent Labs   Lab 03/10/19  0427 03/11/19  0424   WBC 1.94* 2.34*   HGB 7.4* 6.7*   HCT 24.9* 23.4*   PLT 30* 28*   MCV 78* 80*   RDW 19.2* 18.9*        Chemistries:  Recent Labs   Lab 03/10/19  0427 03/11/19  0424   * 133*   K 4.1 4.1    101   CO2 27 25   BUN 22* 20   CREATININE 1.4 1.3   CALCIUM 8.6* 8.2*   ALBUMIN 1.9* 1.7*   PROT 5.0* 4.6*   BILITOT 0.6 0.5   ALKPHOS 54* 51*   ALT 14 13   AST 16 14   MG 1.6 1.6   PHOS 4.0 3.8         Significant Imaging:  Imaging Results           US Liver with Doppler (Final result)     Abnormal  Result time 03/08/19 08:48:32    Final result by Jung Herrera MD (03/08/19 08:48:32)                 Impression:      1. 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  Recommend further evaluation with triple phase CT and consult to interventional radiology.  2. Cirrhosis and sequela of portal hypertension including splenomegaly, small volume ascites, collateral vessel formation.  3. Cholelithiasis.  4. Right pleural effusion.  5. Stable complex cystic splenic lesion.  This report was flagged in Epic as abnormal.    Electronically signed by resident: Rivera Pang  Date:    03/08/2019  Time:    08:28    Electronically signed by: Jung Herrera MD  Date:    03/08/2019  Time:    08:48             Narrative:    EXAMINATION:  US LIVER WITH DOPPLER    CLINICAL HISTORY:  evaluate tips;    TECHNIQUE:  Complete abdominal ultrasound (including pancreas, liver, gallbladder, common bile duct, spleen, aorta, IVC, and kidneys) was performed.    Complete abdominal doppler exam was also performed.    COMPARISON:  Ultrasound 07/20/2018, 05/23/2018.    FINDINGS:  Liver: Normal in size, measuring 14.4 cm. Heterogeneous echotexture with nodular contour compatible with cirrhosis.  Stable cyst within the left hepatic lobe measuring 0.7 x 0.7 x 0.7 cm.    Gallbladder: Several non-mobile stones within the gallbladder measuring up to 0.7 cm.  Mild wall thickening to 0.4 cm, similar to prior studies and likely due to hepatic dysfunction.  No pericholecystic fluid.  No sonographic Arevalo's sign.    Biliary system: The common duct is not dilated, measuring 2 mm.  No intrahepatic ductal dilatation.    Spleen: Enlarged with a homogeneous echotexture, measuring 20.9 x 4.2 cm.  Contains a complex cystic structure measuring 1.8 x 2.0 x 1.8 cm, similar to  prior studies.    Pancreas: The visualized portions of pancreas appear normal.    Inferior vena cava: Normal in appearance.    Miscellaneous: Small volume ascites.  Collateral vessel formation throughout the upper abdomen.    Main hepatic artery: Patent.    Main portal vein: Partially thrombosed with sluggish hepatopetal flow.    Left portal vein:  Patent with hepatopetal flow.    Right portal vein:  Anterior branch is patent with hepatopetal flow.    SMV:  Persistent partial thrombosis.    IVC: Patent    Left, middle, and right hepatic veins: Patent.    Umbilical vein: Not patent.    TIPS stent again identified and is now thrombosed along its length, new since the most recent prior study.    Right-sided pleural effusion is present.                               X-Ray Chest PA And Lateral (Final result)  Result time 03/07/19 09:25:47    Final result by Frank Erickson MD (03/07/19 09:25:47)                 Impression:      Enlarging right pleural effusion now of large size with compression atelectasis right lung.      Electronically signed by: Frank Erickson MD  Date:    03/07/2019  Time:    09:25             Narrative:    EXAMINATION:  XR CHEST PA AND LATERAL    CLINICAL HISTORY:  Shortness of breath    TECHNIQUE:  PA and lateral views of the chest were performed.    COMPARISON:  07/30/2018    FINDINGS:  Large right pleural effusion with prominent compression atelectasis of right lung, remaining portions of right upper lobe and right middle lobe retained air.  Left lung clear.  Heart normal size.  Pulmonary vessels intact.  Single surgical clip right epigastric region stable.                                    Assessment/Plan:     Pulmonary   * Hepatic Hydrothorax    - S/p TIPS revision with recanalization for portal vein thrombosis on 3/11. Due to incomplete thrombectomy, RIJ sheath was left in place for repeat procedure on 3/12.  - ICU was consulted as IR was informed that sheath could not remain in  place on medicine floor.    - We have spoken with house supervisor and charge nurse in the ICU who have informed us that IJ sheath can remain in place on a stepdown floor.  - Patient is hemodynamically stable without critical care needs and can go to stepdown floor.   - However, with acute decompensation including acute encephalopathy and acute GIB considering patient's cirrhosis, do not hesitate to re consult ICU for re-evaluation        Critical secondary to Patient has a condition that poses threat to life and bodily function: Hepatic hydothorax s/p TIPS.      Critical care was time spent personally by me on the following activities: development of treatment plan with patient or surrogate and bedside caregivers, discussions with consultants, evaluation of patient's response to treatment, examination of patient, ordering and performing treatments and interventions, ordering and review of laboratory studies, ordering and review of radiographic studies, pulse oximetry, re-evaluation of patient's condition. This critical care time did not overlap with that of any other provider or involve time for any procedures.    Thank you for your consult. I will sign off. Please contact us if you have any additional questions.     Yuval Benoit MD  PGY-3 Internal Medicine  [phone]    Critical Care Medicine  [facility]

## 2019-03-12 NOTE — ANESTHESIA PREPROCEDURE EVALUATION
03/12/2019  Pre-operative evaluation for Procedure(s) (LRB):  DECLOTTING SMV (N/A)    Michelle Pappas is a 59 y.o. female     Prev airway: Easy mask, DL with grade 1 view    Patient Active Problem List   Diagnosis    Hypoalbuminemia    Thrombocytopenia    Other cirrhosis of liver    Hepatic Hydrothorax    Anxiety    Depression    Essential hypertension    Diabetes    Chronic hepatitis C without hepatic coma    Pleural effusion    Insomnia    Decompensated HCV cirrhosis    Hypotension    Pancytopenia    CKD stage 3 due to type 2 diabetes mellitus    Hypokalemia    ARIANNE (acute kidney injury)    Recurrent right pleural effusion    Portal vein thrombosis    Severe malnutrition    Hypomagnesemia    Hypophosphatemia       Review of patient's allergies indicates:   Allergen Reactions    Promethazine hcl Itching        Current Facility-Administered Medications on File Prior to Encounter   Medication Dose Route Frequency Provider Last Rate Last Dose    fentaNYL injection 50 mcg  50 mcg Intravenous Q3 Min PRN Martha Ayon, NP   50 mcg at 08/13/18 1620    midazolam (VERSED) 1 mg/mL injection 1 mg  1 mg Intravenous Q3 Min PRN Martha Ayon, ISAIAH   1 mg at 08/13/18 1620     Current Outpatient Medications on File Prior to Encounter   Medication Sig Dispense Refill    clonazePAM (KLONOPIN) 1 MG tablet daily prn      furosemide (LASIX) 80 MG tablet Take 1 tablet (80 mg total) by mouth 2 (two) times daily. 60 tablet 2    glimepiride (AMARYL) 2 MG tablet Take 2 mg by mouth as needed.      lactulose (CHRONULAC) 10 gram/15 mL solution Take 45 mLs (30 g total) by mouth 3 (three) times daily. Adjust to 3-4 bowel movements daily. Takes 30 ml TID      midodrine (PROAMATINE) 10 MG tablet Take 1 tablet (10 mg total) by mouth 3 (three) times daily. 90 tablet 5    POTASSIUM BROMIDE, BULK, MISC  by Misc.(Non-Drug; Combo Route) route.      spironolactone (ALDACTONE) 100 MG tablet Take 2 tablets (200 mg total) by mouth once daily. 60 tablet 2    traZODone (DESYREL) 100 MG tablet Take 100 mg by mouth every evening.      ciprofloxacin HCl (CIPRO) 500 MG tablet Take 1 tablet (500 mg total) by mouth once daily. 30 tablet 11    hyoscyamine (LEVSIN/SL) 0.125 mg Subl Take 1 tablet by mouth up to four times daily         Past Surgical History:   Procedure Laterality Date    breast reduction       SECTION      HYSTERECTOMY      TIPS N/A 2018    [facility]    TIPS N/A 2018    Performed by [facility] at [facility]    Venogram N/A 2018    Performed by [facility] at [facility] OR [address] [place] [address]       Social History     Socioeconomic History    Marital status:      Spouse name: Not on file    Number of children: Not on file    Years of education: Not on file    Highest education level: Not on file   Social Needs    Financial resource strain: Not on file    Food insecurity - worry: Not on file    Food insecurity - inability: Not on file    Transportation needs - medical: Not on file    Transportation needs - non-medical: Not on file   Occupational History    Not on file   Tobacco Use    Smoking status: Former Smoker     Types: Cigarettes     Last attempt to quit: 3/6/2018     Years since quittin.0    Smokeless tobacco: Never Used    Tobacco comment: quit 2018   Substance and Sexual Activity    Alcohol use: No    Drug use: No    Sexual activity: Not on file   Other Topics Concern    Not on file   Social History Narrative    Not on file         Vital Signs Range (Last 24H):  Temp:  [36.2 °C (97.2 °F)-37 °C (98.6 °F)]   Pulse:  []   Resp:  [12-28]   BP: ()/(52-66)   SpO2:  [89 %-100 %]       CBC:   Recent Labs     19  0424 19  0409   WBC 2.34* 5.19   RBC 2.93* 3.73*   HGB 6.7* 8.8*   HCT 23.4* 30.7*   PLT 28*  43*   MCV 80* 82   MCH 22.9* 23.6*   MCHC 28.6* 28.7*       CMP:   Recent Labs     03/11/19 0424 03/12/19  0409   * 133*   K 4.1 4.8    100   CO2 25 25   BUN 20 20   CREATININE 1.3 1.2   * 190*   MG 1.6 1.6   PHOS 3.8 4.9*   CALCIUM 8.2* 9.0   ALBUMIN 1.7* 2.1*   PROT 4.6* 5.5*   ALKPHOS 51* 69   ALT 13 29   AST 14 95*   BILITOT 0.5 3.6*       INR  Recent Labs     03/10/19  0427 03/11/19  0424 03/12/19  0409   INR 1.1 1.2 1.3*       Diagnostic Studies:      EKG:  Normal sinus rhythm  Normal ECG  When compared with ECG of 22-MAY-2018 12:56,  No significant change was found  Confirmed by Leslie Palumbo MD ([id number]) on 7/27/2018 4:50:09 PM    2D Echo:  CONCLUSIONS     1 - Normal left ventricular systolic function (EF 60-65%).     2 - Normal left ventricular diastolic function.     3 - Normal right ventricular systolic function .     4 - Mild tricuspid regurgitation.     5 - The estimated PA systolic pressure is 22 mmHg.     6 - Right pleural effusion.     Anesthesia Evaluation    I have reviewed the Patient Summary Reports.    I have reviewed the Nursing Notes.   I have reviewed the Medications.     Review of Systems  Anesthesia Hx:  No problems with previous Anesthesia  History of prior surgery of interest to airway management or planning: Denies Family Hx of Anesthesia complications.   Denies Personal Hx of Anesthesia complications.   Social:  Former Smoker    Hematology/Oncology:         -- Anemia: Hematology Comments: Thrombocytopenia   Cardiovascular:   Exercise tolerance: good Hypertension Denies CAD.     Denies Angina.  Functional Capacity Can you climb two flights of stairs? ==> Yes    Pulmonary:   Denies Asthma.  Denies Recent URI.  Denies Sleep Apnea.    Renal/:   Chronic Renal Disease, CRI    Hepatic/GI:   Denies PUD. Denies Hiatal Hernia.  Denies GERD. Liver Disease, Hepatitis, C    Neurological:   Denies CVA. Seizures    Endocrine:   Diabetes Denies Hypothyroidism.        Physical  Exam  General:  Well nourished    Airway/Jaw/Neck:  Airway Findings: Mouth Opening: Normal Tongue: Normal  General Airway Assessment: Adult  Mallampati: I  TM Distance: Normal, at least 6 cm  Jaw/Neck Findings:  Neck ROM: Normal ROM  Neck Findings:     Eyes/Ears/Nose:  EYES/EARS/NOSE FINDINGS: Normal   Dental:  Dental Findings: Edentulous   Chest/Lungs:  Chest/Lungs Findings: Clear to auscultation     Heart/Vascular:  Heart Findings: Rate: Normal  Rhythm: Regular Rhythm  Sounds: Normal  Vascular Findings:  Vascular Access: Existing Central Line(s)     Abdomen:  Abdomen Findings:  Ascites       Mental Status:  Mental Status Findings:  Alert and Oriented         Anesthesia Plan  Type of Anesthesia, risks & benefits discussed:  Anesthesia Type:  general, MAC  Patient's Preference:   Intra-op Monitoring Plan: standard ASA monitors  Intra-op Monitoring Plan Comments:   Post Op Pain Control Plan: per primary service following discharge from PACU, IV/PO Opioids PRN and multimodal analgesia  Post Op Pain Control Plan Comments:   Induction:   IV  Beta Blocker:  Patient is not currently on a Beta-Blocker (No further documentation required).       Informed Consent: Patient understands risks and agrees with Anesthesia plan.  Questions answered. Anesthesia consent signed with patient.  ASA Score: 3  emergent   Day of Surgery Review of History & Physical:    H&P update referred to the surgeon.         Ready For Surgery From Anesthesia Perspective.

## 2019-03-12 NOTE — SUBJECTIVE & OBJECTIVE
Past Medical History:   Diagnosis Date    Anemia     Anxiety 2018    CAH (chronic active hepatitis)     Depression 2018    Encephalopathy     Essential hypertension 2018    Hepatic Hydrothorax 2018    Hypoalbuminemia 2018    Other cirrhosis of liver 2018    Pleural effusion     Thrombocytopenia 2018       Past Surgical History:   Procedure Laterality Date    breast reduction       SECTION      HYSTERECTOMY      TIPS N/A 2018    Performed by [facility]    TIPS N/A 2018    Performed by [facility]    Venogram N/A 2018    Performed by [facility] Diagnostic Provider at [facility] OR [address] [place] [address]       Review of patient's allergies indicates:   Allergen Reactions    Promethazine hcl Itching       Family History     None        Tobacco Use    Smoking status: Former Smoker     Types: Cigarettes     Last attempt to quit: 3/6/2018     Years since quittin.0    Smokeless tobacco: Never Used    Tobacco comment: quit 2018   Substance and Sexual Activity    Alcohol use: No    Drug use: No    Sexual activity: Not on file      Review of Systems   Constitutional: Positive for fatigue. Negative for chills and fever.   HENT: Negative for congestion, facial swelling, hearing loss and trouble swallowing.    Eyes: Negative for photophobia and visual disturbance.   Respiratory: Positive for shortness of breath (improved). Negative for chest tightness and wheezing.    Cardiovascular: Negative for chest pain, palpitations and leg swelling.   Gastrointestinal: Positive for abdominal pain. Negative for blood in stool, constipation, diarrhea, nausea and vomiting.   Endocrine: Negative.    Genitourinary: Negative.    Musculoskeletal: Negative for back pain, joint swelling and myalgias.   Skin: Negative.    Allergic/Immunologic: Negative.    Neurological: Negative for dizziness, facial asymmetry, speech difficulty, weakness and numbness.    Hematological: Negative.    Psychiatric/Behavioral: Negative for agitation, confusion and dysphoric mood. The patient is not nervous/anxious.      Objective:     Vital Signs (Most Recent):  Temp: 98 °F (36.7 °C) (03/11/19 2230)  Pulse: 75 (03/11/19 2330)  Resp: 17 (03/11/19 2330)  BP: (!) 140/65 (03/11/19 2330)  SpO2: 95 % (03/11/19 2330) Vital Signs (24h Range):  Temp:  [97.2 °F (36.2 °C)-98.6 °F (37 °C)] 98 °F (36.7 °C)  Pulse:  [66-86] 75  Resp:  [12-20] 17  SpO2:  [89 %-100 %] 95 %  BP: (100-140)/(52-66) 140/65   Weight: 71.4 kg (157 lb 6.5 oz)  Body mass index is 27.88 kg/m².      Intake/Output Summary (Last 24 hours) at 3/12/2019 0042  Last data filed at 3/12/2019 0000  Gross per 24 hour   Intake 947 ml   Output 1575 ml   Net -628 ml       Physical Exam   Constitutional: She is oriented to person, place, and time. She appears well-developed and well-nourished. No distress.   HENT:   Head: Normocephalic and atraumatic.   Mouth/Throat: No oropharyngeal exudate.   Eyes: EOM are normal. Pupils are equal, round, and reactive to light. No scleral icterus.   Neck: Normal range of motion. Neck supple. No JVD present.   RIJ sheath in place, mild oozing from area.    Area dressed with clean and intact dressings.    Cardiovascular: Normal rate, regular rhythm and intact distal pulses.   Pulmonary/Chest: Effort normal and breath sounds normal. No respiratory distress. She has no wheezes. She has no rales.   Abdominal: Soft. Bowel sounds are normal. She exhibits no distension. There is tenderness (Mild at RUQ and RLQ).   Musculoskeletal: Normal range of motion. She exhibits no edema.   Neurological: She is alert and oriented to person, place, and time.   Skin: Skin is warm and dry. Capillary refill takes less than 2 seconds. She is not diaphoretic. No erythema.       Vents:     Lines/Drains/Airways     Drain                 Sheath 03/11/19  Right 1 day          Peripheral Intravenous Line                 Peripheral IV -  Single Lumen 03/07/19 0850 Right Hand 4 days         Peripheral IV - Single Lumen 03/11/19 1755 Right Forearm less than 1 day              Significant Labs:    CBC/Anemia Profile:  Recent Labs   Lab 03/10/19  0427 03/11/19  0424   WBC 1.94* 2.34*   HGB 7.4* 6.7*   HCT 24.9* 23.4*   PLT 30* 28*   MCV 78* 80*   RDW 19.2* 18.9*        Chemistries:  Recent Labs   Lab 03/10/19  0427 03/11/19  0424   * 133*   K 4.1 4.1    101   CO2 27 25   BUN 22* 20   CREATININE 1.4 1.3   CALCIUM 8.6* 8.2*   ALBUMIN 1.9* 1.7*   PROT 5.0* 4.6*   BILITOT 0.6 0.5   ALKPHOS 54* 51*   ALT 14 13   AST 16 14   MG 1.6 1.6   PHOS 4.0 3.8         Significant Imaging:  Imaging Results          US Liver with Doppler (Final result)     Abnormal  Result time 03/08/19 08:48:32    Final result by Jung Herrera MD (03/08/19 08:48:32)                 Impression:      1. 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  Recommend further evaluation with triple phase CT and consult to interventional radiology.  2. Cirrhosis and sequela of portal hypertension including splenomegaly, small volume ascites, collateral vessel formation.  3. Cholelithiasis.  4. Right pleural effusion.  5. Stable complex cystic splenic lesion.  This report was flagged in Epic as abnormal.    Electronically signed by resident: Rivera Pang  Date:    03/08/2019  Time:    08:28    Electronically signed by: Jung Herrera MD  Date:    03/08/2019  Time:    08:48             Narrative:    EXAMINATION:  US LIVER WITH DOPPLER    CLINICAL HISTORY:  evaluate tips;    TECHNIQUE:  Complete abdominal ultrasound (including pancreas, liver, gallbladder, common bile duct, spleen, aorta, IVC, and kidneys) was performed.    Complete abdominal doppler exam was also performed.    COMPARISON:  Ultrasound 07/20/2018, 05/23/2018.    FINDINGS:  Liver:  Normal in size, measuring 14.4 cm. Heterogeneous echotexture with nodular contour compatible with cirrhosis.  Stable cyst within the left hepatic lobe measuring 0.7 x 0.7 x 0.7 cm.    Gallbladder: Several non-mobile stones within the gallbladder measuring up to 0.7 cm.  Mild wall thickening to 0.4 cm, similar to prior studies and likely due to hepatic dysfunction.  No pericholecystic fluid.  No sonographic Arevalo's sign.    Biliary system: The common duct is not dilated, measuring 2 mm.  No intrahepatic ductal dilatation.    Spleen: Enlarged with a homogeneous echotexture, measuring 20.9 x 4.2 cm.  Contains a complex cystic structure measuring 1.8 x 2.0 x 1.8 cm, similar to prior studies.    Pancreas: The visualized portions of pancreas appear normal.    Inferior vena cava: Normal in appearance.    Miscellaneous: Small volume ascites.  Collateral vessel formation throughout the upper abdomen.    Main hepatic artery: Patent.    Main portal vein: Partially thrombosed with sluggish hepatopetal flow.    Left portal vein:  Patent with hepatopetal flow.    Right portal vein:  Anterior branch is patent with hepatopetal flow.    SMV:  Persistent partial thrombosis.    IVC: Patent    Left, middle, and right hepatic veins: Patent.    Umbilical vein: Not patent.    TIPS stent again identified and is now thrombosed along its length, new since the most recent prior study.    Right-sided pleural effusion is present.                               X-Ray Chest PA And Lateral (Final result)  Result time 03/07/19 09:25:47    Final result by Frank Erickson MD (03/07/19 09:25:47)                 Impression:      Enlarging right pleural effusion now of large size with compression atelectasis right lung.      Electronically signed by: Frank Erickson MD  Date:    03/07/2019  Time:    09:25             Narrative:    EXAMINATION:  XR CHEST PA AND LATERAL    CLINICAL HISTORY:  Shortness of breath    TECHNIQUE:  PA and lateral  views of the chest were performed.    COMPARISON:  07/30/2018    FINDINGS:  Large right pleural effusion with prominent compression atelectasis of right lung, remaining portions of right upper lobe and right middle lobe retained air.  Left lung clear.  Heart normal size.  Pulmonary vessels intact.  Single surgical clip right epigastric region stable.

## 2019-03-12 NOTE — PROGRESS NOTES
Pt intubated, per anesthesia.  Pt's oxygenation inadequate.  Dr. Cochran at bedside. Per MD Thoracentesis to be performed by prior to procedure.  Pt tolerated well.  1200mls of serous sanguinous pleural fluid drained from right side.  See flowsheet per anesthesia for vitals.

## 2019-03-12 NOTE — ASSESSMENT & PLAN NOTE
- S/p TIPS revision with recanalization for portal vein thrombosis on 3/11. Due to incomplete thrombectomy, RIJ sheath was left in place for repeat procedure on 3/12.  - ICU was consulted as IR was informed that sheath could not remain in place on medicine floor.  - We have spoken with house supervisor and charge nurse in the ICU who have informed us that IJ sheath can remain in place on a stepdown floor.  - Patient is hemodynamically stable without critical care needs and can go to stepdown floor.   - However, with acute decompensation including acute encephalopathy and acute GIB considering patient's cirrhosis, do not hesitate to re consult ICU for re-evaluation

## 2019-03-12 NOTE — PROCEDURES
"Radiology Post-Procedure Note    Pre Op Diagnosis: TIPS malfunction with portal vein thrombus    Post Op Diagnosis: Same    Procedure: TIPs revision and portal vein recanalization.    Procedure performed by: Ya    Written Informed Consent Obtained: Yes  Specimen Removed: NO  Estimated Blood Loss: 300cc    Findings:   Access to the right IJ was regained from sheath left over night. ANgiogram demonstrated persistant clot in the portal vein. The right IJ sheath was then upsized to a 20Fr. Multiple attempts at angioplasty, suction thrombectomy and mechanical thrombectomy resulted moderate improvement of flow within the Portal vein and TIPS. The Sheath was subsequently removed and hemostasis was achieved with a purse string suture and manual compression.    Patient tolerated procedure well. She received 2 units of blood during the procedure secondary to use of suction thrombectomy.    Plan:  Patient will need to be heparinized while in the hospital and transitioned to Eliquis upon discharge. Heparinization to begin 6 hours post procedure.    Patient will need close monitoring post op to insure no hemorrhage around right IJ or internally. Purse string suture to be removed in the AM.    Fluids to continue over the evening secondary to amount of contrast used during the procedure.     Will plan on Liver doppler US in 2 weeks to assess TIPS. POssible repeat venogram following that      Sebas De Leon MD (Buck)  Radiology PGY-5  [phone]    "

## 2019-03-12 NOTE — H&P
Inpatient Radiology Pre-procedure Note    History of Present Illness:  Michelle Pappas is a 59 y.o. female who presents for TIPS revision and protal vein recanalization.  Admission H&P reviewed.  Past Medical History:   Diagnosis Date    Anemia     Anxiety 2018    CAH (chronic active hepatitis)     Depression 2018    Encephalopathy     Essential hypertension 2018    Hepatic Hydrothorax 2018    Hypoalbuminemia 2018    Other cirrhosis of liver 2018    Pleural effusion     Thrombocytopenia 2018     Past Surgical History:   Procedure Laterality Date    breast reduction       SECTION      HYSTERECTOMY      TIPS N/A 2018    Performed by [facility] Surgeon at [facility]    TIPS N/A 2018    Performed by [facility] Surgeon at [facility]    Venogram N/A 2018    Performed by [facility] Diagnostic Provider at [facility] OR [address] [place] [address]       Review of Systems:   As documented in primary team H&P    Home Meds:   Prior to Admission medications    Medication Sig Start Date End Date Taking? Authorizing Provider   clonazePAM (KLONOPIN) 1 MG tablet daily prn 7/13/15  Yes Historical Provider, MD   furosemide (LASIX) 80 MG tablet Take 1 tablet (80 mg total) by mouth 2 (two) times daily. 18 Yes Phill Thurston MD   glimepiride (AMARYL) 2 MG tablet Take 2 mg by mouth as needed.   Yes Historical Provider, MD   lactulose (CHRONULAC) 10 gram/15 mL solution Take 45 mLs (30 g total) by mouth 3 (three) times daily. Adjust to 3-4 bowel movements daily. Takes 30 ml TID 18  Yes Phill Thurston MD   midodrine (PROAMATINE) 10 MG tablet Take 1 tablet (10 mg total) by mouth 3 (three) times daily. 18 Yes Phill Thurston MD   POTASSIUM BROMIDE, BULK, MISC by Misc.(Non-Drug; Combo Route) route.   Yes Historical Provider, MD   spironolactone (ALDACTONE) 100 MG tablet Take 2 tablets (200 mg total) by mouth once daily. 18 Yes Phill Thurston MD   traZODone (DESYREL) 100 MG tablet  Take 100 mg by mouth every evening.   Yes Historical Provider, MD   ciprofloxacin HCl (CIPRO) 500 MG tablet Take 1 tablet (500 mg total) by mouth once daily. 5/1/18   Andreea Walton MD   hyoscyamine (LEVSIN/SL) 0.125 mg Subl Take 1 tablet by mouth up to four times daily 8/31/15   Historical Provider, MD     Scheduled Meds:    albumin human 5%        ePHEDrine sulfate        furosemide  80 mg Oral Daily    insulin aspart U-100  2 Units Subcutaneous TIDWM    lactulose  15 g Oral TID    midodrine  10 mg Oral TID    multivitamin  1 tablet Oral Daily    polyethylene glycol  17 g Oral Daily    rifAXImin  550 mg Oral BID    spironolactone  200 mg Oral Daily    traZODone  100 mg Oral QHS     Continuous Infusions:    heparin (porcine) 25 mL/hr (03/12/19 0000)    heparin (porcine) 5 mL/hr (03/12/19 0000)     PRN Meds:sodium chloride, sodium chloride, sodium chloride, sodium chloride, sodium chloride, acetaminophen, acetaminophen, albuterol-ipratropium, bisacodyl, clonazePAM, dextrose 50%, dextrose 50%, diphenhydrAMINE, fentaNYL, glucagon (human recombinant), glucose, glucose, HYDROcodone-acetaminophen, insulin aspart U-100, meperidine, omnipaque, ondansetron, ondansetron, ramelteon, sodium chloride 0.9%, sodium chloride 0.9%  Anticoagulants/Antiplatelets: Heparin    Allergies:   Review of patient's allergies indicates:   Allergen Reactions    Promethazine hcl Itching     Sedation Hx: have not been any systemic reactions    Labs:  Recent Labs   Lab 03/12/19  0409   INR 1.3*       Recent Labs   Lab 03/12/19  0409   WBC 5.19   HGB 8.8*   HCT 30.7*   MCV 82   PLT 43*      Recent Labs   Lab 03/12/19  0409   *   *   K 4.8      CO2 25   BUN 20   CREATININE 1.2   CALCIUM 9.0   MG 1.6   ALT 29   AST 95*   ALBUMIN 2.1*   BILITOT 3.6*         Vitals:  Temp: 97.8 °F (36.6 °C) (03/12/19 1100)  Pulse: 92 (03/12/19 1200)  Resp: 12 (03/12/19 1200)  BP: (!) 92/51 (03/12/19 1200)  SpO2: 97 % (03/12/19 1200)  "    Physical Exam:  ASA: 2  Mallampati: per anesthesia    General: no acute distress  Mental Status: alert and oriented to person, place and time  HEENT: normocephalic, atraumatic  Chest: unlabored breathing  Heart: regular heart rate  Abdomen: nondistended  Extremity: moves all extremities    Plan: portal vein recanilzation  Sedation Plan: per anesthesia    Sebas De Leon MD ([facility])  Radiology PGY-5  [phone]      "

## 2019-03-13 NOTE — ASSESSMENT & PLAN NOTE
- On admission, CXR: Large right pleural effusion with prominent compression atelectasis of right lung, remaining portions of right upper lobe and right middle lobe retained air.  satting 98% on RA    - Likely prompted by complete thrombosis of the TIPS stent and partial thrombosis of the main portal vein and worsening portal hypertension  - S/p thoracentesis on 3/7 and 3/10 by pulmonary with removal of approximately 1.2L and 1.4L of pleural fluid removal. Effusions are transudative.    - Cont diuretics with lasix and aldactone: BP tolerating   - S/p TIPS revision with recanalization for portal vein thrombosis on 3/11. Due to incomplete thrombectomy, RIJ sheath was left in place for repeat procedure on 3/12 with improvement of flow.

## 2019-03-13 NOTE — PROGRESS NOTES
[facility]  Hepatology  Progress Note    Patient Name: Michelle Pappas  MRN: [id number]  Admission Date: 3/7/2019  Hospital Length of Stay: 1 days  Attending Provider: Onesimo Hope MD   Primary Care Physician: Andreea Walton MD  Principal Problem:Portal vein thrombosis    Subjective:     HPI: 59 year old female with decompensated Hep C cirrhosis c/b PVT s/p thrombectomy with balloon angioplasty as well as TIPS on who Hepatology is consulted for decompensated cirrhosis.    Patient followed in Hepatology clinic until August-September of 2018 when she moved to [place]. She reports that starting in December she has had recurrent cough with shortness of breath which have required 4 thoracentesis (12/3/18, 2/22/19, 3/1/19, and 3/5/19). She now presents to [facility] for the same complaint and is status post a thoracentesis with removal of 1.2L in the ED. She is unsure if during her last admissions her TIPS was evaluated but does report being compliant with Na+/liquid restriction as well as with her diuretics. She denies any fever, chills, chest pain, nausea, emesis, abdominal pain, or changes in her urinary/bowel habits.    Interval History:   Patient s/p second TIPS revision yesterday.   Reports shortness of breath this morning as well as serosangeous drainage from RUQ.    Current Facility-Administered Medications   Medication    0.9%  NaCl infusion (for blood administration)    0.9%  NaCl infusion (for blood administration)    acetaminophen tablet 325 mg    albumin human 25% bottle 12.5 g    albuterol-ipratropium 2.5 mg-0.5 mg/3 mL nebulizer solution 3 mL    bisacodyl suppository 10 mg    ciprofloxacin 250 mg/5 ml suspension 500 mg    clonazePAM tablet 0.5 mg    dextrose 50% injection 12.5 g    dextrose 50% injection 25 g    diphenhydrAMINE injection 25 mg    glucagon (human recombinant) injection 1 mg    glucose chewable tablet 16 g    glucose chewable tablet 24 g    heparin 25,000  units in dextrose 5% (100 units/ml) IV bolus from bag - ADDITIONAL PRN BOLUS - 30 units/kg    heparin 25,000 units in dextrose 5% (100 units/ml) IV bolus from bag - ADDITIONAL PRN BOLUS - 60 units/kg    heparin 25,000 units in dextrose 5% 250 mL (100 units/mL) infusion LOW INTENSITY nomogram - [facility]    heparin infusion 1,000 units/500 ml in 0.9% NaCl (pressure line flush)    heparin infusion 1,000 units/500 ml in 0.9% NaCl (pressure line flush)    HYDROcodone-acetaminophen 5-325 mg per tablet 1 tablet    insulin aspart U-100 pen 0-5 Units    lactulose 20 gram/30 mL solution Soln 15 g    midodrine tablet 15 mg    morphine injection 3 mg    multivitamin tablet 1 tablet    omnipaque oral solution 15 mL    ondansetron disintegrating tablet 8 mg    polyethylene glycol packet 17 g    ramelteon tablet 8 mg    rifAXIMin tablet 550 mg    sodium chloride 0.9% flush 3 mL    sodium chloride 0.9% flush 5 mL    traZODone tablet 100 mg     Facility-Administered Medications Ordered in Other Encounters   Medication    fentaNYL injection 50 mcg    midazolam (VERSED) 1 mg/mL injection 1 mg       Objective:     Vital Signs (Most Recent):  Temp: 98.6 °F (37 °C) (03/13/19 1800)  Pulse: 99 (03/13/19 1800)  Resp: 20 (03/13/19 1800)  BP: (!) 82/46 (03/13/19 1800)  SpO2: (!) 92 % (03/13/19 1800) Vital Signs (24h Range):  Temp:  [97.3 °F (36.3 °C)-98.6 °F (37 °C)] 98.6 °F (37 °C)  Pulse:  [] 99  Resp:  [13-21] 20  SpO2:  [90 %-100 %] 92 %  BP: ()/(45-56) 82/46     Weight: 71.4 kg (157 lb 6.5 oz) (03/11/19 0600)  Body mass index is 27.88 kg/m².    Physical Exam   Constitutional: She is oriented to person, place, and time. She appears well-developed and well-nourished.   HENT:   Head: Normocephalic and atraumatic.   Eyes: No scleral icterus.   Cardiovascular: Normal rate and regular rhythm.   Pulmonary/Chest: Effort normal and breath sounds normal.   Abdominal: Soft. Bowel sounds are normal. She exhibits  distension. There is no tenderness.   Musculoskeletal: She exhibits edema.   Neurological: She is alert and oriented to person, place, and time.       MELD-Na score: 23 at 3/13/2019  9:43 AM  MELD score: 20 at 3/13/2019  9:43 AM  Calculated from:  Serum Creatinine: 2.3 mg/dL at 3/13/2019  9:40 AM  Serum Sodium: 132 mmol/L at 3/13/2019  9:40 AM  Total Bilirubin: 1.3 mg/dL at 3/13/2019  9:40 AM  INR(ratio): 1.5 at 3/13/2019  9:43 AM  Age: 59 years    Significant Labs:  CBC:   Recent Labs   Lab 03/13/19  1353   WBC 14.27*   RBC 4.34   HGB 11.5*   HCT 35.8*   PLT 45*     CMP:   Recent Labs   Lab 03/13/19  0940   *   CALCIUM 8.4*   ALBUMIN 2.0*   PROT 4.8*   *   K 4.4   CO2 24   CL 99   BUN 30*   CREATININE 2.3*   ALKPHOS 64   ALT 28   AST 47*   BILITOT 1.3*     Coagulation:   Recent Labs   Lab 03/13/19  0940 03/13/19  0943   INR  --  1.5*   APTT 56.6*  --        Significant Imaging:  X-Ray: Reviewed  US: Reviewed  CT: Reviewed    Assessment/Plan:     Decompensated HCV cirrhosis    59 year old female with decompensated Hep C cirrhosis c/b PVT s/p thrombectomy with balloon angioplasty as well as TIPS who Hepatology is followinng for decompensated cirrhosis.Patient is now s/p TIPS revision X 2 with thoracentesis and initiation of anticoagulation. Patient needs to be monitored closely for signs of bleeding as well as infection s/p her procedures. Also we will have to monitor her MELD closely because if there is evidence of decompensation we will have to discuss liver transplant evaluation.    Recommendations:  --Daily CMP, CBC, and INR  --Strict I/O, renally dose medications, and avoid nephrotoxins  --Monitor for signs of bleeding   --Monitor for signs of infection  --For ARIANNE/volume expansion can try 5% albumin  --Continue lactulose (to be titrated to ~3 BM per day)  --Once ready for transfer [facility] to [facility]            Thank you for your consult. I will follow-up with patient. Please contact us if you have any  additional questions.    Kena Guzmán M.D.  Gastroenterology Fellow, PGY-V  Pager: [phone]  [facility]

## 2019-03-13 NOTE — ASSESSMENT & PLAN NOTE
MELD-Na score: 24 at 3/12/2019 11:19 PM  MELD score: 22 at 3/12/2019 11:19 PM  Calculated from:  Serum Creatinine: 1.2 mg/dL at 3/12/2019  4:09 AM  Serum Sodium: 133 mmol/L at 3/12/2019  4:09 AM  Total Bilirubin: 3.6 mg/dL at 3/12/2019  4:09 AM  INR(ratio): 2.3 at 3/12/2019 11:19 PM  Age: 59 years    - Likely prompted by portal vein thrombosis (see section).   - Continue midodrine 10 mg PO TID to maintain stable BP.  - Continue lactulose for HE prophylaxis.  - Continue furosemide 80 mg PO daily and spironolactone 200 mg PO daily for diuresis and treatment of pleural effusion.  - Hepatology consulted; appreciate their recs.

## 2019-03-13 NOTE — SUBJECTIVE & OBJECTIVE
Interval History: Patient remained in PACU post TIPS procedure on 3/11 due to inability to fully cannulate lesion and sheath needing to remain in place. For second attempt today at re-cannulation. Reports some pain at site of sheath. H&H responded appropriately to transfuion.      Review of Systems   Constitutional: Positive for fatigue. Negative for chills and fever.   HENT: Negative for congestion, facial swelling, hearing loss and trouble swallowing.    Eyes: Negative for photophobia and visual disturbance.   Respiratory: Positive for shortness of breath (improved). Negative for chest tightness and wheezing.    Cardiovascular: Negative for chest pain, palpitations and leg swelling.   Gastrointestinal: Negative for abdominal pain, blood in stool, constipation, diarrhea, nausea and vomiting.   Endocrine: Negative.    Genitourinary: Negative.    Musculoskeletal: Negative for back pain, joint swelling and myalgias.        + pain as above    Skin: Negative.    Allergic/Immunologic: Negative.    Neurological: Negative for dizziness, facial asymmetry, speech difficulty, weakness and numbness.   Hematological: Negative.    Psychiatric/Behavioral: Negative for agitation, confusion and dysphoric mood. The patient is not nervous/anxious.      Objective:     Vital Signs (Most Recent):  Temp: 97.8 °F (36.6 °C) (03/12/19 1100)  Pulse: 90 (03/12/19 1840)  Resp: 20 (03/12/19 1840)  BP: (!) 94/50 (03/12/19 1840)  SpO2: 97 % (03/12/19 1840) Vital Signs (24h Range):  Temp:  [97.2 °F (36.2 °C)-98 °F (36.7 °C)] 97.8 °F (36.6 °C)  Pulse:  [] 90  Resp:  [12-28] 20  SpO2:  [92 %-100 %] 97 %  BP: ()/(49-66) 94/50     Weight: 71.4 kg (157 lb 6.5 oz)  Body mass index is 27.88 kg/m².    Intake/Output Summary (Last 24 hours) at 3/12/2019 1958  Last data filed at 3/12/2019 1734  Gross per 24 hour   Intake 2198 ml   Output 1625 ml   Net 573 ml      Physical Exam   Constitutional: She is oriented to person, place, and time. Vital  signs are normal. She appears well-developed and well-nourished.  Non-toxic appearance. She does not appear ill. No distress.   Sitting up. Appears a bit fatigued.    Eyes: No scleral icterus.   Neck: Normal range of motion and full passive range of motion without pain. Neck supple. No JVD present. Carotid bruit is not present.   Cardiovascular: Normal rate, S1 normal, S2 normal and normal pulses. Exam reveals no gallop, no S3, no S4 and no friction rub.   No murmur heard.  Pulmonary/Chest: Effort normal. No accessory muscle usage. No tachypnea. No respiratory distress. She has decreased breath sounds (in the right lower fields). She has no wheezes. She has no rhonchi. She has no rales.   Abdominal: Soft. Normal appearance and bowel sounds are normal. She exhibits distension. She exhibits no shifting dullness, no abdominal bruit and no ascites. There is no hepatosplenomegaly. There is no tenderness. There is no rigidity and no guarding.   Musculoskeletal: Normal range of motion. She exhibits no edema or tenderness.   Neurological: She is alert and oriented to person, place, and time. She has normal strength. She is not disoriented. No cranial nerve deficit or sensory deficit. GCS eye subscore is 4. GCS verbal subscore is 5. GCS motor subscore is 6.   Skin: Skin is warm, dry and intact. No abrasion and no lesion noted.   Psychiatric: She has a normal mood and affect. Her behavior is normal. Judgment and thought content normal. Her mood appears not anxious. Her speech is not slurred. She is not actively hallucinating. Cognition and memory are normal. She does not exhibit a depressed mood. She is attentive.     Significant Labs: All pertinent labs within the past 24 hours have been reviewed.    Significant Imaging: I have reviewed all pertinent imaging results/findings within the past 24 hours.

## 2019-03-13 NOTE — ASSESSMENT & PLAN NOTE
59 year old female with decompensated Hep C cirrhosis c/b PVT s/p thrombectomy with balloon angioplasty as well as TIPS who Hepatology is followinng for decompensated cirrhosis.Patient is now s/p TIPS revision X 2 with thoracentesis and initiation of anticoagulation. Patient needs to be monitored closely for signs of bleeding as well as infection s/p her procedures. Also we will have to monitor her MELD closely because if there is evidence of decompensation we will have to discuss liver transplant evaluation.    Recommendations:  --Daily CMP, CBC, and INR  --Strict I/O, renally dose medications, and avoid nephrotoxins  --Monitor for signs of bleeding   --Monitor for signs of infection  --For ARIANNE/volume expansion can try 5% albumin  --Continue lactulose (to be titrated to ~3 BM per day)  --Once ready for transfer should go to [facility]

## 2019-03-13 NOTE — H&P
[facility]  Critical Care Medicine  History & Physical    Patient Name: Michelle Pappas  MRN: [id number]  Admission Date: 3/7/2019  Hospital Length of Stay: 1 days  Code Status: Full Code  Attending Physician: Lawson Hare MD   Primary Care Provider: Andreea Walton MD   Principal Problem: Portal vein thrombosis    Subjective:     HPI:  Michelle Pappas is a 58 y/o F with medical history significant for HTN, HCV cirrhosis, recurrent hepatic hydrothorax, who presents to [facility] on 3/7 with one day history with symptoms of dyspnea at rest and on exertion, orthopnea, cough and bilateral LE edema and found to have hepatic hydrothorax. Of note, patient has history of recurrent hepatic hydrothorax requiring therapeutic paracentesis (4 since December - 12/3/18, 2/22/19, 3/1/19, and 3/5/19). She is s/p TIPS procedure in June 2018 with reduction of portosystemic gradient. A the time of the TIPS she had a mechanical thrombectomy and balloon angioplasty of a portal vein thrombosis with restoration of flow but residual clot was noted. She was placed on apixaban. During this admission, she again was found to have recurrent hydrothorax. She is s/p thoracentesis on 3/7 and 3/10 by pulmonary with removal of approximately 1.2L and 1.4L of pleural fluid removal. Of note, on US, complete thrombosis of the TIPS stent and partial thrombosis of the main portal vein, new since the most recent prior study with persistent partial thrombosis of the superior mesenteric vein. CT triple phase was demonstrable for rapid re-accumulation with mass effect on the heart. Patient was brought in for TIPS revision on 3/11 with recanalization per IR but was unable to achieve appreciable flow within the portal vein. Right IJ sheath was left in place and on 3/12, right IJ sheath was upsized to a 20Fr.  Multiple attempts at angioplasty, suction thrombectomy and mechanical thrombectomy resulted moderate improvement of flow within the  Portal vein and TIPS. The Sheath was subsequently removed and hemostasis was achieved with a purse string suture and manual compression. During procedure, patient received 2U PRBC. She tolerated procedure without complication, including post-procedure encephalopathy, acute bleeding, or hemodynamic compromise. MICU was consulted s/p TIPS revision for monitoring overnight, due to plan for heparinization 6 hours post procedure and concern for bleeding risks in cirrhotic patient.            Hospital/ICU Course:  No notes on file     Past Medical History:   Diagnosis Date    Anemia     Anxiety 2018    CAH (chronic active hepatitis)     Depression 2018    Encephalopathy     Essential hypertension 2018    Hepatic Hydrothorax 2018    Hypoalbuminemia 2018    Other cirrhosis of liver 2018    Pleural effusion     Thrombocytopenia 2018       Past Surgical History:   Procedure Laterality Date    breast reduction       SECTION      HYSTERECTOMY      TIPS N/A 2018    Performed by [facility]    TIPS N/A 2018    Performed by [facility]    Venogram N/A 2018    Performed by [facility] Provider at [facility] OR [address] [place] [address]       Review of patient's allergies indicates:   Allergen Reactions    Promethazine hcl Itching       Family History     None        Tobacco Use    Smoking status: Former Smoker     Types: Cigarettes     Last attempt to quit: 3/6/2018     Years since quittin.0    Smokeless tobacco: Never Used    Tobacco comment: quit 2018   Substance and Sexual Activity    Alcohol use: No    Drug use: No    Sexual activity: Not on file      Review of Systems   Constitutional: Positive for fatigue. Negative for chills and fever.   HENT: Negative for congestion, facial swelling, hearing loss and trouble swallowing.    Eyes: Negative for photophobia and visual disturbance.   Respiratory: Positive for shortness of breath (improved).  Negative for chest tightness and wheezing.    Cardiovascular: Negative for chest pain, palpitations and leg swelling.   Gastrointestinal: Positive for abdominal pain. Negative for blood in stool, constipation, diarrhea, nausea and vomiting.   Endocrine: Negative.    Genitourinary: Negative.    Musculoskeletal: Negative for back pain, joint swelling and myalgias.   Skin: Negative.    Allergic/Immunologic: Negative.    Neurological: Negative for dizziness, facial asymmetry, speech difficulty, weakness and numbness.   Hematological: Negative.    Psychiatric/Behavioral: Negative for agitation, confusion and dysphoric mood. The patient is not nervous/anxious.      Objective:     Vital Signs (Most Recent):  Temp: 97.5 °F (36.4 °C) (03/12/19 2240)  Pulse: 91 (03/12/19 2240)  Resp: 20 (03/12/19 2240)  BP: (!) 97/54 (03/12/19 2240)  SpO2: 99 % (03/12/19 2240) Vital Signs (24h Range):  Temp:  [97.5 °F (36.4 °C)-97.8 °F (36.6 °C)] 97.5 °F (36.4 °C)  Pulse:  [] 91  Resp:  [12-28] 20  SpO2:  [92 %-99 %] 99 %  BP: ()/(47-64) 97/54   Weight: 71.4 kg (157 lb 6.5 oz)  Body mass index is 27.88 kg/m².      Intake/Output Summary (Last 24 hours) at 3/13/2019 0004  Last data filed at 3/12/2019 2240  Gross per 24 hour   Intake 2019.67 ml   Output 1550 ml   Net 469.67 ml       Physical Exam   Constitutional: She is oriented to person, place, and time. She appears well-developed and well-nourished. No distress.   HENT:   Head: Normocephalic and atraumatic.   Mouth/Throat: No oropharyngeal exudate.   Eyes: EOM are normal. Pupils are equal, round, and reactive to light. No scleral icterus.   Neck: Normal range of motion. Neck supple. No JVD present.   RIJ with c/d/i bandage, no appreciable drainage around area     Oozing of serous liquid from RUQ at site of previous paracentesis.    Cardiovascular: Normal rate, regular rhythm and intact distal pulses.   Pulmonary/Chest: Effort normal and breath sounds normal. No respiratory  distress. She has no wheezes. She has no rales.   Decrease breath sounds on RLL   Abdominal: Soft. Bowel sounds are normal. She exhibits no distension. There is tenderness (Mild at RUQ and RLQ).   Musculoskeletal: Normal range of motion. She exhibits no edema.   Neurological: She is alert and oriented to person, place, and time.   Skin: Skin is warm and dry. Capillary refill takes less than 2 seconds. She is not diaphoretic. No erythema.       Vents:     Lines/Drains/Airways     Drain                 Sheath 03/11/19  Right 2 days          Peripheral Intravenous Line                 Peripheral IV - Single Lumen   Left Foot -- days         Peripheral IV - Single Lumen   Left Hand -- days         Peripheral IV - Single Lumen   Left Wrist -- days         Peripheral IV - Single Lumen 03/07/19 0850 Right Hand 5 days         Peripheral IV - Single Lumen 03/12/19 1311 Left Upper Arm less than 1 day              Significant Labs:    CBC/Anemia Profile:  Recent Labs   Lab 03/11/19  0424 03/12/19  0409 03/12/19  1701   WBC 2.34* 5.19  --    HGB 6.7* 8.8*  --    HCT 23.4* 30.7* 20*   PLT 28* 43*  --    MCV 80* 82  --    RDW 18.9* 18.9*  --         Chemistries:  Recent Labs   Lab 03/11/19  0424 03/12/19  0409   * 133*   K 4.1 4.8    100   CO2 25 25   BUN 20 20   CREATININE 1.3 1.2   CALCIUM 8.2* 9.0   ALBUMIN 1.7* 2.1*   PROT 4.6* 5.5*   BILITOT 0.5 3.6*   ALKPHOS 51* 69   ALT 13 29   AST 14 95*   MG 1.6 1.6   PHOS 3.8 4.9*       Coagulation:   Recent Labs   Lab 03/12/19  2319   INR 2.3*   APTT 39.2*         Assessment/Plan:     Pulmonary   Recurrent right pleural effusion    - See hepatic hydrothorax.     Hepatic Hydrothorax    - On admission, CXR: Large right pleural effusion with prominent compression atelectasis of right lung, remaining portions of right upper lobe and right middle lobe retained air.  satting 98% on RA    - Likely prompted by complete thrombosis of the TIPS stent and partial thrombosis of the  main portal vein and worsening portal hypertension  - S/p thoracentesis on 3/7 and 3/10 by pulmonary with removal of approximately 1.2L and 1.4L of pleural fluid removal. Effusions are transudative.    - Cont diuretics with lasix and aldactone: BP tolerating   - S/p TIPS revision with recanalization for portal vein thrombosis on 3/11. Due to incomplete thrombectomy, RIJ sheath was left in place for repeat procedure on 3/12 with improvement of flow.        Hematology   * Portal vein thrombosis    - Per US, complete thrombosis of the TIPS stent and partial thrombosis of the main portal vein and worsening portal hypertension.  - S/p TIPS revision with thrombectomy 3/11 and 3/12 by IR. Received 2U prbc during procedure on 3/12. No noted complications or acute bleeding.  - MICU consulted and accepted patient overnight from 3/12-3/13 due to bleeding risks.  - Per IR and hepatology, patient will need to be heparinized while in the hospital and transitioned to Eliquis upon discharge. Heparinization to begin 6 hours post procedure.  - Monitoring CBC q4h and observing for hemodynamic compromise.          Endocrine   CKD stage 3 due to type 2 diabetes mellitus    - Recent creatine baseline 1.2 to 1.4.  - Stable here.  - Avoid nephrotoxic agents.   - Daily CMP.     Diabetes    - Recent A1c 5.7.  - Placed on LDSSI here with ADA diet; add insulin prn.      GI   Decompensated HCV cirrhosis    MELD-Na score: 24 at 3/12/2019 11:19 PM  MELD score: 22 at 3/12/2019 11:19 PM  Calculated from:  Serum Creatinine: 1.2 mg/dL at 3/12/2019  4:09 AM  Serum Sodium: 133 mmol/L at 3/12/2019  4:09 AM  Total Bilirubin: 3.6 mg/dL at 3/12/2019  4:09 AM  INR(ratio): 2.3 at 3/12/2019 11:19 PM  Age: 59 years    - Likely prompted by portal vein thrombosis (see section).   - Continue midodrine 10 mg PO TID to maintain stable BP.  - Continue lactulose for HE prophylaxis.  - Continue furosemide 80 mg PO daily and spironolactone 200 mg PO daily for diuresis  and treatment of pleural effusion.  - Hepatology consulted; appreciate their recs.        S/P TIPS (transjugular intrahepatic portosystemic shunt)    - See portal vein thrombosis.      Other   Insomnia    - Continue trazodone 100 mg PO daily.           Critical secondary to Patient has a condition that poses threat to life and bodily function: Risk of hemorrhaging s/p TIPS     Critical care was time spent personally by me on the following activities: development of treatment plan with patient or surrogate and bedside caregivers, discussions with consultants, evaluation of patient's response to treatment, examination of patient, ordering and performing treatments and interventions, ordering and review of laboratory studies, ordering and review of radiographic studies, pulse oximetry, re-evaluation of patient's condition. This critical care time did not overlap with that of any other provider or involve time for any procedures.     Yuval Benoit MD  PGY-2 Internal Medicine  [phone]    Critical Care Medicine  [facility]

## 2019-03-13 NOTE — ASSESSMENT & PLAN NOTE
- Recent creatine baseline 1.2 to 1.4.  - Stable here.  - Avoid nephrotoxic agents.   - Daily CMP.

## 2019-03-13 NOTE — SUBJECTIVE & OBJECTIVE
Interval History:   Patient s/p second TIPS revision yesterday.   Reports shortness of breath this morning as well as serosangeous drainage from RUQ.    Current Facility-Administered Medications   Medication    0.9%  NaCl infusion (for blood administration)    0.9%  NaCl infusion (for blood administration)    acetaminophen tablet 325 mg    albumin human 25% bottle 12.5 g    albuterol-ipratropium 2.5 mg-0.5 mg/3 mL nebulizer solution 3 mL    bisacodyl suppository 10 mg    ciprofloxacin 250 mg/5 ml suspension 500 mg    clonazePAM tablet 0.5 mg    dextrose 50% injection 12.5 g    dextrose 50% injection 25 g    diphenhydrAMINE injection 25 mg    glucagon (human recombinant) injection 1 mg    glucose chewable tablet 16 g    glucose chewable tablet 24 g    heparin 25,000 units in dextrose 5% (100 units/ml) IV bolus from bag - ADDITIONAL PRN BOLUS - 30 units/kg    heparin 25,000 units in dextrose 5% (100 units/ml) IV bolus from bag - ADDITIONAL PRN BOLUS - 60 units/kg    heparin 25,000 units in dextrose 5% 250 mL (100 units/mL) infusion LOW INTENSITY nomogram - [facility]    heparin infusion 1,000 units/500 ml in 0.9% NaCl (pressure line flush)    heparin infusion 1,000 units/500 ml in 0.9% NaCl (pressure line flush)    HYDROcodone-acetaminophen 5-325 mg per tablet 1 tablet    insulin aspart U-100 pen 0-5 Units    lactulose 20 gram/30 mL solution Soln 15 g    midodrine tablet 15 mg    morphine injection 3 mg    multivitamin tablet 1 tablet    omnipaque oral solution 15 mL    ondansetron disintegrating tablet 8 mg    polyethylene glycol packet 17 g    ramelteon tablet 8 mg    rifAXIMin tablet 550 mg    sodium chloride 0.9% flush 3 mL    sodium chloride 0.9% flush 5 mL    traZODone tablet 100 mg     Facility-Administered Medications Ordered in Other Encounters   Medication    fentaNYL injection 50 mcg    midazolam (VERSED) 1 mg/mL injection 1 mg       Objective:     Vital Signs (Most  Recent):  Temp: 98.6 °F (37 °C) (03/13/19 1800)  Pulse: 99 (03/13/19 1800)  Resp: 20 (03/13/19 1800)  BP: (!) 82/46 (03/13/19 1800)  SpO2: (!) 92 % (03/13/19 1800) Vital Signs (24h Range):  Temp:  [97.3 °F (36.3 °C)-98.6 °F (37 °C)] 98.6 °F (37 °C)  Pulse:  [] 99  Resp:  [13-21] 20  SpO2:  [90 %-100 %] 92 %  BP: ()/(45-56) 82/46     Weight: 71.4 kg (157 lb 6.5 oz) (03/11/19 0600)  Body mass index is 27.88 kg/m².    Physical Exam   Constitutional: She is oriented to person, place, and time. She appears well-developed and well-nourished.   HENT:   Head: Normocephalic and atraumatic.   Eyes: No scleral icterus.   Cardiovascular: Normal rate and regular rhythm.   Pulmonary/Chest: Effort normal and breath sounds normal.   Abdominal: Soft. Bowel sounds are normal. She exhibits distension. There is no tenderness.   Musculoskeletal: She exhibits edema.   Neurological: She is alert and oriented to person, place, and time.       MELD-Na score: 23 at 3/13/2019  9:43 AM  MELD score: 20 at 3/13/2019  9:43 AM  Calculated from:  Serum Creatinine: 2.3 mg/dL at 3/13/2019  9:40 AM  Serum Sodium: 132 mmol/L at 3/13/2019  9:40 AM  Total Bilirubin: 1.3 mg/dL at 3/13/2019  9:40 AM  INR(ratio): 1.5 at 3/13/2019  9:43 AM  Age: 59 years    Significant Labs:  CBC:   Recent Labs   Lab 03/13/19  1353   WBC 14.27*   RBC 4.34   HGB 11.5*   HCT 35.8*   PLT 45*     CMP:   Recent Labs   Lab 03/13/19  0940   *   CALCIUM 8.4*   ALBUMIN 2.0*   PROT 4.8*   *   K 4.4   CO2 24   CL 99   BUN 30*   CREATININE 2.3*   ALKPHOS 64   ALT 28   AST 47*   BILITOT 1.3*     Coagulation:   Recent Labs   Lab 03/13/19  0940 03/13/19  0943   INR  --  1.5*   APTT 56.6*  --        Significant Imaging:  X-Ray: Reviewed  US: Reviewed  CT: Reviewed

## 2019-03-13 NOTE — ASSESSMENT & PLAN NOTE
- Per US, complete thrombosis of the TIPS stent and partial thrombosis of the main portal vein and worsening portal hypertension.  - S/p TIPS revision with thrombectomy 3/11 and 3/12 by IR. Received 2U prbc during procedure on 3/12. No noted complications or acute bleeding.  - MICU consulted and accepted patient overnight from 3/12-3/13 due to bleeding risks.  - Per IR and hepatology, patient will need to be heparinized while in the hospital and transitioned to Eliquis upon discharge. Heparinization to begin 6 hours post procedure.  - Monitoring CBC q4h and observing for hemodynamic compromise.

## 2019-03-13 NOTE — ANESTHESIA POSTPROCEDURE EVALUATION
"Anesthesia Post Evaluation    Patient: Michelle Pappas    Procedure(s) Performed: Procedure(s) (LRB):  DECLOTTING SMV (N/A)    Final Anesthesia Type: general  Patient location during evaluation: PACU  Patient participation: Yes- Able to Participate  Level of consciousness: awake and alert  Post-procedure vital signs: reviewed and stable  Pain management: adequate  Airway patency: patent  PONV status at discharge: No PONV  Anesthetic complications: no      Cardiovascular status: blood pressure returned to baseline  Respiratory status: unassisted  Hydration status: euvolemic  Follow-up not needed.        Visit Vitals  BP (!) 100/51   Pulse 89   Temp 37 °C (98.6 °F) (Temporal)   Resp 20   Ht 5' 3" (1.6 m)   Wt 71.4 kg (157 lb 6.5 oz)   SpO2 95%   Breastfeeding? No   BMI 27.88 kg/m²       Pain/Rajendra Score: Pain Rating Prior to Med Admin: 4 (3/13/2019  4:36 AM)  Pain Rating Post Med Admin: 3 (3/12/2019  6:30 AM)  Rajendra Score: 9 (3/13/2019  3:00 AM)        "

## 2019-03-13 NOTE — PROGRESS NOTES
[facility] Hospital Medicine  Progress Note    Patient Name: Michelle Pappas  MRN: [id number]  Patient Class: IP- Inpatient   Admission Date: 3/7/2019  Length of Stay: 0 days  Attending Physician: Lawson Hare MD  Primary Care Provider: Andreea Walton MD    [facility] Medicine Team: [facility] HOSP MED  Lawson Hare MD    Subjective:     Principal Problem:Hydrothorax    HPI:  History of Present Illness:  Patient is a 59 y.o. female who has a past medical history of cirrhosis of liver likely to be secondary to hepatitis C, hepatitis C treated with Harvoni, Hepatic Hydrothorax, Pleural effusion, s/p TIPS procedure in June 2018 with reduction of portosystemic gradient, Encephalopathy, Hypoalbuminemia, Thrombocytopenia, Anemia, Anxiety, Depression, Essential hypertension presented with shortness of breath at rest, and with minimal acitivity. Symptoms include constant cough, dyspnea on exertion, dyspnea when laying down and edema to bilateral LE. Symptoms began yesterday evening gradually worsening since that time. Patient denies sputum production, tightness in chest and wheezing. Patient states that she has had a thoracentesis on 3/1 and 2 L were removed and also had a thoracentesis on 3/5 and 2 L were removed. Brother at bedside and states that she was recently admitted to the hospital in [place] due to fluid buildup. Only other complaint is that her appetite has been decreased. Of note per hepatology, at the time of the TIPS she had a mechanical thrombectomy and balloon angioplasty of a portal vein thrombosis with restoration of flow. There was residual clot noted. She was placed on a blood thinner. On 8/13/18 she had a venogram: demonstrates brisk flow through the portal vein with no evidence of residual portal vein thrombosis as well as brisk flow through the superior mesenteric vein with no evidence of residual mesenteric venous thrombosis; the tip is widely patent.  The portosystemic gradient  is 9. Routine tips ultrasound is recommended in 3 months. She is off blood thinners. Patient currently denies any fever/chills, chest pain, dyspnea, weakness, numbness, abdominal pain, nausea/vomiting, dysuria/hematuria, or weight loss.     Hospital Course:  No notes on file    Interval History: Patient remained in PACU post TIPS procedure on 3/11 due to inability to fully cannulate lesion and sheath needing to remain in place. For second attempt today at re-cannulation. Reports some pain at site of sheath. H&H responded appropriately to transfuion.      Review of Systems   Constitutional: Positive for fatigue. Negative for chills and fever.   HENT: Negative for congestion, facial swelling, hearing loss and trouble swallowing.    Eyes: Negative for photophobia and visual disturbance.   Respiratory: Positive for shortness of breath (improved). Negative for chest tightness and wheezing.    Cardiovascular: Negative for chest pain, palpitations and leg swelling.   Gastrointestinal: Negative for abdominal pain, blood in stool, constipation, diarrhea, nausea and vomiting.   Endocrine: Negative.    Genitourinary: Negative.    Musculoskeletal: Negative for back pain, joint swelling and myalgias.        + pain as above    Skin: Negative.    Allergic/Immunologic: Negative.    Neurological: Negative for dizziness, facial asymmetry, speech difficulty, weakness and numbness.   Hematological: Negative.    Psychiatric/Behavioral: Negative for agitation, confusion and dysphoric mood. The patient is not nervous/anxious.      Objective:     Vital Signs (Most Recent):  Temp: 97.8 °F (36.6 °C) (03/12/19 1100)  Pulse: 90 (03/12/19 1840)  Resp: 20 (03/12/19 1840)  BP: (!) 94/50 (03/12/19 1840)  SpO2: 97 % (03/12/19 1840) Vital Signs (24h Range):  Temp:  [97.2 °F (36.2 °C)-98 °F (36.7 °C)] 97.8 °F (36.6 °C)  Pulse:  [] 90  Resp:  [12-28] 20  SpO2:  [92 %-100 %] 97 %  BP: ()/(49-66) 94/50     Weight: 71.4 kg (157 lb 6.5  oz)  Body mass index is 27.88 kg/m².    Intake/Output Summary (Last 24 hours) at 3/12/2019 1958  Last data filed at 3/12/2019 1734  Gross per 24 hour   Intake 2198 ml   Output 1625 ml   Net 573 ml      Physical Exam   Constitutional: She is oriented to person, place, and time. Vital signs are normal. She appears well-developed and well-nourished.  Non-toxic appearance. She does not appear ill. No distress.   Sitting up. Appears a bit fatigued.    Eyes: No scleral icterus.   Neck: Normal range of motion and full passive range of motion without pain. Neck supple. No JVD present. Carotid bruit is not present.   Cardiovascular: Normal rate, S1 normal, S2 normal and normal pulses. Exam reveals no gallop, no S3, no S4 and no friction rub.   No murmur heard.  Pulmonary/Chest: Effort normal. No accessory muscle usage. No tachypnea. No respiratory distress. She has decreased breath sounds (in the right lower fields). She has no wheezes. She has no rhonchi. She has no rales.   Abdominal: Soft. Normal appearance and bowel sounds are normal. She exhibits distension. She exhibits no shifting dullness, no abdominal bruit and no ascites. There is no hepatosplenomegaly. There is no tenderness. There is no rigidity and no guarding.   Musculoskeletal: Normal range of motion. She exhibits no edema or tenderness.   Neurological: She is alert and oriented to person, place, and time. She has normal strength. She is not disoriented. No cranial nerve deficit or sensory deficit. GCS eye subscore is 4. GCS verbal subscore is 5. GCS motor subscore is 6.   Skin: Skin is warm, dry and intact. No abrasion and no lesion noted.   Psychiatric: She has a normal mood and affect. Her behavior is normal. Judgment and thought content normal. Her mood appears not anxious. Her speech is not slurred. She is not actively hallucinating. Cognition and memory are normal. She does not exhibit a depressed mood. She is attentive.     Significant Labs: All  pertinent labs within the past 24 hours have been reviewed.    Significant Imaging: I have reviewed all pertinent imaging results/findings within the past 24 hours.         Assessment/Plan:      * Hepatic Hydrothorax    CXR: Large right pleural effusion with prominent compression atelectasis of right lung, remaining portions of right upper lobe and right middle lobe retained air.  satting 98% on RA  S/p thoracentesis on 3/7 and 3/10 by pulmonary with removal of approximately 1.2L and 1.4L of pleural fluid removal   Cont diuretics with lasix and aldactone: BP tolerating   Duo nebs as needed  continuous pulse ox  S/p TIPS   Liver sonogram: Complete thrombosis of the TIPS stent and partial thrombosis of the main portal vein, new since the most recent prior study.  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 Recommend further evaluation with triple phase CT and consult to interventional radiology.  To IR for recannulization of TIPS occlusion   Triple phase CT findings noted for rapid re-accumulation with mass effect on the heart; thoracentesis done as per above on 3/10     Severe malnutrition    Nutrition consult  Added MVI daily  Small frequent meals due to early satiety        Recurrent right pleural effusion    Plan as above.        CKD stage 3 due to type 2 diabetes mellitus    Lab Results   Component Value Date    CREATININE 1.3 03/11/2019     Sr Cr stable. Baseline 1.0-1.4  Cont to monitor with daily labs   Avoid nephrotoxins.   Renally dose all medications   Monitor events that may lead to decreased renal perfusion (hypovolemia, hypotension, sepsis).   Monitor urine output      Pancytopenia    Lab Results   Component Value Date    WBC 2.34 (L) 03/11/2019    HGB 6.7 (L) 03/11/2019    HCT 23.4 (L) 03/11/2019    MCV 80 (L) 03/11/2019    PLT 28 (LL) 03/11/2019     Secondary to underlying liver disease  WBCs improved  Platelet transfusion for TIPS today  PRBC transfusion given drop in H&H  Cont to  monitor       Hypotension    BP Readings from Last 3 Encounters:   03/11/19 (!) 109/53   08/16/18 104/60   08/14/18 (!) 107/53     Cont midodrine to treat  Cont to monitor.        Decompensated HCV cirrhosis    MELD-Na score: 11 at 3/10/2019  4:27 AM  MELD score: 11 at 3/10/2019  4:27 AM  Calculated from:  Serum Creatinine: 1.4 mg/dL at 3/10/2019  4:27 AM  Serum Sodium: 134 mmol/L at 3/10/2019  4:27 AM  Total Bilirubin: 0.6 mg/dL (Rounded to 1 mg/dL) at 3/10/2019  4:27 AM  INR(ratio): 1.1 at 3/10/2019  4:27 AM  Age: 59 years       Continue Lactulose and Rifaximin to prevent encephalopathy   Ascities associated with slightly decreased protein.   Continue Lasix and Aldactone at present doses.   Cont fluid and sodium restriction.   Consult hepatology. Appreciate assistance.      Insomnia    Cont Trazodone QHS       Diabetes    Lab Results   Component Value Date    HGBA1C 5.7 (H) 07/28/2018     Hold home oral agents- insulin is the preferred treatment for hyperglycemia  Low dose scheduled apart   Low dose correction scale   Cont blood glucose monitoring   ADA diet       VTE Risk Mitigation (From admission, onward)        Ordered     heparin infusion 1,000 units/500 ml in 0.9% NaCl (pressure line flush)  Continuous      03/11/19 2219     heparin infusion 1,000 units/500 ml in 0.9% NaCl (pressure line flush)  Continuous      03/11/19 2219     IP VTE LOW RISK PATIENT  Once      03/07/19 1035     Place ABIMAEL hose  Until discontinued      03/07/19 1035              Lawson Hare MD  Department of Hospital Medicine   [facility]

## 2019-03-13 NOTE — NURSING TRANSFER
Nursing Transfer Note      3/13/2019     Transfer To: [id number]    Transfer via bed    Transfer with continuous bedside monitoring per RN, 4L O2 per NC, patients dentures (in place), patient belongings (purse, smartphone, wallet, clothing)    Transported by RN x 2    Medicines sent: none    Chart send with patient: Yes    Notified: patient's brother via surgical texting system    Patient reassessed at: 3/13/2019 6:00 PM

## 2019-03-13 NOTE — SUBJECTIVE & OBJECTIVE
Past Medical History:   Diagnosis Date    Anemia     Anxiety 2018    CAH (chronic active hepatitis)     Depression 2018    Encephalopathy     Essential hypertension 2018    Hepatic Hydrothorax 2018    Hypoalbuminemia 2018    Other cirrhosis of liver 2018    Pleural effusion     Thrombocytopenia 2018       Past Surgical History:   Procedure Laterality Date    breast reduction       SECTION      HYSTERECTOMY      TIPS N/A 2018    Performed by [facility]    TIPS N/A 2018    Performed by [facility]    Venogram N/A 2018    Performed by [facility] Provider at [facility] OR [address] [place] [address]       Review of patient's allergies indicates:   Allergen Reactions    Promethazine hcl Itching       Family History     None        Tobacco Use    Smoking status: Former Smoker     Types: Cigarettes     Last attempt to quit: 3/6/2018     Years since quittin.0    Smokeless tobacco: Never Used    Tobacco comment: quit 2018   Substance and Sexual Activity    Alcohol use: No    Drug use: No    Sexual activity: Not on file      Review of Systems   Constitutional: Positive for fatigue. Negative for chills and fever.   HENT: Negative for congestion, facial swelling, hearing loss and trouble swallowing.    Eyes: Negative for photophobia and visual disturbance.   Respiratory: Positive for shortness of breath (improved). Negative for chest tightness and wheezing.    Cardiovascular: Negative for chest pain, palpitations and leg swelling.   Gastrointestinal: Positive for abdominal pain. Negative for blood in stool, constipation, diarrhea, nausea and vomiting.   Endocrine: Negative.    Genitourinary: Negative.    Musculoskeletal: Negative for back pain, joint swelling and myalgias.   Skin: Negative.    Allergic/Immunologic: Negative.    Neurological: Negative for dizziness, facial asymmetry, speech difficulty, weakness and numbness.    Hematological: Negative.    Psychiatric/Behavioral: Negative for agitation, confusion and dysphoric mood. The patient is not nervous/anxious.      Objective:     Vital Signs (Most Recent):  Temp: 97.5 °F (36.4 °C) (03/12/19 2240)  Pulse: 91 (03/12/19 2240)  Resp: 20 (03/12/19 2240)  BP: (!) 97/54 (03/12/19 2240)  SpO2: 99 % (03/12/19 2240) Vital Signs (24h Range):  Temp:  [97.5 °F (36.4 °C)-97.8 °F (36.6 °C)] 97.5 °F (36.4 °C)  Pulse:  [] 91  Resp:  [12-28] 20  SpO2:  [92 %-99 %] 99 %  BP: ()/(47-64) 97/54   Weight: 71.4 kg (157 lb 6.5 oz)  Body mass index is 27.88 kg/m².      Intake/Output Summary (Last 24 hours) at 3/13/2019 0004  Last data filed at 3/12/2019 2240  Gross per 24 hour   Intake 2019.67 ml   Output 1550 ml   Net 469.67 ml       Physical Exam   Constitutional: She is oriented to person, place, and time. She appears well-developed and well-nourished. No distress.   HENT:   Head: Normocephalic and atraumatic.   Mouth/Throat: No oropharyngeal exudate.   Eyes: EOM are normal. Pupils are equal, round, and reactive to light. No scleral icterus.   Neck: Normal range of motion. Neck supple. No JVD present.   RIJ with c/d/i bandage, no appreciable drainage around area     Oozing of serous liquid from RUQ at site of previous paracentesis.    Cardiovascular: Normal rate, regular rhythm and intact distal pulses.   Pulmonary/Chest: Effort normal and breath sounds normal. No respiratory distress. She has no wheezes. She has no rales.   Decrease breath sounds on RLL   Abdominal: Soft. Bowel sounds are normal. She exhibits no distension. There is tenderness (Mild at RUQ and RLQ).   Musculoskeletal: Normal range of motion. She exhibits no edema.   Neurological: She is alert and oriented to person, place, and time.   Skin: Skin is warm and dry. Capillary refill takes less than 2 seconds. She is not diaphoretic. No erythema.       Vents:     Lines/Drains/Airways     Drain                 Sheath 03/11/19   Right 2 days          Peripheral Intravenous Line                 Peripheral IV - Single Lumen   Left Foot -- days         Peripheral IV - Single Lumen   Left Hand -- days         Peripheral IV - Single Lumen   Left Wrist -- days         Peripheral IV - Single Lumen 03/07/19 0850 Right Hand 5 days         Peripheral IV - Single Lumen 03/12/19 1311 Left Upper Arm less than 1 day              Significant Labs:    CBC/Anemia Profile:  Recent Labs   Lab 03/11/19  0424 03/12/19  0409 03/12/19  1701   WBC 2.34* 5.19  --    HGB 6.7* 8.8*  --    HCT 23.4* 30.7* 20*   PLT 28* 43*  --    MCV 80* 82  --    RDW 18.9* 18.9*  --         Chemistries:  Recent Labs   Lab 03/11/19  0424 03/12/19  0409   * 133*   K 4.1 4.8    100   CO2 25 25   BUN 20 20   CREATININE 1.3 1.2   CALCIUM 8.2* 9.0   ALBUMIN 1.7* 2.1*   PROT 4.6* 5.5*   BILITOT 0.5 3.6*   ALKPHOS 51* 69   ALT 13 29   AST 14 95*   MG 1.6 1.6   PHOS 3.8 4.9*       Coagulation:   Recent Labs   Lab 03/12/19  2319   INR 2.3*   APTT 39.2*

## 2019-03-13 NOTE — CONSULTS
MICU assuming care of patient, see H&P dated same date.    Yuval Benoit MD  PGY-3 Internal Medicine  [phone]

## 2019-03-13 NOTE — TRANSFER OF CARE
"Anesthesia Transfer of Care Note    Patient: Michelle Pappas    Procedure(s) Performed: Procedure(s) (LRB):  DECLOTTING SMV (N/A)    Patient location: PACU    Anesthesia Type: general    Transport from OR: Transported from OR on 6-10 L/min O2 by face mask with adequate spontaneous ventilation    Post pain: adequate analgesia    Post assessment: no apparent anesthetic complications    Post vital signs: stable    Level of consciousness: awake, alert and oriented    Nausea/Vomiting: no nausea/vomiting    Complications: none    Transfer of care protocol was followed      Last vitals:   Visit Vitals  BP (!) 93/50   Pulse 85   Temp 36.6 °C (97.8 °F) (Oral)   Resp 20   Ht 5' 3" (1.6 m)   Wt 71.4 kg (157 lb 6.5 oz)   SpO2 99%   Breastfeeding? No   BMI 27.88 kg/m²     "

## 2019-03-13 NOTE — PROGRESS NOTES
"Patient assessed at beside this AM. She is doing well noting some tenderness around the R IJ access site.     Recent Labs   Lab 03/13/19  0943   INR 1.5*       Recent Labs   Lab 03/13/19  0943   WBC 9.43   HGB 9.3*   HCT 30.5*   MCV 82   PLT 49*      Recent Labs   Lab 03/13/19  0940   *   *   K 4.4   CL 99   CO2 24   BUN 30*   CREATININE 2.3*   CALCIUM 8.4*   MG 1.6   ALT 28   AST 47*   ALBUMIN 2.0*   BILITOT 1.3*     Vitals:    03/13/19 1000   BP: (!) 88/50   Pulse: 94   Resp: 17   Temp: 97.7 °F (36.5 °C)     A/P:  Sutures to right neck were removed this AM.    NO bleeding issues over night.    Patient ok to be discharged from IR standpoint once transitioned to Eliquis.    Will plan on US or repeat venogram within the month.    Sebas De Leon MD ([place])  Radiology PGY-5  [phone]    "

## 2019-03-13 NOTE — ASSESSMENT & PLAN NOTE
CXR: Large right pleural effusion with prominent compression atelectasis of right lung, remaining portions of right upper lobe and right middle lobe retained air.  satting 98% on RA  S/p thoracentesis on 3/7 and 3/10 by pulmonary with removal of approximately 1.2L and 1.4L of pleural fluid removal   Cont diuretics with lasix and aldactone: BP tolerating   Duo nebs as needed  continuous pulse ox  S/p TIPS   Liver sonogram: Complete thrombosis of the TIPS stent and partial thrombosis of the main portal vein, new since the most recent prior study.  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 Recommend further evaluation with triple phase CT and consult to interventional radiology.  To IR for recannulization of TIPS occlusion   Triple phase CT findings noted for rapid re-accumulation with mass effect on the heart; thoracentesis done as per above on 3/10

## 2019-03-13 NOTE — ANESTHESIA POSTPROCEDURE EVALUATION
"Anesthesia Post Evaluation    Patient: Michelle Pappas    Procedure(s) Performed: Procedure(s) (LRB):  Venogram (Bilateral)    Final Anesthesia Type: general  Patient location during evaluation: PACU  Patient participation: Yes- Able to Participate  Level of consciousness: awake and alert  Pain management: adequate  Airway patency: patent  PONV status at discharge: No PONV  Anesthetic complications: no      Cardiovascular status: blood pressure returned to baseline  Respiratory status: unassisted, spontaneous ventilation and nasal cannula  Hydration status: euvolemic  Follow-up not needed.        Visit Vitals  BP (!) 93/50   Pulse 85   Temp 36.6 °C (97.8 °F) (Oral)   Resp 20   Ht 5' 3" (1.6 m)   Wt 71.4 kg (157 lb 6.5 oz)   SpO2 99%   Breastfeeding? No   BMI 27.88 kg/m²       Pain/Rajendra Score: Pain Rating Prior to Med Admin: 7 (3/12/2019  8:20 PM)  Pain Rating Post Med Admin: 3 (3/12/2019  6:30 AM)  Rajendra Score: 10 (3/12/2019 11:20 AM)        "

## 2019-03-14 PROBLEM — J96.01 ACUTE RESPIRATORY FAILURE WITH HYPOXIA: Status: ACTIVE | Noted: 2019-01-01

## 2019-03-14 PROBLEM — N17.9 ACUTE RENAL FAILURE SUPERIMPOSED ON STAGE 3 CHRONIC KIDNEY DISEASE: Status: ACTIVE | Noted: 2018-01-01

## 2019-03-14 PROBLEM — R57.9 SHOCK, UNSPECIFIED: Status: ACTIVE | Noted: 2019-01-01

## 2019-03-14 NOTE — ASSESSMENT & PLAN NOTE
- Significant worsening respiratory status overnight with needs increasing from LFNC to HFNC; on 20L currently.  - Likely from recurrent right pleural effusion from hepatic hydrothorax.

## 2019-03-14 NOTE — PROGRESS NOTES
"  [facility]  Adult Nutrition  Consult Note    SUMMARY     Recommendations    1. Continue current Regular diet; add Renal diet restrictions if necessary.   2. If PO intake consistently <50%, add Novasource ONS to aid in caloric intake.   3. RD to monitor & follow-up.    Goals: PO intake >50%  Nutrition Goal Status: new  Communication of RD Recs: reviewed with RN    Reason for Assessment    Reason For Assessment: RD follow-up  Diagnosis: other (see comments)(hydrothorax, SOB)  Relevant Medical History: liver cirrhosis , Hep C, anemia, pl. effusion, depression, htn, TIPS -2018  Interdisciplinary Rounds: attended    General Information Comments: Pt getting liver US at time of visit. Per chart review & RN documentation, pt tolerating current regular diet w/ adequate PO intake. Thoracentesis done overnight. Unable to fully assess for malnutrition, RD will follow-up. Please note: Albumin/Prealbumin shouldn't be used as nutrition indicators. Both can be affected by inflammation and fluid status. Recommend obtaining CRP. Elevated CRP can cause albumin/prealbumin to be decreased.  Nutrition Discharge Planning: Adequate PO intake    Nutrition/Diet History    Patient Reported Diet/Restrictions/Preferences: diabetic diet  Typical Food/Fluid Intake: (-)  Spiritual, Cultural Beliefs, Worship Practices, Values that Affect Care: no  Food Allergies: NKFA  Factors Affecting Nutritional Intake: None identified at this time    Anthropometrics    Temp: 97.6 °F (36.4 °C)  Height Method: Stated  Height: 5' 3" (160 cm)  Height (inches): 63 in  Weight Method: Bed Scale  Weight: 73.5 kg (162 lb 0.6 oz)  Weight (lb): 162.04 lb  Ideal Body Weight (IBW), Female: 115 lb  % Ideal Body Weight, Female (lb): 140.9 lb  BMI (Calculated): 28.8  BMI Grade: 25 - 29.9 - overweight  Usual Body Weight (UBW), k kg(on 2018)  % Usual Body Weight: 100.64  % Weight Change From Usual Weight: 0.43 " %    Lab/Procedures/Meds    Pertinent Labs Reviewed: reviewed  Pertinent Labs Comments: Na 125, BUN 41, Creat 3.4, GFR 14.2, P 8  Pertinent Medications Reviewed: reviewed  Pertinent Medications Comments: Heparin, Levophed    Estimated/Assessed Needs    Weight Used For Calorie Calculations: 73.5 kg (162 lb 0.6 oz)     Energy Calorie Requirements (kcal): 1605 kcal/d  Energy Need Method: McLeod-St Jeor(1.25 PAL)     Protein Requirements: 74-88 g/d (1-1.2 g/kg)  Weight Used For Protein Calculations: 73.5 kg (162 lb 0.6 oz)     Estimated Fluid Requirement Method: other (see comments)(Per MD or 1 mL/kcal)     CHO Requirement: (50%)    Nutrition Prescription Ordered    Current Diet Order: Regular    Evaluation of Received Nutrient/Fluid Intake    Comments: LBM: 3/12    Tolerance: tolerating    Nutrition Risk    Level of Risk/Frequency of Follow-up: (1x/week)     Assessment and Plan    Nutrition Problem  Increased Na+ intakes     Related to (etiology):    food choices     Signs and Symptoms (as evidenced by):   SOB, fluid retention, edema     Interventions/Recommendations (treatment strategy):  Encourage Low Na_ food choices      Nutrition Diagnosis Status:   Continues      Monitor and Evaluation    Food and Nutrient Intake: energy intake, food and beverage intake  Food and Nutrient Adminstration: diet order  Knowledge/Beliefs/Attitudes: food and nutrition knowledge/skill, beliefs and attitudes  Physical Activity and Function: nutrition-related ADLs and IADLs  Anthropometric Measurements: weight, weight change  Biochemical Data, Medical Tests and Procedures: lipid profile, inflammatory profile, glucose/endocrine profile, gastrointestinal profile, electrolyte and renal panel  Nutrition-Focused Physical Findings: overall appearance     Nutrition Follow-Up    RD Follow-up?: Yes

## 2019-03-14 NOTE — PROGRESS NOTES
[facility]  Hepatology  Progress Note    Patient Name: Michelle Pappas  MRN: [id number]  Admission Date: 3/7/2019  Hospital Length of Stay: 2 days  Attending Provider: Onesimo Hope MD   Primary Care Physician: Andreea Walton MD  Principal Problem:Shock, unspecified    Subjective:     HPI: 59 year old female with decompensated Hep C cirrhosis c/b PVT s/p thrombectomy with balloon angioplasty as well as TIPS on who Hepatology is consulted for decompensated cirrhosis.    Patient followed in Hepatology clinic until August-2018 when she moved to [place]. She reports that starting in December she has had recurrent cough with shortness of breath which have required 4 thoracentesis (12/3/18, 19, 3/1/19, and 3/5/19). She now presents to [facility] for the same complaint and is status post a thoracentesis with removal of 1.2L in the ED. She is unsure if during her last admissions her TIPS was evaluated but does report being compliant with Na+/liquid restriction as well as with her diuretics. She denies any fever, chills, chest pain, nausea, emesis, abdominal pain, or changes in her urinary/bowel habits.    Interval History:  Rapid decompensation with multi-organ system failure overnight  On high flow, significant dyspnea, and borderline sats  On 2 pressors  AM labs with worsening Cr, LFT's and WBC    Review of Systems   Unable to perform ROS: Acuity of condition       Past Medical History:   Diagnosis Date    Anemia     Anxiety 2018    CAH (chronic active hepatitis)     Depression 2018    Encephalopathy     Essential hypertension 2018    Hepatic Hydrothorax 2018    Hypoalbuminemia 2018    Other cirrhosis of liver 2018    Pleural effusion     Thrombocytopenia 2018       Past Surgical History:   Procedure Laterality Date    breast reduction       SECTION      DECLOTTING [facility] N/A 3/12/2019    Performed by [facility] ANNA[facility]EL     HYSTERECTOMY      TIPS N/A 2018    Performed by [facility] at [facility] ANNABEL    TIPS N/A 2018    Performed by [facility]geon at [facility] ANNABEL    Venogram Bilateral 3/11/2019    Performed by [facility] Surgeon at [facility] ANNABEL    Venogram N/A 2018    Performed by [facility] Diagnostic Provider at [facility] OR [facility] FLR       Family history of liver disease: No    Review of patient's allergies indicates:   Allergen Reactions    Promethazine hcl Itching       Tobacco Use    Smoking status: Former Smoker     Types: Cigarettes     Last attempt to quit: 3/6/2018     Years since quittin.0    Smokeless tobacco: Never Used    Tobacco comment: quit 2018   Substance and Sexual Activity    Alcohol use: No    Drug use: No    Sexual activity: Not on file       Medications Prior to Admission   Medication Sig Dispense Refill Last Dose    clonazePAM (KLONOPIN) 1 MG tablet daily prn   3/6/2019    furosemide (LASIX) 80 MG tablet Take 1 tablet (80 mg total) by mouth 2 (two) times daily. 60 tablet 2 3/6/2019    glimepiride (AMARYL) 2 MG tablet Take 2 mg by mouth as needed.   3/6/2019    lactulose (CHRONULAC) 10 gram/15 mL solution Take 45 mLs (30 g total) by mouth 3 (three) times daily. Adjust to 3-4 bowel movements daily. Takes 30 ml TID   3/6/2019    midodrine (PROAMATINE) 10 MG tablet Take 1 tablet (10 mg total) by mouth 3 (three) times daily. 90 tablet 5 3/6/2019    POTASSIUM BROMIDE, BULK, MISC by Misc.(Non-Drug; Combo Route) route.   3/6/2019    spironolactone (ALDACTONE) 100 MG tablet Take 2 tablets (200 mg total) by mouth once daily. 60 tablet 2 3/6/2019    traZODone (DESYREL) 100 MG tablet Take 100 mg by mouth every evening.   3/6/2019    ciprofloxacin HCl (CIPRO) 500 MG tablet Take 1 tablet (500 mg total) by mouth once daily. 30 tablet 11 Taking    hyoscyamine (LEVSIN/SL) 0.125 mg Subl Take 1 tablet by mouth up to four times daily   Taking       Objective:     Vital Signs (Most Recent):  Temp: 97.7 °F (36.5 °C)  (03/14/19 1100)  Pulse: (!) 124 (03/14/19 1400)  Resp: (!) 28 (03/14/19 1400)  BP: (!) 99/59 (03/14/19 1400)  SpO2: (!) 90 % (03/14/19 1400) Vital Signs (24h Range):  Temp:  [97.4 °F (36.3 °C)-98.6 °F (37 °C)] 97.7 °F (36.5 °C)  Pulse:  [] 124  Resp:  [19-43] 28  SpO2:  [87 %-98 %] 90 %  BP: ()/(44-59) 99/59     Weight: 73.5 kg (162 lb 0.6 oz) (03/14/19 1000)  Body mass index is 28.7 kg/m².    Physical Exam   Constitutional: She is oriented to person, place, and time. She appears distressed.   HENT:   Head: Normocephalic and atraumatic.   Eyes: Scleral icterus is present.   Cardiovascular:   Sinus tachycardia   Pulmonary/Chest:   Decreased breath sounds at the based on high flow   Abdominal: Soft. Bowel sounds are normal. She exhibits distension. There is no tenderness.   Musculoskeletal: She exhibits edema.   Neurological: She is alert and oriented to person, place, and time.   Skin: She is not diaphoretic.   Nursing note and vitals reviewed.      MELD-Na score: 35 at 3/14/2019 11:26 AM  MELD score: 31 at 3/14/2019 11:26 AM  Calculated from:  Serum Creatinine: 3.8 mg/dL at 3/14/2019 11:26 AM  Serum Sodium: 123 mmol/L (Rounded to 125 mmol/L) at 3/14/2019 11:26 AM  Total Bilirubin: 3.3 mg/dL at 3/14/2019 11:26 AM  INR(ratio): 2 at 3/14/2019 11:26 AM  Age: 59 years    Significant Labs:  CBC:   Recent Labs   Lab 03/14/19  1126   WBC 35.28*   RBC 5.27   HGB 13.5   HCT 42.7   PLT 24*     CMP:   Recent Labs   Lab 03/14/19  1126   *   CALCIUM 8.7   ALBUMIN 2.3*   PROT 4.7*   *   K 5.1   CO2 18*   CL 93*   BUN 43*   CREATININE 3.8*   ALKPHOS 83   ALT 92*   *   BILITOT 3.3*     Coagulation:   Recent Labs   Lab 03/14/19  0615 03/14/19  1126   INR 1.9* 2.0*   APTT 64.1*  --        Significant Imaging:  X-Ray: Reviewed  US: Reviewed  CT: Reviewed    Assessment/Plan:     Decompensated HCV cirrhosis    59 year old female with decompensated Hep C cirrhosis c/b PVT s/p thrombectomy with balloon  angioplasty as well as TIPS who Hepatology is followinng for decompensated cirrhosis.Patient is now s/p TIPS revision X 2 with rapid decompensation and multi-organ system failure. Patient/brother have opted for comfort care as she had clearly discussed her wishes with him and did not want to be intubated.         Thank you for your consult. I will follow-up with patient. Please contact us if you have any additional questions.    Kena Guzmán M.D.  Gastroenterology Fellow, PGY-V  Pager: [phone]  [facility]

## 2019-03-14 NOTE — PLAN OF CARE
Problem: Adult Inpatient Plan of Care  Goal: Plan of Care Review  Outcome: Ongoing (interventions implemented as appropriate)  See vital signs and assessments in flowsheets. See below for updates on today's progress.     Pulmonary: Thoracentesis done overnight. ~1700ml serosanguineous fluid removed by MD with nurse at bedside. Patient currently on 25L 80% oxygen comfort flow. Lung sounds coarse, diminished. Pt with increased work of breathing/shortness of breath with any movement or exertion in the bed.    Cardiovascular: HR sinus tach. Pt on levophed gtt, titrating for MAP above 65. Currently utilizing cuff pressures.     Neurological: AAOx4. Afebrile.    Gastrointestinal: No BM this shift. Pt refused HS lactulose despite extensive education from nurse. Abdomen distended, rounded.    Genitourinary: Void x2 to bedpan, unmeasured. Does not tolerate purewick.    Integumentary/Other: Skin with scattered bruising, covered puncture sites from paracentesis, thoracentesis. No other breakdown. RIJ TLC placed, then exchanged for a shorter line. Still waiting on xray confirmation.    Infusions: Levo, heparin    Patient progressing towards goals as tolerated, plan of care communicated and reviewed with Michelle Pappas and family. All concerns addressed, questions answered. Will continue to monitor closely.

## 2019-03-14 NOTE — SUBJECTIVE & OBJECTIVE
Review of Systems   Constitutional: Positive for fatigue. Negative for chills and fever.   HENT: Negative for congestion, facial swelling, hearing loss and trouble swallowing.    Eyes: Negative for photophobia and visual disturbance.   Respiratory: Positive for shortness of breath (improved). Negative for chest tightness and wheezing.    Cardiovascular: Negative for chest pain, palpitations and leg swelling.   Gastrointestinal: Positive for abdominal pain. Negative for blood in stool, constipation, diarrhea, nausea and vomiting.   Endocrine: Negative.    Genitourinary: Negative.    Musculoskeletal: Negative for back pain, joint swelling and myalgias.   Skin: Negative.    Allergic/Immunologic: Negative.    Neurological: Negative for dizziness, facial asymmetry, speech difficulty, weakness and numbness.   Hematological: Negative.    Psychiatric/Behavioral: Negative for agitation, confusion and dysphoric mood. The patient is not nervous/anxious.      Objective:     Vital Signs (Most Recent):  Temp: 97.6 °F (36.4 °C) (03/14/19 0701)  Pulse: (!) 113 (03/14/19 0900)  Resp: (!) 24 (03/14/19 0900)  BP: (!) 98/55 (03/14/19 0900)  SpO2: (!) 94 % (03/14/19 0900) Vital Signs (24h Range):  Temp:  [97.3 °F (36.3 °C)-98.6 °F (37 °C)] 97.6 °F (36.4 °C)  Pulse:  [] 113  Resp:  [15-43] 24  SpO2:  [87 %-98 %] 94 %  BP: ()/(44-59) 98/55   Weight: 73.5 kg (162 lb 0.6 oz)  Body mass index is 28.7 kg/m².      Intake/Output Summary (Last 24 hours) at 3/14/2019 0929  Last data filed at 3/14/2019 0900  Gross per 24 hour   Intake 3368.58 ml   Output 350 ml   Net 3018.58 ml       Physical Exam   Constitutional: She is oriented to person, place, and time. She appears distressed.   Tired appearing women. AAOX4.   HENT:   Head: Normocephalic and atraumatic.   Eyes: No scleral icterus.   Neck: Neck supple. No JVD present.   RIJ catheter in place, c/d/i dressings in place.    Cardiovascular: Regular rhythm.   No murmur  heard.  Tachycardic    Pulmonary/Chest: No stridor. She has no wheezes. She has no rales.   On HFNC, with some use of accessory muscles.     Decreased breath sounds RLL   Abdominal: Soft. Bowel sounds are normal. She exhibits distension. There is no tenderness.   Neurological: She is alert and oriented to person, place, and time.   Skin: Skin is warm.       Vents:  Oxygen Concentration (%): 70 (03/14/19 0753)  Lines/Drains/Airways     Central Venous Catheter Line                 Percutaneous Central Line Insertion/Assessment - triple lumen  03/14/19 0603 right internal jugular less than 1 day          Peripheral Intravenous Line                 Peripheral IV - Single Lumen   Left Hand -- days         Peripheral IV - Single Lumen 03/12/19 1311 Left Upper Arm 1 day              Significant Labs:    CBC/Anemia Profile:  Recent Labs   Lab 03/13/19  1353 03/13/19  2045 03/14/19  0038 03/14/19  0615   WBC 14.27* 16.03* 23.36* 34.64*   HGB 11.5* 10.5* 11.7* 12.8   HCT 35.8* 34.0* 37.3 40.0   PLT 45* 34* 41*  --    MCV 83 85 83 82   RDW 18.4* 18.5* 19.3* 19.6*        Chemistries:  Recent Labs   Lab 03/13/19  0451 03/13/19  0940 03/14/19  0615   * 132* 125*   K 4.5 4.4 5.0    99 95   CO2 13* 24 20*   BUN 26* 30* 41*   CREATININE 1.7* 2.3* 3.4*   CALCIUM 7.0* 8.4* 8.6*   ALBUMIN 1.8* 2.0* 2.1*   PROT 4.1* 4.8* 4.4*   BILITOT 0.9 1.3* 3.9*   ALKPHOS 51* 64 78   ALT 21 28 70*   AST 47* 47* 132*   MG 1.5* 1.6 2.1   PHOS 5.4* 6.3* 8.0*       Lactic Acid:   Recent Labs   Lab 03/14/19  0038   LACTATE 2.5*

## 2019-03-14 NOTE — HOSPITAL COURSE
On the evening of 3/13, patient developed worsening respiratory distress and need for increasing supplemental oxygen (2L LFNC to 32L HFNC at 100%) as well as shock requiring RIJ placement and levophed (up to 0.5 by morning of 3/14). Therapeutic and diagnostic thoracentesis was performed that evening drawing 1.5L with improvement in supplemental oxygen (20L at 70%). 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 Otherwise, HR was elevated at 100-110s and patient remained afebrile. Renal function also worsened by the following morning (trends previous days to that day 1.2 -->2.3 -->3.4 on morning of 3/14). During the day on 3/14 patient with worsening pressor needs. Pt rapidly declined and family along with patient decided to switch care to comfort measures. Patient noted to be asystole on monitor around 1809

## 2019-03-14 NOTE — ASSESSMENT & PLAN NOTE
MELD-Na score: 34 at 3/14/2019  6:15 AM  MELD score: 30 at 3/14/2019  6:15 AM  Calculated from:  Serum Creatinine: 3.4 mg/dL at 3/14/2019  6:15 AM  Serum Sodium: 125 mmol/L at 3/14/2019  6:15 AM  Total Bilirubin: 3.9 mg/dL at 3/14/2019  6:15 AM  INR(ratio): 1.9 at 3/14/2019  6:15 AM  Age: 59 years    - Likely prompted by portal vein thrombosis (see section).   - Continue midodrine 10 mg PO TID  - Continue lactulose for HE prophylaxis.  - Holding furosemide and spironalactone due to albumin challenge for HRS.  - Hepatology consulted; appreciate their recs.

## 2019-03-14 NOTE — SIGNIFICANT EVENT
Death Note    Called to bedside by patient's nurse. Nursing supervisor notified. Family at bedside.  has been called and is also at bedside.    Patient is not responding to verbal or tactile stimuli. Patient does not have a pupillary or corneal reflex. Her pupils are fixed and dilated. No heart or breath sounds on auscultation. No respirations. No palpable pulses.     Time of death: 18:18    Cause of Death: Decompensated cirrhosis    Lisa Avendano MD  Internal Medicine, PGY-3  [phone]

## 2019-03-14 NOTE — PROGRESS NOTES
[facility]  Critical Care Medicine  Progress Note    Patient Name: Michelle Pappas  MRN: [id number]  Admission Date: 3/7/2019  Hospital Length of Stay: 2 days  Code Status: Full Code  Attending Provider: Onesimo Hope MD  Primary Care Provider: Andreea Walton MD   Principal Problem: Shock, unspecified    Subjective:     HPI:  Michelle Pappas is a 60 y/o F with medical history significant for HTN, HCV cirrhosis, recurrent hepatic hydrothorax, who presents to [facility] on 3/7 with one day history with symptoms of dyspnea at rest and on exertion, orthopnea, cough and bilateral LE edema and found to have hepatic hydrothorax. Of note, patient has history of recurrent hepatic hydrothorax requiring therapeutic paracentesis (4 since December - 12/3/18, 2/22/19, 3/1/19, and 3/5/19). She is s/p TIPS procedure in June 2018 with reduction of portosystemic gradient. A the time of the TIPS she had a mechanical thrombectomy and balloon angioplasty of a portal vein thrombosis with restoration of flow but residual clot was noted. She was placed on apixaban. During this admission, she again was found to have recurrent hydrothorax. She is s/p thoracentesis on 3/7 and 3/10 by pulmonary with removal of approximately 1.2L and 1.4L of pleural fluid removal. Of note, on US, complete thrombosis of the TIPS stent and partial thrombosis of the main portal vein, new since the most recent prior study with persistent partial thrombosis of the superior mesenteric vein. CT triple phase was demonstrable for rapid re-accumulation with mass effect on the heart. Patient was brought in for TIPS revision on 3/11 with recanalization per IR but was unable to achieve appreciable flow within the portal vein. Right IJ sheath was left in place and on 3/12, right IJ sheath was upsized to a 20Fr.  Multiple attempts at angioplasty, suction thrombectomy and mechanical thrombectomy resulted moderate improvement of flow within the Portal vein  and TIPS. The Sheath was subsequently removed and hemostasis was achieved with a purse string suture and manual compression. During procedure, patient received 2U PRBC. She tolerated procedure without complication, including post-procedure encephalopathy, acute bleeding, or hemodynamic compromise. MICU was consulted s/p TIPS revision for monitoring overnight, due to plan for heparinization 6 hours post procedure and concern for bleeding risks in cirrhotic patient.            Hospital/ICU Course:  On the evening of 3/13, patient developed worsening respiratory distress and need for increasing supplemental oxygen (2L LFNC to 32L HFNC at 100%) as well as shock requiring RIJ placement and levophed (up to 0.5 by morning of 3/14). Therapeutic and diagnostic thoracentesis was performed that evening drawing 1.5L with improvement in supplemental oxygen (20L at 70%). 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 Otherwise, HR was elevated at 100-110s and patient remained afebrile. Renal function also worsened by the following morning (trends previous days to that day 1.2 -->2.3 -->3.4)      Review of Systems   Constitutional: Positive for fatigue. Negative for chills and fever.   HENT: Negative for congestion, facial swelling, hearing loss and trouble swallowing.    Eyes: Negative for photophobia and visual disturbance.   Respiratory: Positive for shortness of breath (improved). Negative for chest tightness and wheezing.    Cardiovascular: Negative for chest pain, palpitations and leg swelling.   Gastrointestinal: Positive for abdominal pain. Negative for blood in stool, constipation, diarrhea, nausea and vomiting.   Endocrine: Negative.    Genitourinary: Negative.    Musculoskeletal: Negative for back pain, joint swelling and myalgias.   Skin: Negative.    Allergic/Immunologic: Negative.    Neurological: Negative for dizziness, facial asymmetry, speech difficulty, weakness and  numbness.   Hematological: Negative.    Psychiatric/Behavioral: Negative for agitation, confusion and dysphoric mood. The patient is not nervous/anxious.      Objective:     Vital Signs (Most Recent):  Temp: 97.6 °F (36.4 °C) (03/14/19 0701)  Pulse: (!) 113 (03/14/19 0900)  Resp: (!) 24 (03/14/19 0900)  BP: (!) 98/55 (03/14/19 0900)  SpO2: (!) 94 % (03/14/19 0900) Vital Signs (24h Range):  Temp:  [97.3 °F (36.3 °C)-98.6 °F (37 °C)] 97.6 °F (36.4 °C)  Pulse:  [] 113  Resp:  [15-43] 24  SpO2:  [87 %-98 %] 94 %  BP: ()/(44-59) 98/55   Weight: 73.5 kg (162 lb 0.6 oz)  Body mass index is 28.7 kg/m².      Intake/Output Summary (Last 24 hours) at 3/14/2019 0929  Last data filed at 3/14/2019 0900  Gross per 24 hour   Intake 3368.58 ml   Output 350 ml   Net 3018.58 ml       Physical Exam   Constitutional: She is oriented to person, place, and time. She appears distressed.   Tired appearing women. AAOX4.   HENT:   Head: Normocephalic and atraumatic.   Eyes: No scleral icterus.   Neck: Neck supple. No JVD present.   RIJ catheter in place, c/d/i dressings in place.    Cardiovascular: Regular rhythm.   No murmur heard.  Tachycardic    Pulmonary/Chest: No stridor. She has no wheezes. She has no rales.   On HFNC, with some use of accessory muscles.     Decreased breath sounds RLL   Abdominal: Soft. Bowel sounds are normal. She exhibits distension. There is no tenderness.   Neurological: She is alert and oriented to person, place, and time.   Skin: Skin is warm.       Vents:  Oxygen Concentration (%): 70 (03/14/19 0753)  Lines/Drains/Airways     Central Venous Catheter Line                 Percutaneous Central Line Insertion/Assessment - triple lumen  03/14/19 0603 right internal jugular less than 1 day          Peripheral Intravenous Line                 Peripheral IV - Single Lumen   Left Hand -- days         Peripheral IV - Single Lumen 03/12/19 1311 Left Upper Arm 1 day              Significant Labs:    CBC/Anemia  Profile:  Recent Labs   Lab 03/13/19  1353 03/13/19  2045 03/14/19  0038 03/14/19  0615   WBC 14.27* 16.03* 23.36* 34.64*   HGB 11.5* 10.5* 11.7* 12.8   HCT 35.8* 34.0* 37.3 40.0   PLT 45* 34* 41*  --    MCV 83 85 83 82   RDW 18.4* 18.5* 19.3* 19.6*        Chemistries:  Recent Labs   Lab 03/13/19  0451 03/13/19  0940 03/14/19  0615   * 132* 125*   K 4.5 4.4 5.0    99 95   CO2 13* 24 20*   BUN 26* 30* 41*   CREATININE 1.7* 2.3* 3.4*   CALCIUM 7.0* 8.4* 8.6*   ALBUMIN 1.8* 2.0* 2.1*   PROT 4.1* 4.8* 4.4*   BILITOT 0.9 1.3* 3.9*   ALKPHOS 51* 64 78   ALT 21 28 70*   AST 47* 47* 132*   MG 1.5* 1.6 2.1   PHOS 5.4* 6.3* 8.0*       Lactic Acid:   Recent Labs   Lab 03/14/19  0038   LACTATE 2.5*       ABG  Recent Labs   Lab 03/12/19  1701   PH 7.274*   PO2 70*   PCO2 55.2*   HCO3 25.5   BE -1     Assessment/Plan:     Pulmonary   Acute respiratory failure with hypoxia    - Significant worsening respiratory status overnight with needs increasing from LFNC to HFNC; on 20L currently.  - Likely from recurrent right pleural effusion from hepatic hydrothorax.     Recurrent right pleural effusion    - See hepatic hydrothorax.     Hepatic Hydrothorax    - On admission, CXR: Large right pleural effusion with prominent compression atelectasis of right lung, remaining portions of right upper lobe and right middle lobe retained air.  satting 98% on RA  - Likely prompted by complete thrombosis of the TIPS stent and partial thrombosis of the main portal vein and worsening portal hypertension  - S/p thoracentesis on 3/7 and 3/10 by pulmonary with removal of approximately 1.2L and 1.4L of pleural fluid removal. Effusions are transudative.    - Cont diuretics with lasix and aldactone: BP tolerating   - S/p TIPS revision with recanalization for portal vein thrombosis on 3/11. Due to incomplete thrombectomy, RIJ sheath was left in place for repeat procedure on 3/12 with improvement of flow.     - Repeat thoracentesis 3/14 -  transudative exudate. Evidence of accumulation on CXR after several hours.  - Repeat liver US with doppler ordered for 3/14 to assess patency of TIPS stent and evaluation of PVT.         Renal/   Acute renal failure superimposed on stage 3 chronic kidney disease    - Recent creatine baseline 1.2 to 1.4.  - Trends last 3 days were 1.7 to 2.3 to 3.4 this morning.  - Noted shock on evening of 3/13 (see section). Cause of acute insult likely pre-renal injury/hypoperfusion with subsequent ATN.   - Initial worsening possibly type I HRS; given albumin challenge and will continue now.  - Thompson placed, will monitor for UOP.   - Daily BMP        Hematology   Portal vein thrombosis    - Per US, complete thrombosis of the TIPS stent and partial thrombosis of the main portal vein and worsening portal hypertension.  - S/p TIPS revision with thrombectomy 3/11 and 3/12 by IR. Received 2U prbc during procedure on 3/12. No noted complications or acute bleeding.  - MICU consulted and accepted patient overnight from 3/12-3/13 due to bleeding risks.  - Per IR and hepatology, patient will need to be heparinized while in the hospital and transitioned to Eliquis upon discharge. Heparinization continuing 6 hours after procedure.    - Repeat liver US with doppler ordered for 3/14 to assess patency of TIPS stent and evaluation of PVT.       Endocrine   Diabetes    - Recent A1c 5.7.  - Placed on LDSSI here with ADA diet; add insulin prn.      GI   Decompensated HCV cirrhosis    MELD-Na score: 34 at 3/14/2019  6:15 AM  MELD score: 30 at 3/14/2019  6:15 AM  Calculated from:  Serum Creatinine: 3.4 mg/dL at 3/14/2019  6:15 AM  Serum Sodium: 125 mmol/L at 3/14/2019  6:15 AM  Total Bilirubin: 3.9 mg/dL at 3/14/2019  6:15 AM  INR(ratio): 1.9 at 3/14/2019  6:15 AM  Age: 59 years    - Likely prompted by portal vein thrombosis (see section).   - Continue midodrine 10 mg PO TID  - Continue lactulose for HE prophylaxis.  - Holding furosemide and  spironalactone due to albumin challenge for HRS.  - Hepatology consulted; appreciate their recs.        S/P TIPS (transjugular intrahepatic portosystemic shunt)    - See portal vein thrombosis.      Other   * Shock, unspecified    - Developing around 11pm evening of 3/13.  - Cause likely hypovolemic shock 2/2 to decompensated liver cirrhosis (possibly s/p TIPS) vs septic shock. Unlikely cardiogenic shock - patient has warm extremities and bedside echo with hyperdynamic cardiac function.  - Patient has had increasing WBC previously attributable to inflammation from TIPS. WBC trends 9 -->16-->23-->36 today. He has remained afebrile.  - Pericardial analysis suggestive of transudative effusion, unlikely pneumonia. UA from 3/13 unremarkable for UTI. Bedside paracentesis without appreciable pocket for safe ascites.  - Repeat UA to r/o UTI.   - Started on zosyn and vancomycin on 3/14 for treatment of septic shock.  - Liver enzymes have shown marginal worsening with AST/ALT at 132/70 and Tbili increasing to 3.9 today, although increases could be reflection s/p shock rather than decompensated cirrhosis.  - Continue pressors to maintain MAP>65 +zosyn/vanc. Remains on albumin challenge also for HRS.      Insomnia    - Continue trazodone 100 mg PO daily.      Critical secondary to Patient has a condition that poses threat to life and bodily function: Severe Respiratory Distress and Shock     Dispo: Patient's brother states he will be arriving today to discuss GOC with sister, who wishes he be present. Poor prognosis. Continue albumin challenge for ARIANNE and addition of zosyn/vanc to treat possible septic shock.     Yuval Benoit MD  PGY-3 Internal Medicine  [phone]    Critical Care Medicine  [facility]

## 2019-03-14 NOTE — ASSESSMENT & PLAN NOTE
59 year old female with decompensated Hep C cirrhosis c/b PVT s/p thrombectomy with balloon angioplasty as well as TIPS who Hepatology is followinng for decompensated cirrhosis.Patient is now s/p TIPS revision X 2 with rapid decompensation and multi-organ system failure. Patient/brother have opted for comfort care as she had clearly discussed her wishes with him and did not want to be intubated.

## 2019-03-14 NOTE — NURSING
Dr Benoit with critical care notified of patient increasing levophed requirements to keep MAP greater than 65, increasing oxygen requirements to keep O2 sat greater than 88%.

## 2019-03-14 NOTE — ASSESSMENT & PLAN NOTE
- On admission, CXR: Large right pleural effusion with prominent compression atelectasis of right lung, remaining portions of right upper lobe and right middle lobe retained air.  satting 98% on RA  - Likely prompted by complete thrombosis of the TIPS stent and partial thrombosis of the main portal vein and worsening portal hypertension  - S/p thoracentesis on 3/7 and 3/10 by pulmonary with removal of approximately 1.2L and 1.4L of pleural fluid removal. Effusions are transudative.    - Cont diuretics with lasix and aldactone: BP tolerating   - S/p TIPS revision with recanalization for portal vein thrombosis on 3/11. Due to incomplete thrombectomy, RIJ sheath was left in place for repeat procedure on 3/12 with improvement of flow.     - Repeat thoracentesis 3/14 - transudative exudate. Evidence of accumulation on CXR after several hours.  - Repeat liver US with doppler ordered for 3/14 to assess patency of TIPS stent and evaluation of PVT.

## 2019-03-14 NOTE — ASSESSMENT & PLAN NOTE
- Recent creatine baseline 1.2 to 1.4.  - Trends last 3 days were 1.7 to 2.3 to 3.4 this morning.  - Noted shock on evening of 3/13 (see section). Cause of acute insult likely pre-renal injury/hypoperfusion with subsequent ATN.   - Initial worsening possibly type I HRS; given albumin challenge and will continue now.  - Thompson placed, will monitor for UOP.   - Daily BMP

## 2019-03-14 NOTE — SUBJECTIVE & OBJECTIVE
Review of Systems   Unable to perform ROS: Acuity of condition       Past Medical History:   Diagnosis Date    Anemia     Anxiety 2018    CAH (chronic active hepatitis)     Depression 2018    Encephalopathy     Essential hypertension 2018    Hepatic Hydrothorax 2018    Hypoalbuminemia 2018    Other cirrhosis of liver 2018    Pleural effusion     Thrombocytopenia 2018       Past Surgical History:   Procedure Laterality Date    breast reduction       SECTION      DECLOTTING [facility] N/A 3/12/2019    Performed by [facility] at [facility]    HYSTERECTOMY      TIPS N/A 2018    Performed by [facility]EL    TIPS N/A 2018    Performed by [facility] at [facility]NA[facility]EL    Venogram Bilateral 3/11/2019    [facility]    Venogram N/A 2018    Performed by [facility] Diagnostic Provider at [facility] OR [facility]       Family history of liver disease: No    Review of patient's allergies indicates:   Allergen Reactions    Promethazine hcl Itching       Tobacco Use    Smoking status: Former Smoker     Types: Cigarettes     Last attempt to quit: 3/6/2018     Years since quittin.0    Smokeless tobacco: Never Used    Tobacco comment: quit 2018   Substance and Sexual Activity    Alcohol use: No    Drug use: No    Sexual activity: Not on file       Medications Prior to Admission   Medication Sig Dispense Refill Last Dose    clonazePAM (KLONOPIN) 1 MG tablet daily prn   3/6/2019    furosemide (LASIX) 80 MG tablet Take 1 tablet (80 mg total) by mouth 2 (two) times daily. 60 tablet 2 3/6/2019    glimepiride (AMARYL) 2 MG tablet Take 2 mg by mouth as needed.   3/6/2019    lactulose (CHRONULAC) 10 gram/15 mL solution Take 45 mLs (30 g total) by mouth 3 (three) times daily. Adjust to 3-4 bowel movements daily. Takes 30 ml TID   3/6/2019    midodrine (PROAMATINE) 10 MG tablet Take 1 tablet (10 mg total) by mouth 3 (three) times daily. 90  tablet 5 3/6/2019    POTASSIUM BROMIDE, BULK, MISC by Misc.(Non-Drug; Combo Route) route.   3/6/2019    spironolactone (ALDACTONE) 100 MG tablet Take 2 tablets (200 mg total) by mouth once daily. 60 tablet 2 3/6/2019    traZODone (DESYREL) 100 MG tablet Take 100 mg by mouth every evening.   3/6/2019    ciprofloxacin HCl (CIPRO) 500 MG tablet Take 1 tablet (500 mg total) by mouth once daily. 30 tablet 11 Taking    hyoscyamine (LEVSIN/SL) 0.125 mg Subl Take 1 tablet by mouth up to four times daily   Taking       Objective:     Vital Signs (Most Recent):  Temp: 97.7 °F (36.5 °C) (03/14/19 1100)  Pulse: (!) 124 (03/14/19 1400)  Resp: (!) 28 (03/14/19 1400)  BP: (!) 99/59 (03/14/19 1400)  SpO2: (!) 90 % (03/14/19 1400) Vital Signs (24h Range):  Temp:  [97.4 °F (36.3 °C)-98.6 °F (37 °C)] 97.7 °F (36.5 °C)  Pulse:  [] 124  Resp:  [19-43] 28  SpO2:  [87 %-98 %] 90 %  BP: ()/(44-59) 99/59     Weight: 73.5 kg (162 lb 0.6 oz) (03/14/19 1000)  Body mass index is 28.7 kg/m².    Physical Exam   Constitutional: She is oriented to person, place, and time. She appears distressed.   HENT:   Head: Normocephalic and atraumatic.   Eyes: Scleral icterus is present.   Cardiovascular:   Sinus tachycardia   Pulmonary/Chest:   Decreased breath sounds at the based on high flow   Abdominal: Soft. Bowel sounds are normal. She exhibits distension. There is no tenderness.   Musculoskeletal: She exhibits edema.   Neurological: She is alert and oriented to person, place, and time.   Skin: She is not diaphoretic.   Nursing note and vitals reviewed.      MELD-Na score: 35 at 3/14/2019 11:26 AM  MELD score: 31 at 3/14/2019 11:26 AM  Calculated from:  Serum Creatinine: 3.8 mg/dL at 3/14/2019 11:26 AM  Serum Sodium: 123 mmol/L (Rounded to 125 mmol/L) at 3/14/2019 11:26 AM  Total Bilirubin: 3.3 mg/dL at 3/14/2019 11:26 AM  INR(ratio): 2 at 3/14/2019 11:26 AM  Age: 59 years    Significant Labs:  CBC:   Recent Labs   Lab 03/14/19  1126    WBC 35.28*   RBC 5.27   HGB 13.5   HCT 42.7   PLT 24*     CMP:   Recent Labs   Lab 03/14/19  1126   *   CALCIUM 8.7   ALBUMIN 2.3*   PROT 4.7*   *   K 5.1   CO2 18*   CL 93*   BUN 43*   CREATININE 3.8*   ALKPHOS 83   ALT 92*   *   BILITOT 3.3*     Coagulation:   Recent Labs   Lab 03/14/19  0615 03/14/19  1126   INR 1.9* 2.0*   APTT 64.1*  --        Significant Imaging:  X-Ray: Reviewed  US: Reviewed  CT: Reviewed

## 2019-03-14 NOTE — ASSESSMENT & PLAN NOTE
- Developing around 11pm evening of 3/13.  - Cause likely hypovolemic shock 2/2 to decompensated liver cirrhosis (possibly s/p TIPS) vs septic shock. Unlikely cardiogenic shock - patient has warm extremities and bedside echo with hyperdynamic cardiac function.  - Patient has had increasing WBC previously attributable to inflammation from TIPS. WBC trends 9 -->16-->23-->36 today. He has remained afebrile.  - Pericardial analysis suggestive of transudative effusion, unlikely pneumonia. UA from 3/13 unremarkable for UTI. Bedside paracentesis without appreciable pocket for safe ascites.  - Repeat UA to r/o UTI.   - Started on zosyn and vancomycin on 3/14 for treatment of septic shock.  - Liver enzymes have shown marginal worsening with AST/ALT at 132/70 and Tbili increasing to 3.9 today, although increases could be reflection s/p shock rather than decompensated cirrhosis.  - Continue pressors to maintain MAP>65 +zosyn/vanc. Remains on albumin challenge also for HRS.

## 2019-03-14 NOTE — ASSESSMENT & PLAN NOTE
- Per US, complete thrombosis of the TIPS stent and partial thrombosis of the main portal vein and worsening portal hypertension.  - S/p TIPS revision with thrombectomy 3/11 and 3/12 by IR. Received 2U prbc during procedure on 3/12. No noted complications or acute bleeding.  - MICU consulted and accepted patient overnight from 3/12-3/13 due to bleeding risks.  - Per IR and hepatology, patient will need to be heparinized while in the hospital and transitioned to Eliquis upon discharge. Heparinization continuing 6 hours after procedure.    - Repeat liver US with doppler ordered for 3/14 to assess patency of TIPS stent and evaluation of PVT.

## 2019-03-14 NOTE — CARE UPDATE
Daughter arrived bedside and patient's brother (POA) is here as well. Decision made to transition to comfort care. Appropriate orders placed.       Lisa Avendano MD  Internal Medicine, PGY-3  [phone]

## 2019-03-15 LAB — BACTERIA FLD CULT: NORMAL

## 2019-03-15 NOTE — PROGRESS NOTES
Pt rapidly declined this AM and family along with patient decided to switch care to comfort measures. Pt noted to be asystole on monitor around 1809, CCS notified and Lisa Avendano MD came to bedside to pronounce pt. Time of death 1818. Family grieving at bedside, denied  services. Continued to provide emotional and supportive care to family during this time. Pt's belongings were taken home by family members at the bedside. Attempted to notify [facility] but was unsuccessful due to placement on hold for extended period of time. Paperwork passed to nightshift RN for completion.

## 2019-03-15 NOTE — PLAN OF CARE
03/15/19 0951   Final Note   Assessment Type Final Discharge Note   Anticipated Discharge Disposition    Hospital Follow Up  Appt(s) scheduled? No   Discharge plans and expectations educations in teach back method with documentation complete? No   Right Care Referral Info   Post Acute Recommendation Other  ( in medical facility)   Randee Elizabeth RN, BSN, CCM  Case Management  [facility]  Ext. [phone]

## 2019-03-15 NOTE — DISCHARGE SUMMARY
[facility]  Critical Care Medicine  Discharge Summary      Patient Name: Michelle Pappas  MRN: [id number]  Admission Date: 3/7/2019  Hospital Length of Stay: 2 days  Discharge Date and Time:  03/14/2019 10:09 PM  Attending Physician: No att. providers found   Discharging Provider: Sanjiv Benoit MD  Primary Care Provider: Andreea Walton MD  Reason for Admission: TIPS occlusion    HPI:   Michelle aPppas is a 60 y/o F with medical history significant for HTN, HCV cirrhosis, recurrent hepatic hydrothorax, who [place] [facility] [place] with one day history with symptoms of dyspnea at rest and on exertion, orthopnea, cough and bilateral LE edema and found to have hepatic hydrothorax. Of note, patient has history of recurrent hepatic hydrothorax requiring therapeutic paracentesis (4 since December - 12/3/18, 2/22/19, 3/1/19, and 3/5/19). She is s/p TIPS procedure in June 2018 with reduction of portosystemic gradient. A the time of the TIPS she had a mechanical thrombectomy and balloon angioplasty of a portal vein thrombosis with restoration of flow but residual clot was noted. She was placed on apixaban. During this admission, she again was found to have recurrent hydrothorax. She is s/p thoracentesis on 3/7 and 3/10 by pulmonary with removal of approximately 1.2L and 1.4L of pleural fluid removal. Of note, on US, complete thrombosis of the TIPS stent and partial thrombosis of the main portal vein, new since the most recent prior study with persistent partial thrombosis of the superior mesenteric vein. CT triple phase was demonstrable for rapid re-accumulation with mass effect on the heart. Patient was brought in for TIPS revision on 3/11 with recanalization per IR but was unable to achieve appreciable flow within the portal vein. Right IJ sheath was left in place and on 3/12, right IJ sheath was upsized to a 20Fr.  Multiple attempts at angioplasty, suction thrombectomy and mechanical thrombectomy  resulted moderate improvement of flow within the Portal vein and TIPS. The Sheath was subsequently removed and hemostasis was achieved with a purse string suture and manual compression. During procedure, patient received 2U PRBC. She tolerated procedure without complication, including post-procedure encephalopathy, acute bleeding, or hemodynamic compromise. MICU was consulted s/p TIPS revision for monitoring overnight, due to plan for heparinization 6 hours post procedure and concern for bleeding risks in cirrhotic patient.            Procedure(s) (LRB):  DECLOTTING SMV (N/A)    Indwelling Lines/Drains at Time of Discharge:   Lines/Drains/Airways     Central Venous Catheter Line                 Percutaneous Central Line Insertion/Assessment - triple lumen  03/14/19 0603 right internal jugular less than 1 day              Hospital Course:   On the evening of 3/13, patient developed worsening respiratory distress and need for increasing supplemental oxygen (2L LFNC to 32L HFNC at 100%) as well as shock requiring RIJ placement and levophed (up to 0.5 by morning of 3/14). Therapeutic and diagnostic thoracentesis was performed that evening drawing 1.5L with improvement in supplemental oxygen (20L at 70%). 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 Otherwise, HR was elevated at 100-110s and patient remained afebrile. Renal function also worsened by the following morning (trends previous days to that day 1.2 -->2.3 -->3.4 on morning of 3/14). During the day on 3/14 patient with worsening pressor needs. Pt rapidly declined and family along with patient decided to switch care to comfort measures. Patient noted to be asystole on monitor around 1809        Consults (From admission, onward)        Status Ordering Provider     Inpatient consult to Critical Care Medicine  Once     Provider:  (Not yet assigned)    Completed GEO GARCIA     Inpatient consult to Critical Care  Medicine  Once     Provider:  (Not yet assigned)    Completed GEO GARCIA     Inpatient consult to Hepatology  Once     Provider:  (Not yet assigned)    Completed JESÚS BUSTOS     Inpatient consult to Pulmonology  Once     Provider:  (Not yet assigned)    Completed TRISHA FLOOD     Inpatient consult to Registered Dietitian/Nutritionist  Once     Provider:  (Not yet assigned)    Completed TRISHA FLOOD     Inpatient consult to Social Work/Case Management  Once     Provider:  (Not yet assigned)    Acknowledged TRISHA FLOOD     IP consult to case management  Once     Provider:  (Not yet assigned)    Acknowledged TRISHA FLOOD        Significant Labs:  CMP:   Recent Labs   Lab 03/13/19  0940 03/14/19  0615 03/14/19  1126   * 125* 123*   K 4.4 5.0 5.1   CL 99 95 93*   CO2 24 20* 18*   * 285* 312*   BUN 30* 41* 43*   CREATININE 2.3* 3.4* 3.8*   CALCIUM 8.4* 8.6* 8.7   PROT 4.8* 4.4* 4.7*   ALBUMIN 2.0* 2.1* 2.3*   BILITOT 1.3* 3.9* 3.3*   ALKPHOS 64 78 83   AST 47* 132* 159*   ALT 28 70* 92*   ANIONGAP 9 10 12   EGFRNONAA 22.7* 14.2* 12.4*     Coagulation:   Recent Labs   Lab 03/14/19  0615 03/14/19  1126   INR 1.9* 2.0*   APTT 64.1*  --      Lactic Acid:   Recent Labs   Lab 03/14/19  0038 03/14/19  1126   LACTATE 2.5* 4.6*       Significant Imaging:  Imaging Results          US Liver with Doppler (Final result)     Abnormal  Result time 03/08/19 08:48:32    Final result by Jung Herrera MD (03/08/19 08:48:32)                 Impression:      1. 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  Recommend further evaluation with triple phase CT and consult to interventional radiology.  2. Cirrhosis and sequela of portal hypertension including splenomegaly, small volume ascites, collateral vessel formation.  3. Cholelithiasis.  4. Right pleural effusion.  5.  Stable complex cystic splenic lesion.  This report was flagged in Epic as abnormal.    Electronically signed by resident: Rivera Pang  Date:    03/08/2019  Time:    08:28    Electronically signed by: Jung Herrera MD  Date:    03/08/2019  Time:    08:48             Narrative:    EXAMINATION:  US LIVER WITH DOPPLER    CLINICAL HISTORY:  evaluate tips;    TECHNIQUE:  Complete abdominal ultrasound (including pancreas, liver, gallbladder, common bile duct, spleen, aorta, IVC, and kidneys) was performed.    Complete abdominal doppler exam was also performed.    COMPARISON:  Ultrasound 07/20/2018, 05/23/2018.    FINDINGS:  Liver: Normal in size, measuring 14.4 cm. Heterogeneous echotexture with nodular contour compatible with cirrhosis.  Stable cyst within the left hepatic lobe measuring 0.7 x 0.7 x 0.7 cm.    Gallbladder: Several non-mobile stones within the gallbladder measuring up to 0.7 cm.  Mild wall thickening to 0.4 cm, similar to prior studies and likely due to hepatic dysfunction.  No pericholecystic fluid.  No sonographic Arevaol's sign.    Biliary system: The common duct is not dilated, measuring 2 mm.  No intrahepatic ductal dilatation.    Spleen: Enlarged with a homogeneous echotexture, measuring 20.9 x 4.2 cm.  Contains a complex cystic structure measuring 1.8 x 2.0 x 1.8 cm, similar to prior studies.    Pancreas: The visualized portions of pancreas appear normal.    Inferior vena cava: Normal in appearance.    Miscellaneous: Small volume ascites.  Collateral vessel formation throughout the upper abdomen.    Main hepatic artery: Patent.    Main portal vein: Partially thrombosed with sluggish hepatopetal flow.    Left portal vein:  Patent with hepatopetal flow.    Right portal vein:  Anterior branch is patent with hepatopetal flow.    SMV:  Persistent partial thrombosis.    IVC: Patent    Left, middle, and right hepatic veins: Patent.    Umbilical vein: Not patent.    TIPS stent again identified and is now  thrombosed along its length, new since the most recent prior study.    Right-sided pleural effusion is present.                               X-Ray Chest PA And Lateral (Final result)  Result time 03/07/19 09:25:47    Final result by Frank Erickson MD (03/07/19 09:25:47)                 Impression:      Enlarging right pleural effusion now of large size with compression atelectasis right lung.      Electronically signed by: Frank Erickson MD  Date:    03/07/2019  Time:    09:25             Narrative:    EXAMINATION:  XR CHEST PA AND LATERAL    CLINICAL HISTORY:  Shortness of breath    TECHNIQUE:  PA and lateral views of the chest were performed.    COMPARISON:  07/30/2018    FINDINGS:  Large right pleural effusion with prominent compression atelectasis of right lung, remaining portions of right upper lobe and right middle lobe retained air.  Left lung clear.  Heart normal size.  Pulmonary vessels intact.  Single surgical clip right epigastric region stable.                                  Pending Diagnostic Studies:     Procedure Component Value Units Date/Time    IR TIPS Revision [[id number]] Resulted:  03/11/19 1716    Order Status:  Sent Lab Status:  In process Updated:  03/11/19 2115    IR Thrombectomy Veins Inc Thrombolysis and Fluoro [[id number]] Resulted:  03/12/19 1203    Order Status:  Sent Lab Status:  In process Updated:  03/12/19 1836    X-Ray Chest AP Portable [[id number]]     Order Status:  Sent Lab Status:  No result         Final Active Diagnoses:    Diagnosis Date Noted POA    PRINCIPAL PROBLEM:  Shock, unspecified [R57.9] 03/14/2019 Yes    Portal vein thrombosis [I81] 05/22/2018 Yes    S/P TIPS (transjugular intrahepatic portosystemic shunt) [Z95.828] 03/12/2019 Not Applicable    Hepatic Hydrothorax [J94.8] 05/01/2018 Yes    Acute respiratory failure with hypoxia [J96.01] 03/14/2019 Yes    Severe malnutrition [E43] 07/28/2018 Yes    Recurrent right pleural effusion [J90] 05/22/2018  Yes    Acute renal failure superimposed on stage 3 chronic kidney disease [N17.9, N18.3] 2018 Yes    Pancytopenia [D61.818] 2018 Yes    Decompensated HCV cirrhosis [B19.20, K74.69] 2018 Yes     Chronic    Insomnia [G47.00] 2018 Yes     Chronic    Diabetes [E11.9] 2018 Yes      Problems Resolved During this Admission:     Discharged Condition:     Disposition:        Yuval Benoit MD  PGY-3 Internal Medicine  [phone]    Critical Care Medicine  [facility]

## 2019-03-16 LAB
CHOLEST FLD-MCNC: 10 MG/DL
SPECIMEN SOURCE: NORMAL

## 2019-03-19 LAB
BACTERIA BLD CULT: NORMAL
BACTERIA FLD CULT: NORMAL

## 2019-04-09 LAB — FUNGUS SPEC CULT: NORMAL

## 2019-04-16 LAB — FUNGUS SPEC CULT: NORMAL

## 2019-05-09 LAB
ACID FAST MOD KINY STN SPEC: NORMAL
MYCOBACTERIUM SPEC QL CULT: NORMAL

## 2019-05-16 LAB
ACID FAST MOD KINY STN SPEC: NORMAL
MYCOBACTERIUM SPEC QL CULT: NORMAL

## 2022-02-25 NOTE — PLAN OF CARE
Dr. Aaliyah Guzman called and updated on pt condition. Reviewed SVE's with MD.  FHTs reassuring,  Pt was having reccurent variables, repositioning done and variables would resolve. Pt also noted having earlies. Ctx 3 min apart and IUPC in place. Pitocin @ 8  Niecy-unit. Redose was performed at the start of shift and patient beginning to hurt again at this time. Orders received. Problem: Patient Care Overview  Goal: Plan of Care Review  Outcome: Ongoing (interventions implemented as appropriate)  POC reviewed with pt. AAO x4. No changes throughout day.   Pt remained free from falls. Questions and concerns addressed. Pt progressing towards goals. Will continue to monitor. See flow sheets for full assessment and VS

## 2023-04-24 NOTE — TRANSFER OF CARE
"Anesthesia Transfer of Care Note    Patient: Michelle Pappas    Procedure(s) Performed: Procedure(s) (LRB):  Venogram (Bilateral)    Patient location: PACU    Anesthesia Type: general    Transport from OR: Transported from OR on 6-10 L/min O2 by face mask with adequate spontaneous ventilation    Post pain: adequate analgesia    Post assessment: no apparent anesthetic complications    Post vital signs: stable    Level of consciousness: awake, alert and oriented    Nausea/Vomiting: no nausea/vomiting    Complications: none    Transfer of care protocol was followed      Last vitals:   Visit Vitals  BP (!) 100/56   Pulse 82   Temp 36.8 °C (98.3 °F)   Resp 12   Ht 5' 3" (1.6 m)   Wt 71.4 kg (157 lb 6.5 oz)   SpO2 (!) 90%   Breastfeeding? No   BMI 27.88 kg/m²     " -- DO NOT REPLY / DO NOT REPLY ALL --  -- Message is from [employer] [facility] [employer] ([facility]) --    General Patient Message: Patient would like to know if Aranza Godfrey DO could write a prescription for plan B, please call back to advise      Alternative phone number: no    Can a detailed message be left? Yes    Message Turnaround:     Is it Working Hours? No - After Hours/Weekend/Holiday     Did the caller agree that this message can wait until the office reopens in the morning? YES - The Message Can Wait - Send a message to the provider's clinical support pool     [place]:    Please give this turnaround time to the caller:   \"This message will be sent to [state Provider's name]. The clinical team will fulfill your request as soon as they review your message.\"

## 2023-05-23 NOTE — PATIENT INSTRUCTIONS
Apixaban oral tablets  What is this medicine?  APIXABAN (a PIX a ban) is an anticoagulant (blood thinner). It is used to lower the chance of stroke in people with a medical condition called atrial fibrillation. It is also used to treat or prevent blood clots in the lungs or in the veins.  How should I use this medicine?  Take this medicine by mouth with a glass of water. Follow the directions on the prescription label. You can take it with or without food. If it upsets your stomach, take it with food. Take your medicine at regular intervals. Do not take it more often than directed. Do not stop taking except on your doctor's advice. Stopping this medicine may increase your risk of a blot clot. Be sure to refill your prescription before you run out of medicine.  Talk to your pediatrician regarding the use of this medicine in children. Special care may be needed.  What side effects may I notice from receiving this medicine?  Side effects that you should report to your doctor or health care professional as soon as possible:  · allergic reactions like skin rash, itching or hives, swelling of the face, lips, or tongue  · signs and symptoms of bleeding such as bloody or black, tarry stools; red or dark-brown urine; spitting up blood or brown material that looks like coffee grounds; red spots on the skin; unusual bruising or bleeding from the eye, gums, or nose  What may interact with this medicine?  This medicine may interact with the following:  · aspirin and aspirin-like medicines  · certain medicines for fungal infections like ketoconazole and itraconazole  · certain medicines for seizures like carbamazepine and phenytoin  · certain medicines that treat or prevent blood clots like warfarin, enoxaparin, and dalteparin  · clarithromycin  · NSAIDs, medicines for pain and inflammation, like ibuprofen or naproxen  · rifampin  · ritonavir  · Alburtis's wort  What if I miss a dose?  If you miss a dose, take it as soon as you  She has been seen and followed by podiatrist on tramadol, gabapentin and meloxicam per patient    Advised to follow with podiatrist  can. If it is almost time for your next dose, take only that dose. Do not take double or extra doses.  Where should I keep my medicine?  Keep out of the reach of children.  Store at room temperature between 20 and 25 degrees C (68 and 77 degrees F). Throw away any unused medicine after the expiration date.  What should I tell my health care provider before I take this medicine?  They need to know if you have any of these conditions:  · bleeding disorders  · bleeding in the brain  · blood in your stools (black or tarry stools) or if you have blood in your vomit  · history of stomach bleeding  · kidney disease  · liver disease  · mechanical heart valve  · an unusual or allergic reaction to apixaban, other medicines, foods, dyes, or preservatives  · pregnant or trying to get pregnant  · breast-feeding  What should I watch for while using this medicine?  Notify your doctor or health care professional and seek emergency treatment if you develop breathing problems; changes in vision; chest pain; severe, sudden headache; pain, swelling, warmth in the leg; trouble speaking; sudden numbness or weakness of the face, arm, or leg. These can be signs that your condition has gotten worse.  If you are going to have surgery, tell your doctor or health care professional that you are taking this medicine.  Tell your health care professional that you use this medicine before you have a spinal or epidural procedure. Sometimes people who take this medicine have bleeding problems around the spine when they have a spinal or epidural procedure. This bleeding is very rare. If you have a spinal or epidural procedure while on this medicine, call your health care professional immediately if you have back pain, numbness or tingling (especially in your legs and feet), muscle weakness, paralysis, or loss of bladder or bowel control.  Avoid sports and activities that might cause injury while you are using this medicine. Severe falls or injuries  can cause unseen bleeding. Be careful when using sharp tools or knives. Consider using an electric razor. Take special care brushing or flossing your teeth. Report any injuries, bruising, or red spots on the skin to your doctor or health care professional.  NOTE:This sheet is a summary. It may not cover all possible information. If you have questions about this medicine, talk to your doctor, pharmacist, or health care provider. Copyright© 2017 [employer]        Discharge Instructions for Cirrhosis of the Liver  You have been diagnosed with cirrhosis of the liver. This is a long-term (chronic) problem. It occurs when liver tissue is destroyed and replaced by scar tissue. Causes of cirrhosis include:  · Infection such as viral hepatitis  · Chronic alcoholism  · The bodys immune system attacks healthy cells (autoimmune disorders)  · Obesity  · Medicine side effects  · Genetic diseases  Sometimes the exact cause is unknown. You may not have any symptoms at first. Or your symptoms may be mild. But they usually get worse. Cirrhosis is likely to occur if you have a history of long-term alcohol abuse. Cirrhosis cant be cured. But it can be treated.   Home care  Alcohol  · People with liver disease should not drink alcohol. If you stop drinking, you may feel better and live longer.  · If you are a chronic alcohol user, you will have withdrawal symptoms. Talk with your healthcare provider for more information.    · If alcohol is a problem, ask your provider about medicine that can help you quit drinking.    · Find a local Alcoholics Anonymous support group online at www.aa.org.  Diet  · Ask your provider what kind of diet you should follow. You may be asked to limit or not eat certain foods. Do not limit your protein intake.  · Weigh yourself daily and keep a weight log. If you have a sudden change in weight, call your provider.  · Cut back on salt:  ¨ Limit canned, dried, packaged, and fast foods.  ¨ Dont add salt to  your food at the table.  ¨ Season foods with herbs instead of salt when you cook.  Medicines, supplements, and vaccines  · Take your medicines exactly as directed.  · Talk to your provider before taking vitamins, over-the-counter medicines, or herbal supplements. Many herbal supplements may be poisonous (toxic) to the liver.  · Avoid aspirin and other blood-thinning medicines.  · Discuss vitamin supplements and deficiencies with your provider.  · Ask your provider about getting vaccinations for viruses that can cause liver diseases.  Follow-up care  Follow up with your healthcare provider, or as advised. You will likely have the following tests:  · Lab tests  · Blood tests for liver cancer  · Ultrasound of your liver every 6 months  · Endoscopy to check for swollen veins (varices) in your digestive tract  When to call your provider  Call your healthcare provider right away if you have any of the following:  · Fever of 100.4°F (38.0°C) or higher  · Extreme tiredness (fatigue), weakness, or lack of appetite  · Vomiting (with or without blood)  · Yellowing of your skin or eyes (jaundice)  · Itching  · Swelling in your belly or legs  · Black or tarry stools  · Skin that bruises easily  · Confusion or trouble thinking clearly   Date Last Reviewed: 8/1/2016  © 5283-5633 [employer]. [address] [place] [address], [place] [address]. All rights reserved. This information is not intended as a substitute for professional medical care. Always follow your healthcare professional's instructions.        Discharge Instructions for Thoracentesis  Thoracentesis is a procedure that removes extra fluid (pleural effusion) from the pleural space. This space is between the outside surface of the lungs (pleura) and the chest wall. The procedure may be done to take a sample of the fluid for testing to help find the cause. Or it may be done to drain the extra fluid if you are having trouble breathing.  Home care  · You may have some  pain after the procedure. Your doctor can prescribe or recommend pain medicine for you to take at home, if needed. Take these exactly as directed. If you stopped taking other medicines before the procedure, ask your doctor when you can start them again.  · Take it easy for 48 hours after the procedure. Don't do anything active until your doctor says its OK.  · Don't do strenuous activities, such as lifting, until your doctor says its OK.  · You will have a small bandage over the puncture site. You may remove the bandage in 24 hours.  · Check the puncture site for the signs of infection listed below.  Follow-up  Make a follow-up appointment with your doctor as directed. During your follow-up visit, your doctor will check your healing. Be sure to let your doctor know how you are feeling.  When to call your doctor  Call your doctor right away if you have any of the following:  · Coughing up blood  · Chest pain. If chest pain suddenly gets worse, it may be an emergency.  · Shortness of breath  · Fever of 100.4°F (38°C) or higher, or as directed by your healthcare provider  · Pain that doesn't get better after taking pain medicine  · Signs of infection at the puncture site. These include increased pain, redness, swelling, or warmth.  · Fluid draining from the puncture site   Date Last Reviewed: 10/1/2016  © 5757-2093 [employer]. [address] [place] [address], [place] [address]. All rights reserved. This information is not intended as a substitute for professional medical care. Always follow your healthcare professional's instructions.

## 2025-03-28 NOTE — ASSESSMENT & PLAN NOTE
18-Apr-2025 57 y/o female with hx of HCV cirrhosis (complicated by prior SBP, ascites, hydrothorax) who was transferred to [facility] 5/4 for hepatology evaluation.  She is now s/p thoracentesis this admission on 5/5.    Will rule out other etiologies of recurrent effusion, but suspect related to hepatic cirrhosis.  We will also workup for TIPS and potentially consider this admission.  SAAG 1.8 on pleural fluid c/w portal HTN     Recs:  Echo for pre-tips eval  CT chest , rule out pulmonary process as cause of effusion  Follow up pleural fluid studies  Continue Diuresis with IV lasix and aldactone  Strict I/ O ; daily wt    Pending Echo and CT chest will consider TIPS during this admission.